# Patient Record
Sex: FEMALE | Race: WHITE | NOT HISPANIC OR LATINO | Employment: OTHER | URBAN - METROPOLITAN AREA
[De-identification: names, ages, dates, MRNs, and addresses within clinical notes are randomized per-mention and may not be internally consistent; named-entity substitution may affect disease eponyms.]

---

## 2015-02-27 LAB — HM COLONOSCOPY: NORMAL

## 2017-01-31 RX ORDER — CARVEDILOL 12.5 MG/1
TABLET ORAL
Qty: 180 TABLET | Refills: 3 | OUTPATIENT
Start: 2017-01-31

## 2017-02-03 RX ORDER — CARVEDILOL 12.5 MG/1
TABLET ORAL
Qty: 180 TABLET | Refills: 3 | Status: SHIPPED | OUTPATIENT
Start: 2017-02-03 | End: 2017-03-06 | Stop reason: DRUGHIGH

## 2017-03-03 RX ORDER — LOSARTAN POTASSIUM 50 MG/1
TABLET ORAL
Qty: 30 TABLET | Refills: 1 | Status: SHIPPED | OUTPATIENT
Start: 2017-03-03 | End: 2017-07-03 | Stop reason: SDUPTHER

## 2017-03-04 PROBLEM — R42 DIZZINESS: Status: ACTIVE | Noted: 2017-03-04

## 2017-03-04 PROBLEM — R11.0 NAUSEA: Status: ACTIVE | Noted: 2017-03-04

## 2017-03-04 PROBLEM — R00.1 SINUS BRADYCARDIA: Status: ACTIVE | Noted: 2017-03-04

## 2017-03-04 PROBLEM — I49.1 PREMATURE ATRIAL CONTRACTION: Status: ACTIVE | Noted: 2017-03-04

## 2017-03-04 PROBLEM — E07.9 DISORDER OF THYROID: Status: ACTIVE | Noted: 2017-03-04

## 2017-03-04 PROBLEM — I10 HYPERTENSION: Status: ACTIVE | Noted: 2017-03-04

## 2017-03-04 PROBLEM — R06.02 SHORTNESS OF BREATH: Status: ACTIVE | Noted: 2017-03-04

## 2017-03-04 PROBLEM — D64.9 ANEMIA: Status: ACTIVE | Noted: 2017-03-04

## 2017-03-04 PROBLEM — M81.0 OSTEOPOROSIS: Status: ACTIVE | Noted: 2017-03-04

## 2017-03-04 PROBLEM — M19.90 ARTHRITIS: Status: ACTIVE | Noted: 2017-03-04

## 2017-03-04 PROBLEM — K92.1 HEMATOCHEZIA: Status: ACTIVE | Noted: 2017-03-04

## 2017-03-04 PROBLEM — R10.32 LEFT LOWER QUADRANT PAIN: Status: ACTIVE | Noted: 2017-03-04

## 2017-03-04 PROBLEM — R53.83 FATIGUE: Status: ACTIVE | Noted: 2017-03-04

## 2017-03-04 PROBLEM — G47.30 SLEEP APNEA: Status: ACTIVE | Noted: 2017-03-04

## 2017-03-04 PROBLEM — G47.00 INSOMNIA: Status: ACTIVE | Noted: 2017-03-04

## 2017-03-04 PROBLEM — I49.3 VENTRICULAR PREMATURE BEATS: Status: ACTIVE | Noted: 2017-03-04

## 2017-03-04 PROBLEM — R94.31 ABNORMAL ELECTROCARDIOGRAM: Status: ACTIVE | Noted: 2017-03-04

## 2017-03-04 PROBLEM — K59.00 CONSTIPATION: Status: ACTIVE | Noted: 2017-03-04

## 2017-03-04 PROBLEM — R07.9 CHEST PAIN: Status: ACTIVE | Noted: 2017-03-04

## 2017-03-04 PROBLEM — K21.9 GASTROESOPHAGEAL REFLUX DISEASE WITHOUT ESOPHAGITIS: Status: ACTIVE | Noted: 2017-03-04

## 2017-03-04 PROBLEM — E78.5 DYSLIPIDEMIA: Status: ACTIVE | Noted: 2017-03-04

## 2017-03-04 PROBLEM — K52.9 NON-SPECIFIC COLITIS: Status: ACTIVE | Noted: 2017-03-04

## 2017-03-06 ENCOUNTER — OFFICE VISIT (OUTPATIENT)
Dept: INTERNAL MEDICINE | Facility: CLINIC | Age: 77
End: 2017-03-06

## 2017-03-06 VITALS
BODY MASS INDEX: 26.2 KG/M2 | HEIGHT: 66 IN | HEART RATE: 72 BPM | DIASTOLIC BLOOD PRESSURE: 92 MMHG | RESPIRATION RATE: 16 BRPM | WEIGHT: 163 LBS | SYSTOLIC BLOOD PRESSURE: 166 MMHG | TEMPERATURE: 97.6 F

## 2017-03-06 DIAGNOSIS — E78.5 DYSLIPIDEMIA: ICD-10-CM

## 2017-03-06 DIAGNOSIS — K21.9 GASTROESOPHAGEAL REFLUX DISEASE WITHOUT ESOPHAGITIS: ICD-10-CM

## 2017-03-06 DIAGNOSIS — M81.0 OSTEOPOROSIS: ICD-10-CM

## 2017-03-06 DIAGNOSIS — I10 ESSENTIAL HYPERTENSION: Primary | ICD-10-CM

## 2017-03-06 PROCEDURE — 99214 OFFICE O/P EST MOD 30 MIN: CPT | Performed by: FAMILY MEDICINE

## 2017-03-06 PROCEDURE — G0439 PPPS, SUBSEQ VISIT: HCPCS | Performed by: FAMILY MEDICINE

## 2017-03-06 RX ORDER — CARVEDILOL 25 MG/1
25 TABLET ORAL 2 TIMES DAILY WITH MEALS
COMMUNITY
End: 2017-09-07 | Stop reason: DRUGHIGH

## 2017-03-06 NOTE — PATIENT INSTRUCTIONS
Medicare Wellness  Personal Prevention Plan of Service     Date of Office Visit:  2017  Encounter Provider:  Jarred Carrizales Jr., MD  Place of Service:  [facility]  Patient Name: Moriah Petty  :  1940    As part of the Medicare Wellness portion of your visit today, we are providing you with this personalized preventive plan of services (PPPS). This plan is based upon recommendations of the [place] Preventive Services Task Force (USPSTF) and the Advisory Committee on Immunization Practices (ACIP).    This lists the preventive care services that should be considered, and provides dates of when you are due. Items listed as completed are up-to-date and do not require any further intervention.    Health Maintenance   Topic Date Due   • TDAP/TD VACCINES (1 - Tdap) 1958   • PNEUMOCOCCAL VACCINES (65+ LOW/MEDIUM RISK) (1 of 2 - PCV13) 2004   • ZOSTER VACCINE  2016   • DXA SCAN  2016   • MEDICARE ANNUAL WELLNESS  2016   • INFLUENZA VACCINE  Completed         Patient Self-Management and Personalized Health Advice  The patient has been provided with information about: prevention of cardiac or vascular disease and preventive services including:   · Counseling for cardiovascular disease risk reduction.    Visit Diagnoses:  No diagnosis found.    No orders of the defined types were placed in this encounter.      Outpatient Encounter Prescriptions as of 3/6/2017   Medication Sig Dispense Refill   • Aspirin (ASPIR-81 PO) Take  by mouth.     • Calcium Carb-Cholecalciferol (CALCIUM + D3) 600-200 MG-UNIT tablet Take  by mouth Daily.     • carvedilol (COREG) 25 MG tablet Take 25 mg by mouth 2 (Two) Times a Day With Meals.     • denosumab (PROLIA) 60 MG/ML solution syringe Inject  under the skin 1 (One) Time.     • losartan (COZAAR) 50 MG tablet TAKE 1 TABLET BY MOUTH EVERY DAY 30 tablet 1   • Multiple Vitamin (MULTI VITAMIN DAILY PO) Take  by mouth  Daily.     • Naproxen Sodium (ALEVE PO) Take  by mouth.     • Omega-3 Fatty Acids (FISH OIL) 1200 MG capsule capsule Take  by mouth.     • omeprazole (priLOSEC) 20 MG capsule Take  by mouth.     • [DISCONTINUED] amoxicillin (AMOXIL) 500 MG capsule Take 2 capsules by mouth 2 (Two) Times a Day. 20 capsule 0   • [DISCONTINUED] carvedilol (COREG) 12.5 MG tablet TAKE 1/2 TABLET BY MOUTH TWICE DAILY FOR 2 WKS, THEN 1 TABLET BY MOUTH TWICE DAILY 180 tablet 3     No facility-administered encounter medications on file as of 3/6/2017.        Reviewed use of high risk medication in the elderly: no  Reviewed for potential of harmful drug interactions in the elderly: no    Follow Up:  No Follow-up on file.     An After Visit Summary and PPPS with all of these plans were given to the patient.

## 2017-03-06 NOTE — PROGRESS NOTES
Subjective   Moriah Petty is a 76 y.o. female.     Chief Complaint   Patient presents with   • Hypertension   • Hyperlipidemia   • GI Problem         History of Present Illness   Patient is here as a new patient also follow-up of chronic disease management hypertension question of dyslipidemia also history of reflux esophagitis.  She is followed also osteoporosis with probably a injections and follows up with Dr. Myles [facility].      The following portions of the patient's history were reviewed and updated as appropriate: allergies, current medications, past social history and problem list.    Review of Systems   Constitutional: Negative.    HENT: Negative.    Eyes: Negative.    Respiratory: Negative.    Cardiovascular: Negative.    Gastrointestinal: Negative.    Endocrine: Negative.    Genitourinary: Negative.    Musculoskeletal: Negative.    Skin: Negative.    Allergic/Immunologic: Negative.    Neurological: Negative.    Hematological: Negative.    Psychiatric/Behavioral: Negative.        Objective   Vitals:    03/06/17 1704   BP: 166/92   Pulse: 72   Resp: 16   Temp: 97.6 °F (36.4 °C)     Physical Exam   Constitutional: She is oriented to person, place, and time. She appears well-developed and well-nourished.   HENT:   Head: Normocephalic and atraumatic.   Right Ear: Tympanic membrane and external ear normal.   Left Ear: Tympanic membrane and external ear normal.   Nose: Nose normal.   Mouth/Throat: Oropharynx is clear and moist.   Eyes: Conjunctivae and EOM are normal. Pupils are equal, round, and reactive to light.   Neck: Normal range of motion. Neck supple. No JVD present. No thyromegaly present.   Cardiovascular: Normal rate, regular rhythm, normal heart sounds and intact distal pulses.    Pulmonary/Chest: Effort normal and breath sounds normal.   Abdominal: Soft. Bowel sounds are normal.   Musculoskeletal: Normal range of motion.   Lymphadenopathy:     She has no cervical adenopathy.   Neurological: She is  alert and oriented to person, place, and time. No cranial nerve deficit. Coordination normal.   Skin: Skin is warm and dry. No rash noted.   Psychiatric: She has a normal mood and affect. Her behavior is normal. Judgment and thought content normal.   Vitals reviewed.      Assessment/Plan   Problem List Items Addressed This Visit        Cardiovascular and Mediastinum    Hypertension - Primary    Relevant Medications    carvedilol (COREG) 25 MG tablet       Digestive    Gastroesophageal reflux disease without esophagitis       Musculoskeletal and Integument    Osteoporosis       Other    Dyslipidemia       plan: No changes medications history reviewed call for refills recheck in 6 months sooner if needed.

## 2017-03-06 NOTE — PROGRESS NOTES
QUICK REFERENCE INFORMATION:  The ABCs of the Annual Wellness Visit    Subsequent Medicare Wellness Visit    HEALTH RISK ASSESSMENT    1940    Recent Hospitalizations:  No recent hospitalization(s)..        Current Medical Providers:  Patient Care Team:  Jarred Carrizales Jr., MD as PCP - General (Family Medicine)        Smoking Status:  History   Smoking Status   • Former Smoker   • Packs/day: 0.50   • Years: 30.00   • Types: Cigarettes   • Quit date: 5/6/1989   Smokeless Tobacco   • Never Used       Alcohol Consumption:  History   Alcohol Use   • 4.2 oz/week   • 7 Shots of liquor per week     Comment: 2 DRINKS PER DAY       Depression Screen:   PHQ-9 Depression Screening 3/6/2017   Little interest or pleasure in doing things 0   Feeling down, depressed, or hopeless 0   Trouble falling or staying asleep, or sleeping too much 0   Feeling tired or having little energy 0   Poor appetite or overeating 0   Feeling bad about yourself - or that you are a failure or have let yourself or your family down 0   Trouble concentrating on things, such as reading the newspaper or watching television 0   Moving or speaking so slowly that other people could have noticed. Or the opposite - being so fidgety or restless that you have been moving around a lot more than usual 0   Thoughts that you would be better off dead, or of hurting yourself in some way 0   PHQ-9 Total Score 0   If you checked off any problems, how difficult have these problems made it for you to do your work, take care of things at home, or get along with other people? Not difficult at all       Health Habits and Functional and Cognitive Screening:  Functional & Cognitive Status 3/6/2017   Do you have difficulty preparing food and eating? No   Do you have difficulty bathing yourself? No   Do you have difficulty getting dressed? No   Do you have difficulty using the toilet? No   Do you have difficulty moving around from place to place? No   In the past year have you  fallen or experienced a near fall? No   Do you need help using the phone?  No   Are you deaf or do you have serious difficulty hearing?  No   Do you need help with transportation? No   Do you need help shopping? No   Do you need help preparing meals?  No   Do you need help with housework?  No   Do you need help with laundry? No   Do you need help taking your medications? No   Do you need help managing money? No   Do you have difficulty concentrating, remembering or making decisions? No              Does the patient have evidence of cognitive impairment? No    Asprin use counseling:yes      Recent Lab Results:  CMP:  Lab Results   Component Value Date    BUN 15 01/28/2015    CREATININE 0.72 01/28/2015     01/28/2015    K 4.2 01/28/2015    CO2 27 01/28/2015    CALCIUM 10.1 01/28/2015    ALBUMIN 4.7 01/28/2015    BILITOT 0.3 01/28/2015    ALKPHOS 72 01/28/2015    AST 17 01/28/2015    ALT 14 01/28/2015     Lipid Panel:  Lab Results   Component Value Date    CHLPL 234 (H) 01/28/2015    TRIG 88 01/28/2015    HDL 78 (H) 01/28/2015     (H) 01/28/2015     LDL:     HbA1c:     Urine Microalbumin:     Visual Acuity:  No exam data present    Age-appropriate Screening Schedule:  Refer to the list below for future screening recommendations based on patient's age, sex and/or medical conditions. Orders for these recommended tests are listed in the plan section. The patient has been provided with a written plan.    Health Maintenance   Topic Date Due   • TDAP/TD VACCINES (1 - Tdap) 09/18/1958   • PNEUMOCOCCAL VACCINES (65+ LOW/MEDIUM RISK) (1 of 2 - PCV13) 09/18/2004   • ZOSTER VACCINE  11/29/2016   • DXA SCAN  12/19/2016   • INFLUENZA VACCINE  Completed        Subjective   History of Present Illness    Moriah Petty is a 76 y.o. female who presents for an Subsequent Wellness Visit.    The following portions of the patient's history were reviewed and updated as appropriate: allergies, current medications, past family  history, past medical history, past social history, past surgical history and problem list.    Outpatient Medications Prior to Visit   Medication Sig Dispense Refill   • Aspirin (ASPIR-81 PO) Take  by mouth.     • Calcium Carb-Cholecalciferol (CALCIUM + D3) 600-200 MG-UNIT tablet Take  by mouth Daily.     • losartan (COZAAR) 50 MG tablet TAKE 1 TABLET BY MOUTH EVERY DAY 30 tablet 1   • Multiple Vitamin (MULTI VITAMIN DAILY PO) Take  by mouth Daily.     • Omega-3 Fatty Acids (FISH OIL) 1200 MG capsule capsule Take  by mouth.     • omeprazole (priLOSEC) 20 MG capsule Take  by mouth.     • amoxicillin (AMOXIL) 500 MG capsule Take 2 capsules by mouth 2 (Two) Times a Day. 20 capsule 0   • carvedilol (COREG) 12.5 MG tablet TAKE 1/2 TABLET BY MOUTH TWICE DAILY FOR 2 WKS, THEN 1 TABLET BY MOUTH TWICE DAILY 180 tablet 3     No facility-administered medications prior to visit.        Patient Active Problem List   Diagnosis   • Abnormal electrocardiogram   • Non-specific colitis   • Acute post-traumatic headache   • Anemia   • Arthritis   • Hematochezia   • Chest pain   • Compression fracture of lumbar vertebra   • Closed wedge compression fracture of second lumbar vertebra   • Constipation   • Dizziness   • Dyslipidemia   • Fatigue   • Ventricular premature beats   • Gastroesophageal reflux disease without esophagitis   • Hypertension   • Hypotension   • Insomnia   • Left lower quadrant pain   • Low back pain   • Osteoporosis   • Premature atrial contraction   • Peripheral nerve disease   • Pneumonia   • Nausea   • Sensorineural hearing loss   • Shortness of breath   • Sinus bradycardia   • Sleep apnea   • Disorder of thyroid   • Trigeminal neuralgia       Identification of Risk Factors:  Risk factors include: cardiovascular risk.    Review of Systems   Constitutional: Negative.    HENT: Negative.    Eyes: Negative.    Respiratory: Negative.    Cardiovascular: Negative.    Gastrointestinal: Negative.    Endocrine: Negative.   "  Genitourinary: Negative.    Musculoskeletal: Negative.    Skin: Negative.    Allergic/Immunologic: Negative.    Neurological: Negative.    Hematological: Negative.    Psychiatric/Behavioral: Negative.        Compared to one year ago, the patient feels her physical health is the same.  Compared to one year ago, the patient feels her mental health is the same.    Objective     Physical Exam   Constitutional: She is oriented to person, place, and time. She appears well-developed and well-nourished.   HENT:   Head: Normocephalic and atraumatic.   Right Ear: Tympanic membrane and external ear normal.   Left Ear: Tympanic membrane and external ear normal.   Nose: Nose normal.   Mouth/Throat: Oropharynx is clear and moist.   Eyes: Conjunctivae and EOM are normal. Pupils are equal, round, and reactive to light.   Neck: Normal range of motion. Neck supple. No JVD present. No thyromegaly present.   Cardiovascular: Normal rate, regular rhythm, normal heart sounds and intact distal pulses.    Pulmonary/Chest: Effort normal and breath sounds normal.   Abdominal: Soft. Bowel sounds are normal.   Musculoskeletal: Normal range of motion.   Lymphadenopathy:     She has no cervical adenopathy.   Neurological: She is alert and oriented to person, place, and time. No cranial nerve deficit. Coordination normal.   Skin: Skin is warm and dry. No rash noted.   Psychiatric: She has a normal mood and affect. Her behavior is normal. Judgment and thought content normal.   Vitals reviewed.      Vitals:    03/06/17 1704   BP: 166/92   BP Location: Left arm   Patient Position: Sitting   Cuff Size: Adult   Pulse: 72   Resp: 16   Temp: 97.6 °F (36.4 °C)   TempSrc: Tympanic   Weight: 163 lb (73.9 kg)   Height: 65.5\" (166.4 cm)   PainSc: 0-No pain       Body mass index is 26.71 kg/(m^2).  Discussed the patient's BMI with her. The BMI is in the acceptable range.    Assessment/Plan   Patient Self-Management and Personalized Health Advice  The patient " has been provided with information about: prevention of cardiac or vascular disease and preventive services including:   · Counseling for cardiovascular disease risk reduction.    Visit Diagnoses:  No diagnosis found.    No orders of the defined types were placed in this encounter.      Outpatient Encounter Prescriptions as of 3/6/2017   Medication Sig Dispense Refill   • Aspirin (ASPIR-81 PO) Take  by mouth.     • Calcium Carb-Cholecalciferol (CALCIUM + D3) 600-200 MG-UNIT tablet Take  by mouth Daily.     • carvedilol (COREG) 25 MG tablet Take 25 mg by mouth 2 (Two) Times a Day With Meals.     • denosumab (PROLIA) 60 MG/ML solution syringe Inject  under the skin 1 (One) Time.     • losartan (COZAAR) 50 MG tablet TAKE 1 TABLET BY MOUTH EVERY DAY 30 tablet 1   • Multiple Vitamin (MULTI VITAMIN DAILY PO) Take  by mouth Daily.     • Naproxen Sodium (ALEVE PO) Take  by mouth.     • Omega-3 Fatty Acids (FISH OIL) 1200 MG capsule capsule Take  by mouth.     • omeprazole (priLOSEC) 20 MG capsule Take  by mouth.     • [DISCONTINUED] amoxicillin (AMOXIL) 500 MG capsule Take 2 capsules by mouth 2 (Two) Times a Day. 20 capsule 0   • [DISCONTINUED] carvedilol (COREG) 12.5 MG tablet TAKE 1/2 TABLET BY MOUTH TWICE DAILY FOR 2 WKS, THEN 1 TABLET BY MOUTH TWICE DAILY 180 tablet 3     No facility-administered encounter medications on file as of 3/6/2017.        Reviewed use of high risk medication in the elderly: no  Reviewed for potential of harmful drug interactions in the elderly: no    Follow Up:  No Follow-up on file.     An After Visit Summary and PPPS with all of these plans were given to the patient.

## 2017-04-03 ENCOUNTER — TRANSCRIBE ORDERS (OUTPATIENT)
Dept: ADMINISTRATIVE | Facility: HOSPITAL | Age: 77
End: 2017-04-03

## 2017-04-03 DIAGNOSIS — Z12.31 VISIT FOR SCREENING MAMMOGRAM: Primary | ICD-10-CM

## 2017-04-04 ENCOUNTER — OFFICE VISIT (OUTPATIENT)
Dept: CARDIOLOGY | Facility: CLINIC | Age: 77
End: 2017-04-04

## 2017-04-04 VITALS
DIASTOLIC BLOOD PRESSURE: 77 MMHG | BODY MASS INDEX: 25.43 KG/M2 | HEART RATE: 61 BPM | WEIGHT: 162 LBS | HEIGHT: 67 IN | SYSTOLIC BLOOD PRESSURE: 168 MMHG

## 2017-04-04 DIAGNOSIS — G47.33 OBSTRUCTIVE SLEEP APNEA SYNDROME: ICD-10-CM

## 2017-04-04 DIAGNOSIS — I49.3 VENTRICULAR PREMATURE BEATS: ICD-10-CM

## 2017-04-04 DIAGNOSIS — K21.9 GASTROESOPHAGEAL REFLUX DISEASE WITHOUT ESOPHAGITIS: ICD-10-CM

## 2017-04-04 DIAGNOSIS — R06.02 SHORTNESS OF BREATH: ICD-10-CM

## 2017-04-04 DIAGNOSIS — R53.82 CHRONIC FATIGUE: ICD-10-CM

## 2017-04-04 DIAGNOSIS — R00.1 SINUS BRADYCARDIA: ICD-10-CM

## 2017-04-04 DIAGNOSIS — I10 ESSENTIAL HYPERTENSION: ICD-10-CM

## 2017-04-04 DIAGNOSIS — I49.1 PREMATURE ATRIAL CONTRACTION: ICD-10-CM

## 2017-04-04 DIAGNOSIS — I10 HTN (HYPERTENSION), BENIGN: Primary | ICD-10-CM

## 2017-04-04 PROCEDURE — 93000 ELECTROCARDIOGRAM COMPLETE: CPT | Performed by: INTERNAL MEDICINE

## 2017-04-04 PROCEDURE — 99214 OFFICE O/P EST MOD 30 MIN: CPT | Performed by: INTERNAL MEDICINE

## 2017-04-04 NOTE — PROGRESS NOTES
[facility]  Cardiology Office Visit Note        Subjective:     Encounter Date:2017      Patient ID: Moriah Petty   Age: 76 y.o.  Sex: female  :  1940  MRN: [id number]             Date of Office Visit: 2017  Encounter Provider: Roney Sierra MD  Place of Service: [facility]     .    Chief Complaint:  History of Present Illness    The following portions of the patient's history were reviewed and updated as appropriate: allergies, current medications, past family history, past medical history, past social history, past surgical history and problem list.    Review of Systems   Constitution: Negative for chills, fever and weight gain.   HENT: Negative for congestion, headaches, hearing loss and sore throat.    Eyes: Negative for blurred vision and double vision.   Cardiovascular: Negative for chest pain, claudication, cyanosis, dyspnea on exertion, irregular heartbeat, leg swelling, near-syncope, orthopnea, palpitations, paroxysmal nocturnal dyspnea and syncope.   Respiratory: Negative for cough, shortness of breath, snoring and wheezing.    Endocrine: Negative for cold intolerance.   Hematologic/Lymphatic: Negative for adenopathy. Does not bruise/bleed easily.   Skin: Negative for rash.   Musculoskeletal: Negative for back pain.   Gastrointestinal: Negative for abdominal pain, change in bowel habit, constipation, diarrhea, nausea and vomiting.   Genitourinary: Negative for dysuria, frequency, hematuria and hesitancy.   Neurological: Negative for disturbances in coordination, excessive daytime sleepiness, dizziness, focal weakness, light-headedness, loss of balance and numbness.   Psychiatric/Behavioral: Negative for depression and memory loss. The patient is not nervous/anxious.    Allergic/Immunologic: Negative for hives.         ECG 12 Lead  Date/Time: 2017 12:53 PM  Performed by: RONEY SIERRA JR  Authorized by: MARIELA CASILLAS  RONEY GOFF   Comparison: compared with previous ECG   Similar to previous ECG  Rhythm: sinus bradycardia  BPM: 56  Conduction: LAFB  Other findings: LVH  Clinical impression: abnormal ECG                       HPI     The patient is a 76-year-old white female with history of normal coronary arteries by cardiac catheterization November 2012, hypertension, sleep apnea who does not wear CPAP, who presents for cardiac follow-up.  I last saw her in the office in January 2016.  Since then she denies chest pain.  No PND, orthopnea or pedal edema.  No palpitations dizziness or syncope.  No recent change in weight.    Cardiac risk factors: No diabetes.  Positive hypertension and hyperlipidemia.  No smoking for many years.  No family history of early CAD as father had MI in his 70s.    Last echocardiogram in September 2014 showed LVEF 60%, mild to moderate LVH, interatrial septal aneurysm, history of patent foramen ovale.  Last treadmill test was performed in 2010 showing no ischemia.  Cardiolite was normal.  Ejection fraction 61%.            Past Medical History:   Diagnosis Date   • Anemia    • Bradycardia    • Colitis    • Colon polyps    • Compression fracture of L1 lumbar vertebra    • Compression fracture of L2    • Depression    • Diverticulosis    • GERD (gastroesophageal reflux disease)    • GI hemorrhage    • Hearing loss    • History of blood transfusion 11/2013    S/P CHOLECYSTECTOMY SX  BHE   • History of trigeminal neuralgia    • Hypertension    • Insomnia    • Memory loss    • OA (osteoarthritis)    • Osteoarthritis    • Osteoporosis    • PAC (premature atrial contraction)    • Peripheral neuralgia    • Pneumonia     RUL   • PVC's (premature ventricular contractions)    • Sleep apnea    • Thyroid disorder        Past Surgical History:   Procedure Laterality Date   • BRAIN SURGERY Left 08/2014    MICROVASCULAR DECOMPRESSION LEFT EAR   • CARDIAC CATHETERIZATION  03/2002   • CHOLECYSTECTOMY N/A 11/20/2013      ESME GOLDMAN   • COLONOSCOPY N/A 02/21/2012    INT/EXT HEMORRHOIDS, DIVERTICULOSIS, 2 HYPERPLASTIC POLYPS, TORT,    • COLONOSCOPY  02/27/2015    eh, tics, torts, ih   REPEAT IN 5 YRS      • JOINT REPLACEMENT  03/15/2004    HIP-PARTIAL DR. SIMRAN KRAMER   • JOINT REPLACEMENT Bilateral 2013    KNEE   • KYPHOPLASTY      COMPRESSION FX OF L2   • TRIGEMINAL NERVE DECOMPRESSION         Social History     Social History   • Marital status:      Spouse name: N/A   • Number of children: N/A   • Years of education: N/A     Occupational History   • Not on file.     Social History Main Topics   • Smoking status: Former Smoker     Packs/day: 0.50     Years: 30.00     Types: Cigarettes     Quit date: 5/6/1989   • Smokeless tobacco: Never Used   • Alcohol use 4.2 oz/week     7 Shots of liquor per week      Comment: 2 DRINKS PER DAY   • Drug use: No   • Sexual activity: Not on file     Other Topics Concern   • Not on file     Social History Narrative       Family History   Problem Relation Age of Onset   • Ovarian cancer Daughter    • Colon cancer Mother    • Arthritis Mother    • Stomach cancer Maternal Grandfather    • Glaucoma Father    • Depression Son    • Stroke Paternal Grandmother            Scheduled Meds:  Current Outpatient Prescriptions on File Prior to Visit   Medication Sig Dispense Refill   • Aspirin (ASPIR-81 PO) Take  by mouth.     • Calcium Carb-Cholecalciferol (CALCIUM + D3) 600-200 MG-UNIT tablet Take  by mouth Daily.     • carvedilol (COREG) 25 MG tablet Take 25 mg by mouth 2 (Two) Times a Day With Meals.     • denosumab (PROLIA) 60 MG/ML solution syringe Inject  under the skin 1 (One) Time.     • losartan (COZAAR) 50 MG tablet TAKE 1 TABLET BY MOUTH EVERY DAY 30 tablet 1   • Multiple Vitamin (MULTI VITAMIN DAILY PO) Take  by mouth Daily.     • Naproxen Sodium (ALEVE PO) Take  by mouth.     • Omega-3 Fatty Acids (FISH OIL) 1200 MG capsule capsule Take  by mouth.     •  "omeprazole (priLOSEC) 20 MG capsule Take  by mouth.       No current facility-administered medications on file prior to visit.        /77  Pulse 61  Ht 67\" (170.2 cm)  Wt 162 lb (73.5 kg)  BMI 25.37 kg/m2    Objective:     Physical Exam   Constitutional: She is oriented to person, place, and time. She appears well-developed and well-nourished. No distress.   HENT:   Head: Normocephalic and atraumatic.   Right Ear: External ear normal.   Left Ear: External ear normal.   Mouth/Throat: Oropharynx is clear and moist. No oropharyngeal exudate.   Eyes: Conjunctivae and EOM are normal. Pupils are equal, round, and reactive to light. No scleral icterus.   Neck: Normal range of motion. Neck supple. No JVD present. No tracheal deviation present. No thyromegaly present.   Cardiovascular: Normal rate, regular rhythm, S1 normal, S2 normal, normal heart sounds and intact distal pulses.  PMI is not displaced.  Exam reveals no gallop, no distant heart sounds, no friction rub and no decreased pulses.    No murmur heard.  Pulmonary/Chest: Effort normal and breath sounds normal. No accessory muscle usage. No respiratory distress. She has no wheezes. She has no rales. She exhibits no tenderness.   Abdominal: Soft. Bowel sounds are normal. She exhibits no distension and no mass. There is no tenderness. There is no rebound and no guarding.   Musculoskeletal: Normal range of motion. She exhibits no edema, tenderness or deformity.   Lymphadenopathy:     She has no cervical adenopathy.   Neurological: She is alert and oriented to person, place, and time. She has normal reflexes. No cranial nerve deficit. Coordination normal.   Skin: Skin is dry. No rash noted. She is not diaphoretic. No erythema. No pallor.   Psychiatric: She has a normal mood and affect.             Lab Review:               Lab Review:         Lab Review     No results found for: CHOL  Lab Results   Component Value Date    HDL 78 (H) 01/28/2015    HDL 72 (H) " 01/28/2014     Lab Results   Component Value Date     (H) 01/28/2015     (H) 01/28/2014     Lab Results   Component Value Date    TRIG 88 01/28/2015    TRIG 56 01/28/2014     No components found for: CHOLHDL  Lab Results   Component Value Date    GLUCOSE 89 01/28/2015    BUN 15 01/28/2015    CREATININE 0.72 01/28/2015    CO2 27 01/28/2015    CALCIUM 10.1 01/28/2015    ALBUMIN 4.7 01/28/2015    AST 17 01/28/2015    ALT 14 01/28/2015     Lab Results   Component Value Date    GLUCOSE 89 01/28/2015    CALCIUM 10.1 01/28/2015     01/28/2015    K 4.2 01/28/2015    CO2 27 01/28/2015    CL 99 01/28/2015    BUN 15 01/28/2015    CREATININE 0.72 01/28/2015     Lab Results   Component Value Date    WBC 6.84 06/09/2016    HGB 14.8 06/09/2016    HCT 44.0 06/09/2016    MCV 93.2 06/09/2016     06/09/2016     Lab Results   Component Value Date    DDIMER <150 01/28/2015     Lab Results   Component Value Date    TSH 1.92 01/28/2015     Lab Results   Component Value Date    CKTOTAL 162 01/28/2015     No results found for: DIGOXIN  Lab Results   Component Value Date    CKTOTAL 162 01/28/2015     No results found for: INR, PROTIME  CrCl cannot be calculated (Patient has no serum creatinine result on file.).    Assessment:          Diagnosis Plan   1. HTN (hypertension), benign  ECG 12 Lead   2. Essential hypertension     3. Premature atrial contraction     4. Sinus bradycardia     5. Ventricular premature beats     6. Shortness of breath     7. Gastroesophageal reflux disease without esophagitis     8. Obstructive sleep apnea syndrome     9. Chronic fatigue            Assessment and Plan:    Moriah was seen today for hypertension.    Diagnoses and all orders for this visit:    HTN (hypertension), benign  -     ECG 12 Lead    Essential hypertension    Premature atrial contraction    Sinus bradycardia    Ventricular premature beats    Shortness of breath    Gastroesophageal reflux disease without  esophagitis    Obstructive sleep apnea syndrome    Chronic fatigue    The patient doing fine from a cardiac standpoint.  Last some I saw her, put her on Coreg for treatment of mild hypertension and palpitation from PVCs and PACs.  Her blood pressure has been normal around 120/80 at home in the recent past.  She hasn't taken her blood pressure though in the past month or so.  Her blood pressure is elevated at 166/77.  I will have her check her blood pressure and heart rate at home daily for 2 weeks and call our office.  I may need to adjust her blood pressure medications if her blood pressure remains elevated at home.    A total of 25 minutes was spent in the care of this patient, including at least 13 minutes face-to-face with the patient.    I not only counseled the patient today on the significant factors noted in the assessment and plan, but I also recommended that the patient reduce salt and saturated animal fat intake in diet, as well as to perform scheduled exercise on a regular basis.      Plan:                  04/04/2017  10:58 PM  MD Anibal Clay MD  4/4/2017, 10:58 PM    EMR Dragon/Transcription disclaimer:   Much of this encounter note is an electronic transcription/translation of spoken language to printed text. The electronic translation of spoken language may permit erroneous, or at times, nonsensical words or phrases to be inadvertently transcribed; Although I have reviewed the note for such errors, some may still exist.

## 2017-04-11 ENCOUNTER — HOSPITAL ENCOUNTER (OUTPATIENT)
Dept: MAMMOGRAPHY | Facility: HOSPITAL | Age: 77
Discharge: HOME | End: 2017-04-11
Attending: OBSTETRICS & GYNECOLOGY | Admitting: OBSTETRICS & GYNECOLOGY

## 2017-04-11 DIAGNOSIS — Z12.31 VISIT FOR SCREENING MAMMOGRAM: ICD-10-CM

## 2017-04-11 PROCEDURE — 77063 BREAST TOMOSYNTHESIS BI: CPT

## 2017-04-11 PROCEDURE — G0202 SCR MAMMO BI INCL CAD: HCPCS

## 2017-04-18 ENCOUNTER — APPOINTMENT (OUTPATIENT)
Dept: MAMMOGRAPHY | Facility: HOSPITAL | Age: 77
End: 2017-04-18
Attending: OBSTETRICS & GYNECOLOGY

## 2017-05-09 ENCOUNTER — OFFICE VISIT (OUTPATIENT)
Dept: OBSTETRICS AND GYNECOLOGY | Facility: CLINIC | Age: 77
End: 2017-05-09

## 2017-05-09 VITALS
HEIGHT: 66 IN | BODY MASS INDEX: 25.91 KG/M2 | DIASTOLIC BLOOD PRESSURE: 82 MMHG | SYSTOLIC BLOOD PRESSURE: 148 MMHG | WEIGHT: 161.2 LBS

## 2017-05-09 DIAGNOSIS — Z01.419 ENCOUNTER FOR GYNECOLOGICAL EXAMINATION WITHOUT ABNORMAL FINDING: Primary | ICD-10-CM

## 2017-05-09 DIAGNOSIS — M81.0 OSTEOPOROSIS: ICD-10-CM

## 2017-05-09 LAB
DEVELOPER EXPIRATION DATE: NORMAL
DEVELOPER LOT NUMBER: NORMAL
EXPIRATION DATE: NORMAL
FECAL OCCULT BLOOD SCREEN, POC: NEGATIVE
Lab: NORMAL
NEGATIVE CONTROL: NEGATIVE
POSITIVE CONTROL: POSITIVE

## 2017-05-09 PROCEDURE — G0101 CA SCREEN;PELVIC/BREAST EXAM: HCPCS | Performed by: OBSTETRICS & GYNECOLOGY

## 2017-05-09 PROCEDURE — G0328 FECAL BLOOD SCRN IMMUNOASSAY: HCPCS | Performed by: OBSTETRICS & GYNECOLOGY

## 2017-05-11 LAB
CONV .: NORMAL
CYTOLOGIST CVX/VAG CYTO: NORMAL
CYTOLOGY CVX/VAG DOC THIN PREP: NORMAL
DX ICD CODE: NORMAL
HIV 1 & 2 AB SER-IMP: NORMAL
OTHER STN SPEC: NORMAL
PATH REPORT.FINAL DX SPEC: NORMAL
STAT OF ADQ CVX/VAG CYTO-IMP: NORMAL

## 2017-05-30 ENCOUNTER — CLINICAL SUPPORT (OUTPATIENT)
Dept: OBSTETRICS AND GYNECOLOGY | Facility: CLINIC | Age: 77
End: 2017-05-30

## 2017-05-30 VITALS
BODY MASS INDEX: 25.78 KG/M2 | SYSTOLIC BLOOD PRESSURE: 150 MMHG | HEIGHT: 66 IN | WEIGHT: 160.4 LBS | DIASTOLIC BLOOD PRESSURE: 94 MMHG

## 2017-05-30 DIAGNOSIS — M81.0 OSTEOPOROSIS: Primary | ICD-10-CM

## 2017-05-30 DIAGNOSIS — Z92.29 HISTORY OF DRUG THERAPY: ICD-10-CM

## 2017-05-30 PROCEDURE — 96372 THER/PROPH/DIAG INJ SC/IM: CPT | Performed by: OBSTETRICS & GYNECOLOGY

## 2017-07-03 RX ORDER — LOSARTAN POTASSIUM 50 MG/1
TABLET ORAL
Qty: 90 TABLET | Refills: 3 | Status: SHIPPED | OUTPATIENT
Start: 2017-07-03 | End: 2019-05-20

## 2017-09-07 ENCOUNTER — OFFICE VISIT (OUTPATIENT)
Dept: INTERNAL MEDICINE | Facility: CLINIC | Age: 77
End: 2017-09-07

## 2017-09-07 VITALS
OXYGEN SATURATION: 96 % | SYSTOLIC BLOOD PRESSURE: 122 MMHG | BODY MASS INDEX: 25.99 KG/M2 | DIASTOLIC BLOOD PRESSURE: 86 MMHG | WEIGHT: 161 LBS | TEMPERATURE: 96.4 F | HEART RATE: 51 BPM

## 2017-09-07 DIAGNOSIS — R53.83 OTHER FATIGUE: ICD-10-CM

## 2017-09-07 DIAGNOSIS — G47.62 SLEEP RELATED LEG CRAMPS: Primary | ICD-10-CM

## 2017-09-07 DIAGNOSIS — I10 ESSENTIAL HYPERTENSION: ICD-10-CM

## 2017-09-07 DIAGNOSIS — E78.5 DYSLIPIDEMIA: ICD-10-CM

## 2017-09-07 PROCEDURE — 99214 OFFICE O/P EST MOD 30 MIN: CPT | Performed by: FAMILY MEDICINE

## 2017-09-07 RX ORDER — DESONIDE 0.5 MG/G
CREAM TOPICAL
Refills: 1 | COMMUNITY
Start: 2017-08-02 | End: 2019-05-20

## 2017-09-07 RX ORDER — CARVEDILOL 12.5 MG/1
TABLET ORAL
Refills: 3 | COMMUNITY
Start: 2017-08-17 | End: 2019-05-20

## 2017-09-07 NOTE — PROGRESS NOTES
Subjective   Moriah Petty is a 76 y.o. female.     Chief Complaint   Patient presents with   • Leg cramps   • Hypertension   • Insomnia         History of Present Illness   Patient is history of hypertension hyperlipidemia with history of OB/GYN follow-up.    Other problems addressed include nocturnal leg cramps and difficulty sleeping.  Prior labs and workup are reviewed.  She has history of trigeminal neuralgia requiring surgery.  We discussed some aspects of fatigue in relation to sleep.      The following portions of the patient's history were reviewed and updated as appropriate: allergies, current medications, past social history and problem list.    Review of Systems   Constitutional: Negative.    HENT: Negative.    Eyes: Negative.    Respiratory: Negative.    Cardiovascular: Negative.    Gastrointestinal: Negative.    Endocrine: Negative.    Genitourinary: Negative.    Musculoskeletal: Negative.    Skin: Negative.    Allergic/Immunologic: Negative.    Neurological: Negative.    Hematological: Negative.    Psychiatric/Behavioral: Positive for sleep disturbance.       Objective   Vitals:    09/07/17 1029   BP: 122/86   Pulse: 51   Temp: 96.4 °F (35.8 °C)   SpO2: 96%     Physical Exam   Constitutional: She is oriented to person, place, and time. She appears well-developed and well-nourished.   HENT:   Head: Normocephalic and atraumatic.   Right Ear: Tympanic membrane and external ear normal.   Left Ear: Tympanic membrane and external ear normal.   Nose: Nose normal.   Mouth/Throat: Oropharynx is clear and moist.   Eyes: Conjunctivae and EOM are normal. Pupils are equal, round, and reactive to light.   Neck: Normal range of motion. Neck supple. No JVD present. No thyromegaly present.   Cardiovascular: Normal rate, regular rhythm, normal heart sounds and intact distal pulses.    Pulmonary/Chest: Effort normal and breath sounds normal.   Abdominal: Soft. Bowel sounds are normal.   Musculoskeletal: Normal range of  motion.   Lymphadenopathy:     She has no cervical adenopathy.   Neurological: She is alert and oriented to person, place, and time. No cranial nerve deficit. Coordination normal.   Skin: Skin is warm and dry. No rash noted.   Psychiatric: She has a normal mood and affect. Her behavior is normal. Judgment and thought content normal.   Vitals reviewed.      Assessment/Plan   Problem List Items Addressed This Visit        Cardiovascular and Mediastinum    Hypertension    Relevant Medications    carvedilol (COREG) 12.5 MG tablet    Other Relevant Orders    TSH    T4, Free    T3, Free    Vitamin B12    Lipid Panel With / Chol / HDL Ratio    CBC & Differential    Comprehensive Metabolic Panel    Urinalysis With / Microscopic If Indicated       Other    Dyslipidemia    Relevant Orders    TSH    T4, Free    T3, Free    Vitamin B12    Lipid Panel With / Chol / HDL Ratio    CBC & Differential    Comprehensive Metabolic Panel    Urinalysis With / Microscopic If Indicated    Fatigue    Relevant Orders    TSH    T4, Free    T3, Free    Vitamin B12    Lipid Panel With / Chol / HDL Ratio    CBC & Differential    Comprehensive Metabolic Panel    Urinalysis With / Microscopic If Indicated      Other Visit Diagnoses     Sleep related leg cramps    -  Primary    Relevant Orders    TSH    T4, Free    T3, Free    Vitamin B12    Lipid Panel With / Chol / HDL Ratio    CBC & Differential    Comprehensive Metabolic Panel    Urinalysis With / Microscopic If Indicated      Plan: Screening labs for fatigue and leg cramps.  Recheck in 6 months.  Meds continued.  May try OTC magnesium oxide 250 mg at bedtime.

## 2017-09-08 LAB
ALBUMIN SERPL-MCNC: 4.9 G/DL (ref 3.5–5.2)
ALBUMIN/GLOB SERPL: 1.8 G/DL
ALP SERPL-CCNC: 45 U/L (ref 39–117)
ALT SERPL-CCNC: 13 U/L (ref 1–33)
APPEARANCE UR: CLEAR
AST SERPL-CCNC: 18 U/L (ref 1–32)
BASOPHILS # BLD AUTO: 0.06 10*3/MM3 (ref 0–0.2)
BASOPHILS NFR BLD AUTO: 0.8 % (ref 0–1.5)
BILIRUB SERPL-MCNC: 0.7 MG/DL (ref 0.1–1.2)
BILIRUB UR QL STRIP: NEGATIVE
BUN SERPL-MCNC: 13 MG/DL (ref 8–23)
BUN/CREAT SERPL: 15.3 (ref 7–25)
CALCIUM SERPL-MCNC: 10.6 MG/DL (ref 8.6–10.5)
CHLORIDE SERPL-SCNC: 90 MMOL/L (ref 98–107)
CHOLEST SERPL-MCNC: 260 MG/DL (ref 0–200)
CHOLEST/HDLC SERPL: 2.77 {RATIO}
CO2 SERPL-SCNC: 27.6 MMOL/L (ref 22–29)
COLOR UR: YELLOW
CREAT SERPL-MCNC: 0.85 MG/DL (ref 0.57–1)
EOSINOPHIL # BLD AUTO: 0.18 10*3/MM3 (ref 0–0.7)
EOSINOPHIL NFR BLD AUTO: 2.4 % (ref 0.3–6.2)
ERYTHROCYTE [DISTWIDTH] IN BLOOD BY AUTOMATED COUNT: 13.7 % (ref 11.7–13)
GLOBULIN SER CALC-MCNC: 2.8 GM/DL
GLUCOSE SERPL-MCNC: 100 MG/DL (ref 65–99)
GLUCOSE UR QL: NEGATIVE
HCT VFR BLD AUTO: 41.4 % (ref 35.6–45.5)
HDLC SERPL-MCNC: 94 MG/DL (ref 40–60)
HGB BLD-MCNC: 14 G/DL (ref 11.9–15.5)
HGB UR QL STRIP: NEGATIVE
IMM GRANULOCYTES # BLD: 0.02 10*3/MM3 (ref 0–0.03)
IMM GRANULOCYTES NFR BLD: 0.3 % (ref 0–0.5)
KETONES UR QL STRIP: NEGATIVE
LDLC SERPL CALC-MCNC: 150 MG/DL (ref 0–100)
LEUKOCYTE ESTERASE UR QL STRIP: NEGATIVE
LYMPHOCYTES # BLD AUTO: 2.08 10*3/MM3 (ref 0.9–4.8)
LYMPHOCYTES NFR BLD AUTO: 27.4 % (ref 19.6–45.3)
MCH RBC QN AUTO: 31.5 PG (ref 26.9–32)
MCHC RBC AUTO-ENTMCNC: 33.8 G/DL (ref 32.4–36.3)
MCV RBC AUTO: 93 FL (ref 80.5–98.2)
MONOCYTES # BLD AUTO: 0.58 10*3/MM3 (ref 0.2–1.2)
MONOCYTES NFR BLD AUTO: 7.6 % (ref 5–12)
NEUTROPHILS # BLD AUTO: 4.67 10*3/MM3 (ref 1.9–8.1)
NEUTROPHILS NFR BLD AUTO: 61.5 % (ref 42.7–76)
NITRITE UR QL STRIP: NEGATIVE
PH UR STRIP: 7.5 [PH] (ref 5–8)
PLATELET # BLD AUTO: 275 10*3/MM3 (ref 140–500)
POTASSIUM SERPL-SCNC: 4.9 MMOL/L (ref 3.5–5.2)
PROT SERPL-MCNC: 7.7 G/DL (ref 6–8.5)
PROT UR QL STRIP: NEGATIVE
RBC # BLD AUTO: 4.45 10*6/MM3 (ref 3.9–5.2)
SODIUM SERPL-SCNC: 130 MMOL/L (ref 136–145)
SP GR UR: 1.02 (ref 1–1.03)
T3FREE SERPL-MCNC: 2.7 PG/ML (ref 2–4.4)
T4 FREE SERPL-MCNC: 1.11 NG/DL (ref 0.93–1.7)
TRIGL SERPL-MCNC: 81 MG/DL (ref 0–150)
TSH SERPL DL<=0.005 MIU/L-ACNC: 10.38 MIU/ML (ref 0.27–4.2)
UROBILINOGEN UR STRIP-MCNC: NORMAL MG/DL
VIT B12 SERPL-MCNC: 838 PG/ML (ref 211–946)
VLDLC SERPL CALC-MCNC: 16.2 MG/DL (ref 5–40)
WBC # BLD AUTO: 7.59 10*3/MM3 (ref 4.5–10.7)

## 2017-09-14 DIAGNOSIS — R79.89 ELEVATED TSH: Primary | ICD-10-CM

## 2017-09-14 DIAGNOSIS — R73.01 ELEVATED FASTING GLUCOSE: ICD-10-CM

## 2017-09-14 DIAGNOSIS — E87.1 HYPONATREMIA: ICD-10-CM

## 2017-09-27 ENCOUNTER — RESULTS ENCOUNTER (OUTPATIENT)
Dept: INTERNAL MEDICINE | Facility: CLINIC | Age: 77
End: 2017-09-27

## 2017-09-27 ENCOUNTER — OFFICE VISIT (OUTPATIENT)
Dept: INTERNAL MEDICINE | Facility: CLINIC | Age: 77
End: 2017-09-27

## 2017-09-27 VITALS
WEIGHT: 164 LBS | HEART RATE: 57 BPM | DIASTOLIC BLOOD PRESSURE: 76 MMHG | SYSTOLIC BLOOD PRESSURE: 128 MMHG | TEMPERATURE: 97.2 F | OXYGEN SATURATION: 96 % | BODY MASS INDEX: 26.47 KG/M2

## 2017-09-27 DIAGNOSIS — E03.9 ACQUIRED HYPOTHYROIDISM: Primary | ICD-10-CM

## 2017-09-27 DIAGNOSIS — Z23 NEED FOR IMMUNIZATION AGAINST INFLUENZA: ICD-10-CM

## 2017-09-27 DIAGNOSIS — E83.52 SERUM CALCIUM ELEVATED: ICD-10-CM

## 2017-09-27 DIAGNOSIS — R79.89 ELEVATED TSH: ICD-10-CM

## 2017-09-27 DIAGNOSIS — E87.1 HYPONATREMIA: ICD-10-CM

## 2017-09-27 DIAGNOSIS — I10 ESSENTIAL HYPERTENSION: ICD-10-CM

## 2017-09-27 DIAGNOSIS — E78.5 DYSLIPIDEMIA: ICD-10-CM

## 2017-09-27 DIAGNOSIS — R73.01 ELEVATED FASTING GLUCOSE: ICD-10-CM

## 2017-09-27 DIAGNOSIS — K21.9 GASTROESOPHAGEAL REFLUX DISEASE WITHOUT ESOPHAGITIS: ICD-10-CM

## 2017-09-27 PROCEDURE — 99214 OFFICE O/P EST MOD 30 MIN: CPT | Performed by: FAMILY MEDICINE

## 2017-09-27 PROCEDURE — G0008 ADMIN INFLUENZA VIRUS VAC: HCPCS | Performed by: FAMILY MEDICINE

## 2017-09-27 RX ORDER — LEVOTHYROXINE SODIUM 25 UG/1
25 CAPSULE ORAL DAILY
Qty: 30 CAPSULE | Refills: 0 | Status: SHIPPED | OUTPATIENT
Start: 2017-09-27 | End: 2017-10-20 | Stop reason: SDUPTHER

## 2017-09-27 NOTE — PROGRESS NOTES
Subjective   Moriah Petty is a 77 y.o. female.     Chief Complaint   Patient presents with   • Hypothyroidism   • Depression   • Hypertension   • Hyperlipidemia         History of Present Illness   Patient returns for recheck with lab abnormalities.  She has had a rise in TSH to greater than 10 history of being treated with thyroid supplement Dr. Wright several years ago.  She is also had some increase in leg cramps insomnia mild depression with labs showing slight rise in calcium hyponatremia sodium down to 130.  There is been arising cholesterol.  She's had increasing fatigue.    She also sees Dr. Myles GYN Dr. Collins gastroneurology also Dr. Trujillo cardiology.    We discussed trying to get off omeprazole as she is worried about some the studies about dementia.  We'll have her alternate omeprazole with Pepcid.      The following portions of the patient's history were reviewed and updated as appropriate: allergies, current medications, past social history and problem list.    Review of Systems   Constitutional: Negative.    HENT: Negative.    Eyes: Negative.    Respiratory: Negative.    Cardiovascular: Negative.    Gastrointestinal: Negative.    Endocrine: Negative.    Genitourinary: Negative.    Musculoskeletal: Negative.    Skin: Negative.    Allergic/Immunologic: Negative.    Neurological: Negative.    Hematological: Negative.    Psychiatric/Behavioral: Negative.        Objective   Vitals:    09/27/17 0943   BP: 128/76   Pulse: 57   Temp: 97.2 °F (36.2 °C)   SpO2: 96%     Physical Exam   Constitutional: She is oriented to person, place, and time. She appears well-developed and well-nourished.   HENT:   Head: Normocephalic and atraumatic.   Right Ear: Tympanic membrane and external ear normal.   Left Ear: Tympanic membrane and external ear normal.   Nose: Nose normal.   Mouth/Throat: Oropharynx is clear and moist.   Eyes: Conjunctivae and EOM are normal. Pupils are equal, round, and reactive to light.   Neck:  Normal range of motion. Neck supple. No JVD present. No thyromegaly present.   Cardiovascular: Normal rate, regular rhythm, normal heart sounds and intact distal pulses.    Pulmonary/Chest: Effort normal and breath sounds normal.   Abdominal: Soft. Bowel sounds are normal.   Musculoskeletal: Normal range of motion.   Lymphadenopathy:     She has no cervical adenopathy.   Neurological: She is alert and oriented to person, place, and time. No cranial nerve deficit. Coordination normal.   Skin: Skin is warm and dry. No rash noted.   Psychiatric: She has a normal mood and affect. Her behavior is normal. Judgment and thought content normal.   Vitals reviewed.      Assessment/Plan   Problem List Items Addressed This Visit        Cardiovascular and Mediastinum    Hypertension    Relevant Orders    TSH    T4, Free    T3, Free    Basic Metabolic Panel    Osmolality, Serum    Calcium, Ionized       Other    Dyslipidemia    Relevant Orders    TSH    T4, Free    T3, Free    Basic Metabolic Panel    Osmolality, Serum    Calcium, Ionized      Other Visit Diagnoses     Acquired hypothyroidism    -  Primary    Relevant Medications    levothyroxine sodium (TIROSINT) 25 MCG capsule    Other Relevant Orders    TSH    T4, Free    T3, Free    Basic Metabolic Panel    Osmolality, Serum    Calcium, Ionized    Hyponatremia        Relevant Orders    TSH    T4, Free    T3, Free    Basic Metabolic Panel    Osmolality, Serum    Calcium, Ionized    Serum calcium elevated        Relevant Orders    TSH    T4, Free    T3, Free    Basic Metabolic Panel    Osmolality, Serum    Calcium, Ionized    Need for immunization against influenza        Relevant Orders    Flu Vaccine High Dose PF 65YR+      Plan: Repeat thyroid panel plus BMP serum osmolality ionized calcium.  Anticipate starting levothyroxine 25 µg daily.  Recheck in about 4 months with preceding labs.  Give her high-dose flu vaccine today.  Alternate omeprazole and Pepcid.

## 2017-09-28 LAB
BUN SERPL-MCNC: 13 MG/DL (ref 8–23)
BUN/CREAT SERPL: 16.3 (ref 7–25)
CA-I SERPL ISE-MCNC: 5.4 MG/DL (ref 4.5–5.6)
CALCIUM SERPL-MCNC: 10 MG/DL (ref 8.6–10.5)
CHLORIDE SERPL-SCNC: 99 MMOL/L (ref 98–107)
CO2 SERPL-SCNC: 25.4 MMOL/L (ref 22–29)
CREAT SERPL-MCNC: 0.8 MG/DL (ref 0.57–1)
GLUCOSE SERPL-MCNC: 99 MG/DL (ref 65–99)
OSMOLALITY SERPL: 286 MOSM/KG (ref 280–301)
POTASSIUM SERPL-SCNC: 4.8 MMOL/L (ref 3.5–5.2)
SODIUM SERPL-SCNC: 138 MMOL/L (ref 136–145)
T3FREE SERPL-MCNC: 2.2 PG/ML (ref 2–4.4)
T4 FREE SERPL-MCNC: 1 NG/DL (ref 0.93–1.7)
TSH SERPL DL<=0.005 MIU/L-ACNC: 9.42 MIU/ML (ref 0.27–4.2)

## 2017-10-18 ENCOUNTER — TELEPHONE (OUTPATIENT)
Dept: GASTROENTEROLOGY | Facility: CLINIC | Age: 77
End: 2017-10-18

## 2017-10-18 DIAGNOSIS — K62.5 RECTAL BLEEDING: Primary | ICD-10-CM

## 2017-10-18 NOTE — TELEPHONE ENCOUNTER
Same symptoms as she complained of in March of this year, was seen in June for this and had normal blood count.  Would advise she check her hemoglobin and hematocrit with CBC, can use Anusol HC suppositories as we'll prescribe last time to be taken at bedtime.  Would schedule her follow-up in the office when she returns from vacation to discuss options for further evaluation.  Agree with advice for progressive symptoms to go to the emergency room.

## 2017-10-18 NOTE — TELEPHONE ENCOUNTER
"Called pt and pt reports today when she had a bm she passed a large amount of bright red blood.  She states it was in the water and when she wiped.  She states it was not in the stool.  Pt states she did not strain with this bm and it was a normal bm for her.  She states after the bm she dripped blood for approx 2 hrs.  Pt reports she has done this in the past but not with this amount of blood.  Pt reports she feels great.  She is going out of town Saturday for a \"long planned vacation\".  She is asking what should she do.  Advised would send message to Dr Collins and in the meantime if bleeding recurrs or she becomes symptomatic- light headed or dizzy to go to ER.  Pt verb understanding.   "

## 2017-10-18 NOTE — TELEPHONE ENCOUNTER
----- Message from Sumaya Campos sent at 10/18/2017 11:51 AM EDT -----  Regarding: ANAL FISTULA   Contact: [phone]  PT CALLED STATED MIGHT A FISH AND BLEEDING ASKING FOR MEDICATION

## 2017-10-19 ENCOUNTER — RESULTS ENCOUNTER (OUTPATIENT)
Dept: GASTROENTEROLOGY | Facility: CLINIC | Age: 77
End: 2017-10-19

## 2017-10-19 DIAGNOSIS — K62.5 RECTAL BLEEDING: ICD-10-CM

## 2017-10-19 RX ORDER — HYDROCORTISONE ACETATE 25 MG/1
25 SUPPOSITORY RECTAL NIGHTLY PRN
Qty: 10 SUPPOSITORY | Refills: 3 | Status: SHIPPED | OUTPATIENT
Start: 2017-10-19 | End: 2019-01-28

## 2017-10-19 NOTE — TELEPHONE ENCOUNTER
Call to pt. Advise per Dr Collins to check H&H with CBC.  Can use Anusol HC suppositories as prescribed last time to be taken at bedtime.  F/u visit when returns from vacation to discuss options for further eval.  If symptoms progress, go to ER.    Pt verb understanding.  Leaving Saturday - unable to come today for lab appt.  Will walk in tomorrow - order placed for CBC - message to DR Collins.    F/u appt scheduled with DR Collins for 11/6/17 @ 1400.      Rita completed for Hydrocortisone 25 mg suppository - per rectum QHS prn rectal discomfort, #10, R3.

## 2017-10-20 LAB
ERYTHROCYTE [DISTWIDTH] IN BLOOD BY AUTOMATED COUNT: 14.3 % (ref 11.7–13)
HCT VFR BLD AUTO: 36.5 % (ref 35.6–45.5)
HGB BLD-MCNC: 12.2 G/DL (ref 11.9–15.5)
MCH RBC QN AUTO: 31.4 PG (ref 26.9–32)
MCHC RBC AUTO-ENTMCNC: 33.4 G/DL (ref 32.4–36.3)
MCV RBC AUTO: 94.1 FL (ref 80.5–98.2)
PLATELET # BLD AUTO: 253 10*3/MM3 (ref 140–500)
RBC # BLD AUTO: 3.88 10*6/MM3 (ref 3.9–5.2)
WBC # BLD AUTO: 5.7 10*3/MM3 (ref 4.5–10.7)

## 2017-10-20 RX ORDER — LEVOTHYROXINE SODIUM 25 UG/1
25 CAPSULE ORAL DAILY
Qty: 90 CAPSULE | Refills: 2 | Status: SHIPPED | OUTPATIENT
Start: 2017-10-20 | End: 2018-01-30 | Stop reason: DRUGHIGH

## 2017-10-24 ENCOUNTER — TELEPHONE (OUTPATIENT)
Dept: GASTROENTEROLOGY | Facility: CLINIC | Age: 77
End: 2017-10-24

## 2017-10-24 NOTE — TELEPHONE ENCOUNTER
----- Message from Morgan SHAW MD sent at 10/23/2017 12:36 PM EDT -----  Regarding: Lab results  Okay to call results, H&H relatively stable.  Would follow-up in office as recommended.  ----- Message -----     From: Interface, Reflab Results In     Sent: 10/20/2017   8:09 PM       To: Morgan SHAW MD

## 2017-10-24 NOTE — TELEPHONE ENCOUNTER
Call to pt.  Advise per Dr Collins that H&H relatively stable.  F/u in office as recommended.  Pt verb understanding.

## 2017-11-06 ENCOUNTER — OFFICE VISIT (OUTPATIENT)
Dept: GASTROENTEROLOGY | Facility: CLINIC | Age: 77
End: 2017-11-06

## 2017-11-06 VITALS
SYSTOLIC BLOOD PRESSURE: 132 MMHG | BODY MASS INDEX: 26.12 KG/M2 | WEIGHT: 166.4 LBS | HEIGHT: 67 IN | TEMPERATURE: 97.8 F | DIASTOLIC BLOOD PRESSURE: 74 MMHG

## 2017-11-06 DIAGNOSIS — K59.00 CONSTIPATION, UNSPECIFIED CONSTIPATION TYPE: ICD-10-CM

## 2017-11-06 DIAGNOSIS — K62.5 RECTAL BLEEDING: ICD-10-CM

## 2017-11-06 DIAGNOSIS — Z80.0 FAMILY HISTORY OF COLON CANCER: ICD-10-CM

## 2017-11-06 DIAGNOSIS — D12.6 ADENOMATOUS POLYP OF COLON, UNSPECIFIED PART OF COLON: ICD-10-CM

## 2017-11-06 DIAGNOSIS — K21.9 GASTROESOPHAGEAL REFLUX DISEASE, ESOPHAGITIS PRESENCE NOT SPECIFIED: Primary | ICD-10-CM

## 2017-11-06 PROCEDURE — 99214 OFFICE O/P EST MOD 30 MIN: CPT | Performed by: INTERNAL MEDICINE

## 2017-11-06 NOTE — PROGRESS NOTES
Chief Complaint   Patient presents with   • Rectal Bleeding        Moriah Petty is a  77 y.o. female here for a follow up visit for GERD, Rectal bleeding    HPI this 77-year-old white female patient of Dr. Jarred Carrizales presents since last evaluated in June 2016.  At that time she had experienced an episode of rectal bleeding which was treated as possible hemorrhoidal in nature.  She did respond to therapy but has noted intermittent episodes of rectal bleeding in the interim.  On most recent bleeding event was described as a large volume of blood with no rectal pain.  She treated this with hydrocortisone suppositories and had eventual resolution.  Review of her blood work reflects that she did drop her hemoglobin from a level of 14 to 12.2.  She does carry a family history of colon cancer involving her mother.  Her last colonoscopy was performed February 2015.  Given his recurrent bouts of bleeding and lack of pain as well as her family history, I encouraged her to consider colonoscopic examination at this time.    Past Medical History:   Diagnosis Date   • Anemia    • Bradycardia    • Colitis    • Colon polyps    • Compression fracture of L1 lumbar vertebra    • Compression fracture of L2    • Depression    • Diverticulosis    • GERD (gastroesophageal reflux disease)    • GI hemorrhage    • Hearing loss    • History of blood transfusion 11/2013    S/P CHOLECYSTECTOMY SX  BHE   • History of trigeminal neuralgia    • Hypertension    • Insomnia    • Memory loss    • OA (osteoarthritis)    • Osteoarthritis    • Osteoporosis    • PAC (premature atrial contraction)    • Peripheral neuralgia    • Pneumonia     RUL   • PVC's (premature ventricular contractions)    • Sleep apnea    • Thyroid disorder        Current Outpatient Prescriptions   Medication Sig Dispense Refill   • Alum & Mag Hydroxide-Simeth (ANTACID FAST RELIEF PO) Take  by mouth.     • Aspirin (ASPIR-81 PO) Take  by mouth.     • Calcium Carb-Cholecalciferol  (CALCIUM + D3) 600-200 MG-UNIT tablet Take  by mouth Daily.     • carvedilol (COREG) 12.5 MG tablet 1 TABLET BY MOUTH TWICE DAILY  3   • desonide (DESOWEN) 0.05 % cream APPLY SPARINGLY TO THE CENTRAL FACE AND NOSE 2 TIMES A DAY  1   • levothyroxine sodium (TIROSINT) 25 MCG capsule Take 1 capsule by mouth Daily. 90 capsule 2   • losartan (COZAAR) 50 MG tablet TAKE 1 TABLET BY MOUTH EVERY DAY 90 tablet 3   • magnesium oxide 250 MG tablet Take 250 mg by mouth Every Night.     • Multiple Vitamin (MULTI VITAMIN DAILY PO) Take  by mouth Daily.     • Naproxen Sodium (ALEVE PO) Take  by mouth.     • Omega-3 Fatty Acids (FISH OIL) 1200 MG capsule capsule Take  by mouth.     • omeprazole (priLOSEC) 20 MG capsule Take  by mouth.     • hydrocortisone (ANUSOL-HC) 25 MG suppository Insert 1 suppository into the rectum At Night As Needed for Hemorrhoids (rectal discomfort). 10 suppository 3     No current facility-administered medications for this visit.        PRN Meds:.    Allergies   Allergen Reactions   • Morphine And Related Itching and Swelling       Social History     Social History   • Marital status:      Spouse name: N/A   • Number of children: N/A   • Years of education: N/A     Occupational History   • Not on file.     Social History Main Topics   • Smoking status: Former Smoker     Packs/day: 0.50     Years: 30.00     Types: Cigarettes     Quit date: 5/6/1989   • Smokeless tobacco: Never Used   • Alcohol use 4.2 oz/week     7 Shots of liquor per week      Comment: 2 DRINKS PER DAY   • Drug use: No   • Sexual activity: Not Currently     Other Topics Concern   • Not on file     Social History Narrative       Family History   Problem Relation Age of Onset   • Ovarian cancer Daughter    • Colon cancer Mother    • Arthritis Mother    • Stomach cancer Maternal Grandfather    • Glaucoma Father    • Depression Son    • Stroke Paternal Grandmother        Review of Systems   Gastrointestinal:        Rectal bleeding        Vitals:    11/06/17 1406   BP: 132/74   Temp: 97.8 °F (36.6 °C)       Physical Exam   Constitutional: She is oriented to person, place, and time. She appears well-developed and well-nourished.   HENT:   Head: Normocephalic.   Mouth/Throat: Oropharynx is clear and moist.   Eyes: Conjunctivae and EOM are normal.   Neck: Normal range of motion.   Cardiovascular: Normal rate and regular rhythm.    Pulmonary/Chest: Breath sounds normal.   Abdominal: Soft. Bowel sounds are normal.   Musculoskeletal: Normal range of motion.   Neurological: She is alert and oriented to person, place, and time.   Skin: Skin is warm and dry.   Psychiatric: She has a normal mood and affect. Her behavior is normal.       ASSESSMENT  #1 rectal bleeding: May be associated with hemorrhoids, cannot rule out possibility of polypoid source or even neoplasm.  #2 GERD      PLAN  Schedule colonoscopy      ICD-10-CM ICD-9-CM   1. Gastroesophageal reflux disease, esophagitis presence not specified K21.9 530.81   2. Constipation, unspecified constipation type K59.00 564.00

## 2017-11-28 DIAGNOSIS — M81.0 AGE-RELATED OSTEOPOROSIS WITHOUT CURRENT PATHOLOGICAL FRACTURE: Primary | ICD-10-CM

## 2017-11-29 ENCOUNTER — LAB (OUTPATIENT)
Dept: OBSTETRICS AND GYNECOLOGY | Facility: CLINIC | Age: 77
End: 2017-11-29

## 2017-11-29 DIAGNOSIS — M81.0 OSTEOPOROSIS, UNSPECIFIED OSTEOPOROSIS TYPE, UNSPECIFIED PATHOLOGICAL FRACTURE PRESENCE: Primary | ICD-10-CM

## 2017-11-29 DIAGNOSIS — M81.0 AGE-RELATED OSTEOPOROSIS WITHOUT CURRENT PATHOLOGICAL FRACTURE: ICD-10-CM

## 2017-11-30 ENCOUNTER — DOCUMENTATION (OUTPATIENT)
Dept: OBSTETRICS AND GYNECOLOGY | Facility: CLINIC | Age: 77
End: 2017-11-30

## 2017-11-30 ENCOUNTER — TELEPHONE (OUTPATIENT)
Dept: INTERNAL MEDICINE | Facility: CLINIC | Age: 77
End: 2017-11-30

## 2017-11-30 DIAGNOSIS — E87.5 HYPERKALEMIA: Primary | ICD-10-CM

## 2017-11-30 LAB
25(OH)D3+25(OH)D2 SERPL-MCNC: 50.5 NG/ML (ref 30–100)
ALBUMIN SERPL-MCNC: 4.3 G/DL (ref 3.5–5.2)
ALBUMIN/GLOB SERPL: 1.5 G/DL
ALP SERPL-CCNC: 47 U/L (ref 39–117)
ALT SERPL-CCNC: 15 U/L (ref 1–33)
AST SERPL-CCNC: 19 U/L (ref 1–32)
BILIRUB SERPL-MCNC: 0.5 MG/DL (ref 0.1–1.2)
BUN SERPL-MCNC: 15 MG/DL (ref 8–23)
BUN SERPL-MCNC: 17 MG/DL (ref 8–23)
BUN/CREAT SERPL: 18.5 (ref 7–25)
BUN/CREAT SERPL: 19.1 (ref 7–25)
CALCIUM SERPL-MCNC: 10 MG/DL (ref 8.6–10.5)
CALCIUM SERPL-MCNC: 10.6 MG/DL (ref 8.6–10.5)
CHLORIDE SERPL-SCNC: 100 MMOL/L (ref 98–107)
CHLORIDE SERPL-SCNC: 98 MMOL/L (ref 98–107)
CO2 SERPL-SCNC: 26.4 MMOL/L (ref 22–29)
CO2 SERPL-SCNC: 29.4 MMOL/L (ref 22–29)
CREAT SERPL-MCNC: 0.81 MG/DL (ref 0.57–1)
CREAT SERPL-MCNC: 0.89 MG/DL (ref 0.57–1)
GFR SERPLBLD CREATININE-BSD FMLA CKD-EPI: 61 ML/MIN/1.73
GFR SERPLBLD CREATININE-BSD FMLA CKD-EPI: 68 ML/MIN/1.73
GFR SERPLBLD CREATININE-BSD FMLA CKD-EPI: 74 ML/MIN/1.73
GFR SERPLBLD CREATININE-BSD FMLA CKD-EPI: 83 ML/MIN/1.73
GLOBULIN SER CALC-MCNC: 2.8 GM/DL
GLUCOSE SERPL-MCNC: 106 MG/DL (ref 65–99)
GLUCOSE SERPL-MCNC: 99 MG/DL (ref 65–99)
POTASSIUM SERPL-SCNC: 5.1 MMOL/L (ref 3.5–5.2)
POTASSIUM SERPL-SCNC: 6.1 MMOL/L (ref 3.5–5.2)
PROT SERPL-MCNC: 7.1 G/DL (ref 6–8.5)
SODIUM SERPL-SCNC: 136 MMOL/L (ref 136–145)
SODIUM SERPL-SCNC: 140 MMOL/L (ref 136–145)

## 2017-12-05 ENCOUNTER — TELEPHONE (OUTPATIENT)
Dept: OBSTETRICS AND GYNECOLOGY | Facility: CLINIC | Age: 77
End: 2017-12-05

## 2017-12-11 ENCOUNTER — DOCUMENTATION (OUTPATIENT)
Dept: OBSTETRICS AND GYNECOLOGY | Facility: CLINIC | Age: 77
End: 2017-12-11

## 2017-12-11 ENCOUNTER — TELEPHONE (OUTPATIENT)
Dept: INTERNAL MEDICINE | Facility: CLINIC | Age: 77
End: 2017-12-11

## 2017-12-11 DIAGNOSIS — E87.5 HYPERKALEMIA: Primary | ICD-10-CM

## 2017-12-11 DIAGNOSIS — R73.09 ELEVATED GLUCOSE: ICD-10-CM

## 2017-12-11 NOTE — TELEPHONE ENCOUNTER
I talked to Dr. Myles of the patient's GYN about her Prolia and potassium of 6.1 and then repeat of 5.1.  She has been on losartan for years but it looks like it's possible that the blood potassium elevation be related to losartan..  I would like for her to come in in about 2 days after being off losartan for nonfasting BMP and hemoglobin A1c.  I have called her and told her that's what needs to be done.

## 2017-12-11 NOTE — PROGRESS NOTES
I have reviewed the patient's labs and discuss them with Dr. Carrizales.  He feels that this is likely due to losartan.  He has stopped the losartan and plans to repeat the potassium level.  This is already begun to improve.  If it continues to normalize, we could consider Prolia at that point.

## 2017-12-20 ENCOUNTER — LAB (OUTPATIENT)
Dept: INTERNAL MEDICINE | Facility: CLINIC | Age: 77
End: 2017-12-20

## 2017-12-20 DIAGNOSIS — E87.5 HYPERKALEMIA: ICD-10-CM

## 2017-12-20 DIAGNOSIS — R73.09 ELEVATED GLUCOSE: ICD-10-CM

## 2017-12-20 LAB
BUN SERPL-MCNC: 14 MG/DL (ref 8–23)
BUN/CREAT SERPL: 16.7 (ref 7–25)
CALCIUM SERPL-MCNC: 10 MG/DL (ref 8.6–10.5)
CHLORIDE SERPL-SCNC: 97 MMOL/L (ref 98–107)
CO2 SERPL-SCNC: 28.7 MMOL/L (ref 22–29)
CREAT SERPL-MCNC: 0.84 MG/DL (ref 0.57–1)
GLUCOSE SERPL-MCNC: 102 MG/DL (ref 65–99)
HBA1C MFR BLD: 5.55 % (ref 4.8–5.6)
POTASSIUM SERPL-SCNC: 4.9 MMOL/L (ref 3.5–5.2)
SODIUM SERPL-SCNC: 137 MMOL/L (ref 136–145)

## 2018-01-23 DIAGNOSIS — M81.0 OSTEOPOROSIS, UNSPECIFIED OSTEOPOROSIS TYPE, UNSPECIFIED PATHOLOGICAL FRACTURE PRESENCE: ICD-10-CM

## 2018-01-23 DIAGNOSIS — E03.9 HYPOTHYROIDISM, UNSPECIFIED TYPE: ICD-10-CM

## 2018-01-23 DIAGNOSIS — I10 ESSENTIAL HYPERTENSION: Primary | ICD-10-CM

## 2018-01-26 LAB
BUN SERPL-MCNC: 12 MG/DL (ref 8–23)
BUN/CREAT SERPL: 15 (ref 7–25)
CALCIUM SERPL-MCNC: 10.2 MG/DL (ref 8.6–10.5)
CHLORIDE SERPL-SCNC: 96 MMOL/L (ref 98–107)
CO2 SERPL-SCNC: 26.3 MMOL/L (ref 22–29)
CREAT SERPL-MCNC: 0.8 MG/DL (ref 0.57–1)
GFR SERPLBLD CREATININE-BSD FMLA CKD-EPI: 69 ML/MIN/1.73
GFR SERPLBLD CREATININE-BSD FMLA CKD-EPI: 84 ML/MIN/1.73
GLUCOSE SERPL-MCNC: 107 MG/DL (ref 65–99)
POTASSIUM SERPL-SCNC: 4.8 MMOL/L (ref 3.5–5.2)
SODIUM SERPL-SCNC: 135 MMOL/L (ref 136–145)
T4 FREE SERPL-MCNC: 1.24 NG/DL (ref 0.93–1.7)
TSH SERPL DL<=0.005 MIU/L-ACNC: 3.97 MIU/ML (ref 0.27–4.2)

## 2018-01-28 ENCOUNTER — RESULTS ENCOUNTER (OUTPATIENT)
Dept: INTERNAL MEDICINE | Facility: CLINIC | Age: 78
End: 2018-01-28

## 2018-01-28 DIAGNOSIS — E03.9 HYPOTHYROIDISM, UNSPECIFIED TYPE: ICD-10-CM

## 2018-01-28 DIAGNOSIS — M81.0 OSTEOPOROSIS, UNSPECIFIED OSTEOPOROSIS TYPE, UNSPECIFIED PATHOLOGICAL FRACTURE PRESENCE: ICD-10-CM

## 2018-01-30 ENCOUNTER — OFFICE VISIT (OUTPATIENT)
Dept: INTERNAL MEDICINE | Facility: CLINIC | Age: 78
End: 2018-01-30

## 2018-01-30 VITALS
OXYGEN SATURATION: 98 % | BODY MASS INDEX: 25.22 KG/M2 | WEIGHT: 161 LBS | TEMPERATURE: 97.4 F | SYSTOLIC BLOOD PRESSURE: 122 MMHG | DIASTOLIC BLOOD PRESSURE: 84 MMHG | HEART RATE: 77 BPM

## 2018-01-30 DIAGNOSIS — K21.9 GASTROESOPHAGEAL REFLUX DISEASE WITHOUT ESOPHAGITIS: ICD-10-CM

## 2018-01-30 DIAGNOSIS — G47.09 OTHER INSOMNIA: ICD-10-CM

## 2018-01-30 DIAGNOSIS — I10 ESSENTIAL HYPERTENSION: Primary | ICD-10-CM

## 2018-01-30 DIAGNOSIS — E03.9 HYPOTHYROIDISM, UNSPECIFIED TYPE: ICD-10-CM

## 2018-01-30 PROCEDURE — 99214 OFFICE O/P EST MOD 30 MIN: CPT | Performed by: FAMILY MEDICINE

## 2018-01-30 RX ORDER — LEVOTHYROXINE SODIUM 0.03 MG/1
TABLET ORAL
COMMUNITY
Start: 2018-01-23 | End: 2018-08-21

## 2018-01-30 RX ORDER — FAMOTIDINE 20 MG/1
20 TABLET, FILM COATED ORAL 2 TIMES DAILY
COMMUNITY
End: 2018-04-17 | Stop reason: SDUPTHER

## 2018-01-30 NOTE — PROGRESS NOTES
Subjective   Moriah Petty is a 77 y.o. female.     Chief Complaint   Patient presents with   • Hypothyroidism   • Hypertension   • GI Problem         History of Present Illness   Marina is doing well overall.  Potassium and sodium.  Pretty stable.  Glucose is also not too high.  She has had a recurrence of reflux heartburn not relieve the famotidine 10 mg over-the-counter and we decided to go back to omeprazole 20 mg every other day.  Otherwise treatment of hypertension hyperlipidemia hypothyroidism are reviewed.  She has had some mild insomnia but that seems to be related to screen time with her tablet at bedtime.      The following portions of the patient's history were reviewed and updated as appropriate: allergies, current medications, past social history and problem list.    Review of Systems   Constitutional: Negative.    HENT: Negative.    Eyes: Negative.    Respiratory: Negative.    Cardiovascular: Negative.    Gastrointestinal: Negative.    Endocrine: Negative.    Genitourinary: Negative.    Musculoskeletal: Negative.    Skin: Negative.    Allergic/Immunologic: Negative.    Neurological: Negative.    Hematological: Negative.    Psychiatric/Behavioral: Negative.        Objective   Vitals:    01/30/18 0952   BP: 122/84   Pulse: 77   Temp: 97.4 °F (36.3 °C)   SpO2: 98%     Physical Exam   Constitutional: She is oriented to person, place, and time. She appears well-developed and well-nourished.   HENT:   Head: Normocephalic and atraumatic.   Right Ear: Tympanic membrane and external ear normal.   Left Ear: Tympanic membrane and external ear normal.   Nose: Nose normal.   Mouth/Throat: Oropharynx is clear and moist.   Eyes: Conjunctivae and EOM are normal. Pupils are equal, round, and reactive to light.   Neck: Normal range of motion. Neck supple. No JVD present. No thyromegaly present.   Cardiovascular: Normal rate, regular rhythm, normal heart sounds and intact distal pulses.    Pulmonary/Chest: Effort normal  and breath sounds normal.   Abdominal: Soft. Bowel sounds are normal.   Musculoskeletal: Normal range of motion.   Lymphadenopathy:     She has no cervical adenopathy.   Neurological: She is alert and oriented to person, place, and time. No cranial nerve deficit. Coordination normal.   Skin: Skin is warm and dry. No rash noted.   Psychiatric: She has a normal mood and affect. Her behavior is normal. Judgment and thought content normal.   Vitals reviewed.      Assessment/Plan   Problem List Items Addressed This Visit        Cardiovascular and Mediastinum    Hypertension - Primary       Digestive    Gastroesophageal reflux disease without esophagitis    Relevant Medications    hyoscyamine (LEVSIN) 0.125 MG SL tablet    famotidine (PEPCID) 20 MG tablet       Endocrine    Hypothyroidism    Relevant Medications    levothyroxine (SYNTHROID, LEVOTHROID) 25 MCG tablet       Other    Insomnia      Meds reviewed and omeprazole 20 mg OTC every other day return in 6 months for a care wellness and limit screen time at bedtime.

## 2018-02-06 PROBLEM — M81.0 OSTEOPOROSIS, POST-MENOPAUSAL: Status: ACTIVE | Noted: 2018-02-06

## 2018-02-07 ENCOUNTER — HOSPITAL ENCOUNTER (OUTPATIENT)
Dept: INFUSION THERAPY | Facility: HOSPITAL | Age: 78
Discharge: HOME | End: 2018-02-07
Attending: OBSTETRICS & GYNECOLOGY | Admitting: OBSTETRICS & GYNECOLOGY

## 2018-02-07 VITALS
HEIGHT: 67 IN | WEIGHT: 161 LBS | HEART RATE: 54 BPM | DIASTOLIC BLOOD PRESSURE: 64 MMHG | OXYGEN SATURATION: 98 % | TEMPERATURE: 96.7 F | BODY MASS INDEX: 25.27 KG/M2 | SYSTOLIC BLOOD PRESSURE: 148 MMHG | RESPIRATION RATE: 12 BRPM

## 2018-02-07 DIAGNOSIS — M81.0 OSTEOPOROSIS, POST-MENOPAUSAL: ICD-10-CM

## 2018-02-07 PROCEDURE — 96372 THER/PROPH/DIAG INJ SC/IM: CPT

## 2018-02-07 PROCEDURE — 25010000002 DENOSUMAB 60 MG/ML SOLUTION: Performed by: OBSTETRICS & GYNECOLOGY

## 2018-02-07 RX ORDER — FAMOTIDINE 20 MG/1
20 TABLET, FILM COATED ORAL EVERY OTHER DAY
COMMUNITY
End: 2018-08-21

## 2018-02-07 RX ADMIN — DENOSUMAB 60 MG: 60 INJECTION SUBCUTANEOUS at 11:51

## 2018-02-07 NOTE — PATIENT INSTRUCTIONS
Denosumab injection  What is this medicine?  DENOSUMAB (den oh laura mab) slows bone breakdown. Prolia is used to treat osteoporosis in women after menopause and in men. Xgeva is used to prevent bone fractures and other bone problems caused by cancer bone metastases. Xgeva is also used to treat giant cell tumor of the bone.  This medicine may be used for other purposes; ask your health care provider or pharmacist if you have questions.  COMMON BRAND NAME(S): Prolia, XGEVA  What should I tell my health care provider before I take this medicine?  They need to know if you have any of these conditions:  -dental disease  -eczema  -infection or history of infections  -kidney disease or on dialysis  -low blood calcium or vitamin D  -malabsorption syndrome  -scheduled to have surgery or tooth extraction  -taking medicine that contains denosumab  -thyroid or parathyroid disease  -an unusual reaction to denosumab, other medicines, foods, dyes, or preservatives  -pregnant or trying to get pregnant  -breast-feeding  How should I use this medicine?  This medicine is for injection under the skin. It is given by a health care professional in a hospital or clinic setting.  If you are getting Prolia, a special MedGuide will be given to you by the pharmacist with each prescription and refill. Be sure to read this information carefully each time.  For Prolia, talk to your pediatrician regarding the use of this medicine in children. Special care may be needed. For Xgeva, talk to your pediatrician regarding the use of this medicine in children. While this drug may be prescribed for children as young as 13 years for selected conditions, precautions do apply.  Overdosage: If you think you have taken too much of this medicine contact a poison control center or emergency room at once.  NOTE: This medicine is only for you. Do not share this medicine with others.  What if I miss a dose?  It is important not to miss your dose. Call your doctor  or health care professional if you are unable to keep an appointment.  What may interact with this medicine?  Do not take this medicine with any of the following medications:  -other medicines containing denosumab  This medicine may also interact with the following medications:  -medicines that suppress the immune system  -medicines that treat cancer  -steroid medicines like prednisone or cortisone  This list may not describe all possible interactions. Give your health care provider a list of all the medicines, herbs, non-prescription drugs, or dietary supplements you use. Also tell them if you smoke, drink alcohol, or use illegal drugs. Some items may interact with your medicine.  What should I watch for while using this medicine?  Visit your doctor or health care professional for regular checks on your progress. Your doctor or health care professional may order blood tests and other tests to see how you are doing.  Call your doctor or health care professional if you get a cold or other infection while receiving this medicine. Do not treat yourself. This medicine may decrease your body's ability to fight infection.  You should make sure you get enough calcium and vitamin D while you are taking this medicine, unless your doctor tells you not to. Discuss the foods you eat and the vitamins you take with your health care professional.  See your dentist regularly. Brush and floss your teeth as directed. Before you have any dental work done, tell your dentist you are receiving this medicine.  Do not become pregnant while taking this medicine or for 5 months after stopping it. Women should inform their doctor if they wish to become pregnant or think they might be pregnant. There is a potential for serious side effects to an unborn child. Talk to your health care professional or pharmacist for more information.  What side effects may I notice from receiving this medicine?  Side effects that you should report to your doctor  or health care professional as soon as possible:  -allergic reactions like skin rash, itching or hives, swelling of the face, lips, or tongue  -breathing problems  -chest pain  -fast, irregular heartbeat  -feeling faint or lightheaded, falls  -fever, chills, or any other sign of infection  -muscle spasms, tightening, or twitches  -numbness or tingling  -skin blisters or bumps, or is dry, peels, or red  -slow healing or unexplained pain in the mouth or jaw  -unusual bleeding or bruising  Side effects that usually do not require medical attention (report to your doctor or health care professional if they continue or are bothersome):  -muscle pain  -stomach upset, gas  This list may not describe all possible side effects. Call your doctor for medical advice about side effects. You may report side effects to FDA at 5-983-FDA-0618.  Where should I keep my medicine?  This medicine is only given in a clinic, doctor's office, or other health care setting and will not be stored at home.  NOTE: This sheet is a summary. It may not cover all possible information. If you have questions about this medicine, talk to your doctor, pharmacist, or health care provider.  © 2018 [employer] (2017-01-19 10:06:55)

## 2018-02-07 NOTE — PROGRESS NOTES
Calcium WNL from lab results drawn on 1/25/18. Patient tolerated injection without complaints and reports receiving med previously at MD office. Patient ambulatory, D/C'd from [facility] at 1158.

## 2018-03-16 ENCOUNTER — TRANSCRIBE ORDERS (OUTPATIENT)
Dept: ADMINISTRATIVE | Facility: HOSPITAL | Age: 78
End: 2018-03-16

## 2018-03-16 DIAGNOSIS — Z12.39 SCREENING BREAST EXAMINATION: Primary | ICD-10-CM

## 2018-04-13 ENCOUNTER — HOSPITAL ENCOUNTER (OUTPATIENT)
Dept: MAMMOGRAPHY | Facility: HOSPITAL | Age: 78
Discharge: HOME | End: 2018-04-13
Attending: OBSTETRICS & GYNECOLOGY | Admitting: OBSTETRICS & GYNECOLOGY

## 2018-04-13 DIAGNOSIS — Z12.39 SCREENING BREAST EXAMINATION: ICD-10-CM

## 2018-04-13 PROCEDURE — 77067 SCR MAMMO BI INCL CAD: CPT

## 2018-04-13 PROCEDURE — 77063 BREAST TOMOSYNTHESIS BI: CPT

## 2018-04-17 ENCOUNTER — OFFICE VISIT (OUTPATIENT)
Dept: GASTROENTEROLOGY | Facility: CLINIC | Age: 78
End: 2018-04-17

## 2018-04-17 VITALS
BODY MASS INDEX: 26.37 KG/M2 | HEIGHT: 67 IN | SYSTOLIC BLOOD PRESSURE: 110 MMHG | TEMPERATURE: 98.5 F | WEIGHT: 168 LBS | DIASTOLIC BLOOD PRESSURE: 70 MMHG

## 2018-04-17 DIAGNOSIS — K59.00 CONSTIPATION, UNSPECIFIED CONSTIPATION TYPE: ICD-10-CM

## 2018-04-17 DIAGNOSIS — K62.5 RECTAL BLEEDING: Primary | ICD-10-CM

## 2018-04-17 DIAGNOSIS — K21.9 GASTROESOPHAGEAL REFLUX DISEASE WITHOUT ESOPHAGITIS: ICD-10-CM

## 2018-04-17 DIAGNOSIS — D12.6 ADENOMATOUS POLYP OF COLON, UNSPECIFIED PART OF COLON: ICD-10-CM

## 2018-04-17 LAB
BASOPHILS # BLD AUTO: 0.04 10*3/MM3 (ref 0–0.2)
BASOPHILS NFR BLD AUTO: 0.7 % (ref 0–1.5)
EOSINOPHIL # BLD AUTO: 0.28 10*3/MM3 (ref 0–0.7)
EOSINOPHIL NFR BLD AUTO: 4.9 % (ref 0.3–6.2)
ERYTHROCYTE [DISTWIDTH] IN BLOOD BY AUTOMATED COUNT: 14.3 % (ref 11.7–13)
HCT VFR BLD AUTO: 38.6 % (ref 35.6–45.5)
HGB BLD-MCNC: 12.6 G/DL (ref 11.9–15.5)
IMM GRANULOCYTES # BLD: 0.02 10*3/MM3 (ref 0–0.03)
IMM GRANULOCYTES NFR BLD: 0.3 % (ref 0–0.5)
LYMPHOCYTES # BLD AUTO: 1.33 10*3/MM3 (ref 0.9–4.8)
LYMPHOCYTES NFR BLD AUTO: 23.3 % (ref 19.6–45.3)
MCH RBC QN AUTO: 29.8 PG (ref 26.9–32)
MCHC RBC AUTO-ENTMCNC: 32.6 G/DL (ref 32.4–36.3)
MCV RBC AUTO: 91.3 FL (ref 80.5–98.2)
MONOCYTES # BLD AUTO: 0.45 10*3/MM3 (ref 0.2–1.2)
MONOCYTES NFR BLD AUTO: 7.9 % (ref 5–12)
NEUTROPHILS # BLD AUTO: 3.6 10*3/MM3 (ref 1.9–8.1)
NEUTROPHILS NFR BLD AUTO: 62.9 % (ref 42.7–76)
PLATELET # BLD AUTO: 254 10*3/MM3 (ref 140–500)
RBC # BLD AUTO: 4.23 10*6/MM3 (ref 3.9–5.2)
WBC # BLD AUTO: 5.72 10*3/MM3 (ref 4.5–10.7)

## 2018-04-17 PROCEDURE — 99214 OFFICE O/P EST MOD 30 MIN: CPT | Performed by: NURSE PRACTITIONER

## 2018-04-17 NOTE — PROGRESS NOTES
Chief Complaint   Patient presents with   • Rectal Bleeding     almost a week       Moriah Petty is a  77 y.o. female here for a follow up visit for rectal bleeding.     HPI  76 yo f presents today for follow up visit for recurrent rectal bleeding. She is a patient of Dr. Collins. She was last seen in office on 11/2017. At that time she had been having bright red blood per rectum with hx hemorrhoids. She has FH mother with colon cancer. Patient's last colonoscopy was 2015 and showed diverticulosis, NBIH, NBEH and tortuous colon. She has hx hyperplastic colon polyps. She used HC suppositories and those helped at that time. She was scheduled for colonoscopy last December but cancelled because the rectal bleeding stopped and it was so close to the holidays.     She has been doing well until about a few weeks ago she started seeing BRBPR again (seeing in the toilet and on the toilet paper every time she had Bm) x 1 week. She started HC suppositories and the bleeding has since stopped. She also had change in bowels from normal (brown and soft) to pencil thin. This alarmed her and it is what made her come in today. She has hx GERD and does well on pepcid. She denies any dysphagia, reflux, abd pain, constipation, diarrhea or melena. She admits her appetite is good and her weight has been stable.    Past Medical History:   Diagnosis Date   • Adenomatous polyp of colon 11/6/2017   • Anemia    • Bradycardia    • Colitis    • Colon polyps    • Compression fracture of L1 lumbar vertebra    • Compression fracture of L2    • Depression    • Diverticulosis    • GERD (gastroesophageal reflux disease)    • GI hemorrhage    • Hearing loss    • History of blood transfusion 11/2013    S/P CHOLECYSTECTOMY SX  BHE   • History of trigeminal neuralgia    • Hypertension    • Insomnia    • Memory loss    • OA (osteoarthritis)    • Osteoarthritis    • Osteoporosis    • PAC (premature atrial contraction)    • Peripheral neuralgia    •  Pneumonia     RUL   • PVC's (premature ventricular contractions)    • Sleep apnea    • Thyroid disorder        Past Surgical History:   Procedure Laterality Date   • BRAIN SURGERY Left 08/2014    MICROVASCULAR DECOMPRESSION LEFT EAR   • CARDIAC CATHETERIZATION  03/2002   • CHOLECYSTECTOMY N/A 11/20/2013    DR. ESME GOLDMAN   • COLONOSCOPY N/A 02/21/2012    INT/EXT HEMORRHOIDS, DIVERTICULOSIS, 2 HYPERPLASTIC POLYPS, TORT,    • COLONOSCOPY  02/27/2015    eh, tics, torts, ih   REPEAT IN 5 YRS      • JOINT REPLACEMENT  03/15/2004    HIP-PARTIAL DR. SIMRAN KRAMER   • JOINT REPLACEMENT Bilateral 2013    KNEE   • KYPHOPLASTY      COMPRESSION FX OF L2   • TRIGEMINAL NERVE DECOMPRESSION         Scheduled Meds:    Continuous Infusions:  No current facility-administered medications for this visit.     PRN Meds:.    Allergies   Allergen Reactions   • Morphine And Related Itching and Swelling       Social History     Social History   • Marital status:      Spouse name: N/A   • Number of children: N/A   • Years of education: N/A     Occupational History   • Not on file.     Social History Main Topics   • Smoking status: Former Smoker     Packs/day: 0.50     Years: 30.00     Types: Cigarettes     Start date: 2/1/1957     Quit date: 5/6/1989   • Smokeless tobacco: Never Used   • Alcohol use 4.2 oz/week     2 Shots of liquor, 2 Standard drinks or equivalent per week      Comment: 2 DRINKS PER DAY   • Drug use: No   • Sexual activity: Not Currently     Partners: Male     Birth control/ protection: Post-menopausal     Other Topics Concern   • Not on file     Social History Narrative   • No narrative on file       Family History   Problem Relation Age of Onset   • Ovarian cancer Daughter    • Colon cancer Mother    • Arthritis Mother    • Stomach cancer Mother    • Stomach cancer Maternal Grandfather    • Glaucoma Father    • Depression Son    • Stroke Paternal Grandmother        Review of Systems    Constitutional: Negative for appetite change, chills, diaphoresis, fatigue, fever and unexpected weight change.   HENT: Negative for nosebleeds, postnasal drip, sore throat, trouble swallowing and voice change.    Respiratory: Negative for cough, choking, chest tightness, shortness of breath and wheezing.    Cardiovascular: Negative for chest pain.   Gastrointestinal: Negative for abdominal distention, abdominal pain, anal bleeding, blood in stool, constipation, diarrhea, nausea, rectal pain and vomiting.   Endocrine: Negative for polydipsia, polyphagia and polyuria.   Musculoskeletal: Negative for gait problem.   Skin: Negative for rash and wound.   Allergic/Immunologic: Negative for food allergies.   Neurological: Negative for dizziness, speech difficulty and light-headedness.   Psychiatric/Behavioral: Negative for confusion, self-injury, sleep disturbance and suicidal ideas.       Vitals:    04/17/18 1133   BP: 110/70   Temp: 98.5 °F (36.9 °C)       Physical Exam   Constitutional: She is oriented to person, place, and time. She appears well-developed and well-nourished. She does not appear ill. No distress.   HENT:   Head: Normocephalic.   Eyes: Pupils are equal, round, and reactive to light.   Cardiovascular: Normal rate, regular rhythm and normal heart sounds.    Pulmonary/Chest: Effort normal and breath sounds normal.   Abdominal: Soft. Bowel sounds are normal. She exhibits no distension and no mass. There is no hepatosplenomegaly. There is no tenderness. There is no rebound and no guarding. No hernia.   Musculoskeletal: Normal range of motion.   Neurological: She is alert and oriented to person, place, and time.   Skin: Skin is warm and dry.   Psychiatric: She has a normal mood and affect. Her speech is normal and behavior is normal. Judgment normal.       No images are attached to the encounter.    Assessment & Plan     1. Rectal bleeding  - CBC & Differential  - Case Request; Standing  - Case  Request    2. Adenomatous polyp of colon, unspecified part of colon  - Case Request; Standing  - Case Request    3. Gastroesophageal reflux disease without esophagitis  - Case Request; Standing  - Case Request    4. Constipation, unspecified constipation type  - Case Request; Standing  - Case Request    Given her recurrence of rectal bleeding and FH of colon cancer I think she needs colonoscopy for further evaluation. I discussed with her the potential risks involved with the procedure and she agrees to proceed. I will check Hgb today given she dropped 2 units last year after the last bleeding episode. Follow up with Dr. Collins after the scope. GERD seems well controlled at this time.

## 2018-04-23 DIAGNOSIS — N63.20 BREAST MASS, LEFT: Primary | ICD-10-CM

## 2018-04-23 DIAGNOSIS — D49.3 NEOPLASM OF BREAST: ICD-10-CM

## 2018-04-24 ENCOUNTER — ANESTHESIA (OUTPATIENT)
Dept: GASTROENTEROLOGY | Facility: HOSPITAL | Age: 78
End: 2018-04-24

## 2018-04-24 ENCOUNTER — HOSPITAL ENCOUNTER (OUTPATIENT)
Facility: HOSPITAL | Age: 78
Setting detail: HOSPITAL OUTPATIENT SURGERY
Discharge: HOME | End: 2018-04-24
Attending: INTERNAL MEDICINE | Admitting: INTERNAL MEDICINE

## 2018-04-24 ENCOUNTER — ANESTHESIA EVENT (OUTPATIENT)
Dept: GASTROENTEROLOGY | Facility: HOSPITAL | Age: 78
End: 2018-04-24

## 2018-04-24 VITALS
SYSTOLIC BLOOD PRESSURE: 143 MMHG | WEIGHT: 166.44 LBS | RESPIRATION RATE: 18 BRPM | BODY MASS INDEX: 26.06 KG/M2 | TEMPERATURE: 97.7 F | DIASTOLIC BLOOD PRESSURE: 77 MMHG | HEART RATE: 51 BPM | OXYGEN SATURATION: 98 %

## 2018-04-24 PROCEDURE — S0260 H&P FOR SURGERY: HCPCS | Performed by: INTERNAL MEDICINE

## 2018-04-24 PROCEDURE — 25010000002 PROPOFOL 10 MG/ML EMULSION: Performed by: ANESTHESIOLOGY

## 2018-04-24 PROCEDURE — 45378 DIAGNOSTIC COLONOSCOPY: CPT | Performed by: INTERNAL MEDICINE

## 2018-04-24 RX ORDER — PROPOFOL 10 MG/ML
VIAL (ML) INTRAVENOUS CONTINUOUS PRN
Status: DISCONTINUED | OUTPATIENT
Start: 2018-04-24 | End: 2018-04-24 | Stop reason: SURG

## 2018-04-24 RX ORDER — PROPOFOL 10 MG/ML
VIAL (ML) INTRAVENOUS AS NEEDED
Status: DISCONTINUED | OUTPATIENT
Start: 2018-04-24 | End: 2018-04-24 | Stop reason: SURG

## 2018-04-24 RX ORDER — LIDOCAINE HYDROCHLORIDE 20 MG/ML
INJECTION, SOLUTION INFILTRATION; PERINEURAL AS NEEDED
Status: DISCONTINUED | OUTPATIENT
Start: 2018-04-24 | End: 2018-04-24 | Stop reason: SURG

## 2018-04-24 RX ORDER — SODIUM CHLORIDE, SODIUM LACTATE, POTASSIUM CHLORIDE, CALCIUM CHLORIDE 600; 310; 30; 20 MG/100ML; MG/100ML; MG/100ML; MG/100ML
1000 INJECTION, SOLUTION INTRAVENOUS CONTINUOUS
Status: DISCONTINUED | OUTPATIENT
Start: 2018-04-24 | End: 2018-04-24 | Stop reason: HOSPADM

## 2018-04-24 RX ADMIN — SODIUM CHLORIDE, POTASSIUM CHLORIDE, SODIUM LACTATE AND CALCIUM CHLORIDE: 600; 310; 30; 20 INJECTION, SOLUTION INTRAVENOUS at 13:07

## 2018-04-24 RX ADMIN — SODIUM CHLORIDE, POTASSIUM CHLORIDE, SODIUM LACTATE AND CALCIUM CHLORIDE 1000 ML: 600; 310; 30; 20 INJECTION, SOLUTION INTRAVENOUS at 12:39

## 2018-04-24 RX ADMIN — PROPOFOL 160 MCG/KG/MIN: 10 INJECTION, EMULSION INTRAVENOUS at 13:07

## 2018-04-24 RX ADMIN — LIDOCAINE HYDROCHLORIDE 60 MG: 20 INJECTION, SOLUTION INFILTRATION; PERINEURAL at 13:07

## 2018-04-24 RX ADMIN — PROPOFOL 100 MG: 10 INJECTION, EMULSION INTRAVENOUS at 13:07

## 2018-04-24 NOTE — DISCHARGE INSTRUCTIONS
WHAT ARE DIVERTICULOSIS AND DIVERTICULITIS?  Many people have small pouches in their colons that bulge outward through weak spots, like an inner tube that pokes through weak places in a tire.  Each pouch is called a diverticulum.  The condition of having diverticula is DIVERTICULOSIS.  The condition becomes more common as people age.  About half of all people over the age of 60 have diverticulosis    When pouches become infected or inflamed, the condition is called DIVERTICULITIS.  This happens in 10% to 25% of people with diverticulosis.  Diverticulosis and diverticulitis are also called DIVERTICULAR DISEASE.     WHAT ARE THE SYMPTOMS?  Diverticulosis - Most people do not have any discomfort or symptoms.  However, symptoms may include mild cramps, bloating, and constipation.  Other diseases such as irritable bowel syndrome (IBS) and stomach ulcers cause similar problems, so these symptoms do not always mean a person has diverticulosis.  You should visit your doctor if you have these troubling symptoms.    Diverticulitis - The most common symptom is abdominal pain.  The most common sign is tenderness around the left side of the lower abdomen.  If infection is the cause, fever, nausea, vomiting, chills, cramping, and constipation may occur as well.  The severity depends on the extent of the infection and complications.    WHAT ARE THE COMPLICATIONS?  Diverticulitis can lead to bleeding, infections,perforations or tears, or blockages.  These complications always require treatment to prevent them from proggressing and causing serous illness.    Bleeding from a diverticula is a rare complication.  When this occurs, blood may appear in the toilet or in your stool.  Bleeding can be severe, but it may stop by itself and not require treatment.  Doctors believe bleeding diverticula are caused by a small blood vessel in a diverticulum that weakens and finally bursts.  If you have bleeding from the rectum, you should see your  doctor.  If the bleeding does not stop you may need surgery.    Abscess, Perforation, and Peritonitis - The infection causing diverticulitis often clears up after a few days of treatment with antibiotics.  If the condition gets worse, an abscess may form in the colon.  An abscess is an infected area with pus that may cause swelling and destroy tissue.  Sometimes the infected diverticula may develop small holes, called perforations.  These perforations allow pus to leak out of the colon into the abdominal area.  If the abscess is small and remains in the colon, it may clear up after treatment with antibiotics.  If not, the doctor may need to drain it.  A large abscess can become a serious problem if the infection leaks out and contaminates areas outside the colon.  Infection that spreads into the abdominal cavity is called peritonitis.  Peritonitis requires immediate surgery toclean the abdominal cavity and remove the damaged part of the colon.  Without surgery, peritonitis can be fatal.    FISTULA  A fistula is an abnormal connection of tissue between two organs or between an organ and the skin.  When damaged tissues come into contact with each other during infection, they sometimes stick together.  If they heal that way, a fistula forms.  When diverticulitis-related infection spreads through out the colon, the colon's tissue may stick to nearby tissues.  The organs usually involved are the bladder, small intestine, and skin.  The problem can be corrected with surgery to remove the fistula and affected part of the colon.    INTESTINAL OBSTRUCTION  The scarring caused by infection may cause partial or total blockage of the large intestine.  When this happens, the colon is unable to move bowel contents normally.  When the obstruction totally blocks the intestine, emergency surgery is necessary.  Partial blockage is not an emergency, so the surgery to correct it can be planned.    WHAT CAUSES DIVERTICULAR  DISEASE  Although not proven, the dominant theory is that a low-fiber diet is the main cause of diverticular disease.  The disease was first noticed in the [place] in the early 1900s.  At about the same time, processed foods were introduced into the American diet.  Many processed foods contain refined, low-fiber flour.  Unlike whole-wheat flour, refined flour has no wheat bran.    Diverticular disease is common in developed or industrialized countries-particularly the [place], [place], and [place]-where low-fiber diets are common.  The disease is rare in countries of [place] and [place], where people eat high-fiber vegetable diets.    Fiber is the part of fruits, vegetables, and whole grains that the body cannot digest.  Some fiber dissolves easily in water (soluble fiber).  It takes on a soft, jelly-like texture in the intestines.  Some fiber passes almost unchanged through the intestines (insoluble fiber).  Both kinds of fiber help make stools soft and easy to pass.  Fiber also prevents constipation.    Constipation makes the muscles strain to move stool that is too hard.  It is the main cause of increased pressure in the colon.  This excess pressure might cause the weak spots in the colon to bulge out and become diverticula.  Diverticulitis occurs when diverticula become infected or inflamed.  Doctors are not certain what causes the infection.  It may begin when stool or bacteria are caught in the diverticula.  An attack of diverticulitis can develop suddenly and without warning.    HOW DOES THE DOCTOR DIAGNOSE DIVERTICULAR DISEASE  The doctor asks about medical history, does a physical exam, and may perform one or more diagnostic tests.  Because most people do not have symptoms, diverticulosis is often found through tests ordered for another ailment.    When taking a medical history, the doctor may ask about bowel habits, symptoms, pain, diet, and medications.  The physical exam usually involves a  digital rectal exam.  To preform this test. The doctor inserts a gloved, lubricated finger into the rectum to detect tenderness, blockage, or blood.  The doctor may check stool for signs of bleeding and test blood for signs of infection.  The doctor may also order x-rays or other tests.    WHAT IS THE TREATMENT FOR DIVERTICULAR DISEASE  Increasing the amount of fiber in the diet may reduce symptoms of diverticulosis and prevent complications such as diverticulitis.  Fiber keeps stool soft and lowers pressure inside the colon so that bowel contents can move through easily.  The American Dietetic Association. Recommends 20 to 35 grams of fiber each day.  The doctor may also recommend taking a fiber product such as Citrucel or Metamucil once a dya.  These products are mixed water and provide about 2 to 3.5 grams of fiber per  Tablespoon, mixed with 8 ounces of water.    Avoidance of nuts, popcorn, and sunflower, pumpkin, hina, and sesame seeds has been recommended by physicians out of fear that food particles could enter, block, or irritate the diverticula.  However, no scientific data support this treatment measure.  Eating a high-fiber diet is the only requirement highly emphasized across the medical literature.  Eliminating specific foods is not necessary.  The seeds in tomatoes, zucchini, cucumbers, strawberries, and raspberries, as well as poppy seeds, are generally considered harmless.  People differ in amounts and types of foods the can eat.  Decisions about diet should be made based on what works best for each person.  Keeping a food diary may help identify what foods may cause symptoms.    If cramps, bloating, and constipation are problems, the doctor may prescribe  Short course of pain medication.  However, many medications affect emptying of the colon, an undesirable side effect for people with diverticulosis.    DIVERTICULITIS  Treatment focuses on clearing up the infection and inflammation, resting the  colon, and preventing or minimizing complications.  An attack of diverticulitis without complications may respond to antibiotics within a few days if treated early.  To help the colon rest, the doctor may recommend bed rest and a liquid diet, along with a pain reliever.    An acute attack with severe pain or sever infection may require a hospital stay.  Most acute cases of diverticulitis are treated with antibiotics and a liquid diet.  The antibiotics are given by injection into a vein.  In some cases, however, surgery may be necessary.    WHEN IS SURGERY NECESSARY  If attacks are severe or frequent, the doctor may advise surgery.  The surgeon removes the affected part of the colon and joins the remaining sections.  This typed of surgery, called colon resection, aims to keep attacks from coming back and to prevent complications.  The doctor may also recommend surgery for complications of a fistula or intestinal obstruction.    If antibiotics do not correct an attack, emergency surgery may be required.  Other reasons for emergency surgery include a large abscess, perforation, peritonitis, or continued bleeding.    Emergency surgery usually involves 2 operations.  The first will clear the infected abdominal cavity and remove part of the colon.  Because infection and sometimes obstruction, it is not safe to rejoin the colon during the first operation.  Instead, the surgeon creates a temporary hole, or stoma, in the abdomen.  The end of the colon is connected to the hole, a procedure called a colostomy, to allow normal eating and bowel movements.  The stool goes into a bag attached to the opening in the abdomen.  In the second operation, the surgeon rejoins the ends of the colon.

## 2018-04-24 NOTE — H&P
[facility]  Pre Procedure History & Physical    Chief Complaint:   Rectal bleeding, history of polyps    Subjective     HPI:   This 77-year-old female presents to the endoscopy suite for colonoscopic evaluation.  She has a history of recurrent rectal bleeding, family history of colon cancer, and a personal history of polyps.  Last colonoscopy of record February 2015.    Past Medical History:   Past Medical History:   Diagnosis Date   • Adenomatous polyp of colon 11/6/2017   • Anemia    • Bradycardia    • Colitis    • Colon polyps    • Compression fracture of L1 lumbar vertebra    • Compression fracture of L2    • Depression    • Diverticulosis    • GERD (gastroesophageal reflux disease)    • GI hemorrhage    • Hearing loss    • History of blood transfusion 11/2013    S/P CHOLECYSTECTOMY SX  BHE   • History of trigeminal neuralgia    • Hypertension    • Insomnia    • Memory loss    • OA (osteoarthritis)    • Osteoarthritis    • Osteoporosis    • PAC (premature atrial contraction)    • Peripheral neuralgia    • Pneumonia     RUL   • PVC's (premature ventricular contractions)    • Sleep apnea    • Thyroid disorder        Past Surgical History:  Past Surgical History:   Procedure Laterality Date   • BRAIN SURGERY Left 08/2014    MICROVASCULAR DECOMPRESSION LEFT EAR   • CARDIAC CATHETERIZATION  03/2002   • CHOLECYSTECTOMY N/A 11/20/2013    DR. ESME GOLDMAN   • COLONOSCOPY N/A 02/21/2012    INT/EXT HEMORRHOIDS, DIVERTICULOSIS, 2 HYPERPLASTIC POLYPS, TORT,    • COLONOSCOPY  02/27/2015    eh, tics, torts, ih   REPEAT IN 5 YRS      • JOINT REPLACEMENT  03/15/2004    HIP-PARTIAL DR. SIMRAN KRAMER   • JOINT REPLACEMENT Bilateral 2013    KNEE   • KYPHOPLASTY      COMPRESSION FX OF L2   • TRIGEMINAL NERVE DECOMPRESSION         Family History:  Family History   Problem Relation Age of Onset   • Ovarian cancer Daughter    • Colon cancer Mother    • Arthritis Mother    • Stomach  cancer Mother    • Stomach cancer Maternal Grandfather    • Glaucoma Father    • Depression Son    • Stroke Paternal Grandmother        Social History:   reports that she quit smoking about 28 years ago. Her smoking use included Cigarettes. She started smoking about 61 years ago. She has a 15.00 pack-year smoking history. She has never used smokeless tobacco. She reports that she drinks about 4.2 oz of alcohol per week . She reports that she does not use drugs.    Medications:   No prescriptions prior to admission.       Allergies:  Morphine and related    ROS:    Pertinent items are noted in HPI, all other systems reviewed and negative     Objective     There were no vitals taken for this visit.    Physical Exam   Constitutional: Pt is oriented to person, place, and time and well-developed, well-nourished, and in no distress.   Mouth/Throat: Oropharynx is clear and moist.   Neck: Normal range of motion.   Cardiovascular: Normal rate, regular rhythm and normal heart sounds.    Pulmonary/Chest: Effort normal and breath sounds normal.   Abdominal: Soft. Nontender  Skin: Skin is warm and dry.   Psychiatric: Mood, memory, affect and judgment normal.     Assessment/Plan     Diagnosis:  Rectal bleeding  Family history of colon cancer  Personal history of colon polyps    Anticipated Surgical Procedure:  Colonoscopy    The risks, benefits, and alternatives of this procedure have been discussed with the patient or the responsible party- the patient understands and agrees to proceed.

## 2018-04-24 NOTE — ANESTHESIA POSTPROCEDURE EVALUATION
Patient: Moriah Petty    Procedure Summary     Date:  04/24/18 Room / Location:   [facility]    Anesthesia Start:  1305 Anesthesia Stop:  1328    Procedure:  COLONOSCOPY to cecum and t.i. (N/A ) Diagnosis:       Diverticulosis      Tortuous colon      Hemorrhoids      (Rectal bleeding [K62.5])      (Gastroesophageal reflux disease without esophagitis [K21.9])      (Constipation, unspecified constipation type [K59.00])      (Adenomatous polyp of colon, unspecified part of colon [D12.6])    Surgeon:  Morgan SHAW MD Provider:  Santos Tripp MD    Anesthesia Type:  MAC ASA Status:  3          Anesthesia Type: MAC  Last vitals  BP   143/77 (04/24/18 1359)   Temp   36.5 °C (97.7 °F) (04/24/18 1349)   Pulse   51 (04/24/18 1359)   Resp   18 (04/24/18 1359)     SpO2   98 % (04/24/18 1359)     Post Anesthesia Care and Evaluation    Patient location during evaluation: PACU  Patient participation: complete - patient participated  Level of consciousness: awake  Pain score: 0  Pain management: adequate  Airway patency: patent  Anesthetic complications: No anesthetic complications    Cardiovascular status: acceptable  Respiratory status: acceptable  Hydration status: acceptable    Comments: Blood pressure 143/77, pulse 51, temperature 36.5 °C (97.7 °F), resp. rate 18, weight 75.5 kg (166 lb 7 oz), SpO2 98 %.    No anesthesia care post op

## 2018-04-24 NOTE — ANESTHESIA PREPROCEDURE EVALUATION
Anesthesia Evaluation     NPO Solid Status: > 8 hours  NPO Liquid Status: > 2 hours           Airway   Mallampati: II  TM distance: >3 FB  Neck ROM: full  no difficulty expected  Dental - normal exam     Pulmonary - normal exam    breath sounds clear to auscultation  (+) sleep apnea,   (-) decreased breath sounds, wheezes  Cardiovascular - normal exam    Rhythm: regular  Rate: normal    (+) hypertension,       Neuro/Psych  (+) headaches,     GI/Hepatic/Renal/Endo    (+)  GI bleeding, hypothyroidism,     Musculoskeletal     (+) back pain,   Abdominal  - normal exam   Substance History      OB/GYN          Other   (+) arthritis   history of cancer                  Anesthesia Plan    ASA 3     MAC     intravenous induction   Anesthetic plan and risks discussed with patient.

## 2018-04-27 ENCOUNTER — TELEPHONE (OUTPATIENT)
Dept: GASTROENTEROLOGY | Facility: CLINIC | Age: 78
End: 2018-04-27

## 2018-04-27 NOTE — TELEPHONE ENCOUNTER
"----- Message from Moriah Petty sent at 4/27/2018 11:44 AM EDT -----  Regarding: Prescription Question  Contact: [phone]  At my colonoscopy on the 24th, I mentioned that I had a prescription from you for cramping for hyoscyamine 0.125 mg that was to be taken 4x a day with meals. You said there was another medicine that was to be taken \"as needed.\" I checked the prescription bottle and it does indeed say 4x a day with meals. Would it be possible for you to give me a prescription for an \"as needed\" medicine? I believe you said there was something that you put under your tongue to relieve cramping.  Thank you.  Moriah Petty  "

## 2018-04-30 NOTE — TELEPHONE ENCOUNTER
"Review of patient's medical list indicates she had been prescribed Levsin sublingual 0.125 to be taken every 4 hours as needed for spasms.  I could not see any prescription that was written for a 4 times a day schedule with meals.  Levsin Sublingual was the only medication listed to be administered under the tongue.  He she wants to take scheduled hyoscyamine, can try Levbid which is slow release and lasts for 12 hours.  Otherwise, would continue use of Levsin sublingual \"as needed\".  "

## 2018-04-30 NOTE — TELEPHONE ENCOUNTER
Pt called and advised per Dr Collins that he reviewed her medication list and she had been prescribed levisin sublingual 0.125 to be taken every 4 hrs as needed for spasms.  He did not see nay prescription that was written for 4 times a day with meals.  If she is wants to take schedule hyoscyamine , she can try levbid which is slow release and lasts for 12 hours.  Other wise she can continue to use levsin as needed.      Pt verb understanding and states she is glad she can use it as needed instead of 4x per day.

## 2018-05-02 ENCOUNTER — HOSPITAL ENCOUNTER (OUTPATIENT)
Dept: ULTRASOUND IMAGING | Facility: HOSPITAL | Age: 78
Discharge: HOME | End: 2018-05-02
Attending: OBSTETRICS & GYNECOLOGY | Admitting: OBSTETRICS & GYNECOLOGY

## 2018-05-02 DIAGNOSIS — N63.20 BREAST MASS, LEFT: Primary | ICD-10-CM

## 2018-05-02 DIAGNOSIS — R92.8 BREAST LESION ON MAMMOGRAPHY: ICD-10-CM

## 2018-05-02 PROCEDURE — 76642 ULTRASOUND BREAST LIMITED: CPT

## 2018-05-07 ENCOUNTER — DOCUMENTATION (OUTPATIENT)
Dept: OBSTETRICS AND GYNECOLOGY | Facility: CLINIC | Age: 78
End: 2018-05-07

## 2018-05-07 NOTE — PROGRESS NOTES
Phone call.  Results were reviewed with the patient and options were discussed.  She has an ultrasound-guided biopsy scheduled for Thursday.  Further planning after the results of this biopsy are known.

## 2018-05-10 ENCOUNTER — HOSPITAL ENCOUNTER (OUTPATIENT)
Dept: ULTRASOUND IMAGING | Facility: HOSPITAL | Age: 78
Discharge: HOME | End: 2018-05-10
Attending: OBSTETRICS & GYNECOLOGY | Admitting: OBSTETRICS & GYNECOLOGY

## 2018-05-10 VITALS
OXYGEN SATURATION: 95 % | RESPIRATION RATE: 18 BRPM | HEIGHT: 67 IN | DIASTOLIC BLOOD PRESSURE: 79 MMHG | SYSTOLIC BLOOD PRESSURE: 160 MMHG | HEART RATE: 64 BPM | WEIGHT: 164 LBS | TEMPERATURE: 97.7 F | BODY MASS INDEX: 25.74 KG/M2

## 2018-05-10 DIAGNOSIS — R92.8 BREAST LESION ON MAMMOGRAPHY: ICD-10-CM

## 2018-05-10 PROCEDURE — A4648 IMPLANTABLE TISSUE MARKER: HCPCS

## 2018-05-10 PROCEDURE — 88305 TISSUE EXAM BY PATHOLOGIST: CPT | Performed by: OBSTETRICS & GYNECOLOGY

## 2018-05-10 RX ORDER — LIDOCAINE HYDROCHLORIDE AND EPINEPHRINE 10; 10 MG/ML; UG/ML
10 INJECTION, SOLUTION INFILTRATION; PERINEURAL ONCE
Status: COMPLETED | OUTPATIENT
Start: 2018-05-10 | End: 2018-05-10

## 2018-05-10 RX ORDER — LIDOCAINE HYDROCHLORIDE 10 MG/ML
10 INJECTION, SOLUTION INFILTRATION; PERINEURAL ONCE
Status: COMPLETED | OUTPATIENT
Start: 2018-05-10 | End: 2018-05-10

## 2018-05-10 RX ADMIN — LIDOCAINE HYDROCHLORIDE AND EPINEPHRINE 10 ML: 10; 10 INJECTION, SOLUTION INFILTRATION; PERINEURAL at 10:56

## 2018-05-10 RX ADMIN — LIDOCAINE HYDROCHLORIDE 10 ML: 10 INJECTION, SOLUTION INFILTRATION; PERINEURAL at 10:56

## 2018-05-10 NOTE — NURSING NOTE
Biopsy site to left outer breast clear with Skin Affix dry and intact. No firmness or swelling noted at or around biopsy site. Denies pain. Ice pack with protective covering applied to biopsy site. Discharge instructions discussed with understanding voiced by patient. Copies provided to patient. No distress noted. To home via private vehicle accompanied by her . Offered wheelchair but patient declined.

## 2018-05-11 LAB
CYTO UR: NORMAL
LAB AP CASE REPORT: NORMAL
LAB AP CLINICAL INFORMATION: NORMAL
Lab: NORMAL
PATH REPORT.FINAL DX SPEC: NORMAL
PATH REPORT.GROSS SPEC: NORMAL

## 2018-05-14 ENCOUNTER — TELEPHONE (OUTPATIENT)
Dept: OBSTETRICS AND GYNECOLOGY | Facility: CLINIC | Age: 78
End: 2018-05-14

## 2018-05-22 ENCOUNTER — OFFICE VISIT (OUTPATIENT)
Dept: OBSTETRICS AND GYNECOLOGY | Facility: CLINIC | Age: 78
End: 2018-05-22

## 2018-05-22 ENCOUNTER — TRANSCRIBE ORDERS (OUTPATIENT)
Dept: ADMINISTRATIVE | Facility: HOSPITAL | Age: 78
End: 2018-05-22

## 2018-05-22 VITALS — DIASTOLIC BLOOD PRESSURE: 70 MMHG | WEIGHT: 165.8 LBS | SYSTOLIC BLOOD PRESSURE: 130 MMHG | BODY MASS INDEX: 25.96 KG/M2

## 2018-05-22 DIAGNOSIS — M81.0 AGE RELATED OSTEOPOROSIS, UNSPECIFIED PATHOLOGICAL FRACTURE PRESENCE: Primary | ICD-10-CM

## 2018-05-22 DIAGNOSIS — Z01.419 ENCOUNTER FOR GYNECOLOGICAL EXAMINATION WITHOUT ABNORMAL FINDING: Primary | ICD-10-CM

## 2018-05-22 DIAGNOSIS — M81.0 OSTEOPOROSIS, UNSPECIFIED OSTEOPOROSIS TYPE, UNSPECIFIED PATHOLOGICAL FRACTURE PRESENCE: ICD-10-CM

## 2018-05-22 PROCEDURE — G0101 CA SCREEN;PELVIC/BREAST EXAM: HCPCS | Performed by: OBSTETRICS & GYNECOLOGY

## 2018-05-22 PROCEDURE — 99212 OFFICE O/P EST SF 10 MIN: CPT | Performed by: OBSTETRICS & GYNECOLOGY

## 2018-05-22 RX ORDER — NAPROXEN 250 MG/1
250 TABLET ORAL
COMMUNITY
End: 2018-11-16 | Stop reason: ALTCHOICE

## 2018-05-22 NOTE — PROGRESS NOTES
HPI   Moriah Petty  is a 77 y.o. female who presents for a routine gynecologic exam.  She is without complaints today.  Bowels and bladder are functioning normally.  No hot flashes or night sweats.  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  Also, the patient has been in treatment for osteoporosis.  She takes Prolia every 6 months.  Also, calcium, vitamin D, magnesium and regular weightbearing exercise.  She is asymptomatic and feeling well.    Chief Complaint   Patient presents with   • Gynecologic Exam       Past Medical History:   Diagnosis Date   • Adenomatous polyp of colon 11/6/2017   • Anemia    • Bradycardia    • Colitis    • Colon polyps    • Compression fracture of L1 lumbar vertebra    • Compression fracture of L2    • Depression    • Diverticulosis    • GERD (gastroesophageal reflux disease)    • GI hemorrhage    • Hearing loss    • History of blood transfusion 11/2013    S/P CHOLECYSTECTOMY SX  BHE   • History of trigeminal neuralgia    • Hypertension    • Insomnia    • Memory loss    • OA (osteoarthritis)    • Osteoarthritis    • Osteoporosis    • PAC (premature atrial contraction)    • Peripheral neuralgia    • Pneumonia     RUL   • PVC's (premature ventricular contractions)    • Sleep apnea    • Thyroid disorder        Past Surgical History:   Procedure Laterality Date   • BRAIN SURGERY Left 08/2014    MICROVASCULAR DECOMPRESSION LEFT EAR   • CARDIAC CATHETERIZATION  03/2002   • CHOLECYSTECTOMY N/A 11/20/2013    DR. ESME GOLDMAN   • COLONOSCOPY N/A 02/21/2012    INT/EXT HEMORRHOIDS, DIVERTICULOSIS, 2 HYPERPLASTIC POLYPS, TORT,    • COLONOSCOPY  02/27/2015    eh, tics, torts, ih   REPEAT IN 5 YRS      • COLONOSCOPY N/A 4/24/2018    Procedure: COLONOSCOPY to cecum and t.i.;  Surgeon: Morgan SHAW MD;  Location: [facility];  Service: Gastroenterology   • JOINT REPLACEMENT  03/15/2004    HIP-PARTIAL DR. SIMRAN KRAMER   • JOINT REPLACEMENT  Bilateral 2013    KNEE   • KYPHOPLASTY      COMPRESSION FX OF L2   • TRIGEMINAL NERVE DECOMPRESSION         Social History     Social History   • Marital status:      Spouse name: N/A   • Number of children: N/A   • Years of education: N/A     Occupational History   • Not on file.     Social History Main Topics   • Smoking status: Former Smoker     Packs/day: 0.50     Years: 30.00     Types: Cigarettes     Start date: 2/1/1957     Quit date: 5/6/1989   • Smokeless tobacco: Never Used   • Alcohol use 4.2 oz/week     2 Shots of liquor, 2 Standard drinks or equivalent per week      Comment: 2 DRINKS PER DAY   • Drug use: No   • Sexual activity: Not Currently     Partners: Male     Birth control/ protection: Post-menopausal     Other Topics Concern   • Not on file     Social History Narrative   • No narrative on file       The following portions of the patient's history were reviewed and updated as appropriate: allergies, current medications, past family history, past medical history, past social history, past surgical history and problem list.    Review of Systems is negative for unexplained weight change.  It is negative for hot flashes and night sweats.  It is negative for urinary incontinence.  It is negative for pelvic pressure and low back pain.  All other systems are reviewed and are negative          Physical Exam   Constitutional: She is oriented to person, place, and time. She appears well-developed and well-nourished.   HENT:   Head: Normocephalic and atraumatic.   Cardiovascular: Normal rate and regular rhythm.    Pulmonary/Chest: Effort normal and breath sounds normal. She has no wheezes. She has no rales.   The breasts are equal in size.  There are no palpable lumps.  Nipple discharge and axillary adenopathy are absent.   Abdominal: Soft. She exhibits no distension. There is no tenderness.   Genitourinary: Vagina normal and uterus normal. There is no lesion on the right labia. There is no lesion on  the left labia. Cervix exhibits no motion tenderness. Right adnexum displays no mass and no tenderness. Left adnexum displays no mass and no tenderness. No vaginal discharge found.   Neurological: She is alert and oriented to person, place, and time.   Skin: Skin is warm and dry.   Nursing note and vitals reviewed.      Assessment    Moriah was seen today for gynecologic exam.    Diagnoses and all orders for this visit:    Encounter for gynecological examination without abnormal finding    Osteoporosis, unspecified osteoporosis type, unspecified pathological fracture presence    Other orders  -      DEXA SCAN        Plan  1. Normal gynecologic exam  2. Recent mammogram with an ultrasound-guided biopsy.  This revealed a fibroadenoma.  We discussed this.  The patient declines general surgery consultation for excision of the mass.  Instead, she will follow up in 6 months for a repeat mammogram.  3. Normal colonoscopy this year.  Counseled regarding the importance of regular screening colonoscopies  4. Osteoporosis.  Counseled.  Currently, the patient takes calcium, magnesium, vitamin D and does regular weightbearing exercise.  She takes Prolia twice yearly.  I recommend a DEXA soon.  If osteoporosis has resolved, Prolia could be discontinued.     5. No Follow-up on file.    History   Smoking Status   • Former Smoker   • Packs/day: 0.50   • Years: 30.00   • Types: Cigarettes   • Start date: 2/1/1957   • Quit date: 5/6/1989   6.     7.

## 2018-08-01 ENCOUNTER — HOSPITAL ENCOUNTER (OUTPATIENT)
Dept: BONE DENSITY | Facility: HOSPITAL | Age: 78
Discharge: HOME | End: 2018-08-01
Attending: OBSTETRICS & GYNECOLOGY

## 2018-08-13 RX ORDER — LEVOTHYROXINE SODIUM 0.03 MG/1
TABLET ORAL
Qty: 90 TABLET | Refills: 2 | Status: SHIPPED | OUTPATIENT
Start: 2018-08-13 | End: 2019-05-20

## 2018-08-14 DIAGNOSIS — M81.0 AGE-RELATED OSTEOPOROSIS WITHOUT CURRENT PATHOLOGICAL FRACTURE: Primary | ICD-10-CM

## 2018-08-16 ENCOUNTER — HOSPITAL ENCOUNTER (OUTPATIENT)
Dept: BONE DENSITY | Facility: HOSPITAL | Age: 78
Discharge: HOME | End: 2018-08-16
Attending: OBSTETRICS & GYNECOLOGY | Admitting: OBSTETRICS & GYNECOLOGY

## 2018-08-16 DIAGNOSIS — M81.0 AGE RELATED OSTEOPOROSIS, UNSPECIFIED PATHOLOGICAL FRACTURE PRESENCE: ICD-10-CM

## 2018-08-16 PROCEDURE — 77080 DXA BONE DENSITY AXIAL: CPT

## 2018-08-19 ENCOUNTER — RESULTS ENCOUNTER (OUTPATIENT)
Dept: OBSTETRICS AND GYNECOLOGY | Facility: CLINIC | Age: 78
End: 2018-08-19

## 2018-08-19 DIAGNOSIS — M81.0 AGE-RELATED OSTEOPOROSIS WITHOUT CURRENT PATHOLOGICAL FRACTURE: ICD-10-CM

## 2018-08-20 ENCOUNTER — HOSPITAL ENCOUNTER (EMERGENCY)
Facility: HOSPITAL | Age: 78
Discharge: HOME | End: 2018-08-21
Attending: EMERGENCY MEDICINE | Admitting: EMERGENCY MEDICINE

## 2018-08-20 DIAGNOSIS — K52.9 COLITIS: Primary | ICD-10-CM

## 2018-08-20 PROCEDURE — 99284 EMERGENCY DEPT VISIT MOD MDM: CPT

## 2018-08-20 RX ORDER — SODIUM CHLORIDE 0.9 % (FLUSH) 0.9 %
10 SYRINGE (ML) INJECTION AS NEEDED
Status: DISCONTINUED | OUTPATIENT
Start: 2018-08-20 | End: 2018-08-21 | Stop reason: HOSPADM

## 2018-08-21 ENCOUNTER — EPISODE CHANGES (OUTPATIENT)
Dept: CASE MANAGEMENT | Facility: OTHER | Age: 78
End: 2018-08-21

## 2018-08-21 ENCOUNTER — APPOINTMENT (OUTPATIENT)
Dept: CT IMAGING | Facility: HOSPITAL | Age: 78
End: 2018-08-21

## 2018-08-21 VITALS
HEART RATE: 86 BPM | HEIGHT: 65 IN | SYSTOLIC BLOOD PRESSURE: 133 MMHG | OXYGEN SATURATION: 93 % | RESPIRATION RATE: 18 BRPM | TEMPERATURE: 99.9 F | WEIGHT: 165 LBS | BODY MASS INDEX: 27.49 KG/M2 | DIASTOLIC BLOOD PRESSURE: 65 MMHG

## 2018-08-21 LAB
ALBUMIN SERPL-MCNC: 4.9 G/DL (ref 3.5–5.2)
ALBUMIN/GLOB SERPL: 1.7 G/DL
ALP SERPL-CCNC: 50 U/L (ref 39–117)
ALT SERPL W P-5'-P-CCNC: 21 U/L (ref 1–33)
ANION GAP SERPL CALCULATED.3IONS-SCNC: 14.9 MMOL/L
AST SERPL-CCNC: 19 U/L (ref 1–32)
BACTERIA UR QL AUTO: ABNORMAL /HPF
BASOPHILS # BLD AUTO: 0.02 10*3/MM3 (ref 0–0.2)
BASOPHILS NFR BLD AUTO: 0.2 % (ref 0–1.5)
BILIRUB SERPL-MCNC: 0.5 MG/DL (ref 0.1–1.2)
BILIRUB UR QL STRIP: NEGATIVE
BUN BLD-MCNC: 20 MG/DL (ref 8–23)
BUN/CREAT SERPL: 17.5 (ref 7–25)
CALCIUM SPEC-SCNC: 9.3 MG/DL (ref 8.6–10.5)
CHLORIDE SERPL-SCNC: 94 MMOL/L (ref 98–107)
CLARITY UR: CLEAR
CO2 SERPL-SCNC: 27.1 MMOL/L (ref 22–29)
COLOR UR: YELLOW
CREAT BLD-MCNC: 1.14 MG/DL (ref 0.57–1)
DEPRECATED RDW RBC AUTO: 46.3 FL (ref 37–54)
EOSINOPHIL # BLD AUTO: 0.12 10*3/MM3 (ref 0–0.7)
EOSINOPHIL NFR BLD AUTO: 1.1 % (ref 0.3–6.2)
ERYTHROCYTE [DISTWIDTH] IN BLOOD BY AUTOMATED COUNT: 13.7 % (ref 11.7–13)
GFR SERPL CREATININE-BSD FRML MDRD: 46 ML/MIN/1.73
GLOBULIN UR ELPH-MCNC: 2.9 GM/DL
GLUCOSE BLD-MCNC: 85 MG/DL (ref 65–99)
GLUCOSE UR STRIP-MCNC: NEGATIVE MG/DL
HCT VFR BLD AUTO: 43.7 % (ref 35.6–45.5)
HGB BLD-MCNC: 14.3 G/DL (ref 11.9–15.5)
HGB UR QL STRIP.AUTO: NEGATIVE
HOLD SPECIMEN: NORMAL
HOLD SPECIMEN: NORMAL
HYALINE CASTS UR QL AUTO: ABNORMAL /LPF
IMM GRANULOCYTES # BLD: 0.02 10*3/MM3 (ref 0–0.03)
IMM GRANULOCYTES NFR BLD: 0.2 % (ref 0–0.5)
KETONES UR QL STRIP: NEGATIVE
LEUKOCYTE ESTERASE UR QL STRIP.AUTO: ABNORMAL
LIPASE SERPL-CCNC: 37 U/L (ref 13–60)
LYMPHOCYTES # BLD AUTO: 0.62 10*3/MM3 (ref 0.9–4.8)
LYMPHOCYTES NFR BLD AUTO: 5.8 % (ref 19.6–45.3)
MCH RBC QN AUTO: 30.1 PG (ref 26.9–32)
MCHC RBC AUTO-ENTMCNC: 32.7 G/DL (ref 32.4–36.3)
MCV RBC AUTO: 92 FL (ref 80.5–98.2)
MONOCYTES # BLD AUTO: 0.03 10*3/MM3 (ref 0.2–1.2)
MONOCYTES NFR BLD AUTO: 0.3 % (ref 5–12)
NEUTROPHILS # BLD AUTO: 9.83 10*3/MM3 (ref 1.9–8.1)
NEUTROPHILS NFR BLD AUTO: 92.4 % (ref 42.7–76)
NITRITE UR QL STRIP: NEGATIVE
PH UR STRIP.AUTO: 7.5 [PH] (ref 5–8)
PLATELET # BLD AUTO: 240 10*3/MM3 (ref 140–500)
PMV BLD AUTO: 11.8 FL (ref 6–12)
POTASSIUM BLD-SCNC: 4.1 MMOL/L (ref 3.5–5.2)
PROT SERPL-MCNC: 7.8 G/DL (ref 6–8.5)
PROT UR QL STRIP: NEGATIVE
RBC # BLD AUTO: 4.75 10*6/MM3 (ref 3.9–5.2)
RBC # UR: ABNORMAL /HPF
REF LAB TEST METHOD: ABNORMAL
SODIUM BLD-SCNC: 136 MMOL/L (ref 136–145)
SP GR UR STRIP: 1.02 (ref 1–1.03)
SQUAMOUS #/AREA URNS HPF: ABNORMAL /HPF
UROBILINOGEN UR QL STRIP: ABNORMAL
WBC NRBC COR # BLD: 10.64 10*3/MM3 (ref 4.5–10.7)
WBC UR QL AUTO: ABNORMAL /HPF
WHOLE BLOOD HOLD SPECIMEN: NORMAL
WHOLE BLOOD HOLD SPECIMEN: NORMAL

## 2018-08-21 PROCEDURE — 83690 ASSAY OF LIPASE: CPT | Performed by: EMERGENCY MEDICINE

## 2018-08-21 PROCEDURE — 25010000002 IOPAMIDOL 61 % SOLUTION: Performed by: EMERGENCY MEDICINE

## 2018-08-21 PROCEDURE — 85025 COMPLETE CBC W/AUTO DIFF WBC: CPT | Performed by: EMERGENCY MEDICINE

## 2018-08-21 PROCEDURE — 74177 CT ABD & PELVIS W/CONTRAST: CPT

## 2018-08-21 PROCEDURE — 25010000002 ONDANSETRON PER 1 MG: Performed by: EMERGENCY MEDICINE

## 2018-08-21 PROCEDURE — 80053 COMPREHEN METABOLIC PANEL: CPT | Performed by: EMERGENCY MEDICINE

## 2018-08-21 PROCEDURE — 81001 URINALYSIS AUTO W/SCOPE: CPT | Performed by: EMERGENCY MEDICINE

## 2018-08-21 PROCEDURE — 96361 HYDRATE IV INFUSION ADD-ON: CPT

## 2018-08-21 PROCEDURE — 96374 THER/PROPH/DIAG INJ IV PUSH: CPT

## 2018-08-21 RX ORDER — METRONIDAZOLE 250 MG/1
250 TABLET ORAL 3 TIMES DAILY
Qty: 21 TABLET | Refills: 0 | Status: SHIPPED | OUTPATIENT
Start: 2018-08-21 | End: 2018-10-22

## 2018-08-21 RX ORDER — ONDANSETRON 2 MG/ML
4 INJECTION INTRAMUSCULAR; INTRAVENOUS ONCE
Status: COMPLETED | OUTPATIENT
Start: 2018-08-21 | End: 2018-08-21

## 2018-08-21 RX ORDER — SODIUM CHLORIDE 9 MG/ML
125 INJECTION, SOLUTION INTRAVENOUS CONTINUOUS
Status: DISCONTINUED | OUTPATIENT
Start: 2018-08-21 | End: 2018-08-21 | Stop reason: HOSPADM

## 2018-08-21 RX ADMIN — ONDANSETRON 4 MG: 2 INJECTION INTRAMUSCULAR; INTRAVENOUS at 01:10

## 2018-08-21 RX ADMIN — SODIUM CHLORIDE 125 ML/HR: 9 INJECTION, SOLUTION INTRAVENOUS at 00:44

## 2018-08-21 RX ADMIN — IOPAMIDOL 85 ML: 612 INJECTION, SOLUTION INTRAVENOUS at 01:47

## 2018-08-21 NOTE — ED TRIAGE NOTES
EM[facility] reports pt called for generalized abdominal pain that started 3 hours ago.  Pt denies N/v/d. Pt reports the pain is more of a cramp.

## 2018-08-21 NOTE — ED PROVIDER NOTES
EMERGENCY DEPARTMENT ENCOUNTER    CHIEF COMPLAINT  Chief Complaint: Abd pain  History given by: Patient  History limited by: None  Room Number: 19/19  PMD: Jrared Carrizales Jr., MD      HPI:  Pt is a 77 y.o. female who presents complaining of gradual, constant, lower abd pain since 1930 tonight. Pt denies dysuria, diarrhea, hematuria, hematochezia, fever, nausea, and vomiting. Pt states her last BM was this AM. Pt denies any similar pain in the past.    Duration: Since 1930 tonight  Onset: Gradual  Timing: Constant  Location: Lower abd  Intensity/Severity: Moderate  Progression: Unchanged  Associated Symptoms: None  Previous Episodes: None  Treatment before arrival: None    PAST MEDICAL HISTORY  Active Ambulatory Problems     Diagnosis Date Noted   • Abnormal electrocardiogram 03/04/2017   • Non-specific colitis 03/04/2017   • Acute post-traumatic headache 10/09/2013   • Anemia 03/04/2017   • Arthritis 03/04/2017   • Hematochezia 03/04/2017   • Chest pain 03/04/2017   • Compression fracture of lumbar vertebra (CMS/[facility]) 10/09/2013   • Closed wedge compression fracture of second lumbar vertebra (CMS/[facility]) 04/15/2013   • Constipation 03/04/2017   • Dizziness 03/04/2017   • Dyslipidemia 03/04/2017   • Fatigue 03/04/2017   • Ventricular premature beats 03/04/2017   • Gastroesophageal reflux disease without esophagitis 03/04/2017   • Hypertension 03/04/2017   • Hypotension 09/10/2014   • Insomnia 03/04/2017   • Left lower quadrant pain 03/04/2017   • Low back pain 08/04/2014   • Osteoporosis 03/04/2017   • Premature atrial contraction 03/04/2017   • 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   • Pneumonia 09/03/2014   • Nausea 03/04/2017   • Sensorineural hearing loss 10/08/2014   • Shortness of breath 03/04/2017   • Sinus bradycardia 03/04/2017   • Sleep apnea 03/04/2017   • Disorder of thyroid 03/04/2017   • Trigeminal neuralgia 04/15/2013   • Rectal bleeding 11/06/2017   • Family history of colon cancer 11/06/2017   • Adenomatous  polyp of colon 11/06/2017   • Hypothyroidism 01/23/2018   • Osteoporosis, 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     Resolved Ambulatory Problems     Diagnosis Date Noted   • No Resolved Ambulatory Problems     Past Medical History:   Diagnosis Date   • Adenomatous polyp of colon 11/6/2017   • Anemia    • Bradycardia    • Colitis    • Colon polyps    • Compression fracture of L1 lumbar vertebra (CMS/HCC)    • Compression fracture of L2 (CMS/HCC)    • Depression    • Diverticulosis    • GERD (gastroesophageal reflux disease)    • GI hemorrhage    • Hearing loss    • History of blood transfusion 11/2013   • History of trigeminal neuralgia    • Hypertension    • Insomnia    • Memory loss    • OA (osteoarthritis)    • Osteoarthritis    • Osteoporosis    • PAC (premature atrial contraction)    • Peripheral neuralgia    • Pneumonia    • PVC's (premature ventricular contractions)    • Sleep apnea    • Thyroid disorder        PAST SURGICAL HISTORY  Past Surgical History:   Procedure Laterality Date   • BRAIN SURGERY Left 08/2014    MICROVASCULAR DECOMPRESSION LEFT EAR   • CARDIAC CATHETERIZATION  03/2002   • CHOLECYSTECTOMY N/A 11/20/2013    DR. ESME GOLDMAN   • COLONOSCOPY N/A 02/21/2012    INT/EXT HEMORRHOIDS, DIVERTICULOSIS, 2 HYPERPLASTIC POLYPS, TORT,    • COLONOSCOPY  02/27/2015    eh, tics, torts, ih   REPEAT IN 5 YRS      • COLONOSCOPY N/A 4/24/2018    Procedure: COLONOSCOPY to cecum and t.i.;  Surgeon: Morgan SHAW MD;  Location: [facility] ENDOSCOPY;  Service: Gastroenterology   • JOINT REPLACEMENT  03/15/2004    HIP-PARTIAL DR. SIMRAN KRAMER   • JOINT REPLACEMENT Bilateral 2013    KNEE   • KYPHOPLASTY      COMPRESSION FX OF L2   • TRIGEMINAL NERVE DECOMPRESSION         FAMILY HISTORY  Family History   Problem Relation Age of Onset   • Ovarian cancer Daughter    • Colon cancer Mother    • Arthritis Mother    • Stomach cancer Mother    • Stomach cancer Maternal Grandfather    • Glaucoma Father     • Depression Son    • Stroke Paternal Grandmother        SOCIAL HISTORY  Social History     Social History   • Marital status:      Spouse name: N/A   • Number of children: N/A   • Years of education: N/A     Occupational History   • Not on file.     Social History Main Topics   • Smoking status: Former Smoker     Packs/day: 0.50     Years: 30.00     Types: Cigarettes     Start date: 2/1/1957     Quit date: 5/6/1989   • Smokeless tobacco: Never Used   • Alcohol use 4.2 oz/week     2 Shots of liquor, 2 Standard drinks or equivalent per week      Comment: 2 DRINKS PER DAY   • Drug use: No   • Sexual activity: Not Currently     Partners: Male     Birth control/ protection: Post-menopausal     Other Topics Concern   • Not on file     Social History Narrative   • No narrative on file       ALLERGIES  Morphine and related    REVIEW OF SYSTEMS  Review of Systems   Constitutional: Negative for fever.   HENT: Negative for sore throat.    Eyes: Negative.    Respiratory: Negative for cough and shortness of breath.    Cardiovascular: Negative for chest pain.   Gastrointestinal: Positive for abdominal pain (gradual, constant, lower abd pain). Negative for blood in stool, diarrhea, nausea and vomiting.   Genitourinary: Negative for dysuria and hematuria.   Musculoskeletal: Negative for neck pain.   Skin: Negative for rash.   Allergic/Immunologic: Negative.    Neurological: Negative for weakness, numbness and headaches.   Hematological: Negative.    Psychiatric/Behavioral: Negative.    All other systems reviewed and are negative.      PHYSICAL EXAM  ED Triage Vitals   Temp Heart Rate Resp BP SpO2   08/20/18 2342 08/20/18 2337 08/20/18 2337 08/20/18 2337 08/20/18 2337   100.4 °F (38 °C) 82 20 164/86 97 %      Temp src Heart Rate Source Patient Position BP Location FiO2 (%)   08/20/18 2342 -- -- -- --   Tympanic           Physical Exam   Constitutional: She is oriented to person, place, and time. No distress.   HENT:    Head: Normocephalic and atraumatic.   Eyes: Pupils are equal, round, and reactive to light. EOM are normal.   Neck: Normal range of motion. Neck supple.   Cardiovascular: Normal rate, regular rhythm and normal heart sounds.    Pulmonary/Chest: Effort normal and breath sounds normal. No respiratory distress.   Abdominal: Soft. There is tenderness (mild) in the suprapubic area. There is no rebound and no guarding.   Musculoskeletal: Normal range of motion. She exhibits no edema.   Neurological: She is alert and oriented to person, place, and time. She has normal sensation and normal strength.   Skin: Skin is warm and dry. No rash noted.   Psychiatric: Mood and affect normal.   Nursing note and vitals reviewed.      LAB RESULTS  Lab Results (last 24 hours)     Procedure Component Value Units Date/Time    CBC & Differential [[id number]] Collected:  08/21/18 0033    Specimen:  Blood Updated:  08/21/18 0054    Narrative:       The following orders were created for panel order CBC & Differential.  Procedure                               Abnormality         Status                     ---------                               -----------         ------                     CBC Auto Differential[[id number]]        Abnormal            Final result                 Please view results for these tests on the individual orders.    Comprehensive Metabolic Panel [[id number]]  (Abnormal) Collected:  08/21/18 0033    Specimen:  Blood Updated:  08/21/18 0109     Glucose 85 mg/dL      BUN 20 mg/dL      Creatinine 1.14 (H) mg/dL      Sodium 136 mmol/L      Potassium 4.1 mmol/L      Chloride 94 (L) mmol/L      CO2 27.1 mmol/L      Calcium 9.3 mg/dL      Total Protein 7.8 g/dL      Albumin 4.90 g/dL      ALT (SGPT) 21 U/L      AST (SGOT) 19 U/L      Alkaline Phosphatase 50 U/L      Total Bilirubin 0.5 mg/dL      eGFR Non African Amer 46 (L) mL/min/1.73      Globulin 2.9 gm/dL      A/G Ratio 1.7 g/dL      BUN/Creatinine Ratio 17.5     Anion Gap  14.9 mmol/L     Narrative:       The MDRD GFR formula is only valid for adults with stable renal function between ages 18 and 70.    Lipase [[id number]]  (Normal) Collected:  08/21/18 0033    Specimen:  Blood Updated:  08/21/18 0109     Lipase 37 U/L     Urinalysis With Microscopic If Indicated (No Culture) - Urine, Clean Catch [[id number]]  (Abnormal) Collected:  08/21/18 0033    Specimen:  Urine from Urine, Clean Catch Updated:  08/21/18 0048     Color, UA Yellow     Appearance, UA Clear     pH, UA 7.5     Specific Gravity, UA 1.019     Glucose, UA Negative     Ketones, UA Negative     Bilirubin, UA Negative     Blood, UA Negative     Protein, UA Negative     Leuk Esterase, UA Small (1+) (A)     Nitrite, UA Negative     Urobilinogen, UA 1.0 E.U./dL    CBC Auto Differential [[id number]]  (Abnormal) Collected:  08/21/18 0033    Specimen:  Blood Updated:  08/21/18 0054     WBC 10.64 10*3/mm3      RBC 4.75 10*6/mm3      Hemoglobin 14.3 g/dL      Hematocrit 43.7 %      MCV 92.0 fL      MCH 30.1 pg      MCHC 32.7 g/dL      RDW 13.7 (H) %      RDW-SD 46.3 fl      MPV 11.8 fL      Platelets 240 10*3/mm3      Neutrophil % 92.4 (H) %      Lymphocyte % 5.8 (L) %      Monocyte % 0.3 (L) %      Eosinophil % 1.1 %      Basophil % 0.2 %      Immature Grans % 0.2 %      Neutrophils, Absolute 9.83 (H) 10*3/mm3      Lymphocytes, Absolute 0.62 (L) 10*3/mm3      Monocytes, Absolute 0.03 (L) 10*3/mm3      Eosinophils, Absolute 0.12 10*3/mm3      Basophils, Absolute 0.02 10*3/mm3      Immature Grans, Absolute 0.02 10*3/mm3     Urinalysis, Microscopic Only - Urine, Clean Catch [[id number]]  (Abnormal) Collected:  08/21/18 0033    Specimen:  Urine from Urine, Clean Catch Updated:  08/21/18 0048     RBC, UA 0-2 /HPF      WBC, UA 3-5 (A) /HPF      Bacteria, UA None Seen /HPF      Squamous Epithelial Cells, UA 0-2 /HPF      Hyaline Casts, UA 0-2 /LPF      Methodology Automated Microscopy          I ordered the above labs and reviewed the  results    RADIOLOGY  CT Abdomen Pelvis With Contrast   Final Result   IMPRESSION :    1. Bilateral renal lesions consistent with cysts. The large complex left   renal cyst shows little change.   2. Mild mid sigmoid colitis, suggest endoscopic correlation.   3. Colonic diverticulosis           This report was finalized on 8/21/2018 1:56 AM by Doe Marshall M.D.               I ordered the above noted radiological studies. Interpreted by radiologist. Reviewed by me in PACS.       PROCEDURES  Procedures      PROGRESS AND CONSULTS   2341 Ordered protocol labs, UA, and lipase for further evaluation.    0034 Ordered zofran for nausea.    0110 Ordered CT Abd/Pel for further evaluation.    0200 Rechecked with pt, who is resting comfortably, and discussed colitis seen in CT. Plan to discharge with antibiotics. Pt understands and agrees with the plan, all questions answered.    MEDICAL DECISION MAKING  Results were reviewed/discussed with the patient and they were also made aware of online access. Pt also made aware that some labs, such as cultures, will not be resulted during ER visit and follow up with PMD is necessary.     MDM  Number of Diagnoses or Management Options  Colitis:      Amount and/or Complexity of Data Reviewed  Clinical lab tests: ordered and reviewed (Creatinine= 1.14, WBC= 10.64, UA is negative for UTI.)  Tests in the radiology section of CPT®: reviewed and ordered (CT Abd/Pel shows mild mid sigmoid colitis)  Decide to obtain previous medical records or to obtain history from someone other than the patient: yes    Patient Progress  Patient progress: stable         DIAGNOSIS  Final diagnoses:   Colitis       DISPOSITION  DISCHARGE    Patient discharged in stable condition.    Reviewed implications of results, diagnosis, meds, responsibility to follow up, warning signs and symptoms of possible worsening, potential complications and reasons to return to ER, including new or worsening sxs.    Patient/Family  voiced understanding of above instructions.    Discussed plan for discharge, as there is no emergent indication for admission. Patient referred to primary care provider for BP management due to today's BP. Pt/family is agreeable and understands need for follow up and repeat testing.  Pt is aware that discharge does not mean that nothing is wrong but it indicates no emergency is present that requires admission and they must continue care with follow-up as given below or physician of their choice.     FOLLOW-UP  Jarred Carrizales Jr., MD  [address] [id number] [phone]    Schedule an appointment as soon as possible for a visit       [facility] Emergency Department  [address] [facility] [address] [phone]    If symptoms worsen, As needed         Medication List      New Prescriptions    metroNIDAZOLE 250 MG tablet  Commonly known as:  FLAGYL  Take 1 tablet by mouth 3 (Three) Times a Day.          Latest Documented Vital Signs:  As of 2:35 AM  BP- 141/71 HR- 86 Temp- 100.4 °F (38 °C) (Tympanic) O2 sat- 93%    --  Documentation assistance provided by noah Escobar for Dr. Mccoy.  Information recorded by the scribe was done at my direction and has been verified and validated by me.     Kathy Escobar  08/21/18 0206       Santos Mccoy MD  08/21/18 0287

## 2018-08-23 ENCOUNTER — OFFICE VISIT (OUTPATIENT)
Dept: INTERNAL MEDICINE | Facility: CLINIC | Age: 78
End: 2018-08-23

## 2018-08-23 ENCOUNTER — TELEPHONE (OUTPATIENT)
Dept: OBSTETRICS AND GYNECOLOGY | Facility: CLINIC | Age: 78
End: 2018-08-23

## 2018-08-23 VITALS
DIASTOLIC BLOOD PRESSURE: 82 MMHG | BODY MASS INDEX: 28.29 KG/M2 | HEART RATE: 56 BPM | TEMPERATURE: 97.2 F | SYSTOLIC BLOOD PRESSURE: 136 MMHG | OXYGEN SATURATION: 98 % | WEIGHT: 170 LBS

## 2018-08-23 DIAGNOSIS — Q21.12 PFO (PATENT FORAMEN OVALE): ICD-10-CM

## 2018-08-23 DIAGNOSIS — R79.89 ELEVATED SERUM CREATININE: ICD-10-CM

## 2018-08-23 DIAGNOSIS — I49.3 VENTRICULAR PREMATURE BEATS: ICD-10-CM

## 2018-08-23 DIAGNOSIS — G47.9 SLEEP DISTURBANCE: ICD-10-CM

## 2018-08-23 DIAGNOSIS — K52.9 NON-SPECIFIC COLITIS: ICD-10-CM

## 2018-08-23 DIAGNOSIS — I10 ESSENTIAL HYPERTENSION: Primary | ICD-10-CM

## 2018-08-23 LAB
BUN SERPL-MCNC: 13 MG/DL (ref 8–23)
BUN/CREAT SERPL: 16.9 (ref 7–25)
CALCIUM SERPL-MCNC: 9.7 MG/DL (ref 8.6–10.5)
CHLORIDE SERPL-SCNC: 100 MMOL/L (ref 98–107)
CO2 SERPL-SCNC: 25.3 MMOL/L (ref 22–29)
CREAT SERPL-MCNC: 0.77 MG/DL (ref 0.57–1)
GLUCOSE SERPL-MCNC: 103 MG/DL (ref 65–99)
POTASSIUM SERPL-SCNC: 4.9 MMOL/L (ref 3.5–5.2)
SODIUM SERPL-SCNC: 137 MMOL/L (ref 136–145)

## 2018-08-23 PROCEDURE — G0439 PPPS, SUBSEQ VISIT: HCPCS | Performed by: FAMILY MEDICINE

## 2018-08-23 PROCEDURE — 99214 OFFICE O/P EST MOD 30 MIN: CPT | Performed by: FAMILY MEDICINE

## 2018-08-23 NOTE — PATIENT INSTRUCTIONS
Medicare Wellness  Personal Prevention Plan of Service     Date of Office Visit:  2018  Encounter Provider:  Jarred Carrizales Jr., MD  Place of Service:  [facility] INTERNAL MEDICINE  Patient Name: Moriah Petty  :  1940    As part of the Medicare Wellness portion of your visit today, we are providing you with this personalized preventive plan of services (PPPS). This plan is based upon recommendations of the [place] Preventive Services Task Force (USPSTF) and the Advisory Committee on Immunization Practices (ACIP).    This lists the preventive care services that should be considered, and provides dates of when you are due. Items listed as completed are up-to-date and do not require any further intervention.    Health Maintenance   Topic Date Due   • TDAP/TD VACCINES (1 - Tdap) 1958   • ZOSTER VACCINE (1 of 2) 1989   • PNEUMOCOCCAL VACCINES (65+ LOW/MEDIUM RISK) (1 of 2 - PCV13) 2004   • MEDICARE ANNUAL WELLNESS  2018   • INFLUENZA VACCINE  2018   • DXA SCAN  2020   • COLONOSCOPY  2023       No orders of the defined types were placed in this encounter.      No Follow-up on file.

## 2018-08-23 NOTE — PROGRESS NOTES
QUICK REFERENCE INFORMATION:  The ABCs of the Annual Wellness Visit    Subsequent Medicare Wellness Visit    HEALTH RISK ASSESSMENT    1940    Recent Hospitalizations:  Recently treated at the following:  [facility].        Current Medical Providers:  Patient Care Team:  Jarred Carrizales Jr., MD as PCP - General (Family Medicine)  Jarred Carrizales Jr., MD as PCP - Claims Attributed  Shmuel Triana, RN as Care Coordinator (Population Health)        Smoking Status:  History   Smoking Status   • Former Smoker   • Packs/day: 0.50   • Years: 30.00   • Types: Cigarettes   • Start date: 2/1/1957   • Quit date: 5/6/1989   Smokeless Tobacco   • Never Used       Alcohol Consumption:  History   Alcohol Use   • 4.2 oz/week   • 2 Shots of liquor, 2 Standard drinks or equivalent per week     Comment: 2 DRINKS PER DAY       Depression Screen:   PHQ-2/PHQ-9 Depression Screening 8/23/2018   Little interest or pleasure in doing things 0   Feeling down, depressed, or hopeless 1   Trouble falling or staying asleep, or sleeping too much -   Feeling tired or having little energy -   Poor appetite or overeating -   Feeling bad about yourself - or that you are a failure or have let yourself or your family down -   Trouble concentrating on things, such as reading the newspaper or watching television -   Moving or speaking so slowly that other people could have noticed. Or the opposite - being so fidgety or restless that you have been moving around a lot more than usual -   Thoughts that you would be better off dead, or of hurting yourself in some way -   Total Score 1   If you checked off any problems, how difficult have these problems made it for you to do your work, take care of things at home, or get along with other people? -       Health Habits and Functional and Cognitive Screening:  Functional & Cognitive Status 8/23/2018   Do you have difficulty preparing food and eating? No   Do you have difficulty bathing yourself,  getting dressed or grooming yourself? No   Do you have difficulty using the toilet? No   Do you have difficulty moving around from place to place? No   Do you have trouble with steps or getting out of a bed or a chair? No   In the past year have you fallen or experienced a near fall? No   Current Diet Well Balanced Diet   Dental Exam Up to date   Eye Exam Up to date   Exercise (times per week) 0 times per week   Current Exercise Activities Include None   Do you need help using the phone?  No   Are you deaf or do you have serious difficulty hearing?  No   Do you need help with transportation? No   Do you need help shopping? No   Do you need help preparing meals?  No   Do you need help with housework?  No   Do you need help with laundry? No   Do you need help taking your medications? No   Do you need help managing money? No   Do you ever drive or ride in a car without wearing a seat belt? No   Have you felt unusual stress, anger or loneliness in the last month? No   Who do you live with? Spouse   If you need help, do you have trouble finding someone available to you? No   Have you been bothered in the last four weeks by sexual problems? No   Do you have difficulty concentrating, remembering or making decisions? No           Does the patient have evidence of cognitive impairment? No    Aspirin use counseling: Taking ASA appropriately as indicated      Recent Lab Results:  CMP:  Lab Results   Component Value Date     (H) 01/25/2018    BUN 20 08/21/2018    CREATININE 1.14 (H) 08/21/2018    EGFRIFNONA 46 (L) 08/21/2018    EGFRIFAFRI 84 01/25/2018    BCR 17.5 08/21/2018     08/21/2018    K 4.1 08/21/2018    CO2 27.1 08/21/2018    CALCIUM 9.3 08/21/2018    PROTENTOTREF 7.1 11/29/2017    ALBUMIN 4.90 08/21/2018    LABGLOBREF 2.8 11/29/2017    LABIL2 1.5 11/29/2017    BILITOT 0.5 08/21/2018    ALKPHOS 50 08/21/2018    AST 19 08/21/2018    ALT 21 08/21/2018     Lipid Panel:  Lab Results   Component Value Date     TRIG 81 09/07/2017    HDL 94 (H) 09/07/2017    VLDL 16.2 09/07/2017     HbA1c:  Lab Results   Component Value Date    HGBA1C 5.55 12/20/2017       Visual Acuity:  No exam data present    Age-appropriate Screening Schedule:  Refer to the list below for future screening recommendations based on patient's age, sex and/or medical conditions. Orders for these recommended tests are listed in the plan section. The patient has been provided with a written plan.    Health Maintenance   Topic Date Due   • TDAP/TD VACCINES (1 - Tdap) 09/18/1958   • ZOSTER VACCINE (1 of 2) 09/18/1989   • PNEUMOCOCCAL VACCINES (65+ LOW/MEDIUM RISK) (1 of 2 - PCV13) 09/18/2004   • INFLUENZA VACCINE  08/01/2018   • DXA SCAN  08/16/2020   • COLONOSCOPY  04/24/2023        Subjective   History of Present Illness    Moriah Petty is a 77 y.o. female who presents for an Subsequent Wellness Visit.    The following portions of the patient's history were reviewed and updated as appropriate: allergies, current medications, past family history, past medical history, past social history, past surgical history and problem list.    Outpatient Medications Prior to Visit   Medication Sig Dispense Refill   • Aspirin (ASPIR-81 PO) Take  by mouth.     • Calcium Carb-Cholecalciferol (CALCIUM + D3) 600-200 MG-UNIT tablet Take  by mouth Daily.     • calcium citrate-vitamin d (CALCITRATE) 315-250 MG-UNIT tablet tablet Take 1 tablet by mouth.     • carvedilol (COREG) 12.5 MG tablet 1 TABLET BY MOUTH TWICE DAILY  3   • denosumab (PROLIA) 60 MG/ML solution syringe Inject  under the skin 1 (One) Time. Every 6 months     • desonide (DESOWEN) 0.05 % cream APPLY SPARINGLY TO THE CENTRAL FACE AND NOSE 2 TIMES A DAY  1   • hydrocortisone (ANUSOL-HC) 25 MG suppository Insert 1 suppository into the rectum At Night As Needed for Hemorrhoids (rectal discomfort). 10 suppository 3   • hyoscyamine (LEVSIN) 0.125 MG SL tablet Take 0.125 mg by mouth Every 4 (Four) Hours As Needed for  Cramping.     • levothyroxine (SYNTHROID, LEVOTHROID) 25 MCG tablet TAKE 1 TABLET BY MOUTH DAILY 90 tablet 2   • losartan (COZAAR) 50 MG tablet TAKE 1 TABLET BY MOUTH EVERY DAY 90 tablet 3   • magnesium oxide 250 MG tablet Take 250 mg by mouth Every Night.     • metroNIDAZOLE (FLAGYL) 250 MG tablet Take 1 tablet by mouth 3 (Three) Times a Day. 21 tablet 0   • Multiple Vitamin (MULTI VITAMIN DAILY PO) Take  by mouth Daily.     • naproxen (NAPROSYN) 250 MG tablet Take 250 mg by mouth.     • Naproxen Sodium (ALEVE PO) Take  by mouth.     • Omega-3 Fatty Acids (FISH OIL) 1200 MG capsule capsule Take  by mouth.     • omeprazole (priLOSEC) 20 MG capsule Take  by mouth Every Other Day.       No facility-administered medications prior to visit.        Patient Active Problem List   Diagnosis   • Abnormal electrocardiogram   • Non-specific colitis   • Acute post-traumatic headache   • Anemia   • Arthritis   • Hematochezia   • Chest pain   • Compression fracture of lumbar vertebra (CMS/HCC)   • Closed wedge compression fracture of second lumbar vertebra (CMS/HCC)   • Constipation   • Dizziness   • Dyslipidemia   • Fatigue   • Ventricular premature beats   • Gastroesophageal reflux disease without esophagitis   • Hypertension   • Hypotension   • Insomnia   • Left lower quadrant pain   • Low back pain   • Osteoporosis   • Premature atrial contraction   • Peripheral nerve disease   • Pneumonia   • Nausea   • Sensorineural hearing loss   • Shortness of breath   • Sinus bradycardia   • Sleep apnea   • Disorder of thyroid   • Trigeminal neuralgia   • Rectal bleeding   • Family history of colon cancer   • Adenomatous polyp of colon   • Hypothyroidism   • Osteoporosis, post-menopausal       Advance Care Planning:  has an advance directive - a copy has been provided and is in file    Identification of Risk Factors:  Risk factors include: cardiovascular risk and depression.    Review of Systems    Compared to one year ago, the patient  feels her physical health is the same.  Compared to one year ago, the patient feels her mental health is worse.    Objective     Physical Exam    Vitals:    08/23/18 1009   BP: 136/82   BP Location: Left arm   Patient Position: Sitting   Cuff Size: Adult   Pulse: 56   Temp: 97.2 °F (36.2 °C)   TempSrc: Tympanic   SpO2: 98%   Weight: 77.1 kg (170 lb)   PainSc: 0-No pain       Patient's Body mass index is 28.29 kg/m². BMI is above normal parameters. Recommendations include: educational material.      Assessment/Plan   Patient Self-Management and Personalized Health Advice  The patient has been provided with information about: prevention of cardiac or vascular disease and mental health concerns and preventive services including:   · Counseling for cardiovascular disease risk reduction.    Visit Diagnoses:  No diagnosis found.    No orders of the defined types were placed in this encounter.      Outpatient Encounter Prescriptions as of 8/23/2018   Medication Sig Dispense Refill   • Aspirin (ASPIR-81 PO) Take  by mouth.     • Calcium Carb-Cholecalciferol (CALCIUM + D3) 600-200 MG-UNIT tablet Take  by mouth Daily.     • calcium citrate-vitamin d (CALCITRATE) 315-250 MG-UNIT tablet tablet Take 1 tablet by mouth.     • carvedilol (COREG) 12.5 MG tablet 1 TABLET BY MOUTH TWICE DAILY  3   • denosumab (PROLIA) 60 MG/ML solution syringe Inject  under the skin 1 (One) Time. Every 6 months     • desonide (DESOWEN) 0.05 % cream APPLY SPARINGLY TO THE CENTRAL FACE AND NOSE 2 TIMES A DAY  1   • hydrocortisone (ANUSOL-HC) 25 MG suppository Insert 1 suppository into the rectum At Night As Needed for Hemorrhoids (rectal discomfort). 10 suppository 3   • hyoscyamine (LEVSIN) 0.125 MG SL tablet Take 0.125 mg by mouth Every 4 (Four) Hours As Needed for Cramping.     • levothyroxine (SYNTHROID, LEVOTHROID) 25 MCG tablet TAKE 1 TABLET BY MOUTH DAILY 90 tablet 2   • losartan (COZAAR) 50 MG tablet TAKE 1 TABLET BY MOUTH EVERY DAY 90 tablet 3    • magnesium oxide 250 MG tablet Take 250 mg by mouth Every Night.     • metroNIDAZOLE (FLAGYL) 250 MG tablet Take 1 tablet by mouth 3 (Three) Times a Day. 21 tablet 0   • Multiple Vitamin (MULTI VITAMIN DAILY PO) Take  by mouth Daily.     • naproxen (NAPROSYN) 250 MG tablet Take 250 mg by mouth.     • Naproxen Sodium (ALEVE PO) Take  by mouth.     • Omega-3 Fatty Acids (FISH OIL) 1200 MG capsule capsule Take  by mouth.     • omeprazole (priLOSEC) 20 MG capsule Take  by mouth Every Other Day.       No facility-administered encounter medications on file as of 8/23/2018.        Reviewed use of high risk medication in the elderly: not applicable  Reviewed for potential of harmful drug interactions in the elderly: not applicable    Follow Up:  No Follow-up on file.     An After Visit Summary and PPPS with all of these plans were given to the patient.

## 2018-08-23 NOTE — PROGRESS NOTES
Subjective   Moriah Petty is a 77 y.o. female.     Chief Complaint   Patient presents with   • GI Problem   • Hypertension   Sleep disturbance/Medicare wellness.  Delightful lady was recently emergency room with evidence of nonspecific colitis moving on Flagyl.  History of Present Illness   Delightful lady was recently emergency room with evidence of nonspecific colitis moving on Flagyl.  She is here for Medicare wellness exam and also review of hypertension treatment.  She does have some sleep disturbance and suicidal features tends to do solitary things were as her  is very social.  She doesn't have evidence of any morbid depression doesn't wish taking antidepressant.    We discussed upcoming need for Prevnar 13 and Pneumovax 23.    She has had follow-up with Dr. Trujillo her cardiologist has a history of patent foramen ovale traumatic.    Otherwise she is seen Dr. Collins in the recent past is up-to-date on colonoscopy despite having colitis recently.      The following portions of the patient's history were reviewed and updated as appropriate: allergies, current medications, past social history and problem list.    Review of Systems   Constitutional: Negative.    HENT: Negative.    Eyes: Negative.    Respiratory: Negative.    Cardiovascular: Negative.    Gastrointestinal: Positive for abdominal pain.   Endocrine: Negative.    Genitourinary: Negative.    Musculoskeletal: Negative.    Skin: Negative.    Allergic/Immunologic: Negative.    Neurological: Negative.    Hematological: Negative.    Psychiatric/Behavioral: Positive for sleep disturbance.       Objective   Vitals:    08/23/18 1009   BP: 136/82   Pulse: 56   Temp: 97.2 °F (36.2 °C)   SpO2: 98%     Physical Exam   Constitutional: She is oriented to person, place, and time. She appears well-developed.   HENT:   Head: Normocephalic.   Right Ear: External ear normal.   Left Ear: External ear normal.   Mouth/Throat: Oropharynx is clear and moist.   Eyes:  Pupils are equal, round, and reactive to light.   Neck: Normal range of motion. Neck supple.   Cardiovascular: Normal rate, regular rhythm and normal heart sounds.    Pulmonary/Chest: Effort normal and breath sounds normal.   Abdominal: Soft. Bowel sounds are normal.   Musculoskeletal: Normal range of motion.   Neurological: She is alert and oriented to person, place, and time.   Skin: Skin is warm and dry.   Psychiatric: She has a normal mood and affect.   Vitals reviewed.      Assessment/Plan   Problem List Items Addressed This Visit        Cardiovascular and Mediastinum    Ventricular premature beats    Hypertension - Primary       Digestive    Non-specific colitis      Other Visit Diagnoses     Sleep disturbance        PFO (patent foramen ovale)        Elevated serum creatinine        Relevant Orders    Basic Metabolic Panel      Plan: Recheck BMP for elevated creatinine in emergency room otherwise recheck in 6 months.  Consider Prevnar 13 on next visit.  Medications remain the same Medicare wellness performed.  Discussion about sleep hygiene.

## 2018-08-24 ENCOUNTER — INFUSION (OUTPATIENT)
Dept: ONCOLOGY | Facility: HOSPITAL | Age: 78
End: 2018-08-24
Attending: OBSTETRICS & GYNECOLOGY

## 2018-08-24 ENCOUNTER — DOCUMENTATION (OUTPATIENT)
Dept: OBSTETRICS AND GYNECOLOGY | Facility: CLINIC | Age: 78
End: 2018-08-24

## 2018-08-24 ENCOUNTER — PATIENT OUTREACH (OUTPATIENT)
Dept: CASE MANAGEMENT | Facility: OTHER | Age: 78
End: 2018-08-24

## 2018-08-24 VITALS — WEIGHT: 167.6 LBS | TEMPERATURE: 98.5 F | BODY MASS INDEX: 27.89 KG/M2

## 2018-08-24 DIAGNOSIS — M81.0 OSTEOPOROSIS, POST-MENOPAUSAL: Primary | ICD-10-CM

## 2018-08-24 PROCEDURE — 25010000002 DENOSUMAB 60 MG/ML SOLUTION: Performed by: OBSTETRICS & GYNECOLOGY

## 2018-08-24 PROCEDURE — 96372 THER/PROPH/DIAG INJ SC/IM: CPT | Performed by: NURSE PRACTITIONER

## 2018-08-24 RX ADMIN — DENOSUMAB 60 MG: 60 INJECTION SUBCUTANEOUS at 14:16

## 2018-08-24 NOTE — PROGRESS NOTES
Pt arrived ambulatory for prolia injection. Labs reviewed and calcium level WNL. Pt denies any complaints. Prolia given in left arm with bandaid applied over site. Instructed to notify PCP with any questions or concerns. Provided pt with scheduling number for next appt which will be due in feb 2019. Pt vu and dc ambulatory.

## 2018-08-24 NOTE — PROGRESS NOTES
Phone call.  DEXA results were reviewed and discussed with the patient.  She continues to have osteoporosis at the spine.  I recommend that she continue Prolia and she agrees.  She has no appointment for this afternoon.

## 2018-08-24 NOTE — OUTREACH NOTE
Care Management Plan 8/24/2018   Lifestyle Goals Medication management;Routine follow-up with doctor(s)   Self Management Medication Adherence   Annual Wellness Visit:  Patient Has Completed   Specific Disease Process Teaching (No Data)   Specific Disease Process Teaching Colitis   Does patient have depression diagnosis? No   Advanced Directives: Patient Has   Ed Visits past 12 months: 1   Medication Adherence Medications understood   Goal Progress (No Data)   Goal Progress Active My Chart   Health Literacy Good     The main concerns and/or symptoms the patient would like to address are: No needs or concerns    Education/instruction provided by Care Coordinator: Medications as advised for Colitis.  Diet recommendations.    Follow Up Outreach Due: As needed.    Shmuel Triana RN

## 2018-09-24 ENCOUNTER — CLINICAL SUPPORT (OUTPATIENT)
Dept: INTERNAL MEDICINE | Facility: CLINIC | Age: 78
End: 2018-09-24

## 2018-09-24 DIAGNOSIS — Z23 NEED FOR PNEUMOCOCCAL VACCINE: ICD-10-CM

## 2018-09-24 DIAGNOSIS — Z23 FLU VACCINE NEED: ICD-10-CM

## 2018-09-24 PROCEDURE — 90662 IIV NO PRSV INCREASED AG IM: CPT | Performed by: FAMILY MEDICINE

## 2018-09-24 PROCEDURE — G0008 ADMIN INFLUENZA VIRUS VAC: HCPCS | Performed by: FAMILY MEDICINE

## 2018-09-24 PROCEDURE — G0009 ADMIN PNEUMOCOCCAL VACCINE: HCPCS | Performed by: FAMILY MEDICINE

## 2018-09-24 PROCEDURE — 90670 PCV13 VACCINE IM: CPT | Performed by: FAMILY MEDICINE

## 2018-10-09 ENCOUNTER — EPISODE CHANGES (OUTPATIENT)
Dept: CASE MANAGEMENT | Facility: OTHER | Age: 78
End: 2018-10-09

## 2018-10-22 ENCOUNTER — OFFICE VISIT (OUTPATIENT)
Dept: INTERNAL MEDICINE | Facility: CLINIC | Age: 78
End: 2018-10-22

## 2018-10-22 VITALS
OXYGEN SATURATION: 97 % | RESPIRATION RATE: 16 BRPM | SYSTOLIC BLOOD PRESSURE: 140 MMHG | HEIGHT: 65 IN | HEART RATE: 55 BPM | WEIGHT: 169.4 LBS | DIASTOLIC BLOOD PRESSURE: 80 MMHG | BODY MASS INDEX: 28.22 KG/M2 | TEMPERATURE: 97.5 F

## 2018-10-22 DIAGNOSIS — K52.9 COLITIS: Primary | ICD-10-CM

## 2018-10-22 PROCEDURE — 99212 OFFICE O/P EST SF 10 MIN: CPT | Performed by: NURSE PRACTITIONER

## 2018-10-22 RX ORDER — METRONIDAZOLE 250 MG/1
250 TABLET ORAL 3 TIMES DAILY
Qty: 30 TABLET | Refills: 0 | Status: SHIPPED | OUTPATIENT
Start: 2018-10-22 | End: 2018-11-09

## 2018-10-22 NOTE — PROGRESS NOTES
Subjective   Moriah Petty is a 78 y.o. female.   Chief Complaint   Patient presents with   • GI Problem     Possible Colitis, pt has history with attacks.        Patient presents for initial evaluation of lower abdominal pain that began yesterday.  She describes the pain as throughout the lower abdomen, and cramping in quality.  She states that she has not had any changes in her bowel movements, endorsing daily soft formed stools.  She denies nausea or vomiting.  She does have a history of colitis with her last flare being in August 2018.  At that time she presented to the ER and was prescribed Flagyl, which alleviated her symptoms at the completion of therapy.  She denies shortness of breath, chest pain, palpitations.  She denies any fever or myalgias at this time.  She does state that the abdominal pain is very similar to that of her last flare.  She denies melena and of any other new symptoms at this time.         The following portions of the patient's history were reviewed and updated as appropriate: allergies, current medications, past family history, past medical history, past social history, past surgical history and problem list.    Review of Systems   Constitutional: Negative for activity change, chills, fatigue, fever, unexpected weight gain and unexpected weight loss.   HENT: Negative for congestion, hearing loss, postnasal drip, sinus pressure, sneezing, sore throat and tinnitus.    Eyes: Negative for photophobia, pain and visual disturbance.   Respiratory: Negative for cough, chest tightness, shortness of breath and wheezing.    Cardiovascular: Negative for chest pain, palpitations and leg swelling.   Gastrointestinal: Positive for abdominal pain (lower abdomen). Negative for abdominal distention, blood in stool, constipation, diarrhea, nausea, rectal pain, vomiting, GERD and indigestion.   Endocrine: Negative for polydipsia, polyphagia and polyuria.   Genitourinary: Negative for dysuria, frequency,  "hematuria and urgency.   Musculoskeletal: Negative.    Skin: Negative.    Neurological: Negative for dizziness, weakness, numbness and headache.   Psychiatric/Behavioral: Negative.        Objective    /80 (BP Location: Right arm, Patient Position: Sitting, Cuff Size: Small Adult)   Pulse 55   Temp 97.5 °F (36.4 °C) (Tympanic)   Resp 16   Ht 165.1 cm (65\")   Wt 76.8 kg (169 lb 6.4 oz)   SpO2 97%   BMI 28.19 kg/m²     Physical Exam   Constitutional: She is oriented to person, place, and time. She appears well-developed and well-nourished. No distress.   HENT:   Head: Normocephalic and atraumatic.   Mouth/Throat: No oropharyngeal exudate.   Eyes: Pupils are equal, round, and reactive to light. Conjunctivae and EOM are normal.   Neck: Normal range of motion. Neck supple. No JVD present. No tracheal deviation present. No thyromegaly present.   Cardiovascular: Normal rate, regular rhythm, normal heart sounds and intact distal pulses.  Exam reveals no gallop and no friction rub.    No murmur heard.  Pulmonary/Chest: Effort normal and breath sounds normal. No respiratory distress. She has no wheezes. She has no rales. She exhibits no tenderness.   Abdominal: Soft. Normal appearance and bowel sounds are normal. She exhibits no distension, no ascites and no mass. There is no hepatosplenomegaly. There is no tenderness. There is no rigidity, no rebound, no guarding, no CVA tenderness, no tenderness at McBurney's point and negative Rebolledo's sign. No hernia.   Bowel sounds active ×4 quadrants.  No pain to light or deep palpation ×4 quadrants.   Musculoskeletal: Normal range of motion.   Lymphadenopathy:     She has no cervical adenopathy.   Neurological: She is alert and oriented to person, place, and time.   Skin: Capillary refill takes less than 2 seconds. She is not diaphoretic.   Psychiatric: She has a normal mood and affect. Her behavior is normal.   Nursing note and vitals reviewed.        Assessment/Plan "   Moriah was seen today for gi problem.    Diagnoses and all orders for this visit:    Colitis  -     metroNIDAZOLE (FLAGYL) 250 MG tablet; Take 1 tablet by mouth 3 (Three) Times a Day.      -Colitis: Prescribed Flagyl 250 mg 3 times daily ×10 days.  Encouraged patient to keep her diet [place] and increase her water intake for the duration of this illness.  Encouraged her to proceed to the ER for development of acute symptoms including acute abdominal pain, chest pain, palpitations, shortness breath.  Follow-up if symptoms persist or worsen despite completion of antibiotic therapy.

## 2018-10-30 DIAGNOSIS — Z09 FOLLOW-UP EXAM, 3-6 MONTHS SINCE PREVIOUS EXAM: ICD-10-CM

## 2018-10-30 DIAGNOSIS — N63.20 BREAST MASS, LEFT: Primary | ICD-10-CM

## 2018-11-05 ENCOUNTER — EPISODE CHANGES (OUTPATIENT)
Dept: CASE MANAGEMENT | Facility: OTHER | Age: 78
End: 2018-11-05

## 2018-11-05 ENCOUNTER — OFFICE VISIT (OUTPATIENT)
Dept: INTERNAL MEDICINE | Facility: CLINIC | Age: 78
End: 2018-11-05

## 2018-11-05 VITALS
OXYGEN SATURATION: 94 % | SYSTOLIC BLOOD PRESSURE: 158 MMHG | TEMPERATURE: 98.5 F | DIASTOLIC BLOOD PRESSURE: 88 MMHG | WEIGHT: 168.2 LBS | HEART RATE: 57 BPM | BODY MASS INDEX: 28.02 KG/M2 | HEIGHT: 65 IN | RESPIRATION RATE: 16 BRPM

## 2018-11-05 DIAGNOSIS — R10.32 LEFT LOWER QUADRANT PAIN: Primary | ICD-10-CM

## 2018-11-05 LAB
ALBUMIN SERPL-MCNC: 4.4 G/DL (ref 3.5–5.2)
ALBUMIN/GLOB SERPL: 1.6 G/DL
ALP SERPL-CCNC: 47 U/L (ref 39–117)
ALT SERPL-CCNC: 23 U/L (ref 1–33)
AST SERPL-CCNC: 15 U/L (ref 1–32)
BASOPHILS # BLD AUTO: 0.03 10*3/MM3 (ref 0–0.2)
BASOPHILS NFR BLD AUTO: 0.4 % (ref 0–1.5)
BILIRUB SERPL-MCNC: 0.5 MG/DL (ref 0.1–1.2)
BUN SERPL-MCNC: 11 MG/DL (ref 8–23)
BUN/CREAT SERPL: 15.3 (ref 7–25)
CALCIUM SERPL-MCNC: 9.5 MG/DL (ref 8.6–10.5)
CHLORIDE SERPL-SCNC: 97 MMOL/L (ref 98–107)
CO2 SERPL-SCNC: 25.8 MMOL/L (ref 22–29)
CREAT SERPL-MCNC: 0.72 MG/DL (ref 0.57–1)
EOSINOPHIL # BLD AUTO: 0.2 10*3/MM3 (ref 0–0.7)
EOSINOPHIL NFR BLD AUTO: 2.6 % (ref 0.3–6.2)
ERYTHROCYTE [DISTWIDTH] IN BLOOD BY AUTOMATED COUNT: 14 % (ref 11.7–13)
GLOBULIN SER CALC-MCNC: 2.8 GM/DL
GLUCOSE SERPL-MCNC: 98 MG/DL (ref 65–99)
HCT VFR BLD AUTO: 40.5 % (ref 35.6–45.5)
HGB BLD-MCNC: 13 G/DL (ref 11.9–15.5)
IMM GRANULOCYTES # BLD: 0.02 10*3/MM3 (ref 0–0.03)
IMM GRANULOCYTES NFR BLD: 0.3 % (ref 0–0.5)
LYMPHOCYTES # BLD AUTO: 1.58 10*3/MM3 (ref 0.9–4.8)
LYMPHOCYTES NFR BLD AUTO: 20.2 % (ref 19.6–45.3)
MCH RBC QN AUTO: 29.7 PG (ref 26.9–32)
MCHC RBC AUTO-ENTMCNC: 32.1 G/DL (ref 32.4–36.3)
MCV RBC AUTO: 92.5 FL (ref 80.5–98.2)
MONOCYTES # BLD AUTO: 0.76 10*3/MM3 (ref 0.2–1.2)
MONOCYTES NFR BLD AUTO: 9.7 % (ref 5–12)
NEUTROPHILS # BLD AUTO: 5.24 10*3/MM3 (ref 1.9–8.1)
NEUTROPHILS NFR BLD AUTO: 66.8 % (ref 42.7–76)
PLATELET # BLD AUTO: 253 10*3/MM3 (ref 140–500)
POTASSIUM SERPL-SCNC: 4.5 MMOL/L (ref 3.5–5.2)
PROT SERPL-MCNC: 7.2 G/DL (ref 6–8.5)
RBC # BLD AUTO: 4.38 10*6/MM3 (ref 3.9–5.2)
SODIUM SERPL-SCNC: 135 MMOL/L (ref 136–145)
WBC # BLD AUTO: 7.83 10*3/MM3 (ref 4.5–10.7)

## 2018-11-05 PROCEDURE — 99213 OFFICE O/P EST LOW 20 MIN: CPT | Performed by: NURSE PRACTITIONER

## 2018-11-05 RX ORDER — CIPROFLOXACIN 750 MG/1
750 TABLET, FILM COATED ORAL EVERY 12 HOURS SCHEDULED
Qty: 14 TABLET | Refills: 0 | Status: SHIPPED | OUTPATIENT
Start: 2018-11-05 | End: 2018-11-12

## 2018-11-05 NOTE — PROGRESS NOTES
Subjective   Moriah Petty is a 78 y.o. female.   Chief Complaint   Patient presents with   • Abdominal Pain     lower left quadrant began yesterday, consitent with intermitent severity       Patient presents for evaluation of left lower quadrant abdominal pain.  She was seen by me on 10/22/18 with bilateral lower abdominal pain.  She does have a history of diverticulitis and colitis.  At her last visit, she was treated with Flagyl ×10 days, at the completion of which she states that her symptoms subsided.  She began to have left lower quadrant pain yesterday.  She states that the pain is localized at this time to the left lower quadrant and does not radiate, and states that it is sharp and intermittent in nature.  It waxes and wanes.  She states that this abdominal pain feels very similar to past episodes of diverticulitis.  She denies any systemic symptoms including fever, chills, myalgias, shortness of breath, chest pain.  She states that her bowel and bladder habits have remained unchanged, denying constipation, diarrhea, blood in the urine or stool.  She does have a GI doctor, and lasted a colonoscopy April 2018 which showed diverticulosis.  She denies development of any other new symptoms at this time.      Abdominal Pain   This is a recurrent problem. The onset quality is gradual. The problem occurs intermittently. The problem has been waxing and waning. The pain is located in the LLQ. The pain is at a severity of 8/10. The quality of the pain is cramping and sharp. The abdominal pain radiates to the LLQ. Associated symptoms include belching. Pertinent negatives include no anorexia, arthralgias, constipation, diarrhea, dysuria, fever, flatus, frequency, headaches, hematochezia, hematuria, melena, myalgias, nausea, vomiting or weight loss. Nothing aggravates the pain. The pain is relieved by nothing.        The following portions of the patient's history were reviewed and updated as appropriate: allergies,  "current medications, past family history, past medical history, past social history, past surgical history and problem list.    Review of Systems   Constitutional: Negative for activity change, chills, fatigue, fever, unexpected weight gain and unexpected weight loss.   HENT: Negative for congestion, hearing loss, postnasal drip, sinus pressure, sneezing, sore throat and tinnitus.    Eyes: Negative for photophobia, pain and visual disturbance.   Respiratory: Negative for cough, chest tightness, shortness of breath and wheezing.    Cardiovascular: Negative for chest pain, palpitations and leg swelling.   Gastrointestinal: Positive for abdominal pain (  Left lower quadrant ×1 day). Negative for abdominal distention, anal bleeding, anorexia, blood in stool, constipation, diarrhea, flatus, hematochezia, melena, nausea, rectal pain, vomiting, GERD and indigestion.   Endocrine: Negative for polydipsia, polyphagia and polyuria.   Genitourinary: Negative for dysuria, frequency, hematuria and urgency.   Musculoskeletal: Negative for arthralgias and myalgias.   Neurological: Negative for dizziness, weakness, numbness and headache.   All other systems reviewed and are negative.      Objective    /88 (BP Location: Left arm, Patient Position: Sitting, Cuff Size: Small Adult)   Pulse 57   Temp 98.5 °F (36.9 °C) (Oral)   Resp 16   Ht 165.1 cm (65\")   Wt 76.3 kg (168 lb 3.2 oz)   SpO2 94%   BMI 27.99 kg/m²     Physical Exam   Constitutional: She is oriented to person, place, and time. She appears well-developed and well-nourished. No distress.   HENT:   Head: Normocephalic and atraumatic.   Right Ear: External ear normal.   Left Ear: External ear normal.   Nose: Nose normal.   Mouth/Throat: Oropharynx is clear and moist.   Eyes: Pupils are equal, round, and reactive to light. Conjunctivae and EOM are normal. Right eye exhibits no discharge. Left eye exhibits no discharge.   Neck: Normal range of motion. Neck supple. No " JVD present. No tracheal deviation present. No thyromegaly present.   Cardiovascular: Normal rate, regular rhythm, normal heart sounds and intact distal pulses.  Exam reveals no gallop and no friction rub.    No murmur heard.  Pulmonary/Chest: Effort normal and breath sounds normal. No respiratory distress. She has no wheezes. She has no rales. She exhibits no tenderness.   Lungs CTA bilaterally   Abdominal: Soft. Normal appearance and bowel sounds are normal. She exhibits no distension, no ascites and no mass. There is no hepatosplenomegaly, splenomegaly or hepatomegaly. There is tenderness in the left lower quadrant. There is no rigidity, no rebound, no guarding, no CVA tenderness, no tenderness at McBurney's point and negative Rebolledo's sign. No hernia.       Pressure and slight tenderness to palpation of left lower quadrant.   Genitourinary: Rectal exam shows guaiac negative stool.   Musculoskeletal: Normal range of motion.   Lymphadenopathy:     She has no cervical adenopathy.   Neurological: She is alert and oriented to person, place, and time.   Skin: Skin is warm and dry. Capillary refill takes less than 2 seconds. She is not diaphoretic.   Psychiatric: She has a normal mood and affect. Her behavior is normal.   Nursing note and vitals reviewed.        Assessment/Plan   Moriah was seen today for abdominal pain.    Diagnoses and all orders for this visit:    Left lower quadrant pain  -     CT Abdomen Pelvis With Contrast  -     CBC & Differential  -     Comprehensive metabolic panel    Other orders  -     ciprofloxacin (CIPRO) 750 MG tablet; Take 1 tablet by mouth Every 12 (Twelve) Hours for 7 days.    -Will treat with Cipro 750 mg twice a day ×7 days, as she has just completed a course of Flagyl.  We'll also obtain a CT scan of the abdomen and pelvis to rule out other etiologies of this recurrent abdominal pain.  Will also obtain CBC and CMP.  Educated patient on the risks of ciprofloxacin including tendon  rupture, and that she should abstain from any heavy lifting for the duration of her treatment and for 1 week after.    -Follow-up if symptoms persist or worsen.  Educated patient on symptoms for which she should proceed to the ER including development of fever correlated with this abdominal pain, shortness of breath, chest pain.

## 2018-11-07 ENCOUNTER — TELEPHONE (OUTPATIENT)
Dept: INTERNAL MEDICINE | Facility: CLINIC | Age: 78
End: 2018-11-07

## 2018-11-07 NOTE — TELEPHONE ENCOUNTER
Pt called and said she saw you Monday and you started her on Cipro and had a fainting episode and its apparently a side effect and would like to know what you recommend. Please advise.

## 2018-11-09 ENCOUNTER — HOSPITAL ENCOUNTER (OUTPATIENT)
Dept: CT IMAGING | Facility: HOSPITAL | Age: 78
Discharge: HOME | End: 2018-11-09
Admitting: NURSE PRACTITIONER

## 2018-11-09 DIAGNOSIS — K57.92 DIVERTICULITIS: Primary | ICD-10-CM

## 2018-11-09 PROCEDURE — 82565 ASSAY OF CREATININE: CPT

## 2018-11-09 PROCEDURE — 25010000002 IOPAMIDOL 61 % SOLUTION: Performed by: NURSE PRACTITIONER

## 2018-11-09 PROCEDURE — 74177 CT ABD & PELVIS W/CONTRAST: CPT

## 2018-11-09 RX ORDER — METRONIDAZOLE 250 MG/1
250 TABLET ORAL 3 TIMES DAILY
Qty: 30 TABLET | Refills: 0 | Status: SHIPPED | OUTPATIENT
Start: 2018-11-09 | End: 2018-11-21 | Stop reason: HOSPADM

## 2018-11-09 RX ADMIN — IOPAMIDOL 85 ML: 612 INJECTION, SOLUTION INTRAVENOUS at 10:19

## 2018-11-09 NOTE — PROGRESS NOTES
Please let patient know that her CT came back showing likely mild acute diverticulitis of the sigmoid colon. Since she responded well to Flagyl last time, and could not tolerate the Cipro, we will do another course of Flagyl, which I have sent the prescription for. I would also like her to follow up with her GI doctor soon on this recurring diverticulitis.

## 2018-11-12 ENCOUNTER — TELEPHONE (OUTPATIENT)
Dept: GASTROENTEROLOGY | Facility: CLINIC | Age: 78
End: 2018-11-12

## 2018-11-12 LAB — CREAT BLDA-MCNC: 0.8 MG/DL (ref 0.6–1.3)

## 2018-11-12 NOTE — TELEPHONE ENCOUNTER
Call to pt.  States went to PCP for worsening abd pain and unable to get appt with Dr Collins.  CT of 11/9 showed mild diverticulitis.  Placed on FLagyl.  States feeling better.   Has f/u appt with Dr Collins 1/7.    Requesting DR Collins review CT - concerned because has had a couple episode of colitis.  Wants to make sure tx and f/u appropriately.

## 2018-11-12 NOTE — TELEPHONE ENCOUNTER
Call to pt.  Advise per DR Collins that review of the scan suggests an isolated area that would be consistent with mild diverticulitis.  As long at pt responds to current tx, would not change tx or f/u.  May discuss further eval upon f/u.  Pt verb understanding.

## 2018-11-12 NOTE — TELEPHONE ENCOUNTER
----- Message from Anny Gardner sent at 11/12/2018  1:52 PM EST -----  Regarding: MILD DIVERTICULITIS  Contact: [phone]  PT HAD CT SCAN ON 11/09 @ [facility]. IT SHOWED MILD DIVERTICULITIS. PT ASK IF DR RAO COULD TAKE A LOOK AT IT. THEY DID START HER ON FLAGYL. MAYELA O/V IN JAN.

## 2018-11-12 NOTE — TELEPHONE ENCOUNTER
Review of the scan suggests an isolated area that would be consistent with mild diverticulitis.  As long as patient response to current therapy would not change treatment or follow-up.  May discuss further evaluation upon follow-up.

## 2018-11-13 ENCOUNTER — HOSPITAL ENCOUNTER (OUTPATIENT)
Dept: MAMMOGRAPHY | Facility: HOSPITAL | Age: 78
Discharge: HOME | End: 2018-11-13
Attending: OBSTETRICS & GYNECOLOGY | Admitting: OBSTETRICS & GYNECOLOGY

## 2018-11-13 ENCOUNTER — HOSPITAL ENCOUNTER (OUTPATIENT)
Dept: ULTRASOUND IMAGING | Facility: HOSPITAL | Age: 78
Discharge: HOME | End: 2018-11-13

## 2018-11-13 DIAGNOSIS — N63.20 BREAST MASS, LEFT: ICD-10-CM

## 2018-11-13 DIAGNOSIS — Z09 FOLLOW-UP EXAM, 3-6 MONTHS SINCE PREVIOUS EXAM: ICD-10-CM

## 2018-11-13 PROCEDURE — 77065 DX MAMMO INCL CAD UNI: CPT

## 2018-11-13 PROCEDURE — 76642 ULTRASOUND BREAST LIMITED: CPT

## 2018-11-16 ENCOUNTER — APPOINTMENT (OUTPATIENT)
Dept: CT IMAGING | Facility: HOSPITAL | Age: 78
End: 2018-11-16

## 2018-11-16 ENCOUNTER — HOSPITAL ENCOUNTER (INPATIENT)
Facility: HOSPITAL | Age: 78
LOS: 4 days | Discharge: HOME | End: 2018-11-21
Attending: EMERGENCY MEDICINE | Admitting: HOSPITALIST

## 2018-11-16 DIAGNOSIS — K57.92 ACUTE DIVERTICULITIS: Primary | ICD-10-CM

## 2018-11-16 LAB
ALBUMIN SERPL-MCNC: 4.1 G/DL (ref 3.5–5.2)
ALBUMIN/GLOB SERPL: 1.2 G/DL
ALP SERPL-CCNC: 38 U/L (ref 39–117)
ALT SERPL W P-5'-P-CCNC: 25 U/L (ref 1–33)
ANION GAP SERPL CALCULATED.3IONS-SCNC: 10.8 MMOL/L
AST SERPL-CCNC: 25 U/L (ref 1–32)
BACTERIA UR QL AUTO: ABNORMAL /HPF
BASOPHILS # BLD AUTO: 0.01 10*3/MM3 (ref 0–0.2)
BASOPHILS NFR BLD AUTO: 0.1 % (ref 0–1.5)
BILIRUB SERPL-MCNC: 0.7 MG/DL (ref 0.1–1.2)
BILIRUB UR QL STRIP: NEGATIVE
BUN BLD-MCNC: 13 MG/DL (ref 8–23)
BUN/CREAT SERPL: 16.3 (ref 7–25)
CALCIUM SPEC-SCNC: 9.5 MG/DL (ref 8.6–10.5)
CHLORIDE SERPL-SCNC: 98 MMOL/L (ref 98–107)
CLARITY UR: CLEAR
CO2 SERPL-SCNC: 26.2 MMOL/L (ref 22–29)
COLOR UR: YELLOW
CREAT BLD-MCNC: 0.8 MG/DL (ref 0.57–1)
DEPRECATED RDW RBC AUTO: 47.1 FL (ref 37–54)
EOSINOPHIL # BLD AUTO: 0.03 10*3/MM3 (ref 0–0.7)
EOSINOPHIL NFR BLD AUTO: 0.2 % (ref 0.3–6.2)
ERYTHROCYTE [DISTWIDTH] IN BLOOD BY AUTOMATED COUNT: 14.3 % (ref 11.7–13)
GFR SERPL CREATININE-BSD FRML MDRD: 69 ML/MIN/1.73
GLOBULIN UR ELPH-MCNC: 3.3 GM/DL
GLUCOSE BLD-MCNC: 111 MG/DL (ref 65–99)
GLUCOSE UR STRIP-MCNC: NEGATIVE MG/DL
HCT VFR BLD AUTO: 37.6 % (ref 35.6–45.5)
HGB BLD-MCNC: 12.6 G/DL (ref 11.9–15.5)
HGB UR QL STRIP.AUTO: NEGATIVE
HOLD SPECIMEN: NORMAL
HOLD SPECIMEN: NORMAL
HYALINE CASTS UR QL AUTO: ABNORMAL /LPF
IMM GRANULOCYTES # BLD: 0.04 10*3/MM3 (ref 0–0.03)
IMM GRANULOCYTES NFR BLD: 0.2 % (ref 0–0.5)
KETONES UR QL STRIP: ABNORMAL
LEUKOCYTE ESTERASE UR QL STRIP.AUTO: ABNORMAL
LIPASE SERPL-CCNC: 21 U/L (ref 13–60)
LYMPHOCYTES # BLD AUTO: 1.12 10*3/MM3 (ref 0.9–4.8)
LYMPHOCYTES NFR BLD AUTO: 6.4 % (ref 19.6–45.3)
MCH RBC QN AUTO: 30.1 PG (ref 26.9–32)
MCHC RBC AUTO-ENTMCNC: 33.5 G/DL (ref 32.4–36.3)
MCV RBC AUTO: 90 FL (ref 80.5–98.2)
MONOCYTES # BLD AUTO: 0.73 10*3/MM3 (ref 0.2–1.2)
MONOCYTES NFR BLD AUTO: 4.2 % (ref 5–12)
NEUTROPHILS # BLD AUTO: 15.69 10*3/MM3 (ref 1.9–8.1)
NEUTROPHILS NFR BLD AUTO: 89.1 % (ref 42.7–76)
NITRITE UR QL STRIP: NEGATIVE
PH UR STRIP.AUTO: 6.5 [PH] (ref 5–8)
PLATELET # BLD AUTO: 255 10*3/MM3 (ref 140–500)
PMV BLD AUTO: 11.2 FL (ref 6–12)
POTASSIUM BLD-SCNC: 4 MMOL/L (ref 3.5–5.2)
PROT SERPL-MCNC: 7.4 G/DL (ref 6–8.5)
PROT UR QL STRIP: ABNORMAL
RBC # BLD AUTO: 4.18 10*6/MM3 (ref 3.9–5.2)
RBC # UR: ABNORMAL /HPF
REF LAB TEST METHOD: ABNORMAL
SODIUM BLD-SCNC: 135 MMOL/L (ref 136–145)
SP GR UR STRIP: 1.02 (ref 1–1.03)
SQUAMOUS #/AREA URNS HPF: ABNORMAL /HPF
TRANS CELLS #/AREA URNS HPF: ABNORMAL /HPF
UROBILINOGEN UR QL STRIP: ABNORMAL
WBC NRBC COR # BLD: 17.58 10*3/MM3 (ref 4.5–10.7)
WBC UR QL AUTO: ABNORMAL /HPF
WHOLE BLOOD HOLD SPECIMEN: NORMAL
WHOLE BLOOD HOLD SPECIMEN: NORMAL

## 2018-11-16 PROCEDURE — 80053 COMPREHEN METABOLIC PANEL: CPT | Performed by: PHYSICIAN ASSISTANT

## 2018-11-16 PROCEDURE — 25010000002 HYDROMORPHONE PER 4 MG: Performed by: EMERGENCY MEDICINE

## 2018-11-16 PROCEDURE — 83690 ASSAY OF LIPASE: CPT | Performed by: PHYSICIAN ASSISTANT

## 2018-11-16 PROCEDURE — 99285 EMERGENCY DEPT VISIT HI MDM: CPT

## 2018-11-16 PROCEDURE — 85025 COMPLETE CBC W/AUTO DIFF WBC: CPT | Performed by: PHYSICIAN ASSISTANT

## 2018-11-16 PROCEDURE — 25010000002 IOPAMIDOL 61 % SOLUTION: Performed by: EMERGENCY MEDICINE

## 2018-11-16 PROCEDURE — 74177 CT ABD & PELVIS W/CONTRAST: CPT

## 2018-11-16 PROCEDURE — 25010000002 CEFTRIAXONE PER 250 MG: Performed by: EMERGENCY MEDICINE

## 2018-11-16 PROCEDURE — 81001 URINALYSIS AUTO W/SCOPE: CPT | Performed by: PHYSICIAN ASSISTANT

## 2018-11-16 PROCEDURE — 25010000002 ONDANSETRON PER 1 MG: Performed by: PHYSICIAN ASSISTANT

## 2018-11-16 RX ORDER — HYDROMORPHONE HYDROCHLORIDE 1 MG/ML
0.5 INJECTION, SOLUTION INTRAMUSCULAR; INTRAVENOUS; SUBCUTANEOUS ONCE
Status: COMPLETED | OUTPATIENT
Start: 2018-11-16 | End: 2018-11-16

## 2018-11-16 RX ORDER — CEFTRIAXONE SODIUM 1 G/50ML
1 INJECTION, SOLUTION INTRAVENOUS ONCE
Status: COMPLETED | OUTPATIENT
Start: 2018-11-16 | End: 2018-11-17

## 2018-11-16 RX ORDER — ONDANSETRON 2 MG/ML
4 INJECTION INTRAMUSCULAR; INTRAVENOUS ONCE
Status: COMPLETED | OUTPATIENT
Start: 2018-11-16 | End: 2018-11-16

## 2018-11-16 RX ADMIN — ONDANSETRON 4 MG: 2 INJECTION INTRAMUSCULAR; INTRAVENOUS at 21:54

## 2018-11-16 RX ADMIN — HYDROMORPHONE HYDROCHLORIDE 0.5 MG: 1 INJECTION, SOLUTION INTRAMUSCULAR; INTRAVENOUS; SUBCUTANEOUS at 21:54

## 2018-11-16 RX ADMIN — IOPAMIDOL 85 ML: 612 INJECTION, SOLUTION INTRAVENOUS at 22:26

## 2018-11-16 RX ADMIN — CEFTRIAXONE SODIUM 1 G: 1 INJECTION, SOLUTION INTRAVENOUS at 23:43

## 2018-11-16 RX ADMIN — SODIUM CHLORIDE, POTASSIUM CHLORIDE, SODIUM LACTATE AND CALCIUM CHLORIDE 500 ML: 600; 310; 30; 20 INJECTION, SOLUTION INTRAVENOUS at 21:53

## 2018-11-17 PROBLEM — K57.92 ACUTE DIVERTICULITIS: Status: ACTIVE | Noted: 2018-11-17

## 2018-11-17 LAB
ANION GAP SERPL CALCULATED.3IONS-SCNC: 11.3 MMOL/L
BASOPHILS # BLD AUTO: 0.02 10*3/MM3 (ref 0–0.2)
BASOPHILS NFR BLD AUTO: 0.2 % (ref 0–1.5)
BUN BLD-MCNC: 10 MG/DL (ref 8–23)
BUN/CREAT SERPL: 13.3 (ref 7–25)
CALCIUM SPEC-SCNC: 9 MG/DL (ref 8.6–10.5)
CHLORIDE SERPL-SCNC: 100 MMOL/L (ref 98–107)
CO2 SERPL-SCNC: 25.7 MMOL/L (ref 22–29)
CREAT BLD-MCNC: 0.75 MG/DL (ref 0.57–1)
DEPRECATED RDW RBC AUTO: 47.6 FL (ref 37–54)
EOSINOPHIL # BLD AUTO: 0.05 10*3/MM3 (ref 0–0.7)
EOSINOPHIL NFR BLD AUTO: 0.4 % (ref 0.3–6.2)
ERYTHROCYTE [DISTWIDTH] IN BLOOD BY AUTOMATED COUNT: 14.4 % (ref 11.7–13)
GFR SERPL CREATININE-BSD FRML MDRD: 75 ML/MIN/1.73
GLUCOSE BLD-MCNC: 125 MG/DL (ref 65–99)
HCT VFR BLD AUTO: 34.2 % (ref 35.6–45.5)
HGB BLD-MCNC: 11.2 G/DL (ref 11.9–15.5)
IMM GRANULOCYTES # BLD: 0.02 10*3/MM3 (ref 0–0.03)
IMM GRANULOCYTES NFR BLD: 0.2 % (ref 0–0.5)
LYMPHOCYTES # BLD AUTO: 1.29 10*3/MM3 (ref 0.9–4.8)
LYMPHOCYTES NFR BLD AUTO: 10.1 % (ref 19.6–45.3)
MCH RBC QN AUTO: 29.6 PG (ref 26.9–32)
MCHC RBC AUTO-ENTMCNC: 32.7 G/DL (ref 32.4–36.3)
MCV RBC AUTO: 90.5 FL (ref 80.5–98.2)
MONOCYTES # BLD AUTO: 0.65 10*3/MM3 (ref 0.2–1.2)
MONOCYTES NFR BLD AUTO: 5.1 % (ref 5–12)
NEUTROPHILS # BLD AUTO: 10.76 10*3/MM3 (ref 1.9–8.1)
NEUTROPHILS NFR BLD AUTO: 84.2 % (ref 42.7–76)
PLATELET # BLD AUTO: 220 10*3/MM3 (ref 140–500)
PMV BLD AUTO: 11.7 FL (ref 6–12)
POTASSIUM BLD-SCNC: 4.4 MMOL/L (ref 3.5–5.2)
RBC # BLD AUTO: 3.78 10*6/MM3 (ref 3.9–5.2)
SODIUM BLD-SCNC: 137 MMOL/L (ref 136–145)
WBC NRBC COR # BLD: 12.77 10*3/MM3 (ref 4.5–10.7)

## 2018-11-17 PROCEDURE — 25010000002 HYDROMORPHONE PER 4 MG: Performed by: EMERGENCY MEDICINE

## 2018-11-17 PROCEDURE — 36415 COLL VENOUS BLD VENIPUNCTURE: CPT | Performed by: INTERNAL MEDICINE

## 2018-11-17 PROCEDURE — 85025 COMPLETE CBC W/AUTO DIFF WBC: CPT | Performed by: INTERNAL MEDICINE

## 2018-11-17 PROCEDURE — 80048 BASIC METABOLIC PNL TOTAL CA: CPT | Performed by: INTERNAL MEDICINE

## 2018-11-17 PROCEDURE — 25010000002 HYDROMORPHONE PER 4 MG: Performed by: HOSPITALIST

## 2018-11-17 PROCEDURE — 25010000002 PIPERACILLIN SOD-TAZOBACTAM PER 1 G: Performed by: INTERNAL MEDICINE

## 2018-11-17 PROCEDURE — 25810000003 SODIUM CHLORIDE 0.9 % WITH KCL 20 MEQ 20-0.9 MEQ/L-% SOLUTION: Performed by: INTERNAL MEDICINE

## 2018-11-17 RX ORDER — CARVEDILOL 12.5 MG/1
12.5 TABLET ORAL 2 TIMES DAILY WITH MEALS
Status: DISCONTINUED | OUTPATIENT
Start: 2018-11-17 | End: 2018-11-21 | Stop reason: HOSPADM

## 2018-11-17 RX ORDER — HYDROMORPHONE HYDROCHLORIDE 1 MG/ML
0.5 INJECTION, SOLUTION INTRAMUSCULAR; INTRAVENOUS; SUBCUTANEOUS
Status: DISCONTINUED | OUTPATIENT
Start: 2018-11-17 | End: 2018-11-20

## 2018-11-17 RX ORDER — HYDROMORPHONE HYDROCHLORIDE 1 MG/ML
0.5 INJECTION, SOLUTION INTRAMUSCULAR; INTRAVENOUS; SUBCUTANEOUS ONCE
Status: COMPLETED | OUTPATIENT
Start: 2018-11-17 | End: 2018-11-17

## 2018-11-17 RX ORDER — SODIUM CHLORIDE AND POTASSIUM CHLORIDE 150; 900 MG/100ML; MG/100ML
100 INJECTION, SOLUTION INTRAVENOUS CONTINUOUS
Status: DISCONTINUED | OUTPATIENT
Start: 2018-11-17 | End: 2018-11-20

## 2018-11-17 RX ORDER — LEVOTHYROXINE SODIUM 0.03 MG/1
25 TABLET ORAL
Status: DISCONTINUED | OUTPATIENT
Start: 2018-11-17 | End: 2018-11-21 | Stop reason: HOSPADM

## 2018-11-17 RX ORDER — ACETAMINOPHEN 325 MG/1
650 TABLET ORAL EVERY 4 HOURS PRN
Status: DISCONTINUED | OUTPATIENT
Start: 2018-11-17 | End: 2018-11-21 | Stop reason: HOSPADM

## 2018-11-17 RX ORDER — LOSARTAN POTASSIUM 50 MG/1
50 TABLET ORAL DAILY
Status: DISCONTINUED | OUTPATIENT
Start: 2018-11-17 | End: 2018-11-21 | Stop reason: HOSPADM

## 2018-11-17 RX ORDER — CALCIUM CARBONATE 200(500)MG
2 TABLET,CHEWABLE ORAL 2 TIMES DAILY PRN
Status: DISCONTINUED | OUTPATIENT
Start: 2018-11-17 | End: 2018-11-21 | Stop reason: HOSPADM

## 2018-11-17 RX ORDER — ONDANSETRON 4 MG/1
4 TABLET, FILM COATED ORAL EVERY 6 HOURS PRN
Status: DISCONTINUED | OUTPATIENT
Start: 2018-11-17 | End: 2018-11-21 | Stop reason: HOSPADM

## 2018-11-17 RX ORDER — ONDANSETRON 4 MG/1
4 TABLET, ORALLY DISINTEGRATING ORAL EVERY 6 HOURS PRN
Status: DISCONTINUED | OUTPATIENT
Start: 2018-11-17 | End: 2018-11-21 | Stop reason: HOSPADM

## 2018-11-17 RX ORDER — ASPIRIN 81 MG/1
81 TABLET ORAL NIGHTLY
Status: DISCONTINUED | OUTPATIENT
Start: 2018-11-17 | End: 2018-11-21 | Stop reason: HOSPADM

## 2018-11-17 RX ORDER — CALCIUM CARBONATE 500(1250)
1000 TABLET ORAL DAILY
Status: DISCONTINUED | OUTPATIENT
Start: 2018-11-17 | End: 2018-11-21 | Stop reason: HOSPADM

## 2018-11-17 RX ORDER — SODIUM CHLORIDE 0.9 % (FLUSH) 0.9 %
3 SYRINGE (ML) INJECTION EVERY 12 HOURS SCHEDULED
Status: DISCONTINUED | OUTPATIENT
Start: 2018-11-17 | End: 2018-11-21 | Stop reason: HOSPADM

## 2018-11-17 RX ORDER — DIPHENOXYLATE HYDROCHLORIDE AND ATROPINE SULFATE 2.5; .025 MG/1; MG/1
1 TABLET ORAL DAILY
Status: DISCONTINUED | OUTPATIENT
Start: 2018-11-17 | End: 2018-11-21 | Stop reason: HOSPADM

## 2018-11-17 RX ORDER — ONDANSETRON 2 MG/ML
4 INJECTION INTRAMUSCULAR; INTRAVENOUS EVERY 6 HOURS PRN
Status: DISCONTINUED | OUTPATIENT
Start: 2018-11-17 | End: 2018-11-21 | Stop reason: HOSPADM

## 2018-11-17 RX ORDER — SODIUM CHLORIDE 0.9 % (FLUSH) 0.9 %
3-10 SYRINGE (ML) INJECTION AS NEEDED
Status: DISCONTINUED | OUTPATIENT
Start: 2018-11-17 | End: 2018-11-21 | Stop reason: HOSPADM

## 2018-11-17 RX ORDER — PANTOPRAZOLE SODIUM 40 MG/1
40 TABLET, DELAYED RELEASE ORAL EVERY MORNING
Status: DISCONTINUED | OUTPATIENT
Start: 2018-11-17 | End: 2018-11-21 | Stop reason: HOSPADM

## 2018-11-17 RX ADMIN — CALCIUM 1000 MG: 500 TABLET ORAL at 14:07

## 2018-11-17 RX ADMIN — LOSARTAN POTASSIUM 50 MG: 50 TABLET, FILM COATED ORAL at 14:07

## 2018-11-17 RX ADMIN — HYDROMORPHONE HYDROCHLORIDE 0.5 MG: 1 INJECTION, SOLUTION INTRAMUSCULAR; INTRAVENOUS; SUBCUTANEOUS at 17:41

## 2018-11-17 RX ADMIN — HYDROMORPHONE HYDROCHLORIDE 0.5 MG: 1 INJECTION, SOLUTION INTRAMUSCULAR; INTRAVENOUS; SUBCUTANEOUS at 11:49

## 2018-11-17 RX ADMIN — METRONIDAZOLE 500 MG: 500 INJECTION, SOLUTION INTRAVENOUS at 00:32

## 2018-11-17 RX ADMIN — PANTOPRAZOLE SODIUM 40 MG: 40 TABLET, DELAYED RELEASE ORAL at 09:54

## 2018-11-17 RX ADMIN — Medication 1 TABLET: at 14:07

## 2018-11-17 RX ADMIN — ASPIRIN 81 MG: 81 TABLET, DELAYED RELEASE ORAL at 20:22

## 2018-11-17 RX ADMIN — POTASSIUM CHLORIDE AND SODIUM CHLORIDE 100 ML/HR: 900; 150 INJECTION, SOLUTION INTRAVENOUS at 09:54

## 2018-11-17 RX ADMIN — CARVEDILOL 12.5 MG: 12.5 TABLET, FILM COATED ORAL at 14:07

## 2018-11-17 RX ADMIN — POTASSIUM CHLORIDE AND SODIUM CHLORIDE 100 ML/HR: 900; 150 INJECTION, SOLUTION INTRAVENOUS at 20:22

## 2018-11-17 RX ADMIN — TAZOBACTAM SODIUM AND PIPERACILLIN SODIUM 3.38 G: 375; 3 INJECTION, SOLUTION INTRAVENOUS at 06:08

## 2018-11-17 RX ADMIN — HYDROMORPHONE HYDROCHLORIDE 0.5 MG: 1 INJECTION, SOLUTION INTRAMUSCULAR; INTRAVENOUS; SUBCUTANEOUS at 01:29

## 2018-11-17 RX ADMIN — TAZOBACTAM SODIUM AND PIPERACILLIN SODIUM 3.38 G: 375; 3 INJECTION, SOLUTION INTRAVENOUS at 14:06

## 2018-11-17 RX ADMIN — LEVOTHYROXINE SODIUM 25 MCG: 25 TABLET ORAL at 14:07

## 2018-11-17 RX ADMIN — HYDROMORPHONE HYDROCHLORIDE 0.5 MG: 1 INJECTION, SOLUTION INTRAMUSCULAR; INTRAVENOUS; SUBCUTANEOUS at 22:01

## 2018-11-17 RX ADMIN — TAZOBACTAM SODIUM AND PIPERACILLIN SODIUM 3.38 G: 375; 3 INJECTION, SOLUTION INTRAVENOUS at 22:01

## 2018-11-17 RX ADMIN — HYDROMORPHONE HYDROCHLORIDE 0.5 MG: 1 INJECTION, SOLUTION INTRAMUSCULAR; INTRAVENOUS; SUBCUTANEOUS at 07:49

## 2018-11-17 RX ADMIN — HYDROMORPHONE HYDROCHLORIDE 0.5 MG: 1 INJECTION, SOLUTION INTRAMUSCULAR; INTRAVENOUS; SUBCUTANEOUS at 14:14

## 2018-11-17 NOTE — ED NOTES
Pt reports that she started having lower abd pain that started this morning. Pt denies having N/V/D. Pt report that it feels like she is constipated but has had several BMs today and with mostly normal consistency. Pt denies seeing blood in stool. Pt reports that this is different that diverticulitis. Pt reports having CT scan her on 11/9 for her diverticulitis.      Sarah Galdamez RN  11/16/18 2053    Pt reports pain when she is standing and pain when she is sitting.     Sarah Galdamez RN  11/16/18 2111

## 2018-11-17 NOTE — PLAN OF CARE
Problem: Patient Care Overview  Goal: Plan of Care Review  Outcome: Ongoing (interventions implemented as appropriate)   11/17/18 4536   Plan of Care Review   Progress no change   OTHER   Outcome Summary from er at 0205, call to md (pain med not ordered), waiting for response, LBM 11/16, received iv abt in er     Goal: Discharge Needs Assessment  Outcome: Ongoing (interventions implemented as appropriate)    Goal: Interprofessional Rounds/Family Conf  Outcome: Ongoing (interventions implemented as appropriate)      Problem: Pain, Acute (Adult)  Goal: Identify Related Risk Factors and Signs and Symptoms  Outcome: Ongoing (interventions implemented as appropriate)    Goal: Acceptable Pain Control/Comfort Level  Outcome: Ongoing (interventions implemented as appropriate)

## 2018-11-17 NOTE — ED PROVIDER NOTES
"EMERGENCY DEPARTMENT ENCOUNTER    Room Number:  19/19  Date seen:  11/16/2018  Time seen: 9:00 PM  PCP: Jarred Carrizales Jr., MD    HPI:  Chief complaint:Abdominal Pain  Context:Moriah Petty is a 78 y.o. female who presents to the ED with c/o severe lower abd pain that began this morning. Pt states that she had a CT and was told she had mild diverticulitis, but that this pain is not similar to the pain that she experienced with that. Pt denies N/V/D, fever, and any urinary problems. Pt states she has had a cholecystectomy in the past. Pt has no other complaints at this time. She was placed on cipro, but states it made her feel unwell so was changed to flagyl.    Onset: gradual  Location:lower abd  Radiation: none  Duration: since this morning  Timing: constant  Character: \"like I am constipated\"  Aggravating Factors: none  Alleviating Factors: none  Severity: severe    MEDICAL RECORD REVIEW  On 11/5/18 pt had an office visit with PCP for LLQ abd pain, had a CT of abd and pelvis on 11/9/18 that showed mild acute uncomplicated Diverticulitis.     ALLERGIES  Morphine and related    PAST MEDICAL HISTORY  Active Ambulatory Problems     Diagnosis Date Noted   • Abnormal electrocardiogram 03/04/2017   • Non-specific colitis 03/04/2017   • Acute post-traumatic headache 10/09/2013   • Anemia 03/04/2017   • Arthritis 03/04/2017   • Hematochezia 03/04/2017   • Chest pain 03/04/2017   • Compression fracture of lumbar vertebra (CMS/HCC) 10/09/2013   • Closed wedge compression fracture of second lumbar vertebra (CMS/HCC) 04/15/2013   • Constipation 03/04/2017   • Dizziness 03/04/2017   • Dyslipidemia 03/04/2017   • Fatigue 03/04/2017   • Ventricular premature beats 03/04/2017   • Gastroesophageal reflux disease without esophagitis 03/04/2017   • Hypertension 03/04/2017   • Hypotension 09/10/2014   • Insomnia 03/04/2017   • Left lower quadrant pain 03/04/2017   • Low back pain 08/04/2014   • Osteoporosis 03/04/2017   • Premature " atrial contraction 03/04/2017   • 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   • Pneumonia 09/03/2014   • Nausea 03/04/2017   • Sensorineural hearing loss 10/08/2014   • Shortness of breath 03/04/2017   • Sinus bradycardia 03/04/2017   • Sleep apnea 03/04/2017   • Disorder of thyroid 03/04/2017   • Trigeminal neuralgia 04/15/2013   • Rectal bleeding 11/06/2017   • Family history of colon cancer 11/06/2017   • Adenomatous polyp of colon 11/06/2017   • Hypothyroidism 01/23/2018   • Osteoporosis, 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     Resolved Ambulatory Problems     Diagnosis Date Noted   • No Resolved Ambulatory Problems     Past Medical History:   Diagnosis Date   • Adenomatous polyp of colon 11/6/2017   • Anemia    • Bradycardia    • Colitis    • Colon polyps    • Compression fracture of L1 lumbar vertebra (CMS/HCC)    • Compression fracture of L2 (CMS/HCC)    • Depression    • Diverticulosis    • GERD (gastroesophageal reflux disease)    • GI hemorrhage    • Hearing loss    • History of blood transfusion 11/2013   • History of trigeminal neuralgia    • Hypertension    • Insomnia    • Memory loss    • OA (osteoarthritis)    • Osteoarthritis    • Osteoporosis    • PAC (premature atrial contraction)    • Peripheral neuralgia    • Pneumonia    • PVC's (premature ventricular contractions)    • Sleep apnea    • Thyroid disorder        PAST SURGICAL HISTORY  Past Surgical History:   Procedure Laterality Date   • BRAIN SURGERY Left 08/2014    MICROVASCULAR DECOMPRESSION LEFT EAR   • CARDIAC CATHETERIZATION  03/2002   • CHOLECYSTECTOMY N/A 11/20/2013    DR. ESME GOLDMAN   • COLONOSCOPY N/A 02/21/2012    INT/EXT HEMORRHOIDS, DIVERTICULOSIS, 2 HYPERPLASTIC POLYPS, TORT,    • COLONOSCOPY  02/27/2015    eh, tics, torts, ih   REPEAT IN 5 YRS      • JOINT REPLACEMENT  03/15/2004    HIP-PARTIAL DR. SIMRAN KRAMER   • JOINT REPLACEMENT Bilateral 2013    KNEE   • KYPHOPLASTY      COMPRESSION FX OF L2   • TRIGEMINAL NERVE  DECOMPRESSION         FAMILY HISTORY  Family History   Problem Relation Age of Onset   • Ovarian cancer Daughter    • Colon cancer Mother    • Arthritis Mother    • Stomach cancer Mother    • Stomach cancer Maternal Grandfather    • Glaucoma Father    • Depression Son    • Stroke Paternal Grandmother        SOCIAL HISTORY  Social History     Socioeconomic History   • Marital status:      Spouse name: Not on file   • Number of children: Not on file   • Years of education: Not on file   • Highest education level: Not on file   Social Needs   • Financial resource strain: Not on file   • Food insecurity - worry: Not on file   • Food insecurity - inability: Not on file   • Transportation needs - medical: Not on file   • Transportation needs - non-medical: Not on file   Occupational History   • Not on file   Tobacco Use   • Smoking status: Former Smoker     Packs/day: 0.50     Years: 30.00     Pack years: 15.00     Types: Cigarettes     Start date: 1957     Last attempt to quit: 1989     Years since quittin.5   • Smokeless tobacco: Never Used   Substance and Sexual Activity   • Alcohol use: Yes     Alcohol/week: 4.2 oz     Types: 2 Shots of liquor, 2 Standard drinks or equivalent per week     Comment: 2 DRINKS PER DAY   • Drug use: No   • Sexual activity: Not Currently     Partners: Male     Birth control/protection: Post-menopausal   Other Topics Concern   • Not on file   Social History Narrative   • Not on file       REVIEW OF SYSTEMS  Review of Systems   Constitutional: Negative for chills and fever.   HENT: Negative.    Eyes: Negative.    Respiratory: Negative for shortness of breath.    Cardiovascular: Negative for chest pain.   Gastrointestinal: Positive for abdominal pain (lower abd).   Genitourinary: Negative.    Musculoskeletal: Negative.    Skin: Negative.    Neurological: Negative.    Psychiatric/Behavioral: Negative.        PHYSICAL EXAM  ED Triage Vitals [18]   Temp Heart Rate  Resp BP SpO2   98.1 °F (36.7 °C) 93 16 -- 96 %      Temp src Heart Rate Source Patient Position BP Location FiO2 (%)   Tympanic Monitor -- -- --     Physical Exam   Constitutional: She is oriented to person, place, and time and well-developed, well-nourished, and in no distress.   HENT:   Head: Normocephalic and atraumatic.   Right Ear: External ear normal.   Left Ear: External ear normal.   Nose: Nose normal.   Eyes: Conjunctivae are normal.   Neck: Normal range of motion.   Cardiovascular: Normal rate and regular rhythm.   Pulmonary/Chest: Effort normal and breath sounds normal.   Abdominal:   Normal bowel sounds  Soft  Diffuse lower abd tenderness  Nondistended  No rebound, guarding, or rigidity  No appreciable organomegaly  No CVA tenderness     Musculoskeletal: Normal range of motion.   Neurological: She is alert and oriented to person, place, and time.   Skin: Skin is warm and dry.   Psychiatric: Affect normal.   Nursing note and vitals reviewed.      LAB RESULTS  Recent Results (from the past 24 hour(s))   Comprehensive Metabolic Panel    Collection Time: 11/16/18  9:20 PM   Result Value Ref Range    Glucose 111 (H) 65 - 99 mg/dL    BUN 13 8 - 23 mg/dL    Creatinine 0.80 0.57 - 1.00 mg/dL    Sodium 135 (L) 136 - 145 mmol/L    Potassium 4.0 3.5 - 5.2 mmol/L    Chloride 98 98 - 107 mmol/L    CO2 26.2 22.0 - 29.0 mmol/L    Calcium 9.5 8.6 - 10.5 mg/dL    Total Protein 7.4 6.0 - 8.5 g/dL    Albumin 4.10 3.50 - 5.20 g/dL    ALT (SGPT) 25 1 - 33 U/L    AST (SGOT) 25 1 - 32 U/L    Alkaline Phosphatase 38 (L) 39 - 117 U/L    Total Bilirubin 0.7 0.1 - 1.2 mg/dL    eGFR Non African Amer 69 >60 mL/min/1.73    Globulin 3.3 gm/dL    A/G Ratio 1.2 g/dL    BUN/Creatinine Ratio 16.3 7.0 - 25.0    Anion Gap 10.8 mmol/L   Lipase    Collection Time: 11/16/18  9:20 PM   Result Value Ref Range    Lipase 21 13 - 60 U/L   CBC Auto Differential    Collection Time: 11/16/18  9:20 PM   Result Value Ref Range    WBC 17.58 (H) 4.50 -  10.70 10*3/mm3    RBC 4.18 3.90 - 5.20 10*6/mm3    Hemoglobin 12.6 11.9 - 15.5 g/dL    Hematocrit 37.6 35.6 - 45.5 %    MCV 90.0 80.5 - 98.2 fL    MCH 30.1 26.9 - 32.0 pg    MCHC 33.5 32.4 - 36.3 g/dL    RDW 14.3 (H) 11.7 - 13.0 %    RDW-SD 47.1 37.0 - 54.0 fl    MPV 11.2 6.0 - 12.0 fL    Platelets 255 140 - 500 10*3/mm3    Neutrophil % 89.1 (H) 42.7 - 76.0 %    Lymphocyte % 6.4 (L) 19.6 - 45.3 %    Monocyte % 4.2 (L) 5.0 - 12.0 %    Eosinophil % 0.2 (L) 0.3 - 6.2 %    Basophil % 0.1 0.0 - 1.5 %    Immature Grans % 0.2 0.0 - 0.5 %    Neutrophils, Absolute 15.69 (H) 1.90 - 8.10 10*3/mm3    Lymphocytes, Absolute 1.12 0.90 - 4.80 10*3/mm3    Monocytes, Absolute 0.73 0.20 - 1.20 10*3/mm3    Eosinophils, Absolute 0.03 0.00 - 0.70 10*3/mm3    Basophils, Absolute 0.01 0.00 - 0.20 10*3/mm3    Immature Grans, Absolute 0.04 (H) 0.00 - 0.03 10*3/mm3   Light Blue Top    Collection Time: 11/16/18  9:20 PM   Result Value Ref Range    Extra Tube hold for add-on    Green Top (Gel)    Collection Time: 11/16/18  9:20 PM   Result Value Ref Range    Extra Tube Hold for add-ons.    Lavender Top    Collection Time: 11/16/18  9:20 PM   Result Value Ref Range    Extra Tube hold for add-on    Gold Top - SST    Collection Time: 11/16/18  9:20 PM   Result Value Ref Range    Extra Tube Hold for add-ons.    Urinalysis With Microscopic If Indicated (No Culture) - Urine, Clean Catch    Collection Time: 11/16/18  9:30 PM   Result Value Ref Range    Color, UA Yellow Yellow, Straw    Appearance, UA Clear Clear    pH, UA 6.5 5.0 - 8.0    Specific Gravity, UA 1.020 1.005 - 1.030    Glucose, UA Negative Negative    Ketones, UA 15 mg/dL (1+) (A) Negative    Bilirubin, UA Negative Negative    Blood, UA Negative Negative    Protein, UA Trace (A) Negative    Leuk Esterase, UA Small (1+) (A) Negative    Nitrite, UA Negative Negative    Urobilinogen, UA 0.2 E.U./dL 0.2 - 1.0 E.U./dL   Urinalysis, Microscopic Only - Urine, Clean Catch    Collection Time:  11/16/18  9:30 PM   Result Value Ref Range    RBC, UA 0-2 None Seen, 0-2 /HPF    WBC, UA 3-5 (A) None Seen, 0-2 /HPF    Bacteria, UA None Seen None Seen /HPF    Squamous Epithelial Cells, UA 0-2 None Seen, 0-2 /HPF    Transitional Epithelial Cells, UA 0-2 0 - 2 /HPF    Hyaline Casts, UA 0-2 None Seen /LPF    Methodology Manual Light Microscopy        I ordered the above labs and reviewed the results    RADIOLOGY  CT Abdomen Pelvis With Contrast   Final Result    IMPRESSION:       1. Persistently thick-walled appearance to the patient's mid sigmoid   colon may reflect diverticulitis. I don't see any extraluminal gas or   fluid collections to suggest perforation and abscess formation. There is   a trace amount of fluid seen within the pelvis, but this certainly could   be reactive given the appearance of the sigmoid colon. There is no   evidence of mechanical bowel obstruction.   2. Intra and extrahepatic biliary dilatation may be postcholecystectomy   in nature, but given degree of dilatation, correlation with liver   function tests is suggested.       Radiation dose reduction techniques were utilized, including automated   exposure control and exposure modulation based on body size.       This report was finalized on 11/16/2018 11:12 PM by Dr. Viola Barrera M.D.              I ordered the above noted radiological studies and reviewed the images on the PACS system.     MEDICATIONS GIVEN IN ER  Medications   cefTRIAXone (ROCEPHIN) IVPB 1 g (not administered)   metroNIDAZOLE (FLAGYL) IVPB 500 mg (not administered)   lactated ringers bolus 500 mL (0 mL Intravenous Stopped 11/16/18 2253)   ondansetron (ZOFRAN) injection 4 mg (4 mg Intravenous Given 11/16/18 2154)   HYDROmorphone (DILAUDID) injection 0.5 mg (0.5 mg Intravenous Given 11/16/18 2154)   iopamidol (ISOVUE-300) 61 % injection 100 mL (85 mL Intravenous Given by Other 11/16/18 2226)       PROCEDURES  Procedures      PROGRESS AND CONSULTS    Progress  Notes:       2108  Discussed plan to order pt pain medication for her pain and imaging and labs for further evaluation of pt condition. Pt understands and agrees with the plan, all questions answered.    1100 Rechecked patient. She is resting, pain is improved. Discussed labs, though we are still awaiting CT scan result. Abdomen remains soft and  tender diffusely throughout the lower.    1125 Reviewed pt's history and workup with Dr. Wright.  After a bedside evaluation; Dr Wright agrees with the plan of care    1130 Rechecked patient. Pain is returning, will order additional analgesia. IV Abx ordered and Dr. Wright will discuss with A for admission. The patient is agreeable.    Documentation assistance provided by noah Morrow for Kandis Mcgovern PA-C.  Information recorded by the scribe was done at my direction and has been verified and validated by me.         Gordon Morrow  11/16/18 2331       Kandis Mcgovern PA  11/16/18 9583

## 2018-11-17 NOTE — H&P
Name: Moriah Petty ADMIT: 2018   : 1940  PCP: Jarred Carrizales Jr., MD    MRN: [id number] LOS: 0 days   AGE/SEX: 78 y.o. female  ROOM: [facility]     Chief Complaint   Patient presents with   • Abdominal Pain       History of present illness  Ms. Petty is a 78 y.o. female has a history of diverticulosis, hypertension and hypothyroidism and presents to [facility] complaining of abdominal pain.  Patient was treated as an outpatient for diverticulitis initially with Cipro but she did not tolerate the Cipro so she was given Flagyl.  However she continues to have pain and white count was 17,000 yesterday and CT shows sigmoid colon diverticulitis but no abscess formation or complications.  Patient was started on Zosyn and she feels somewhat better but still has abdominal cramps.  She is not passing any blood in his stool prevented any nausea or vomiting.      Past Medical History:   Diagnosis Date   • Adenomatous polyp of colon 2017   • Anemia    • Bradycardia    • Colitis    • Colon polyps    • Compression fracture of L1 lumbar vertebra (CMS/HCC)    • Compression fracture of L2 (CMS/HCC)    • Depression    • Diverticulosis    • GERD (gastroesophageal reflux disease)    • GI hemorrhage    • Hearing loss    • History of blood transfusion 2013    S/P CHOLECYSTECTOMY SX  BHE   • History of trigeminal neuralgia    • Hypertension    • Insomnia    • Memory loss    • OA (osteoarthritis)    • Osteoarthritis    • Osteoporosis    • PAC (premature atrial contraction)    • Peripheral neuralgia    • Pneumonia     RUL   • PVC's (premature ventricular contractions)    • Sleep apnea    • Thyroid disorder      Past Surgical History:   Procedure Laterality Date   • BRAIN SURGERY Left 2014    MICROVASCULAR DECOMPRESSION LEFT EAR   • CARDIAC CATHETERIZATION  2002   • CHOLECYSTECTOMY N/A 2013    DR. ESME GOLDMAN   • COLONOSCOPY N/A 2012    INT/EXT HEMORRHOIDS, DIVERTICULOSIS, 2 HYPERPLASTIC  POLYPS, TORT,    • COLONOSCOPY  2015    eh, tics, torts, ih   REPEAT IN 5 YRS      • JOINT REPLACEMENT  03/15/2004    HIP-PARTIAL DR. SIMRAN KRAMER   • JOINT REPLACEMENT Bilateral     KNEE   • KYPHOPLASTY      COMPRESSION FX OF L2   • TRIGEMINAL NERVE DECOMPRESSION       Family History   Problem Relation Age of Onset   • Ovarian cancer Daughter    • Cancer Daughter    • Colon cancer Mother    • Arthritis Mother    • Stomach cancer Mother    • Cancer Mother    • Stomach cancer Maternal Grandfather    • Cancer Maternal Grandfather    • Glaucoma Father    • Arthritis Father    • Heart disease Father    • Hypertension Father    • Alcohol abuse Son    • Stroke Paternal Grandmother      Social History     Tobacco Use   • Smoking status: Former Smoker     Packs/day: 0.50     Years: 30.00     Pack years: 15.00     Types: Cigarettes     Start date: 1957     Last attempt to quit: 1989     Years since quittin.5   • Smokeless tobacco: Never Used   Substance Use Topics   • Alcohol use: Yes     Alcohol/week: 4.2 oz     Types: 2 Shots of liquor, 2 Standard drinks or equivalent per week     Comment: 2 DRINKS PER DAY   • Drug use: No     Medications Prior to Admission   Medication Sig Dispense Refill Last Dose   • Aspirin (ASPIR-81 PO) Take 81 mg by mouth Every Night.   11/15/2018 at 2230   • Calcium Carb-Cholecalciferol (CALCIUM + D3) 600-200 MG-UNIT tablet Take 1 tablet by mouth Every Night.   11/15/2018 at 2230   • carvedilol (COREG) 12.5 MG tablet q 12 hours  3 2018 at 0800   • desonide (DESOWEN) 0.05 % cream APPLY SPARINGLY TO THE CENTRAL FACE AND NOSE 2 TIMES A DAY  prn  1 Past Month at Unknown time   • hyoscyamine (LEVSIN) 0.125 MG SL tablet Take 0.125 mg by mouth Every 4 (Four) Hours As Needed for Cramping.   2018 at 1200   • levothyroxine (SYNTHROID, LEVOTHROID) 25 MCG tablet TAKE 1 TABLET BY MOUTH DAILY 90 tablet 2 2018 at 0800   • losartan (COZAAR) 50 MG  tablet TAKE 1 TABLET BY MOUTH EVERY DAY (Patient taking differently: TAKE 1 TABLET BY MOUTH EVERY DAY 50 mg) 90 tablet 3 11/16/2018 at 0800   • magnesium oxide 250 MG tablet Take 250 mg by mouth Daily.   11/15/2018 at 2230   • metroNIDAZOLE (FLAGYL) 250 MG tablet Take 1 tablet by mouth 3 (Three) Times a Day for 10 days. 30 tablet 0 11/16/2018 at 0800   • Multiple Vitamin (MULTI VITAMIN DAILY PO) Take 1 tablet by mouth Daily.   11/16/2018 at 0800   • Naproxen Sodium (ALEVE PO) Take 1 tablet by mouth Daily.   11/16/2018 at 0800   • Omega-3 Fatty Acids (FISH OIL) 1200 MG capsule capsule Take 1,200 mg by mouth 2 (Two) Times a Day With Meals.   11/16/2018 at 0800   • omeprazole (priLOSEC) 20 MG capsule Take 20 mg by mouth Daily.   11/16/2018 at 0800   • denosumab (PROLIA) 60 MG/ML solution syringe Inject  under the skin into the appropriate area as directed. Every 6 months - feb and august 8/24/2018   • hydrocortisone (ANUSOL-HC) 25 MG suppository Insert 1 suppository into the rectum At Night As Needed for Hemorrhoids (rectal discomfort). 10 suppository 3 More than a month at Unknown time     Allergies:    Allergies   Allergen Reactions   • Morphine And Related Itching and Swelling       Review of Systems   Constitutional: Negative for activity change, appetite change, chills, fatigue and fever.   HENT: Negative for congestion, ear discharge, mouth sores, nosebleeds, sneezing, sore throat and trouble swallowing.    Eyes: Negative for discharge, itching and visual disturbance.   Respiratory: Negative for apnea, cough, chest tightness, shortness of breath, wheezing and stridor.    Cardiovascular: Negative for chest pain, palpitations and leg swelling.   Gastrointestinal: Positive for abdominal pain. Negative for abdominal distention, blood in stool, constipation, diarrhea, nausea and vomiting.   Endocrine: Negative for cold intolerance, heat intolerance and polydipsia.   Genitourinary: Negative for difficulty urinating  and frequency.   Musculoskeletal: Negative for arthralgias, back pain, gait problem, joint swelling and neck stiffness.   Skin: Negative for color change, pallor and rash.   Neurological: Negative for dizziness, seizures, syncope, facial asymmetry, weakness, numbness and headaches.   Hematological: Negative for adenopathy. Does not bruise/bleed easily.   Psychiatric/Behavioral: Negative for agitation, behavioral problems and hallucinations.          Objective    Vital Signs  Temp:  [97.7 °F (36.5 °C)-99.2 °F (37.3 °C)] 97.7 °F (36.5 °C)  Heart Rate:  [61-93] 61  Resp:  [15-18] 18  BP: (111-153)/(62-75) 111/69  SpO2:  [86 %-98 %] 96 %  on  Flow (L/min):  [2] 2;   Device (Oxygen Therapy): nasal cannula  Body mass index is 27.15 kg/m².    Physical Exam   Constitutional: She is oriented to person, place, and time. She appears well-developed and well-nourished. No distress.   HENT:   Head: Normocephalic and atraumatic.   Nose: No epistaxis.   Mouth/Throat: Oropharynx is clear and moist.   Eyes: Conjunctivae and EOM are normal. Pupils are equal, round, and reactive to light.   Neck: Normal range of motion. Neck supple. No JVD present. No tracheal deviation and no edema present. No thyroid mass and no thyromegaly present.   Cardiovascular: Normal rate, regular rhythm and normal heart sounds.   No murmur heard.  Pulmonary/Chest: Breath sounds normal. She has no decreased breath sounds. She has no wheezes.   Abdominal: Soft. Normal appearance and bowel sounds are normal. She exhibits no ascites. There is no hepatosplenomegaly.   Musculoskeletal: Normal range of motion. She exhibits no edema.   Neurological: She is alert and oriented to person, place, and time.   Skin: Skin is warm, dry and intact. No rash noted. No cyanosis or erythema. No pallor. Nails show no clubbing.   Psychiatric: She has a normal mood and affect. Her behavior is normal.   Vitals reviewed.      Results Review:   I reviewed the patient's new clinical  results.    Lab Results (last 24 hours)     Procedure Component Value Units Date/Time    CBC & Differential [[id number]] Collected:  11/16/18 2120    Specimen:  Blood Updated:  11/16/18 2140    Narrative:       The following orders were created for panel order CBC & Differential.  Procedure                               Abnormality         Status                     ---------                               -----------         ------                     CBC Auto Differential[[id number]]        Abnormal            Final result                 Please view results for these tests on the individual orders.    Comprehensive Metabolic Panel [[id number]]  (Abnormal) Collected:  11/16/18 2120    Specimen:  Blood Updated:  11/16/18 2203     Glucose 111 mg/dL      BUN 13 mg/dL      Creatinine 0.80 mg/dL      Sodium 135 mmol/L      Potassium 4.0 mmol/L      Chloride 98 mmol/L      CO2 26.2 mmol/L      Calcium 9.5 mg/dL      Total Protein 7.4 g/dL      Albumin 4.10 g/dL      ALT (SGPT) 25 U/L      AST (SGOT) 25 U/L      Alkaline Phosphatase 38 U/L      Total Bilirubin 0.7 mg/dL      eGFR Non African Amer 69 mL/min/1.73      Globulin 3.3 gm/dL      A/G Ratio 1.2 g/dL      BUN/Creatinine Ratio 16.3     Anion Gap 10.8 mmol/L     Narrative:       The MDRD GFR formula is only valid for adults with stable renal function between ages 18 and 70.    Lipase [[id number]]  (Normal) Collected:  11/16/18 2120    Specimen:  Blood Updated:  11/16/18 2203     Lipase 21 U/L     CBC Auto Differential [[id number]]  (Abnormal) Collected:  11/16/18 2120    Specimen:  Blood Updated:  11/16/18 2140     WBC 17.58 10*3/mm3      RBC 4.18 10*6/mm3      Hemoglobin 12.6 g/dL      Hematocrit 37.6 %      MCV 90.0 fL      MCH 30.1 pg      MCHC 33.5 g/dL      RDW 14.3 %      RDW-SD 47.1 fl      MPV 11.2 fL      Platelets 255 10*3/mm3      Neutrophil % 89.1 %      Lymphocyte % 6.4 %      Monocyte % 4.2 %      Eosinophil % 0.2 %      Basophil % 0.1 %      Immature Grans  % 0.2 %      Neutrophils, Absolute 15.69 10*3/mm3      Lymphocytes, Absolute 1.12 10*3/mm3      Monocytes, Absolute 0.73 10*3/mm3      Eosinophils, Absolute 0.03 10*3/mm3      Basophils, Absolute 0.01 10*3/mm3      Immature Grans, Absolute 0.04 10*3/mm3     Urinalysis With Microscopic If Indicated (No Culture) - Urine, Clean Catch [[id number]]  (Abnormal) Collected:  11/16/18 2130    Specimen:  Urine, Clean Catch Updated:  11/16/18 2203     Color, UA Yellow     Appearance, UA Clear     pH, UA 6.5     Specific Gravity, UA 1.020     Glucose, UA Negative     Ketones, UA 15 mg/dL (1+)     Bilirubin, UA Negative     Blood, UA Negative     Protein, UA Trace     Leuk Esterase, UA Small (1+)     Nitrite, UA Negative     Urobilinogen, UA 0.2 E.U./dL    Urinalysis, Microscopic Only - Urine, Clean Catch [[id number]]  (Abnormal) Collected:  11/16/18 2130    Specimen:  Urine, Clean Catch Updated:  11/16/18 2203     RBC, UA 0-2 /HPF      WBC, UA 3-5 /HPF      Bacteria, UA None Seen /HPF      Squamous Epithelial Cells, UA 0-2 /HPF      Transitional Epithelial Cells, UA 0-2 /HPF      Hyaline Casts, UA 0-2 /LPF      Methodology Manual Light Microscopy    Basic Metabolic Panel [[id number]]  (Abnormal) Collected:  11/17/18 0657    Specimen:  Blood Updated:  11/17/18 0848     Glucose 125 mg/dL      BUN 10 mg/dL      Creatinine 0.75 mg/dL      Sodium 137 mmol/L      Potassium 4.4 mmol/L      Chloride 100 mmol/L      CO2 25.7 mmol/L      Calcium 9.0 mg/dL      eGFR Non African Amer 75 mL/min/1.73      BUN/Creatinine Ratio 13.3     Anion Gap 11.3 mmol/L     Narrative:       The MDRD GFR formula is only valid for adults with stable renal function between ages 18 and 70.    CBC & Differential [[id number]] Collected:  11/17/18 0657    Specimen:  Blood Updated:  11/17/18 0822    Narrative:       The following orders were created for panel order CBC & Differential.  Procedure                               Abnormality         Status                      ---------                               -----------         ------                     CBC Auto Differential[[id number]]        Abnormal            Final result                 Please view results for these tests on the individual orders.    CBC Auto Differential [[id number]]  (Abnormal) Collected:  11/17/18 0657    Specimen:  Blood Updated:  11/17/18 0822     WBC 12.77 10*3/mm3      RBC 3.78 10*6/mm3      Hemoglobin 11.2 g/dL      Hematocrit 34.2 %      MCV 90.5 fL      MCH 29.6 pg      MCHC 32.7 g/dL      RDW 14.4 %      RDW-SD 47.6 fl      MPV 11.7 fL      Platelets 220 10*3/mm3      Neutrophil % 84.2 %      Lymphocyte % 10.1 %      Monocyte % 5.1 %      Eosinophil % 0.4 %      Basophil % 0.2 %      Immature Grans % 0.2 %      Neutrophils, Absolute 10.76 10*3/mm3      Lymphocytes, Absolute 1.29 10*3/mm3      Monocytes, Absolute 0.65 10*3/mm3      Eosinophils, Absolute 0.05 10*3/mm3      Basophils, Absolute 0.02 10*3/mm3      Immature Grans, Absolute 0.02 10*3/mm3           CT Abdomen Pelvis With Contrast   Final Result    IMPRESSION:       1. Persistently thick-walled appearance to the patient's mid sigmoid   colon may reflect diverticulitis. I don't see any extraluminal gas or   fluid collections to suggest perforation and abscess formation. There is   a trace amount of fluid seen within the pelvis, but this certainly could   be reactive given the appearance of the sigmoid colon. There is no   evidence of mechanical bowel obstruction.   2. Intra and extrahepatic biliary dilatation may be postcholecystectomy   in nature, but given degree of dilatation, correlation with liver   function tests is suggested.       Radiation dose reduction techniques were utilized, including automated   exposure control and exposure modulation based on body size.       This report was finalized on 11/16/2018 11:12 PM by Dr. Viola Barrera M.D.            Assessment/Plan      Active Hospital Problems    Diagnosis Date Noted   •  **Acute diverticulitis [K57.92] 11/17/2018   • Hypothyroidism [E03.9] 01/23/2018   • Hypertension [I10] 03/04/2017      Resolved Hospital Problems   No resolved problems to display.         Ms. Petty is a 78 y.o. female who diverticulitis of sigmoid colon.  Continue Zosyn and pain medications.        I discussed the patients findings and my recommendations with patient.      Bibiana Dominique MD  [facility]  11/17/18

## 2018-11-17 NOTE — ED PROVIDER NOTES
Pt presents to the ED complaining of lower abd pain that worsened acutely this morning. Pt states she had recent CT scan with evidence of diverticulitis, and was started on Cipro. Pt was unable to take Cipro and was switched to Flagyl, without improvement.     On exam, pt has moderate lower abd tenderness, but is in NAD.     I agree with midlevel plan to admit pt to [facility] for further workup and evaluation of diverticulitis and persistence of symptoms. I agreed to discuss case with Dr. Plaza for admission. Sary Mcgovern PA-C agreed to update pt on plan for admission and further care.     2325: Call placed to [facility]    0021: Discussed pt's with Dr. Plaza ([facility] who agreed to admit pt to Med/Surg for further abx treatment of diverticulitis.     The CHANDRAKANT and I have discussed this patient's history, physical exam, and treatment plan.  I have reviewed the documentation and personally had a face to face interaction with the patient. I affirm the documentation and agree with the treatment and plan.  The attached note describes my personal findings.    Documentation assistance provided by noah Chiang for Dr. Wright.  Information recorded by the noah was done at my direction and has been verified and validated by me.       Leora Chiang  11/17/18 0028       Zak Wright MD  11/17/18 2245

## 2018-11-17 NOTE — PLAN OF CARE
Problem: Patient Care Overview  Goal: Plan of Care Review  Outcome: Ongoing (interventions implemented as appropriate)   11/17/18 1504   Plan of Care Review   Progress no change   OTHER   Outcome Summary Patient c/o constant abdominal pain, medicating with IV Dilaudid. Continue IVF and IV antibiotics. Full liquid diet. VSS.    Coping/Psychosocial   Plan of Care Reviewed With patient     Goal: Individualization and Mutuality  Outcome: Ongoing (interventions implemented as appropriate)    Goal: Discharge Needs Assessment  Outcome: Ongoing (interventions implemented as appropriate)    Goal: Interprofessional Rounds/Family Conf  Outcome: Ongoing (interventions implemented as appropriate)      Problem: Pain, Acute (Adult)  Goal: Identify Related Risk Factors and Signs and Symptoms  Outcome: Outcome(s) achieved Date Met: 11/17/18    Goal: Acceptable Pain Control/Comfort Level  Outcome: Ongoing (interventions implemented as appropriate)

## 2018-11-18 LAB
ANION GAP SERPL CALCULATED.3IONS-SCNC: 11.1 MMOL/L
BASOPHILS # BLD AUTO: 0.01 10*3/MM3 (ref 0–0.2)
BASOPHILS NFR BLD AUTO: 0.1 % (ref 0–1.5)
BUN BLD-MCNC: 6 MG/DL (ref 8–23)
BUN/CREAT SERPL: 8.3 (ref 7–25)
CALCIUM SPEC-SCNC: 8.6 MG/DL (ref 8.6–10.5)
CHLORIDE SERPL-SCNC: 103 MMOL/L (ref 98–107)
CO2 SERPL-SCNC: 22.9 MMOL/L (ref 22–29)
CREAT BLD-MCNC: 0.72 MG/DL (ref 0.57–1)
DEPRECATED RDW RBC AUTO: 49.8 FL (ref 37–54)
EOSINOPHIL # BLD AUTO: 0.1 10*3/MM3 (ref 0–0.7)
EOSINOPHIL NFR BLD AUTO: 1 % (ref 0.3–6.2)
ERYTHROCYTE [DISTWIDTH] IN BLOOD BY AUTOMATED COUNT: 14.4 % (ref 11.7–13)
GFR SERPL CREATININE-BSD FRML MDRD: 78 ML/MIN/1.73
GLUCOSE BLD-MCNC: 107 MG/DL (ref 65–99)
HCT VFR BLD AUTO: 33.4 % (ref 35.6–45.5)
HGB BLD-MCNC: 10.6 G/DL (ref 11.9–15.5)
IMM GRANULOCYTES # BLD: 0.02 10*3/MM3 (ref 0–0.03)
IMM GRANULOCYTES NFR BLD: 0.2 % (ref 0–0.5)
LYMPHOCYTES # BLD AUTO: 1.02 10*3/MM3 (ref 0.9–4.8)
LYMPHOCYTES NFR BLD AUTO: 10.4 % (ref 19.6–45.3)
MCH RBC QN AUTO: 29.9 PG (ref 26.9–32)
MCHC RBC AUTO-ENTMCNC: 31.7 G/DL (ref 32.4–36.3)
MCV RBC AUTO: 94.4 FL (ref 80.5–98.2)
MONOCYTES # BLD AUTO: 0.73 10*3/MM3 (ref 0.2–1.2)
MONOCYTES NFR BLD AUTO: 7.5 % (ref 5–12)
NEUTROPHILS # BLD AUTO: 7.9 10*3/MM3 (ref 1.9–8.1)
NEUTROPHILS NFR BLD AUTO: 80.8 % (ref 42.7–76)
PLATELET # BLD AUTO: 194 10*3/MM3 (ref 140–500)
PMV BLD AUTO: 11.4 FL (ref 6–12)
POTASSIUM BLD-SCNC: 4.4 MMOL/L (ref 3.5–5.2)
RBC # BLD AUTO: 3.54 10*6/MM3 (ref 3.9–5.2)
SODIUM BLD-SCNC: 137 MMOL/L (ref 136–145)
WBC NRBC COR # BLD: 9.78 10*3/MM3 (ref 4.5–10.7)

## 2018-11-18 PROCEDURE — 25010000002 PIPERACILLIN SOD-TAZOBACTAM PER 1 G: Performed by: INTERNAL MEDICINE

## 2018-11-18 PROCEDURE — 36415 COLL VENOUS BLD VENIPUNCTURE: CPT | Performed by: INTERNAL MEDICINE

## 2018-11-18 PROCEDURE — 25010000002 HYDROMORPHONE PER 4 MG: Performed by: HOSPITALIST

## 2018-11-18 PROCEDURE — 85025 COMPLETE CBC W/AUTO DIFF WBC: CPT | Performed by: INTERNAL MEDICINE

## 2018-11-18 PROCEDURE — 25810000003 SODIUM CHLORIDE 0.9 % WITH KCL 20 MEQ 20-0.9 MEQ/L-% SOLUTION: Performed by: INTERNAL MEDICINE

## 2018-11-18 PROCEDURE — 80048 BASIC METABOLIC PNL TOTAL CA: CPT | Performed by: INTERNAL MEDICINE

## 2018-11-18 RX ORDER — ECHINACEA PURPUREA EXTRACT 125 MG
1 TABLET ORAL AS NEEDED
Status: DISCONTINUED | OUTPATIENT
Start: 2018-11-18 | End: 2018-11-21 | Stop reason: HOSPADM

## 2018-11-18 RX ADMIN — TAZOBACTAM SODIUM AND PIPERACILLIN SODIUM 3.38 G: 375; 3 INJECTION, SOLUTION INTRAVENOUS at 14:19

## 2018-11-18 RX ADMIN — HYDROMORPHONE HYDROCHLORIDE 0.5 MG: 1 INJECTION, SOLUTION INTRAMUSCULAR; INTRAVENOUS; SUBCUTANEOUS at 14:19

## 2018-11-18 RX ADMIN — HYDROMORPHONE HYDROCHLORIDE 0.5 MG: 1 INJECTION, SOLUTION INTRAMUSCULAR; INTRAVENOUS; SUBCUTANEOUS at 04:32

## 2018-11-18 RX ADMIN — ACETAMINOPHEN 650 MG: 325 TABLET, FILM COATED ORAL at 11:29

## 2018-11-18 RX ADMIN — POTASSIUM CHLORIDE AND SODIUM CHLORIDE 100 ML/HR: 900; 150 INJECTION, SOLUTION INTRAVENOUS at 06:09

## 2018-11-18 RX ADMIN — ASPIRIN 81 MG: 81 TABLET, DELAYED RELEASE ORAL at 20:44

## 2018-11-18 RX ADMIN — LEVOTHYROXINE SODIUM 25 MCG: 25 TABLET ORAL at 06:09

## 2018-11-18 RX ADMIN — HYDROMORPHONE HYDROCHLORIDE 0.5 MG: 1 INJECTION, SOLUTION INTRAMUSCULAR; INTRAVENOUS; SUBCUTANEOUS at 07:50

## 2018-11-18 RX ADMIN — HYDROMORPHONE HYDROCHLORIDE 0.5 MG: 1 INJECTION, SOLUTION INTRAMUSCULAR; INTRAVENOUS; SUBCUTANEOUS at 12:23

## 2018-11-18 RX ADMIN — HYDROMORPHONE HYDROCHLORIDE 0.5 MG: 1 INJECTION, SOLUTION INTRAMUSCULAR; INTRAVENOUS; SUBCUTANEOUS at 17:57

## 2018-11-18 RX ADMIN — TAZOBACTAM SODIUM AND PIPERACILLIN SODIUM 3.38 G: 375; 3 INJECTION, SOLUTION INTRAVENOUS at 06:09

## 2018-11-18 RX ADMIN — POTASSIUM CHLORIDE AND SODIUM CHLORIDE 100 ML/HR: 900; 150 INJECTION, SOLUTION INTRAVENOUS at 16:17

## 2018-11-18 RX ADMIN — HYDROMORPHONE HYDROCHLORIDE 0.5 MG: 1 INJECTION, SOLUTION INTRAMUSCULAR; INTRAVENOUS; SUBCUTANEOUS at 20:45

## 2018-11-18 RX ADMIN — TAZOBACTAM SODIUM AND PIPERACILLIN SODIUM 3.38 G: 375; 3 INJECTION, SOLUTION INTRAVENOUS at 22:05

## 2018-11-18 RX ADMIN — HYDROMORPHONE HYDROCHLORIDE 0.5 MG: 1 INJECTION, SOLUTION INTRAMUSCULAR; INTRAVENOUS; SUBCUTANEOUS at 09:53

## 2018-11-18 RX ADMIN — PANTOPRAZOLE SODIUM 40 MG: 40 TABLET, DELAYED RELEASE ORAL at 06:10

## 2018-11-18 NOTE — PROGRESS NOTES
Name: Moriah Petty ADMIT: 2018   : 1940  PCP: Jarred Carrizales Jr., MD    MRN: [id number] LOS: 1 days   AGE/SEX: 78 y.o. female    ROOM: [facility]/   Subjective   Had pain this morning  No fever    Brief hospital course since admission:  Acute diverticulitis    I have reviewed past medical history, family history, social history and allergies.  No changes from admission note.      Review of Systems   Constitutional: Positive for fever.   Respiratory: Negative for shortness of breath.    Gastrointestinal: Positive for abdominal pain. Negative for abdominal distention, diarrhea, nausea and vomiting.          Objective   Vital Signs  Temp:  [97.4 °F (36.3 °C)-100.6 °F (38.1 °C)] 97.4 °F (36.3 °C)  Heart Rate:  [58-71] 71  Resp:  [18] 18  BP: (104-143)/(58-76) 143/76  SpO2:  [96 %-97 %] 97 %  on  Flow (L/min):  [2] 2;   Device (Oxygen Therapy): nasal cannula  Body mass index is 27.15 kg/m².    Intake/Output Summary (Last 24 hours) at 2018 1502  Last data filed at 2018 1419  Gross per 24 hour   Intake 2906 ml   Output 2825 ml   Net 81 ml       Physical Exam   Constitutional: She is oriented to person, place, and time. She appears well-developed and well-nourished. No distress.   HENT:   Head: Normocephalic and atraumatic.   Eyes: Pupils are equal, round, and reactive to light. No scleral icterus.   Cardiovascular: Normal rate and regular rhythm.   Pulmonary/Chest: Effort normal. No respiratory distress. She has no decreased breath sounds. She has no wheezes.   Abdominal: Soft. Bowel sounds are normal. There is no hepatosplenomegaly.   Neurological: She is alert and oriented to person, place, and time.   Skin: No cyanosis. Nails show no clubbing.   Psychiatric: She has a normal mood and affect. Her behavior is normal.       Results Review:    Results from last 7 days   Lab Units  18   0516  18   0657  18   2120   WBC 10*3/mm3  9.78  12.77*  17.58*   HEMOGLOBIN g/dL  10.6*  11.2*   12.6   HEMATOCRIT %  33.4*  34.2*  37.6   PLATELETS 10*3/mm3  194  220  255     Results from last 7 days   Lab Units  11/18/18   0516  11/17/18   0657  11/16/18   2120   SODIUM mmol/L  137  137  135*   POTASSIUM mmol/L  4.4  4.4  4.0   CHLORIDE mmol/L  103  100  98   CO2 mmol/L  22.9  25.7  26.2   BUN mg/dL  6*  10  13   CREATININE mg/dL  0.72  0.75  0.80   GLUCOSE mg/dL  107*  125*  111*   CALCIUM mg/dL  8.6  9.0  9.5         Hemoglobin A1C:  Lab Results   Component Value Date    HGBA1C 5.55 12/20/2017     Glucose Range:No results found for: POCGLU      aspirin 81 mg Oral Nightly   calcium carbonate (oyster shell) 1,000 mg Oral Daily   carvedilol 12.5 mg Oral BID With Meals   levothyroxine 25 mcg Oral Q AM   losartan 50 mg Oral Daily   multivitamin 1 tablet Oral Daily   pantoprazole 40 mg Oral QAM   piperacillin-tazobactam 3.375 g Intravenous Q8H   sodium chloride 3 mL Intravenous Q12H       sodium chloride 0.9 % with KCl 20 mEq 100 mL/hr Last Rate: 100 mL/hr (11/18/18 0609)   Diet Full Liquid  Assessment/Plan       Assessment/Plan      Active Hospital Problems    Diagnosis Date Noted   • **Acute diverticulitis [K57.92] 11/17/2018   • Hypothyroidism [E03.9] 01/23/2018   • Hypertension [I10] 03/04/2017      Resolved Hospital Problems   No resolved problems to display.     Continue antibiotics and full liquid diet        Bibiana Dominique MD  [facility]  11/18/18

## 2018-11-18 NOTE — PLAN OF CARE
Problem: Patient Care Overview  Goal: Plan of Care Review  Outcome: Ongoing (interventions implemented as appropriate)   11/18/18 0522   Plan of Care Review   Progress no change   OTHER   Outcome Summary med for pain, walked, ivf, iv abt, vdg     Goal: Discharge Needs Assessment  Outcome: Ongoing (interventions implemented as appropriate)    Goal: Interprofessional Rounds/Family Conf  Outcome: Ongoing (interventions implemented as appropriate)      Problem: Pain, Acute (Adult)  Goal: Acceptable Pain Control/Comfort Level  Outcome: Ongoing (interventions implemented as appropriate)

## 2018-11-19 LAB
ANION GAP SERPL CALCULATED.3IONS-SCNC: 12.2 MMOL/L
BASOPHILS # BLD AUTO: 0.02 10*3/MM3 (ref 0–0.2)
BASOPHILS NFR BLD AUTO: 0.1 % (ref 0–1.5)
BUN BLD-MCNC: 4 MG/DL (ref 8–23)
BUN/CREAT SERPL: 6.9 (ref 7–25)
CALCIUM SPEC-SCNC: 8.6 MG/DL (ref 8.6–10.5)
CHLORIDE SERPL-SCNC: 100 MMOL/L (ref 98–107)
CO2 SERPL-SCNC: 22.8 MMOL/L (ref 22–29)
CREAT BLD-MCNC: 0.58 MG/DL (ref 0.57–1)
DEPRECATED RDW RBC AUTO: 50.4 FL (ref 37–54)
EOSINOPHIL # BLD AUTO: 0.09 10*3/MM3 (ref 0–0.7)
EOSINOPHIL NFR BLD AUTO: 0.7 % (ref 0.3–6.2)
ERYTHROCYTE [DISTWIDTH] IN BLOOD BY AUTOMATED COUNT: 14.5 % (ref 11.7–13)
GFR SERPL CREATININE-BSD FRML MDRD: 100 ML/MIN/1.73
GLUCOSE BLD-MCNC: 112 MG/DL (ref 65–99)
HCT VFR BLD AUTO: 36.7 % (ref 35.6–45.5)
HGB BLD-MCNC: 11.7 G/DL (ref 11.9–15.5)
IMM GRANULOCYTES # BLD: 0.03 10*3/MM3 (ref 0–0.03)
IMM GRANULOCYTES NFR BLD: 0.2 % (ref 0–0.5)
LYMPHOCYTES # BLD AUTO: 0.78 10*3/MM3 (ref 0.9–4.8)
LYMPHOCYTES NFR BLD AUTO: 5.7 % (ref 19.6–45.3)
MCH RBC QN AUTO: 30.4 PG (ref 26.9–32)
MCHC RBC AUTO-ENTMCNC: 31.9 G/DL (ref 32.4–36.3)
MCV RBC AUTO: 95.3 FL (ref 80.5–98.2)
MONOCYTES # BLD AUTO: 0.77 10*3/MM3 (ref 0.2–1.2)
MONOCYTES NFR BLD AUTO: 5.6 % (ref 5–12)
NEUTROPHILS # BLD AUTO: 11.95 10*3/MM3 (ref 1.9–8.1)
NEUTROPHILS NFR BLD AUTO: 87.7 % (ref 42.7–76)
PLATELET # BLD AUTO: 203 10*3/MM3 (ref 140–500)
PMV BLD AUTO: 11.2 FL (ref 6–12)
POTASSIUM BLD-SCNC: 4.9 MMOL/L (ref 3.5–5.2)
RBC # BLD AUTO: 3.85 10*6/MM3 (ref 3.9–5.2)
SODIUM BLD-SCNC: 135 MMOL/L (ref 136–145)
WBC NRBC COR # BLD: 13.64 10*3/MM3 (ref 4.5–10.7)

## 2018-11-19 PROCEDURE — 85025 COMPLETE CBC W/AUTO DIFF WBC: CPT | Performed by: INTERNAL MEDICINE

## 2018-11-19 PROCEDURE — 25010000002 PIPERACILLIN SOD-TAZOBACTAM PER 1 G: Performed by: INTERNAL MEDICINE

## 2018-11-19 PROCEDURE — 25010000002 HYDROMORPHONE PER 4 MG: Performed by: HOSPITALIST

## 2018-11-19 PROCEDURE — 80048 BASIC METABOLIC PNL TOTAL CA: CPT | Performed by: INTERNAL MEDICINE

## 2018-11-19 PROCEDURE — 25810000003 SODIUM CHLORIDE 0.9 % WITH KCL 20 MEQ 20-0.9 MEQ/L-% SOLUTION: Performed by: INTERNAL MEDICINE

## 2018-11-19 RX ADMIN — HYDROMORPHONE HYDROCHLORIDE 0.5 MG: 1 INJECTION, SOLUTION INTRAMUSCULAR; INTRAVENOUS; SUBCUTANEOUS at 15:23

## 2018-11-19 RX ADMIN — TAZOBACTAM SODIUM AND PIPERACILLIN SODIUM 3.38 G: 375; 3 INJECTION, SOLUTION INTRAVENOUS at 22:52

## 2018-11-19 RX ADMIN — TAZOBACTAM SODIUM AND PIPERACILLIN SODIUM 3.38 G: 375; 3 INJECTION, SOLUTION INTRAVENOUS at 06:00

## 2018-11-19 RX ADMIN — POTASSIUM CHLORIDE AND SODIUM CHLORIDE 100 ML/HR: 900; 150 INJECTION, SOLUTION INTRAVENOUS at 12:13

## 2018-11-19 RX ADMIN — METRONIDAZOLE 500 MG: 500 INJECTION, SOLUTION INTRAVENOUS at 21:42

## 2018-11-19 RX ADMIN — POTASSIUM CHLORIDE AND SODIUM CHLORIDE 100 ML/HR: 900; 150 INJECTION, SOLUTION INTRAVENOUS at 02:21

## 2018-11-19 RX ADMIN — ASPIRIN 81 MG: 81 TABLET, DELAYED RELEASE ORAL at 21:42

## 2018-11-19 RX ADMIN — HYDROMORPHONE HYDROCHLORIDE 0.5 MG: 1 INJECTION, SOLUTION INTRAMUSCULAR; INTRAVENOUS; SUBCUTANEOUS at 22:52

## 2018-11-19 RX ADMIN — LOSARTAN POTASSIUM 50 MG: 50 TABLET, FILM COATED ORAL at 08:23

## 2018-11-19 RX ADMIN — TAZOBACTAM SODIUM AND PIPERACILLIN SODIUM 3.38 G: 375; 3 INJECTION, SOLUTION INTRAVENOUS at 16:05

## 2018-11-19 RX ADMIN — HYDROMORPHONE HYDROCHLORIDE 0.5 MG: 1 INJECTION, SOLUTION INTRAMUSCULAR; INTRAVENOUS; SUBCUTANEOUS at 06:51

## 2018-11-19 RX ADMIN — CARVEDILOL 12.5 MG: 12.5 TABLET, FILM COATED ORAL at 08:23

## 2018-11-19 RX ADMIN — HYDROMORPHONE HYDROCHLORIDE 0.5 MG: 1 INJECTION, SOLUTION INTRAMUSCULAR; INTRAVENOUS; SUBCUTANEOUS at 03:45

## 2018-11-19 RX ADMIN — PANTOPRAZOLE SODIUM 40 MG: 40 TABLET, DELAYED RELEASE ORAL at 06:00

## 2018-11-19 RX ADMIN — HYDROMORPHONE HYDROCHLORIDE 0.5 MG: 1 INJECTION, SOLUTION INTRAMUSCULAR; INTRAVENOUS; SUBCUTANEOUS at 11:13

## 2018-11-19 RX ADMIN — HYDROMORPHONE HYDROCHLORIDE 0.5 MG: 1 INJECTION, SOLUTION INTRAMUSCULAR; INTRAVENOUS; SUBCUTANEOUS at 18:14

## 2018-11-19 RX ADMIN — ACETAMINOPHEN 650 MG: 325 TABLET, FILM COATED ORAL at 15:23

## 2018-11-19 RX ADMIN — METRONIDAZOLE 500 MG: 500 INJECTION, SOLUTION INTRAVENOUS at 14:37

## 2018-11-19 RX ADMIN — CARVEDILOL 12.5 MG: 12.5 TABLET, FILM COATED ORAL at 18:12

## 2018-11-19 RX ADMIN — CALCIUM 1000 MG: 500 TABLET ORAL at 08:23

## 2018-11-19 RX ADMIN — LEVOTHYROXINE SODIUM 25 MCG: 25 TABLET ORAL at 06:00

## 2018-11-19 RX ADMIN — Medication 1 TABLET: at 08:23

## 2018-11-19 NOTE — PLAN OF CARE
Problem: Patient Care Overview  Goal: Plan of Care Review  Outcome: Ongoing (interventions implemented as appropriate)   11/19/18 4716   Plan of Care Review   Progress no change   OTHER   Outcome Summary temp max 100.7, tylenol given, medicated with dilaudid for pain with relief, encouraged mobility today, no nausea, c/o lower abdominal pain, tolerating full liquid, ivf infusing, iv abx administered as ordered   Coping/Psychosocial   Plan of Care Reviewed With patient     Goal: Individualization and Mutuality  Outcome: Ongoing (interventions implemented as appropriate)    Goal: Discharge Needs Assessment  Outcome: Ongoing (interventions implemented as appropriate)    Goal: Interprofessional Rounds/Family Conf  Outcome: Ongoing (interventions implemented as appropriate)      Problem: Pain, Acute (Adult)  Goal: Acceptable Pain Control/Comfort Level  Outcome: Ongoing (interventions implemented as appropriate)      Problem: Infection, Risk/Actual (Adult)  Goal: Infection Prevention/Resolution  Outcome: Ongoing (interventions implemented as appropriate)

## 2018-11-19 NOTE — PROGRESS NOTES
Name: Moriah Petty ADMIT: 2018   : 1940  PCP: Jarred Carrizales Jr., MD    MRN: [id number] LOS: 2 days   AGE/SEX: 78 y.o. female    ROOM: [facility] [place]/   Subjective   Had pain this morning  No fever    Brief hospital course since admission:  Acute diverticulitis    I have reviewed past medical history, family history, social history and allergies.  No changes from admission note.      Review of Systems   Constitutional: Positive for fever.   Respiratory: Negative for shortness of breath.    Gastrointestinal: Positive for abdominal pain. Negative for abdominal distention, diarrhea, nausea and vomiting.          Objective   Vital Signs  Temp:  [97.6 °F (36.4 °C)-100.7 °F (38.2 °C)] 100.7 °F (38.2 °C)  Heart Rate:  [69-80] 69  Resp:  [18] 18  BP: (134-149)/(63-76) 147/74  SpO2:  [94 %-98 %] 94 %  on  Flow (L/min):  [2] 2;   Device (Oxygen Therapy): room air  Body mass index is 27.15 kg/m².    Intake/Output Summary (Last 24 hours) at 2018 1628  Last data filed at 2018 1605  Gross per 24 hour   Intake 4533.33 ml   Output 3790 ml   Net 743.33 ml       Physical Exam   Constitutional: She is oriented to person, place, and time. She appears well-developed and well-nourished. No distress.   HENT:   Head: Normocephalic and atraumatic.   Eyes: Pupils are equal, round, and reactive to light. No scleral icterus.   Cardiovascular: Normal rate and regular rhythm.   Pulmonary/Chest: Effort normal. No respiratory distress. She has no decreased breath sounds. She has no wheezes.   Abdominal: Soft. Bowel sounds are normal. There is no hepatosplenomegaly.   Neurological: She is alert and oriented to person, place, and time.   Skin: No cyanosis. Nails show no clubbing.   Psychiatric: She has a normal mood and affect. Her behavior is normal.       Results Review:    Results from last 7 days   Lab Units  18   0443  18   0516  18   0657   WBC 10*3/mm3  13.64*  9.78  12.77*   HEMOGLOBIN g/dL  11.7*  10.6*   11.2*   HEMATOCRIT %  36.7  33.4*  34.2*   PLATELETS 10*3/mm3  203  194  220     Results from last 7 days   Lab Units  11/19/18   0443  11/18/18   0516  11/17/18   0657   SODIUM mmol/L  135*  137  137   POTASSIUM mmol/L  4.9  4.4  4.4   CHLORIDE mmol/L  100  103  100   CO2 mmol/L  22.8  22.9  25.7   BUN mg/dL  4*  6*  10   CREATININE mg/dL  0.58  0.72  0.75   GLUCOSE mg/dL  112*  107*  125*   CALCIUM mg/dL  8.6  8.6  9.0         Hemoglobin A1C:  Lab Results   Component Value Date    HGBA1C 5.55 12/20/2017     Glucose Range:No results found for: POCGLU      aspirin 81 mg Oral Nightly   calcium carbonate (oyster shell) 1,000 mg Oral Daily   carvedilol 12.5 mg Oral BID With Meals   levothyroxine 25 mcg Oral Q AM   losartan 50 mg Oral Daily   metroNIDAZOLE 500 mg Intravenous Q8H   multivitamin 1 tablet Oral Daily   pantoprazole 40 mg Oral QAM   piperacillin-tazobactam 3.375 g Intravenous Q8H   sodium chloride 3 mL Intravenous Q12H       sodium chloride 0.9 % with KCl 20 mEq 100 mL/hr Last Rate: 100 mL/hr (11/19/18 1213)   Diet Full Liquid  Assessment/Plan       Assessment/Plan      Active Hospital Problems    Diagnosis Date Noted   • **Acute diverticulitis [K57.92] 11/17/2018   • Hypothyroidism [E03.9] 01/23/2018   • Hypertension [I10] 03/04/2017      Resolved Hospital Problems   No resolved problems to display.     Continue antibiotics and full liquid diet  Initial white count came down nicely but today it is up again.  Discussed with the pharmacist and I did add Flagyl in addition to Zosyn  She has a low-grade fever  Continue antibiotics and monitor her white count and fever if it doesn't come down I would consult infectious disease are GI on Tuesday        Bibiana Dominique MD  [facility]  11/19/18

## 2018-11-19 NOTE — PLAN OF CARE
Problem: Patient Care Overview  Goal: Plan of Care Review  Outcome: Ongoing (interventions implemented as appropriate)   11/19/18 0416   Plan of Care Review   Progress no change   OTHER   Outcome Summary still c/o intermittent abd cramping, medicated, no nausea tolerating Full liquid diet, no BM, c/o frequency of urination, Temp max 100   Coping/Psychosocial   Plan of Care Reviewed With patient     Goal: Individualization and Mutuality  Outcome: Ongoing (interventions implemented as appropriate)    Goal: Discharge Needs Assessment  Outcome: Ongoing (interventions implemented as appropriate)      Problem: Pain, Acute (Adult)  Goal: Acceptable Pain Control/Comfort Level  Outcome: Ongoing (interventions implemented as appropriate)      Problem: Infection, Risk/Actual (Adult)  Goal: Identify Related Risk Factors and Signs and Symptoms  Outcome: Outcome(s) achieved Date Met: 11/19/18    Goal: Infection Prevention/Resolution  Outcome: Ongoing (interventions implemented as appropriate)

## 2018-11-19 NOTE — PROGRESS NOTES
Discharge Planning Assessment  [facility]     Patient Name: Moriah Petty  MRN: [id number]  Today's Date: 11/19/2018    Admit Date: 11/16/2018    Discharge Needs Assessment    No documentation.       Discharge Plan     Row Name 11/19/18 6478       Plan    Plan  Plans home with spouse no discharge needs    Plan Comments  Spoke with patient at bedside, face sheet verified. Patient stated she is independent with ADL's and plans to return home with her spouse Sammy Petty [phone] at discharge. Patient stated she has used home health in the past but can not remember which agency she used. Patient denies any discharge needs. Deloris Ponce RN        Destination      No service coordination in this encounter.      Durable Medical Equipment      No service coordination in this encounter.      Dialysis/Infusion      No service coordination in this encounter.      Home Medical Care      No service coordination in this encounter.      Community Resources      No service coordination in this encounter.          Demographic Summary    No documentation.       Functional Status    No documentation.       Psychosocial    No documentation.       Abuse/Neglect    No documentation.       Legal    No documentation.       Substance Abuse    No documentation.       Patient Forms    No documentation.           Deloris Ponce RN

## 2018-11-20 LAB
ANION GAP SERPL CALCULATED.3IONS-SCNC: 10.1 MMOL/L
BASOPHILS # BLD AUTO: 0.02 10*3/MM3 (ref 0–0.2)
BASOPHILS NFR BLD AUTO: 0.2 % (ref 0–1.5)
BUN BLD-MCNC: 3 MG/DL (ref 8–23)
BUN/CREAT SERPL: 6.3 (ref 7–25)
CALCIUM SPEC-SCNC: 8.2 MG/DL (ref 8.6–10.5)
CHLORIDE SERPL-SCNC: 101 MMOL/L (ref 98–107)
CO2 SERPL-SCNC: 23.9 MMOL/L (ref 22–29)
CREAT BLD-MCNC: 0.48 MG/DL (ref 0.57–1)
DEPRECATED RDW RBC AUTO: 48.7 FL (ref 37–54)
EOSINOPHIL # BLD AUTO: 0.27 10*3/MM3 (ref 0–0.7)
EOSINOPHIL NFR BLD AUTO: 2.9 % (ref 0.3–6.2)
ERYTHROCYTE [DISTWIDTH] IN BLOOD BY AUTOMATED COUNT: 14.1 % (ref 11.7–13)
GFR SERPL CREATININE-BSD FRML MDRD: 125 ML/MIN/1.73
GLUCOSE BLD-MCNC: 107 MG/DL (ref 65–99)
HCT VFR BLD AUTO: 34 % (ref 35.6–45.5)
HGB BLD-MCNC: 10.5 G/DL (ref 11.9–15.5)
IMM GRANULOCYTES # BLD: 0.02 10*3/MM3 (ref 0–0.03)
IMM GRANULOCYTES NFR BLD: 0.2 % (ref 0–0.5)
LYMPHOCYTES # BLD AUTO: 0.94 10*3/MM3 (ref 0.9–4.8)
LYMPHOCYTES NFR BLD AUTO: 10 % (ref 19.6–45.3)
MCH RBC QN AUTO: 29.1 PG (ref 26.9–32)
MCHC RBC AUTO-ENTMCNC: 30.9 G/DL (ref 32.4–36.3)
MCV RBC AUTO: 94.2 FL (ref 80.5–98.2)
MONOCYTES # BLD AUTO: 0.71 10*3/MM3 (ref 0.2–1.2)
MONOCYTES NFR BLD AUTO: 7.6 % (ref 5–12)
NEUTROPHILS # BLD AUTO: 7.41 10*3/MM3 (ref 1.9–8.1)
NEUTROPHILS NFR BLD AUTO: 79.1 % (ref 42.7–76)
PLATELET # BLD AUTO: 245 10*3/MM3 (ref 140–500)
PMV BLD AUTO: 11 FL (ref 6–12)
POTASSIUM BLD-SCNC: 4 MMOL/L (ref 3.5–5.2)
RBC # BLD AUTO: 3.61 10*6/MM3 (ref 3.9–5.2)
SODIUM BLD-SCNC: 135 MMOL/L (ref 136–145)
WBC NRBC COR # BLD: 9.37 10*3/MM3 (ref 4.5–10.7)

## 2018-11-20 PROCEDURE — 36415 COLL VENOUS BLD VENIPUNCTURE: CPT | Performed by: INTERNAL MEDICINE

## 2018-11-20 PROCEDURE — 25010000002 HYDROMORPHONE PER 4 MG: Performed by: HOSPITALIST

## 2018-11-20 PROCEDURE — 80048 BASIC METABOLIC PNL TOTAL CA: CPT | Performed by: INTERNAL MEDICINE

## 2018-11-20 PROCEDURE — 85025 COMPLETE CBC W/AUTO DIFF WBC: CPT | Performed by: INTERNAL MEDICINE

## 2018-11-20 PROCEDURE — 25010000002 PIPERACILLIN SOD-TAZOBACTAM PER 1 G: Performed by: INTERNAL MEDICINE

## 2018-11-20 RX ORDER — AMOXICILLIN AND CLAVULANATE POTASSIUM 875; 125 MG/1; MG/1
1 TABLET, FILM COATED ORAL EVERY 12 HOURS SCHEDULED
Status: DISCONTINUED | OUTPATIENT
Start: 2018-11-20 | End: 2018-11-21 | Stop reason: HOSPADM

## 2018-11-20 RX ORDER — METRONIDAZOLE 500 MG/1
500 TABLET ORAL EVERY 8 HOURS SCHEDULED
Status: DISCONTINUED | OUTPATIENT
Start: 2018-11-20 | End: 2018-11-21 | Stop reason: HOSPADM

## 2018-11-20 RX ORDER — HYDROCODONE BITARTRATE AND ACETAMINOPHEN 7.5; 325 MG/1; MG/1
1 TABLET ORAL EVERY 4 HOURS PRN
Status: DISCONTINUED | OUTPATIENT
Start: 2018-11-20 | End: 2018-11-21 | Stop reason: HOSPADM

## 2018-11-20 RX ADMIN — CARVEDILOL 12.5 MG: 12.5 TABLET, FILM COATED ORAL at 18:04

## 2018-11-20 RX ADMIN — PANTOPRAZOLE SODIUM 40 MG: 40 TABLET, DELAYED RELEASE ORAL at 06:14

## 2018-11-20 RX ADMIN — HYDROMORPHONE HYDROCHLORIDE 0.5 MG: 1 INJECTION, SOLUTION INTRAMUSCULAR; INTRAVENOUS; SUBCUTANEOUS at 08:18

## 2018-11-20 RX ADMIN — LEVOTHYROXINE SODIUM 25 MCG: 25 TABLET ORAL at 05:06

## 2018-11-20 RX ADMIN — CARVEDILOL 12.5 MG: 12.5 TABLET, FILM COATED ORAL at 08:17

## 2018-11-20 RX ADMIN — HYDROCODONE BITARTRATE AND ACETAMINOPHEN 1 TABLET: 7.5; 325 TABLET ORAL at 16:08

## 2018-11-20 RX ADMIN — METRONIDAZOLE 500 MG: 500 INJECTION, SOLUTION INTRAVENOUS at 05:06

## 2018-11-20 RX ADMIN — CALCIUM 1000 MG: 500 TABLET ORAL at 08:17

## 2018-11-20 RX ADMIN — HYDROMORPHONE HYDROCHLORIDE 0.5 MG: 1 INJECTION, SOLUTION INTRAMUSCULAR; INTRAVENOUS; SUBCUTANEOUS at 05:20

## 2018-11-20 RX ADMIN — Medication 1 TABLET: at 08:18

## 2018-11-20 RX ADMIN — ASPIRIN 81 MG: 81 TABLET, DELAYED RELEASE ORAL at 20:37

## 2018-11-20 RX ADMIN — HYDROMORPHONE HYDROCHLORIDE 0.5 MG: 1 INJECTION, SOLUTION INTRAMUSCULAR; INTRAVENOUS; SUBCUTANEOUS at 02:38

## 2018-11-20 RX ADMIN — LOSARTAN POTASSIUM 50 MG: 50 TABLET, FILM COATED ORAL at 08:17

## 2018-11-20 RX ADMIN — AMOXICILLIN AND CLAVULANATE POTASSIUM 1 TABLET: 875; 125 TABLET, FILM COATED ORAL at 20:37

## 2018-11-20 RX ADMIN — HYDROCODONE BITARTRATE AND ACETAMINOPHEN 1 TABLET: 7.5; 325 TABLET ORAL at 20:37

## 2018-11-20 RX ADMIN — TAZOBACTAM SODIUM AND PIPERACILLIN SODIUM 3.38 G: 375; 3 INJECTION, SOLUTION INTRAVENOUS at 06:14

## 2018-11-20 RX ADMIN — AMOXICILLIN AND CLAVULANATE POTASSIUM 1 TABLET: 875; 125 TABLET, FILM COATED ORAL at 14:40

## 2018-11-20 RX ADMIN — HYDROCODONE BITARTRATE AND ACETAMINOPHEN 1 TABLET: 7.5; 325 TABLET ORAL at 11:55

## 2018-11-20 RX ADMIN — METRONIDAZOLE 500 MG: 500 TABLET, FILM COATED ORAL at 22:03

## 2018-11-20 RX ADMIN — METRONIDAZOLE 500 MG: 500 TABLET, FILM COATED ORAL at 16:06

## 2018-11-20 NOTE — PROGRESS NOTES
"    DAILY PROGRESS NOTE  [facility]    Patient Identification:  Name: Moriah Petty  Age: 78 y.o.  Sex: female  :  1940  MRN: [id number]         Primary Care Physician: Jarred Carrizales Jr., MD    Subjective:  Interval History: still abd discomfort 3-4/10 and is taking prn Dilaudid - no emesis/cp/LOC - seems apprehensive regarding dispo    Objective:ambulating halls w/ RN - no fm     Scheduled Meds:  amoxicillin-clavulanate 1 tablet Oral Q12H   aspirin 81 mg Oral Nightly   calcium carbonate (oyster shell) 1,000 mg Oral Daily   carvedilol 12.5 mg Oral BID With Meals   levothyroxine 25 mcg Oral Q AM   losartan 50 mg Oral Daily   metroNIDAZOLE 500 mg Oral Q8H   multivitamin 1 tablet Oral Daily   pantoprazole 40 mg Oral QAM   sodium chloride 3 mL Intravenous Q12H     Continuous Infusions:     Vital signs in last 24 hours:  Temp:  [97.1 °F (36.2 °C)-100.7 °F (38.2 °C)] 99 °F (37.2 °C)  Heart Rate:  [68-73] 73  Resp:  [16-18] 16  BP: (125-166)/(56-83) 166/56    Intake/Output:    Intake/Output Summary (Last 24 hours) at 2018 1044  Last data filed at 2018 1030  Gross per 24 hour   Intake 4187.95 ml   Output 2750 ml   Net 1437.95 ml       Exam:  /56 (BP Location: Right arm, Patient Position: Lying)   Pulse 73   Temp 99 °F (37.2 °C) (Oral)   Resp 16   Ht 167.6 cm (66\")   SpO2 92%   BMI 27.15 kg/m²     General Appearance:    Alert, cooperative, no distress, AAOx3, not toxic                          Throat:   Lips, tongue, gums normal; oral mucosa pink and moist                           Neck:   No JVD                         Lungs:    Clear to auscultation bilaterally, respirations unlabored                          Heart:    Regular rate and rhythm, S1 and S2 normal                  Abdomen:     Soft, mild lower quad ttp w/out any r/g, bowel sounds active, no masses                 Extremities:   Extremities normal, atraumatic, no cyanosis or edema                  Neurologic:   " CNII-XII intact, moving all w/out focal deficits noted - normal gait      Data Review:  Labs in chart were reviewed.    Assessment:  Active Hospital Problems    Diagnosis Date Noted   • **Acute diverticulitis [K57.92] 11/17/2018   • Hypothyroidism [E03.9] 01/23/2018   • Hypertension [I10] 03/04/2017      Resolved Hospital Problems   No resolved problems to display.       Plan:  Convert to PO - challenge and observe     FLD    Outpatient GI Dr Collins     Encourage ambulation     Dispo - hopeful for tomorrow     Terence Diop MD  11/20/2018  10:44 AM

## 2018-11-20 NOTE — PLAN OF CARE
Problem: Patient Care Overview  Goal: Plan of Care Review  Outcome: Ongoing (interventions implemented as appropriate)   11/20/18 0325   Plan of Care Review   Progress no change   OTHER   Outcome Summary VSS - temp max 99. C/o generalize abd pain, relieved with IV pain meds, abd round. Last BM tonight. Tolerating full liquid diet, IVF and IV abx continued. Labs in the AM.    Coping/Psychosocial   Plan of Care Reviewed With patient     Goal: Individualization and Mutuality  Outcome: Ongoing (interventions implemented as appropriate)    Goal: Discharge Needs Assessment  Outcome: Ongoing (interventions implemented as appropriate)    Goal: Interprofessional Rounds/Family Conf  Outcome: Ongoing (interventions implemented as appropriate)      Problem: Pain, Acute (Adult)  Goal: Acceptable Pain Control/Comfort Level  Outcome: Ongoing (interventions implemented as appropriate)      Problem: Infection, Risk/Actual (Adult)  Goal: Infection Prevention/Resolution  Outcome: Ongoing (interventions implemented as appropriate)

## 2018-11-20 NOTE — PLAN OF CARE
Problem: Patient Care Overview  Goal: Plan of Care Review  Outcome: Ongoing (interventions implemented as appropriate)   11/20/18 1701   Plan of Care Review   Progress improving   OTHER   Outcome Summary tolerating full liquid diet, no nausea, bm today, c/o abdominal pain - changed to po pain medication with relief, changed to po abx today, up ambulating and sat in chair today   Coping/Psychosocial   Plan of Care Reviewed With patient     Goal: Individualization and Mutuality  Outcome: Ongoing (interventions implemented as appropriate)    Goal: Discharge Needs Assessment  Outcome: Ongoing (interventions implemented as appropriate)    Goal: Interprofessional Rounds/Family Conf  Outcome: Ongoing (interventions implemented as appropriate)      Problem: Pain, Acute (Adult)  Goal: Acceptable Pain Control/Comfort Level  Outcome: Ongoing (interventions implemented as appropriate)      Problem: Infection, Risk/Actual (Adult)  Goal: Infection Prevention/Resolution  Outcome: Ongoing (interventions implemented as appropriate)

## 2018-11-21 VITALS
SYSTOLIC BLOOD PRESSURE: 137 MMHG | RESPIRATION RATE: 16 BRPM | BODY MASS INDEX: 27.15 KG/M2 | TEMPERATURE: 97.4 F | HEIGHT: 66 IN | DIASTOLIC BLOOD PRESSURE: 80 MMHG | OXYGEN SATURATION: 93 % | HEART RATE: 61 BPM

## 2018-11-21 RX ORDER — ONDANSETRON 4 MG/1
4 TABLET, ORALLY DISINTEGRATING ORAL EVERY 6 HOURS PRN
Start: 2018-11-21 | End: 2019-01-28

## 2018-11-21 RX ORDER — ACETAMINOPHEN 325 MG/1
650 TABLET ORAL EVERY 4 HOURS PRN
Start: 2018-11-21 | End: 2019-01-28

## 2018-11-21 RX ORDER — AMOXICILLIN AND CLAVULANATE POTASSIUM 875; 125 MG/1; MG/1
1 TABLET, FILM COATED ORAL EVERY 12 HOURS SCHEDULED
Qty: 17 TABLET | Refills: 0
Start: 2018-11-21 | End: 2018-11-30

## 2018-11-21 RX ORDER — HYDROCODONE BITARTRATE AND ACETAMINOPHEN 7.5; 325 MG/1; MG/1
1 TABLET ORAL EVERY 4 HOURS PRN
Start: 2018-11-21 | End: 2018-11-30

## 2018-11-21 RX ORDER — METRONIDAZOLE 500 MG/1
500 TABLET ORAL EVERY 8 HOURS SCHEDULED
Qty: 26 TABLET | Refills: 0
Start: 2018-11-21 | End: 2018-11-30

## 2018-11-21 RX ADMIN — Medication 1 TABLET: at 08:32

## 2018-11-21 RX ADMIN — HYDROCODONE BITARTRATE AND ACETAMINOPHEN 1 TABLET: 7.5; 325 TABLET ORAL at 02:25

## 2018-11-21 RX ADMIN — HYDROCODONE BITARTRATE AND ACETAMINOPHEN 1 TABLET: 7.5; 325 TABLET ORAL at 06:24

## 2018-11-21 RX ADMIN — AMOXICILLIN AND CLAVULANATE POTASSIUM 1 TABLET: 875; 125 TABLET, FILM COATED ORAL at 08:32

## 2018-11-21 RX ADMIN — METRONIDAZOLE 500 MG: 500 TABLET, FILM COATED ORAL at 13:54

## 2018-11-21 RX ADMIN — HYDROCODONE BITARTRATE AND ACETAMINOPHEN 1 TABLET: 7.5; 325 TABLET ORAL at 10:18

## 2018-11-21 RX ADMIN — SODIUM CHLORIDE, PRESERVATIVE FREE 3 ML: 5 INJECTION INTRAVENOUS at 08:31

## 2018-11-21 RX ADMIN — LOSARTAN POTASSIUM 50 MG: 50 TABLET, FILM COATED ORAL at 08:32

## 2018-11-21 RX ADMIN — LEVOTHYROXINE SODIUM 25 MCG: 25 TABLET ORAL at 06:25

## 2018-11-21 RX ADMIN — CALCIUM 1000 MG: 500 TABLET ORAL at 08:32

## 2018-11-21 RX ADMIN — METRONIDAZOLE 500 MG: 500 TABLET, FILM COATED ORAL at 06:25

## 2018-11-21 RX ADMIN — HYDROCODONE BITARTRATE AND ACETAMINOPHEN 1 TABLET: 7.5; 325 TABLET ORAL at 13:57

## 2018-11-21 RX ADMIN — PANTOPRAZOLE SODIUM 40 MG: 40 TABLET, DELAYED RELEASE ORAL at 06:25

## 2018-11-21 RX ADMIN — CARVEDILOL 12.5 MG: 12.5 TABLET, FILM COATED ORAL at 08:32

## 2018-11-21 NOTE — PROGRESS NOTES
Discharge Planning Assessment  [facility]     Patient Name: Moriah Petty  MRN: [id number]  Today's Date: 11/21/2018    Admit Date: 11/16/2018    Discharge Needs Assessment    No documentation.       Discharge Plan     Row Name 11/21/18 1446       Plan    Plan  Home w/o needs    Plan Comments  I called PCP's office / Dr Carrizales and spoke with Vivian, she said the dr is booked, she made hospital follow up appointment with Malgorzata NICHOLSON for Nov 27 @ 1PM.   Josie Whitaker RN        Expected Discharge Date and Time     Expected Discharge Date Expected Discharge Time    Nov 21, 2018      Josie Whitaker RN

## 2018-11-21 NOTE — PLAN OF CARE
Problem: Patient Care Overview  Goal: Plan of Care Review  Outcome: Ongoing (interventions implemented as appropriate)   11/21/18 9359   Plan of Care Review   Progress improving   OTHER   Outcome Summary VSS, cynthia full liquid diet, abd pain tolerated w/ q4 PO med, ambulated before bed, IVF infusing, resting well, possible discharge today   Coping/Psychosocial   Plan of Care Reviewed With patient     Goal: Individualization and Mutuality  Outcome: Ongoing (interventions implemented as appropriate)    Goal: Discharge Needs Assessment  Outcome: Ongoing (interventions implemented as appropriate)    Goal: Interprofessional Rounds/Family Conf  Outcome: Ongoing (interventions implemented as appropriate)      Problem: Pain, Acute (Adult)  Goal: Acceptable Pain Control/Comfort Level  Outcome: Ongoing (interventions implemented as appropriate)      Problem: Infection, Risk/Actual (Adult)  Goal: Infection Prevention/Resolution  Outcome: Ongoing (interventions implemented as appropriate)

## 2018-11-21 NOTE — DISCHARGE SUMMARY
[facility]    Date of Discharge:  11/21/2018    PCP: Jarred Carrizales Jr., MD    Discharge Diagnosis:   Active Hospital Problems    Diagnosis Date Noted   • **Acute diverticulitis [K57.92] 11/17/2018   • Hypothyroidism [E03.9] 01/23/2018   • Hypertension [I10] 03/04/2017      Resolved Hospital Problems   No resolved problems to display.     Procedures Performed       Consults     Date and Time Order Name Status Description    11/16/2018 4745 [facility] (on-call MD unless specified) Completed         Hospital Course  Please see history and physical for details. Patient is a 78 y.o. female admitted for complaints of abdominal pain.  Patient was started in an outpatient setting on low-dose Flagyl but was unable to tolerate Cipro for diverticulitis.  She was admitted due to concern for antibiotic failure.  CT of the abdomen and pelvis from 11/16/2018 demonstrates persistently thickened wall appearance of the sigmoid colon but there are no aspects of microperforation or abscess formation.  Patient is followed by GI Dr. Collins in an outpatient setting but they were not consulted on this admission.  Patient still has some abdominal pain but is improved and there are absolutely no aspects of emesis though she does have some nausea at times.  Her white blood cell count is back to normal she's remained afebrile and her vital signs are stable.  I feel this patient is medically stable to transition as I have challenged her over the last 24 hours with an oral challenge.  She has tolerated this as I converted her antibiotics to Augmentin and Flagyl.  I will continue these antibiotics for another 10 days and I have strongly counseled this patient to follow-up with her PCP to have a CT repeated of the abdomen and pelvis status post completion of antibiotics on the same CT scanner to see if there is complete resolution.  If there is not that I think PCP should consider referral to  outpatient surgery to consider partial colectomy if this area of bowel is nonhealing.  Again no GI consultation was ascertained on this admission as I do not want her to undergo colonoscopy in any time in the foreseeable future until repeat CT scans are obtained.  Patient seems apprehensive in regards to disposition though I feel she is medically stable at this juncture.  All questions answered to the patient prior to disposition and she seems amenable to the above plan.        Condition on Discharge: Improved.     Temp:  [97.4 °F (36.3 °C)-98.8 °F (37.1 °C)] 97.4 °F (36.3 °C)  Heart Rate:  [61-68] 61  Resp:  [16] 16  BP: (120-143)/(66-80) 137/80  Body mass index is 27.15 kg/m².    Physical Exam   Constitutional: She is oriented to person, place, and time. She appears well-developed and well-nourished.   HENT:   Head: Normocephalic and atraumatic.   Neck: Neck supple. No JVD present.   Cardiovascular: Normal rate and regular rhythm.   Pulmonary/Chest: Effort normal and breath sounds normal. No respiratory distress.   Abdominal: Soft. Bowel sounds are normal. She exhibits no distension. There is no rebound and no guarding.   Mild ttp lower quads   Musculoskeletal: She exhibits no edema.   Neurological: She is alert and oriented to person, place, and time.        Discharge Medications      New Medications      Instructions Start Date   acetaminophen 325 MG tablet  Commonly known as:  TYLENOL   650 mg, Oral, Every 4 Hours PRN      amoxicillin-clavulanate 875-125 MG per tablet  Commonly known as:  AUGMENTIN  Notes to patient:  Next dose: 11/21 PM   1 tablet, Oral, Every 12 Hours Scheduled      HYDROcodone-acetaminophen 7.5-325 MG per tablet  Commonly known as:  NORCO  Notes to patient:  Next dose: 6PM   1 tablet, Oral, Every 4 Hours PRN      ondansetron ODT 4 MG disintegrating tablet  Commonly known as:  ZOFRAN-ODT   4 mg, Oral, Every 6 Hours PRN         Changes to Medications      Instructions Start Date   losartan 50  MG tablet  Commonly known as:  COZAAR  What changed:    · how much to take  · how to take this  · when to take this  Notes to patient:  Next dose: 11/22 AM   TAKE 1 TABLET BY MOUTH EVERY DAY      metroNIDAZOLE 500 MG tablet  Commonly known as:  FLAGYL  What changed:    · medication strength  · how much to take  · when to take this  Notes to patient:  Next dose: 10PM   500 mg, Oral, Every 8 Hours Scheduled         Continue These Medications      Instructions Start Date   ASPIR-81 PO   81 mg, Oral, Nightly      Calcium + D3 600-200 MG-UNIT tablet   1 tablet, Oral, Nightly      carvedilol 12.5 MG tablet  Commonly known as:  COREG  Notes to patient:  Next dose: 11/21 PM   q 12 hours      desonide 0.05 % cream  Commonly known as:  DESOWEN   APPLY SPARINGLY TO THE CENTRAL FACE AND NOSE 2 TIMES A DAY  prn      fish oil 1200 MG capsule capsule   1,200 mg, Oral, 2 Times Daily With Meals      hydrocortisone 25 MG suppository  Commonly known as:  ANUSOL-HC   25 mg, Rectal, Nightly PRN      hyoscyamine 0.125 MG SL tablet  Commonly known as:  LEVSIN   0.125 mg, Oral, Every 4 Hours PRN      levothyroxine 25 MCG tablet  Commonly known as:  SYNTHROID, LEVOTHROID  Notes to patient:  Next dose: 11/22 AM   TAKE 1 TABLET BY MOUTH DAILY      MULTI VITAMIN DAILY PO  Notes to patient:  Next dose: 11/22 AM   1 tablet, Oral, Daily      omeprazole 20 MG capsule  Commonly known as:  priLOSEC   20 mg, Oral, Daily         Stop These Medications    ALEVE PO     magnesium oxide 250 MG tablet     PROLIA 60 MG/ML solution syringe  Generic drug:  denosumab             Additional Instructions for the Follow-ups that You Need to Schedule     Discharge Follow-up with PCP   As directed       Currently Documented PCP:    Jarred Carrizales Jr., MD    PCP Phone Number:    [phone]     Follow Up Details:  pcp post antibiotic completion and suggest repeat CT A/P to PCP           Follow-up Information     Jarred Carrizales Jr., MD .    Specialty:  Family  Medicine  Why:  pcp post antibiotic completion and suggest repeat CT A/P to PCP  Contact information:  [address]  [place] [address]  [phone]                 Test Results Pending at Discharge     Terence Diop MD  11/21/18  3:15 PM    Discharge time spent greater than 30 minutes.

## 2018-11-22 ENCOUNTER — READMISSION MANAGEMENT (OUTPATIENT)
Dept: CALL CENTER | Facility: HOSPITAL | Age: 78
End: 2018-11-22

## 2018-11-22 NOTE — OUTREACH NOTE
Prep Survey      Responses   Facility patient discharged from?  [place]   Is patient eligible?  Yes   Discharge diagnosis  Acute diverticulitis    Does the patient have one of the following disease processes/diagnoses(primary or secondary)?  Other   Does the patient have Home health ordered?  No   Is there a DME ordered?  No   Prep survey completed?  Yes          Mimi Willard RN

## 2018-11-23 NOTE — PROGRESS NOTES
Discharge Planning Assessment  [facility]     Patient Name: Moriah Petty  MRN: [id number]  Today's Date: 11/23/2018    Admit Date: 11/16/2018    Discharge Needs Assessment    No documentation.       Discharge Plan     Row Name 11/23/18 0815       Plan    Final Discharge Disposition Code  01 - home or self-care       Expected Discharge Date and Time     Expected Discharge Date Expected Discharge Time    Nov 21, 2018      Josie Whitaker RN

## 2018-11-24 ENCOUNTER — READMISSION MANAGEMENT (OUTPATIENT)
Dept: CALL CENTER | Facility: HOSPITAL | Age: 78
End: 2018-11-24

## 2018-11-24 NOTE — OUTREACH NOTE
Medical Week 1 Survey      Responses   Facility patient discharged from?  [place]   Does the patient have one of the following disease processes/diagnoses(primary or secondary)?  Other   Is there a successful TCM telephone encounter documented?  No   Week 1 attempt successful?  Yes   Call start time  1520   Call end time  1533   Discharge diagnosis  Acute diverticulitis    Is patient permission given to speak with other caregiver?  No   Meds reviewed with patient/caregiver?  Yes   Is the patient having any side effects they believe may be caused by any medication additions or changes?  No   Does the patient have all medications ordered at discharge?  Yes   Is the patient taking all medications as directed (includes completed medication regime)?  Yes   Does the patient have a primary care provider?   Yes   Does the patient have an appointment with their PCP within 7 days of discharge?  Yes   Comments regarding PCP  Jarred Carrizales Jr., MD. Appt for Malgorzata Fair APRGEORGIE Tuesday Nov 27, 2018 1:00 PM    Has the patient kept scheduled appointments due by today?  N/A   Comments  Appt for GI Dr Collins, 1/7/18.    Has home health visited the patient within 72 hours of discharge?  N/A   Psychosocial issues?  No   Did the patient receive a copy of their discharge instructions?  Yes   Nursing interventions  Reviewed instructions with patient   What is the patient's perception of their health status since discharge?  Improving   Is the patient/caregiver able to teach back signs and symptoms related to disease process for when to call PCP?  Yes   Is the patient/caregiver able to teach back signs and symptoms related to disease process for when to call 911?  Yes   Is the patient/caregiver able to teach back the hierarchy of who to call/visit for symptoms/problems? PCP, Specialist, Home health nurse, Urgent Care, ED, 911  Yes   Week 1 call completed?  Yes          Isi Barnett RN

## 2018-11-27 ENCOUNTER — OFFICE VISIT (OUTPATIENT)
Dept: INTERNAL MEDICINE | Facility: CLINIC | Age: 78
End: 2018-11-27

## 2018-11-27 VITALS
BODY MASS INDEX: 27 KG/M2 | TEMPERATURE: 99 F | WEIGHT: 168 LBS | HEART RATE: 67 BPM | DIASTOLIC BLOOD PRESSURE: 85 MMHG | SYSTOLIC BLOOD PRESSURE: 162 MMHG | RESPIRATION RATE: 16 BRPM | OXYGEN SATURATION: 99 % | HEIGHT: 66 IN

## 2018-11-27 DIAGNOSIS — Z09 HOSPITAL DISCHARGE FOLLOW-UP: Primary | ICD-10-CM

## 2018-11-27 DIAGNOSIS — K57.92 DIVERTICULITIS: ICD-10-CM

## 2018-11-27 LAB
BASOPHILS # BLD AUTO: 0.04 10*3/MM3 (ref 0–0.2)
BASOPHILS NFR BLD AUTO: 0.4 % (ref 0–1.5)
BUN SERPL-MCNC: 7 MG/DL (ref 8–23)
BUN/CREAT SERPL: 10.8 (ref 7–25)
CALCIUM SERPL-MCNC: 9.3 MG/DL (ref 8.6–10.5)
CHLORIDE SERPL-SCNC: 97 MMOL/L (ref 98–107)
CO2 SERPL-SCNC: 24.2 MMOL/L (ref 22–29)
CREAT SERPL-MCNC: 0.65 MG/DL (ref 0.57–1)
EOSINOPHIL # BLD AUTO: 0.1 10*3/MM3 (ref 0–0.7)
EOSINOPHIL NFR BLD AUTO: 0.9 % (ref 0.3–6.2)
ERYTHROCYTE [DISTWIDTH] IN BLOOD BY AUTOMATED COUNT: 14.4 % (ref 11.7–13)
GLUCOSE SERPL-MCNC: 114 MG/DL (ref 65–99)
HCT VFR BLD AUTO: 37.5 % (ref 35.6–45.5)
HGB BLD-MCNC: 12.7 G/DL (ref 11.9–15.5)
IMM GRANULOCYTES # BLD: 0.05 10*3/MM3 (ref 0–0.03)
IMM GRANULOCYTES NFR BLD: 0.5 % (ref 0–0.5)
LYMPHOCYTES # BLD AUTO: 1.63 10*3/MM3 (ref 0.9–4.8)
LYMPHOCYTES NFR BLD AUTO: 15.2 % (ref 19.6–45.3)
MCH RBC QN AUTO: 29.7 PG (ref 26.9–32)
MCHC RBC AUTO-ENTMCNC: 33.9 G/DL (ref 32.4–36.3)
MCV RBC AUTO: 87.8 FL (ref 80.5–98.2)
MONOCYTES # BLD AUTO: 0.86 10*3/MM3 (ref 0.2–1.2)
MONOCYTES NFR BLD AUTO: 8 % (ref 5–12)
NEUTROPHILS # BLD AUTO: 8.08 10*3/MM3 (ref 1.9–8.1)
NEUTROPHILS NFR BLD AUTO: 75.5 % (ref 42.7–76)
PLATELET # BLD AUTO: 428 10*3/MM3 (ref 140–500)
POTASSIUM SERPL-SCNC: 4.6 MMOL/L (ref 3.5–5.2)
RBC # BLD AUTO: 4.27 10*6/MM3 (ref 3.9–5.2)
SODIUM SERPL-SCNC: 136 MMOL/L (ref 136–145)
WBC # BLD AUTO: 10.71 10*3/MM3 (ref 4.5–10.7)

## 2018-11-27 PROCEDURE — 99213 OFFICE O/P EST LOW 20 MIN: CPT | Performed by: NURSE PRACTITIONER

## 2018-11-27 NOTE — PROGRESS NOTES
"Subjective   Moriah Petty is a 78 y.o. female.   Chief Complaint   Patient presents with   • Follow-up     hospital       She presents for hospital follow-up.  She presented to the ER and 11/16/18 with worsening abdominal pain related to going diverticulitis had finished course of Flagyl, but was unable to tolerate Cipro.  She was hospitalized through 11/21/18, during which time she was treated with IV antibiotics.  She was discharged with a normalized white blood cell count on 11/21/18 on a 10 day course of Augmentin and Flagyl, which she is set to finish on 12/1/18.  She states that she has been taking the medications as prescribed.  She reports consistent 2 out of 10 lower abdominal pain, described as \"aching\" that remains.  She states that her bowel movements are loose, but denies presence of blood or mucus.  She denies any nausea or vomiting throughout this entire event.  She states that she has been maintaining a full liquid diet at home since discharge, and is tolerating this well.  She does endorse fatigue.  She denies chest pain, tightness, shortness of breath.  She denies development of any other new symptoms at this time.         The following portions of the patient's history were reviewed and updated as appropriate: allergies, current medications, past family history, past medical history, past social history, past surgical history and problem list.    Review of Systems   Constitutional: Positive for fatigue. Negative for activity change, chills, fever, unexpected weight gain and unexpected weight loss.   HENT: Negative for congestion, hearing loss, postnasal drip, sinus pressure, sneezing, sore throat and tinnitus.    Eyes: Negative for photophobia, pain and visual disturbance.   Respiratory: Negative for cough, chest tightness, shortness of breath and wheezing.    Cardiovascular: Negative for chest pain, palpitations and leg swelling.   Gastrointestinal: Positive for abdominal pain and diarrhea. " "Negative for abdominal distention, anal bleeding, blood in stool, constipation, nausea, rectal pain, vomiting, GERD and indigestion.   Endocrine: Negative for polydipsia, polyphagia and polyuria.   Genitourinary: Negative for dysuria, frequency, hematuria and urgency.   Neurological: Negative for dizziness, weakness, numbness and headache.   All other systems reviewed and are negative.      Objective    /85 (BP Location: Left arm, Patient Position: Sitting, Cuff Size: Small Adult)   Pulse 67   Temp 99 °F (37.2 °C) (Oral)   Resp 16   Ht 167.6 cm (66\")   Wt 76.2 kg (168 lb)   SpO2 99%   BMI 27.12 kg/m²     Physical Exam   Constitutional: She is oriented to person, place, and time. She appears well-developed and well-nourished. No distress.   HENT:   Head: Normocephalic and atraumatic.   Right Ear: External ear normal.   Left Ear: External ear normal.   Nose: Nose normal.   Mouth/Throat: Oropharynx is clear and moist.   Eyes: Conjunctivae and EOM are normal. Pupils are equal, round, and reactive to light. Right eye exhibits no discharge. Left eye exhibits no discharge.   Neck: Normal range of motion. Neck supple.   Cardiovascular: Normal rate, regular rhythm, normal heart sounds and intact distal pulses. Exam reveals no gallop and no friction rub.   No murmur heard.  Pulmonary/Chest: Effort normal and breath sounds normal. No stridor. No respiratory distress. She has no wheezes. She has no rales. She exhibits no tenderness.   Lungs are CTA bilaterally   Abdominal: Soft. Normal appearance and bowel sounds are normal. She exhibits no distension and no mass. There is no hepatosplenomegaly. There is tenderness in the right lower quadrant and left lower quadrant. There is no rigidity, no rebound, no guarding, no CVA tenderness, no tenderness at McBurney's point and negative Rebolledo's sign. No hernia.   Bowel sounds are active ×4 quadrants.   Musculoskeletal: Normal range of motion.   Neurological: She is alert " and oriented to person, place, and time.   Skin: Skin is warm and dry. Capillary refill takes less than 2 seconds. She is not diaphoretic.   Psychiatric: She has a normal mood and affect. Her behavior is normal. Judgment and thought content normal.   Nursing note and vitals reviewed.        Assessment/Plan   Moriah was seen today for follow-up.    Diagnoses and all orders for this visit:    Hospital discharge follow-up    Diverticulitis  -     CT Abdomen Pelvis With Contrast; Future  -     Basic metabolic panel  -     CBC & Differential    - We will redraw CBC and CMP today to ensure that white blood cell count is remaining normalized.  Patient educated on importance of continuing and finishing her Augmentin and Flagyl, which she verbalizes understanding of.  She does have an appointment with her GI doctor, Dr. Collins, in January.     - Per hospital discharge documentation, we will repeat her CT scan once she finishes her antibiotics.  If the CT scan is not completely resolved, we will refer to general surgery for nonhealing diverticulitis, to discuss what next step should be.    - Follow-up if symptoms persist or worsen.

## 2018-11-28 NOTE — PROGRESS NOTES
Please let patient know that her labs came back stable. Her white blood cell count was very slightly elevated. We will follow-up with the CT once she finishes the antibiotics, and we will contact her with these results. Let us know if she needs anything.

## 2018-12-02 ENCOUNTER — READMISSION MANAGEMENT (OUTPATIENT)
Dept: CALL CENTER | Facility: HOSPITAL | Age: 78
End: 2018-12-02

## 2018-12-02 NOTE — OUTREACH NOTE
Medical Week 2 Survey      Responses   Facility patient discharged from?  [facility]   Does the patient have one of the following disease processes/diagnoses(primary or secondary)?  Other   Week 2 attempt successful?  No   Unsuccessful attempts  Attempt 1          Marina Regan RN

## 2018-12-03 ENCOUNTER — HOSPITAL ENCOUNTER (OUTPATIENT)
Dept: CT IMAGING | Facility: HOSPITAL | Age: 78
Discharge: HOME | End: 2018-12-03
Admitting: NURSE PRACTITIONER

## 2018-12-03 ENCOUNTER — READMISSION MANAGEMENT (OUTPATIENT)
Dept: CALL CENTER | Facility: HOSPITAL | Age: 78
End: 2018-12-03

## 2018-12-03 DIAGNOSIS — K57.92 DIVERTICULITIS: ICD-10-CM

## 2018-12-03 LAB — CREAT BLDA-MCNC: 0.6 MG/DL (ref 0.6–1.3)

## 2018-12-03 PROCEDURE — 25010000002 IOPAMIDOL 61 % SOLUTION: Performed by: NURSE PRACTITIONER

## 2018-12-03 PROCEDURE — 82565 ASSAY OF CREATININE: CPT

## 2018-12-03 PROCEDURE — 74177 CT ABD & PELVIS W/CONTRAST: CPT

## 2018-12-03 RX ADMIN — IOPAMIDOL 85 ML: 612 INJECTION, SOLUTION INTRAVENOUS at 16:00

## 2018-12-03 NOTE — OUTREACH NOTE
Medical Week 2 Survey      Responses   Facility patient discharged from?  [facility]   Does the patient have one of the following disease processes/diagnoses(primary or secondary)?  Other   Week 2 attempt successful?  Yes   Call start time  1653   Discharge diagnosis  Acute diverticulitis    Call end time  1700   Is patient permission given to speak with other caregiver?  Yes   List who call center can speak with  [place], spouse   Person spoke with today (if not patient) and relationship  Sammy, spouse and patient    Meds reviewed with patient/caregiver?  Yes   Is the patient having any side effects they believe may be caused by any medication additions or changes?  No   Does the patient have all medications ordered at discharge?  Yes   Is the patient taking all medications as directed (includes completed medication regime)?  Yes   Medication comments  Patient states taking only tylenol for pain.    Does the patient have a primary care provider?   Yes   Does the patient have an appointment with their PCP within 7 days of discharge?  Yes   Comments regarding PCP  Jarred Carrizales Jr., MD. Appt for Malgorzata NICHOLSON Tuesday Nov 27, 2018 1:00 PM    Has the patient kept scheduled appointments due by today?  Yes   Comments  Appt for GI Dr Collins, 1/7/18. Patient had CT scan today.    Has home health visited the patient within 72 hours of discharge?  N/A   Psychosocial issues?  No   Did the patient receive a copy of their discharge instructions?  Yes   Nursing interventions  Reviewed instructions with patient   What is the patient's perception of their health status since discharge?  Improving   Is the patient/caregiver able to teach back signs and symptoms related to disease process for when to call PCP?  Yes   Is the patient/caregiver able to teach back signs and symptoms related to disease process for when to call 911?  Yes   Is the patient/caregiver able to teach back the hierarchy of who to call/visit for  symptoms/problems? PCP, Specialist, Home health nurse, Urgent Care, ED, 911  Yes   Additional teach back comments  Patient states abdominal pain not as severe, but still has pain. States that she is able to eat and drink OK. Denies nausea.    Week 2 Call Completed?  Yes          Isi Barnett RN

## 2018-12-04 ENCOUNTER — APPOINTMENT (OUTPATIENT)
Dept: GENERAL RADIOLOGY | Facility: HOSPITAL | Age: 78
End: 2018-12-04
Attending: INTERNAL MEDICINE

## 2018-12-04 ENCOUNTER — TELEPHONE (OUTPATIENT)
Dept: INTERNAL MEDICINE | Facility: CLINIC | Age: 78
End: 2018-12-04

## 2018-12-04 ENCOUNTER — HOSPITAL ENCOUNTER (INPATIENT)
Facility: HOSPITAL | Age: 78
LOS: 8 days | Discharge: NURSING, REHAB OR LONG-TERM CARE | End: 2018-12-12
Attending: INTERNAL MEDICINE | Admitting: INTERNAL MEDICINE

## 2018-12-04 DIAGNOSIS — K57.20 DIVERTICULITIS OF LARGE INTESTINE WITH ABSCESS WITHOUT BLEEDING: Primary | ICD-10-CM

## 2018-12-04 DIAGNOSIS — R26.2 DIFFICULTY WALKING: ICD-10-CM

## 2018-12-04 PROBLEM — Z80.0 FAMILY HISTORY OF COLON CANCER: Status: ACTIVE | Noted: 2017-11-06

## 2018-12-04 LAB
ALBUMIN SERPL-MCNC: 3.8 G/DL (ref 3.5–5.2)
ALBUMIN/GLOB SERPL: 1.2 G/DL
ALP SERPL-CCNC: 37 U/L (ref 39–117)
ALT SERPL W P-5'-P-CCNC: 10 U/L (ref 1–33)
ANION GAP SERPL CALCULATED.3IONS-SCNC: 12.8 MMOL/L
AST SERPL-CCNC: 20 U/L (ref 1–32)
BASOPHILS # BLD AUTO: 0.11 10*3/MM3 (ref 0–0.2)
BASOPHILS NFR BLD AUTO: 1.2 % (ref 0–1.5)
BILIRUB SERPL-MCNC: 0.3 MG/DL (ref 0.1–1.2)
BUN BLD-MCNC: 6 MG/DL (ref 8–23)
BUN/CREAT SERPL: 9.1 (ref 7–25)
CALCIUM SPEC-SCNC: 9.2 MG/DL (ref 8.6–10.5)
CHLORIDE SERPL-SCNC: 99 MMOL/L (ref 98–107)
CO2 SERPL-SCNC: 21.2 MMOL/L (ref 22–29)
CREAT BLD-MCNC: 0.66 MG/DL (ref 0.57–1)
DEPRECATED RDW RBC AUTO: 47.6 FL (ref 37–54)
EOSINOPHIL # BLD AUTO: 0.12 10*3/MM3 (ref 0–0.7)
EOSINOPHIL NFR BLD AUTO: 1.3 % (ref 0.3–6.2)
ERYTHROCYTE [DISTWIDTH] IN BLOOD BY AUTOMATED COUNT: 14.7 % (ref 11.7–13)
GFR SERPL CREATININE-BSD FRML MDRD: 86 ML/MIN/1.73
GLOBULIN UR ELPH-MCNC: 3.2 GM/DL
GLUCOSE BLD-MCNC: 92 MG/DL (ref 65–99)
HCT VFR BLD AUTO: 35.2 % (ref 35.6–45.5)
HGB BLD-MCNC: 11.6 G/DL (ref 11.9–15.5)
IMM GRANULOCYTES # BLD: 0.02 10*3/MM3 (ref 0–0.03)
IMM GRANULOCYTES NFR BLD: 0.2 % (ref 0–0.5)
LYMPHOCYTES # BLD AUTO: 2.3 10*3/MM3 (ref 0.9–4.8)
LYMPHOCYTES NFR BLD AUTO: 24.7 % (ref 19.6–45.3)
MCH RBC QN AUTO: 29.1 PG (ref 26.9–32)
MCHC RBC AUTO-ENTMCNC: 33 G/DL (ref 32.4–36.3)
MCV RBC AUTO: 88.2 FL (ref 80.5–98.2)
MONOCYTES # BLD AUTO: 0.76 10*3/MM3 (ref 0.2–1.2)
MONOCYTES NFR BLD AUTO: 8.2 % (ref 5–12)
NEUTROPHILS # BLD AUTO: 6.01 10*3/MM3 (ref 1.9–8.1)
NEUTROPHILS NFR BLD AUTO: 64.6 % (ref 42.7–76)
PLATELET # BLD AUTO: 404 10*3/MM3 (ref 140–500)
PMV BLD AUTO: 11.5 FL (ref 6–12)
POTASSIUM BLD-SCNC: 4 MMOL/L (ref 3.5–5.2)
PROT SERPL-MCNC: 7 G/DL (ref 6–8.5)
RBC # BLD AUTO: 3.99 10*6/MM3 (ref 3.9–5.2)
SODIUM BLD-SCNC: 133 MMOL/L (ref 136–145)
TROPONIN T SERPL-MCNC: <0.01 NG/ML (ref 0–0.03)
WBC NRBC COR # BLD: 9.3 10*3/MM3 (ref 4.5–10.7)

## 2018-12-04 PROCEDURE — 25010000002 HYDROMORPHONE 1 MG/ML SOLUTION: Performed by: INTERNAL MEDICINE

## 2018-12-04 PROCEDURE — 93010 ELECTROCARDIOGRAM REPORT: CPT | Performed by: INTERNAL MEDICINE

## 2018-12-04 PROCEDURE — 87040 BLOOD CULTURE FOR BACTERIA: CPT | Performed by: INTERNAL MEDICINE

## 2018-12-04 PROCEDURE — 80053 COMPREHEN METABOLIC PANEL: CPT | Performed by: INTERNAL MEDICINE

## 2018-12-04 PROCEDURE — 93005 ELECTROCARDIOGRAM TRACING: CPT | Performed by: INTERNAL MEDICINE

## 2018-12-04 PROCEDURE — 85025 COMPLETE CBC W/AUTO DIFF WBC: CPT | Performed by: INTERNAL MEDICINE

## 2018-12-04 PROCEDURE — 25010000002 PIPERACILLIN SOD-TAZOBACTAM PER 1 G: Performed by: INTERNAL MEDICINE

## 2018-12-04 PROCEDURE — 84484 ASSAY OF TROPONIN QUANT: CPT | Performed by: INTERNAL MEDICINE

## 2018-12-04 PROCEDURE — 71045 X-RAY EXAM CHEST 1 VIEW: CPT

## 2018-12-04 RX ORDER — OXYCODONE AND ACETAMINOPHEN 7.5; 325 MG/1; MG/1
1 TABLET ORAL EVERY 4 HOURS PRN
Status: DISCONTINUED | OUTPATIENT
Start: 2018-12-04 | End: 2018-12-06

## 2018-12-04 RX ORDER — CARVEDILOL 12.5 MG/1
12.5 TABLET ORAL 2 TIMES DAILY WITH MEALS
Status: DISCONTINUED | OUTPATIENT
Start: 2018-12-04 | End: 2018-12-12 | Stop reason: HOSPADM

## 2018-12-04 RX ORDER — PANTOPRAZOLE SODIUM 40 MG/1
40 TABLET, DELAYED RELEASE ORAL EVERY MORNING
Status: DISCONTINUED | OUTPATIENT
Start: 2018-12-05 | End: 2018-12-04

## 2018-12-04 RX ORDER — ACETAMINOPHEN 325 MG/1
650 TABLET ORAL EVERY 4 HOURS PRN
Status: DISCONTINUED | OUTPATIENT
Start: 2018-12-04 | End: 2018-12-12 | Stop reason: HOSPADM

## 2018-12-04 RX ORDER — LOSARTAN POTASSIUM 50 MG/1
50 TABLET ORAL DAILY
Status: DISCONTINUED | OUTPATIENT
Start: 2018-12-05 | End: 2018-12-12 | Stop reason: HOSPADM

## 2018-12-04 RX ORDER — SODIUM CHLORIDE 0.9 % (FLUSH) 0.9 %
3-10 SYRINGE (ML) INJECTION AS NEEDED
Status: DISCONTINUED | OUTPATIENT
Start: 2018-12-04 | End: 2018-12-12 | Stop reason: HOSPADM

## 2018-12-04 RX ORDER — FAMOTIDINE 20 MG/1
20 TABLET, FILM COATED ORAL DAILY
Status: DISCONTINUED | OUTPATIENT
Start: 2018-12-04 | End: 2018-12-12 | Stop reason: HOSPADM

## 2018-12-04 RX ORDER — LEVOTHYROXINE SODIUM 0.03 MG/1
25 TABLET ORAL DAILY
Status: DISCONTINUED | OUTPATIENT
Start: 2018-12-05 | End: 2018-12-12 | Stop reason: HOSPADM

## 2018-12-04 RX ORDER — SODIUM CHLORIDE 0.9 % (FLUSH) 0.9 %
3 SYRINGE (ML) INJECTION EVERY 12 HOURS SCHEDULED
Status: DISCONTINUED | OUTPATIENT
Start: 2018-12-04 | End: 2018-12-12 | Stop reason: HOSPADM

## 2018-12-04 RX ORDER — ONDANSETRON 4 MG/1
4 TABLET, ORALLY DISINTEGRATING ORAL EVERY 6 HOURS PRN
Status: DISCONTINUED | OUTPATIENT
Start: 2018-12-04 | End: 2018-12-12 | Stop reason: HOSPADM

## 2018-12-04 RX ORDER — SODIUM CHLORIDE 9 MG/ML
100 INJECTION, SOLUTION INTRAVENOUS CONTINUOUS
Status: DISCONTINUED | OUTPATIENT
Start: 2018-12-04 | End: 2018-12-08

## 2018-12-04 RX ORDER — ACETAMINOPHEN 325 MG/1
650 TABLET ORAL EVERY 4 HOURS PRN
Status: DISCONTINUED | OUTPATIENT
Start: 2018-12-04 | End: 2018-12-05

## 2018-12-04 RX ORDER — ONDANSETRON 4 MG/1
4 TABLET, FILM COATED ORAL EVERY 6 HOURS PRN
Status: DISCONTINUED | OUTPATIENT
Start: 2018-12-04 | End: 2018-12-12 | Stop reason: HOSPADM

## 2018-12-04 RX ORDER — ONDANSETRON 2 MG/ML
4 INJECTION INTRAMUSCULAR; INTRAVENOUS EVERY 6 HOURS PRN
Status: DISCONTINUED | OUTPATIENT
Start: 2018-12-04 | End: 2018-12-12 | Stop reason: HOSPADM

## 2018-12-04 RX ADMIN — TAZOBACTAM SODIUM AND PIPERACILLIN SODIUM 3.38 G: 375; 3 INJECTION, SOLUTION INTRAVENOUS at 20:08

## 2018-12-04 RX ADMIN — HYDROMORPHONE HYDROCHLORIDE 0.5 MG: 1 INJECTION, SOLUTION INTRAMUSCULAR; INTRAVENOUS; SUBCUTANEOUS at 22:02

## 2018-12-04 RX ADMIN — OXYCODONE HYDROCHLORIDE AND ACETAMINOPHEN 1 TABLET: 7.5; 325 TABLET ORAL at 20:08

## 2018-12-04 RX ADMIN — SODIUM CHLORIDE, PRESERVATIVE FREE 3 ML: 5 INJECTION INTRAVENOUS at 20:08

## 2018-12-04 RX ADMIN — SODIUM CHLORIDE 100 ML/HR: 9 INJECTION, SOLUTION INTRAVENOUS at 18:46

## 2018-12-04 RX ADMIN — CARVEDILOL 12.5 MG: 12.5 TABLET, FILM COATED ORAL at 20:11

## 2018-12-04 NOTE — H&P
Patient Care Team:  Jarred Carrizales Jr., MD as PCP - General (Family Medicine)  Jarred Carrizales Jr., MD as PCP - Claims Attributed    Chief complaint: abd pain    Subjective     History of Present Illness    Pleasant 77 yo who was admitted here under our service from 11/16/18-11/21/18 with diverticulitis. She was treated with IV abx and eventually discharged on Augmentin and flagyl. She finished the course of abx. Her symptoms had improved until actually just today when she began to have some LLQ abdominal pain. Sharp, constant only today. Associated with increased flatus. Not having any fevers, chills, nausea, vomiting. She scheduled outpatient CT today which showed new development of abdominal abscesses.       Review of Systems   Constitutional: Negative.    HENT: Negative.    Eyes: Negative.    Respiratory: Negative.    Cardiovascular: Negative for chest pain, palpitations and leg swelling.   Gastrointestinal: Positive for abdominal pain. Negative for vomiting.   Endocrine: Negative.    Genitourinary: Negative.    Musculoskeletal: Negative.    Skin: Negative.    Allergic/Immunologic: Negative.    Neurological: Negative.    Hematological: Negative.    Psychiatric/Behavioral: Negative.         Past Medical History:   Diagnosis Date   • Adenomatous polyp of colon 11/6/2017   • Anemia    • Bradycardia    • Colitis    • Colon polyps    • Compression fracture of L1 lumbar vertebra (CMS/HCC)    • Compression fracture of L2 (CMS/HCC)    • Depression    • Diverticulosis    • GERD (gastroesophageal reflux disease)    • GI hemorrhage    • Hearing loss    • History of blood transfusion 11/2013    S/P CHOLECYSTECTOMY SX  BHE   • History of trigeminal neuralgia    • Hypertension    • Insomnia    • Memory loss    • OA (osteoarthritis)    • Osteoarthritis    • Osteoporosis    • PAC (premature atrial contraction)    • Peripheral neuralgia    • Pneumonia     RUL   • PVC's (premature ventricular contractions)    • Sleep apnea    •  Thyroid disorder      Past Surgical History:   Procedure Laterality Date   • BRAIN SURGERY Left 2014    MICROVASCULAR DECOMPRESSION LEFT EAR   • CARDIAC CATHETERIZATION  2002   • CHOLECYSTECTOMY N/A 2013    DR. ESME GOLDMAN   • COLONOSCOPY N/A 2012    INT/EXT HEMORRHOIDS, DIVERTICULOSIS, 2 HYPERPLASTIC POLYPS, TORT,    • COLONOSCOPY  2015    eh, tics, torts, ih   REPEAT IN 5 YRS      • JOINT REPLACEMENT  03/15/2004    HIP-PARTIAL DR. SIMRAN KRAMER   • JOINT REPLACEMENT Bilateral     KNEE   • KYPHOPLASTY      COMPRESSION FX OF L2   • TRIGEMINAL NERVE DECOMPRESSION       Family History   Problem Relation Age of Onset   • Ovarian cancer Daughter    • Cancer Daughter    • Colon cancer Mother    • Arthritis Mother    • Stomach cancer Mother    • Cancer Mother    • Stomach cancer Maternal Grandfather    • Cancer Maternal Grandfather    • Glaucoma Father    • Arthritis Father    • Heart disease Father    • Hypertension Father    • Alcohol abuse Son    • Stroke Paternal Grandmother      Social History     Tobacco Use   • Smoking status: Former Smoker     Packs/day: 0.50     Years: 30.00     Pack years: 15.00     Types: Cigarettes     Start date: 1957     Last attempt to quit: 1989     Years since quittin.6   • Smokeless tobacco: Never Used   Substance Use Topics   • Alcohol use: Yes     Alcohol/week: 4.2 oz     Types: 2 Shots of liquor, 2 Standard drinks or equivalent per week     Comment: 2 DRINKS PER DAY   • Drug use: No     Medications Prior to Admission   Medication Sig Dispense Refill Last Dose   • acetaminophen (TYLENOL) 325 MG tablet Take 2 tablets by mouth Every 4 (Four) Hours As Needed for Mild Pain , Headache or Fever.   2018 at Unknown time   • Aspirin (ASPIR-81 PO) Take 81 mg by mouth Every Night.   12/3/2018 at Unknown time   • Calcium Carb-Cholecalciferol (CALCIUM + D3) 600-200 MG-UNIT tablet Take 1 tablet by mouth Every Night.    12/3/2018 at Unknown time   • carvedilol (COREG) 12.5 MG tablet q 12 hours  3 12/4/2018 at Unknown time   • desonide (DESOWEN) 0.05 % cream APPLY SPARINGLY TO THE CENTRAL FACE AND NOSE 2 TIMES A DAY  prn  1 Past Month at Unknown time   • hydrocortisone (ANUSOL-HC) 25 MG suppository Insert 1 suppository into the rectum At Night As Needed for Hemorrhoids (rectal discomfort). 10 suppository 3 Past Month at Unknown time   • levothyroxine (SYNTHROID, LEVOTHROID) 25 MCG tablet TAKE 1 TABLET BY MOUTH DAILY 90 tablet 2 12/4/2018 at Unknown time   • losartan (COZAAR) 50 MG tablet TAKE 1 TABLET BY MOUTH EVERY DAY (Patient taking differently: TAKE 1 TABLET BY MOUTH EVERY DAY 50 mg) 90 tablet 3 12/4/2018 at Unknown time   • Multiple Vitamin (MULTI VITAMIN DAILY PO) Take 1 tablet by mouth Daily.   12/4/2018 at Unknown time   • Omega-3 Fatty Acids (FISH OIL) 1200 MG capsule capsule Take 1,200 mg by mouth 2 (Two) Times a Day With Meals.   12/4/2018 at Unknown time   • omeprazole (priLOSEC) 20 MG capsule Take 20 mg by mouth Daily.   12/4/2018 at Unknown time   • hyoscyamine (LEVSIN) 0.125 MG SL tablet Take 0.125 mg by mouth Every 4 (Four) Hours As Needed for Cramping.   More than a month at Unknown time   • ondansetron ODT (ZOFRAN-ODT) 4 MG disintegrating tablet Take 1 tablet by mouth Every 6 (Six) Hours As Needed for Nausea or Vomiting.   More than a month at Unknown time     Allergies:  Morphine and related    Objective      Vital Signs  Temp:  [98.9 °F (37.2 °C)] 98.9 °F (37.2 °C)  Heart Rate:  [65] 65  Resp:  [16] 16  BP: (148)/(82) 148/82    Physical Exam   Constitutional: She is oriented to person, place, and time. She appears well-developed and well-nourished. No distress.   HENT:   Head: Normocephalic and atraumatic.   Mouth/Throat: Oropharynx is clear and moist.   Eyes: EOM are normal. Pupils are equal, round, and reactive to light. No scleral icterus.   Neck: Normal range of motion. Neck supple.   Cardiovascular: Normal  rate, regular rhythm and normal heart sounds.   No murmur heard.  Pulmonary/Chest: Effort normal and breath sounds normal.   Abdominal: Soft. Bowel sounds are normal. She exhibits no distension. There is tenderness (minimal generalized).   Genitourinary:   Genitourinary Comments: Deferred    Musculoskeletal: She exhibits no edema or deformity.   Neurological: She is alert and oriented to person, place, and time. No cranial nerve deficit.   Skin: Skin is warm and dry. No rash noted.   Psychiatric: She has a normal mood and affect. Her behavior is normal. Thought content normal.   Nursing note and vitals reviewed.      Results Review:   I reviewed the patient's new clinical results.  CT Abdomen Pelvis With Contrast [[id number]] Alvaro as Reviewed   Order Status: Completed Collected: 12/04/18 1349    Updated: 12/04/18 1349   Narrative:     CT ABDOMEN AND PELVIS WITH IV CONTRAST     HISTORY: 78-year-old female with diverticulitis. Follow-up.     TECHNIQUE: Radiation dose reduction techniques were utilized, including  automated exposure control and exposure modulation based on body size.   3 mm images were obtained through the abdomen and pelvis after the  administration of IV contrast. Compared with previous CT from  11/16/2018.     FINDINGS: There has been interval development of 3 small pelvic complex  fluid collections. There is an approximately 3.2 x 2.2 cm air-fluid  collection between the thickened sigmoid colon and the left aspect of  the uterus, image 104. Slightly inferiorly anterior to the uterus is a  3.8 x 2.6 cm fluid collection and to the right is an approximately 2 x 1  cm fluid collection. These collections have a thickened enhancing wall.  There is persistent thickening of the sigmoid colon and there is mild  peridiverticular haziness. There is no free air. There is no bowel  obstruction. Appendix appears within normal limits. There is no acute  abnormality seen involving the liver, spleen, pancreas,  adrenals, or  kidneys. There are a few small renal cysts bilaterally as well as a  stable 10.5 cm left renal cyst with peripheral calcifications. There are  moderately extensive abdominal aortic atherosclerotic changes without  aneurysmal dilatation. Old L2 compression fracture appears stable.      Impression:     Interval development of 3 complex fluid collections within the pelvis  which likely represent abscesses. Unfortunately, these abscesses are not  accessible for CT-guided drainage.     11/27/2018 1342 11/27/2018 2012 Basic metabolic panel [[id number]]    (Abnormal)   Blood    Final result Glucose 114 Abnormally high  mg/dL   BUN 7 Abnormally low  mg/dL   Creatinine 0.65 mg/dL   eGFR Non African Amer 88 mL/min/1.73   eGFR  African Amer 107 mL/min/1.73   BUN/Creatinine Ratio 10.8    Sodium 136 mmol/L   Potassium 4.6 mmol/L   Chloride 97 Abnormally low  mmol/L   Total CO2 24.2 mmol/L   Calcium 9.3 mg/dL          11/20/2018 0626 11/20/2018 0731 Basic Metabolic Panel [[id number]]    (Abnormal)   Blood    Final result Glucose 107 Abnormally high  mg/dL   BUN 3 Abnormally low  mg/dL   Creatinine 0.48 Abnormally low  mg/dL   Sodium 135 Abnormally low  mmol/L   Potassium 4.0 mmol/L   Chloride 101 mmol/L   CO2 23.9 mmol/L   Calcium 8.2 Abnormally low  mg/dL   eGFR Non African Amer 125 mL/min/1.73   BUN/Creatinine Ratio 6.3 Abnormally low     Anion Gap 10.1 mmol/L              Assessment/Plan       Diverticulitis of large intestine with abscess without bleeding    Anemia    Sleep apnea    Family history of colon cancer    -Check labs including blood cultures, CBC, CMP. She is non-toxic but has had increased symptoms in the past day including abdominal pain  -IV and PO agents for pain  -Surgery Consultation. Clear liquid diet for now. Will go ahead and start on IV zosyn for now.      Assessment & Plan    I discussed the patients findings and my recommendations with patient, nursing staff, primary care team and consulting  provider    Calderon Lake MD  12/04/18  5:43 PM

## 2018-12-04 NOTE — TELEPHONE ENCOUNTER
Spoke with patient.  I have in contact with the hospitalist, Dr. Lake.  Based upon the results of her CT scan, she has developed 3 abscesses since her last scan and hospitalization for diverticulitis.  She states that she has only been able to tolerate a clear liquid diet since discharge, and that her left lower quadrant pain is gradually worsening.  [facility]  Patient her  are leaving their home shortly and will report to registration at [facility].

## 2018-12-04 NOTE — PLAN OF CARE
Problem: Patient Care Overview  Goal: Plan of Care Review  Outcome: Ongoing (interventions implemented as appropriate)   12/04/18 1753   Coping/Psychosocial   Plan of Care Reviewed With patient   Plan of Care Review   Progress no change   OTHER   Outcome Summary Pt direct admit this afternoon. A & O x4, up ad malka. C/o lower abd pain. New orders received. Will continue to monitor.     Goal: Individualization and Mutuality  Outcome: Ongoing (interventions implemented as appropriate)    Goal: Discharge Needs Assessment  Outcome: Ongoing (interventions implemented as appropriate)    Goal: Interprofessional Rounds/Family Conf  Outcome: Ongoing (interventions implemented as appropriate)      Problem: Infection, Risk/Actual (Adult)  Goal: Identify Related Risk Factors and Signs and Symptoms  Outcome: Outcome(s) achieved Date Met: 12/04/18    Goal: Infection Prevention/Resolution  Outcome: Ongoing (interventions implemented as appropriate)      Problem: Pain, Acute (Adult)  Goal: Identify Related Risk Factors and Signs and Symptoms  Outcome: Outcome(s) achieved Date Met: 12/04/18    Goal: Acceptable Pain Control/Comfort Level  Outcome: Ongoing (interventions implemented as appropriate)

## 2018-12-05 ENCOUNTER — ANESTHESIA EVENT (OUTPATIENT)
Dept: PERIOP | Facility: HOSPITAL | Age: 78
End: 2018-12-05

## 2018-12-05 ENCOUNTER — ANESTHESIA (OUTPATIENT)
Dept: PERIOP | Facility: HOSPITAL | Age: 78
End: 2018-12-05

## 2018-12-05 PROBLEM — R07.89 CHEST PAIN, ATYPICAL: Status: ACTIVE | Noted: 2018-12-05

## 2018-12-05 LAB
ANION GAP SERPL CALCULATED.3IONS-SCNC: 10.1 MMOL/L
BUN BLD-MCNC: 5 MG/DL (ref 8–23)
BUN/CREAT SERPL: 8.1 (ref 7–25)
CALCIUM SPEC-SCNC: 8.4 MG/DL (ref 8.6–10.5)
CHLORIDE SERPL-SCNC: 105 MMOL/L (ref 98–107)
CO2 SERPL-SCNC: 22.9 MMOL/L (ref 22–29)
CREAT BLD-MCNC: 0.62 MG/DL (ref 0.57–1)
GFR SERPL CREATININE-BSD FRML MDRD: 93 ML/MIN/1.73
GLUCOSE BLD-MCNC: 95 MG/DL (ref 65–99)
POTASSIUM BLD-SCNC: 3.9 MMOL/L (ref 3.5–5.2)
SODIUM BLD-SCNC: 138 MMOL/L (ref 136–145)
TROPONIN T SERPL-MCNC: <0.01 NG/ML (ref 0–0.03)

## 2018-12-05 PROCEDURE — 25010000002 FENTANYL CITRATE (PF) 100 MCG/2ML SOLUTION: Performed by: NURSE ANESTHETIST, CERTIFIED REGISTERED

## 2018-12-05 PROCEDURE — 25010000002 HYDROMORPHONE PER 4 MG: Performed by: NURSE ANESTHETIST, CERTIFIED REGISTERED

## 2018-12-05 PROCEDURE — 25010000002 HYDROMORPHONE PER 4 MG: Performed by: ANESTHESIOLOGY

## 2018-12-05 PROCEDURE — 80048 BASIC METABOLIC PNL TOTAL CA: CPT | Performed by: INTERNAL MEDICINE

## 2018-12-05 PROCEDURE — 25010000002 PIPERACILLIN SOD-TAZOBACTAM PER 1 G: Performed by: INTERNAL MEDICINE

## 2018-12-05 PROCEDURE — 88307 TISSUE EXAM BY PATHOLOGIST: CPT | Performed by: SURGERY

## 2018-12-05 PROCEDURE — 44143 PARTIAL REMOVAL OF COLON: CPT | Performed by: SURGERY

## 2018-12-05 PROCEDURE — 25010000002 FENTANYL CITRATE (PF) 100 MCG/2ML SOLUTION: Performed by: ANESTHESIOLOGY

## 2018-12-05 PROCEDURE — 84484 ASSAY OF TROPONIN QUANT: CPT | Performed by: INTERNAL MEDICINE

## 2018-12-05 PROCEDURE — 25010000002 MIDAZOLAM PER 1 MG: Performed by: ANESTHESIOLOGY

## 2018-12-05 PROCEDURE — 25010000002 HYDROMORPHONE 1 MG/ML SOLUTION: Performed by: INTERNAL MEDICINE

## 2018-12-05 PROCEDURE — 25010000002 FENTANYL CITRATE (PF) 100 MCG/2ML SOLUTION

## 2018-12-05 PROCEDURE — 25010000002 PROPOFOL 10 MG/ML EMULSION: Performed by: NURSE ANESTHETIST, CERTIFIED REGISTERED

## 2018-12-05 PROCEDURE — 99221 1ST HOSP IP/OBS SF/LOW 40: CPT | Performed by: SURGERY

## 2018-12-05 PROCEDURE — 0D1M0Z4 BYPASS DESCENDING COLON TO CUTANEOUS, OPEN APPROACH: ICD-10-PCS | Performed by: SURGERY

## 2018-12-05 PROCEDURE — 25010000002 ONDANSETRON PER 1 MG: Performed by: INTERNAL MEDICINE

## 2018-12-05 PROCEDURE — C1765 ADHESION BARRIER: HCPCS | Performed by: SURGERY

## 2018-12-05 PROCEDURE — 25010000002 SUCCINYLCHOLINE PER 20 MG: Performed by: NURSE ANESTHETIST, CERTIFIED REGISTERED

## 2018-12-05 PROCEDURE — 0DTN0ZZ RESECTION OF SIGMOID COLON, OPEN APPROACH: ICD-10-PCS | Performed by: SURGERY

## 2018-12-05 PROCEDURE — 0DB80ZZ EXCISION OF SMALL INTESTINE, OPEN APPROACH: ICD-10-PCS | Performed by: SURGERY

## 2018-12-05 PROCEDURE — 88304 TISSUE EXAM BY PATHOLOGIST: CPT | Performed by: SURGERY

## 2018-12-05 PROCEDURE — 0DTJ0ZZ RESECTION OF APPENDIX, OPEN APPROACH: ICD-10-PCS | Performed by: SURGERY

## 2018-12-05 DEVICE — PROXIMATE LINEAR CUTTER RELOAD, BLUE, 75MM
Type: IMPLANTABLE DEVICE | Site: SIGMOID COLON | Status: FUNCTIONAL
Brand: PROXIMATE

## 2018-12-05 RX ORDER — NALOXONE HCL 0.4 MG/ML
0.2 VIAL (ML) INJECTION AS NEEDED
Status: DISCONTINUED | OUTPATIENT
Start: 2018-12-05 | End: 2018-12-05 | Stop reason: HOSPADM

## 2018-12-05 RX ORDER — HYDROMORPHONE HYDROCHLORIDE 1 MG/ML
0.5 INJECTION, SOLUTION INTRAMUSCULAR; INTRAVENOUS; SUBCUTANEOUS
Status: DISCONTINUED | OUTPATIENT
Start: 2018-12-05 | End: 2018-12-06

## 2018-12-05 RX ORDER — SODIUM CHLORIDE 0.9 % (FLUSH) 0.9 %
1-10 SYRINGE (ML) INJECTION AS NEEDED
Status: DISCONTINUED | OUTPATIENT
Start: 2018-12-05 | End: 2018-12-05 | Stop reason: HOSPADM

## 2018-12-05 RX ORDER — PROMETHAZINE HYDROCHLORIDE 25 MG/ML
12.5 INJECTION, SOLUTION INTRAMUSCULAR; INTRAVENOUS ONCE AS NEEDED
Status: DISCONTINUED | OUTPATIENT
Start: 2018-12-05 | End: 2018-12-05 | Stop reason: HOSPADM

## 2018-12-05 RX ORDER — FAMOTIDINE 10 MG/ML
20 INJECTION, SOLUTION INTRAVENOUS ONCE
Status: COMPLETED | OUTPATIENT
Start: 2018-12-05 | End: 2018-12-05

## 2018-12-05 RX ORDER — HYDROMORPHONE HCL 110MG/55ML
PATIENT CONTROLLED ANALGESIA SYRINGE INTRAVENOUS AS NEEDED
Status: DISCONTINUED | OUTPATIENT
Start: 2018-12-05 | End: 2018-12-05 | Stop reason: SURG

## 2018-12-05 RX ORDER — FENTANYL CITRATE 50 UG/ML
INJECTION, SOLUTION INTRAMUSCULAR; INTRAVENOUS
Status: COMPLETED
Start: 2018-12-05 | End: 2018-12-05

## 2018-12-05 RX ORDER — ROCURONIUM BROMIDE 10 MG/ML
INJECTION, SOLUTION INTRAVENOUS AS NEEDED
Status: DISCONTINUED | OUTPATIENT
Start: 2018-12-05 | End: 2018-12-05 | Stop reason: SURG

## 2018-12-05 RX ORDER — PROMETHAZINE HYDROCHLORIDE 25 MG/1
25 TABLET ORAL ONCE AS NEEDED
Status: DISCONTINUED | OUTPATIENT
Start: 2018-12-05 | End: 2018-12-05 | Stop reason: HOSPADM

## 2018-12-05 RX ORDER — LIDOCAINE HYDROCHLORIDE 20 MG/ML
INJECTION, SOLUTION INFILTRATION; PERINEURAL AS NEEDED
Status: DISCONTINUED | OUTPATIENT
Start: 2018-12-05 | End: 2018-12-05 | Stop reason: SURG

## 2018-12-05 RX ORDER — HYDROMORPHONE HYDROCHLORIDE 1 MG/ML
0.5 INJECTION, SOLUTION INTRAMUSCULAR; INTRAVENOUS; SUBCUTANEOUS
Status: DISCONTINUED | OUTPATIENT
Start: 2018-12-05 | End: 2018-12-05 | Stop reason: HOSPADM

## 2018-12-05 RX ORDER — FENTANYL CITRATE 50 UG/ML
50 INJECTION, SOLUTION INTRAMUSCULAR; INTRAVENOUS
Status: DISCONTINUED | OUTPATIENT
Start: 2018-12-05 | End: 2018-12-05 | Stop reason: HOSPADM

## 2018-12-05 RX ORDER — EPHEDRINE SULFATE 50 MG/ML
INJECTION, SOLUTION INTRAVENOUS AS NEEDED
Status: DISCONTINUED | OUTPATIENT
Start: 2018-12-05 | End: 2018-12-05 | Stop reason: SURG

## 2018-12-05 RX ORDER — FENTANYL CITRATE 50 UG/ML
INJECTION, SOLUTION INTRAMUSCULAR; INTRAVENOUS AS NEEDED
Status: DISCONTINUED | OUTPATIENT
Start: 2018-12-05 | End: 2018-12-05 | Stop reason: SURG

## 2018-12-05 RX ORDER — LIDOCAINE HYDROCHLORIDE 10 MG/ML
0.5 INJECTION, SOLUTION EPIDURAL; INFILTRATION; INTRACAUDAL; PERINEURAL ONCE AS NEEDED
Status: DISCONTINUED | OUTPATIENT
Start: 2018-12-05 | End: 2018-12-05 | Stop reason: HOSPADM

## 2018-12-05 RX ORDER — MIDAZOLAM HYDROCHLORIDE 1 MG/ML
1 INJECTION INTRAMUSCULAR; INTRAVENOUS
Status: DISCONTINUED | OUTPATIENT
Start: 2018-12-05 | End: 2018-12-05 | Stop reason: HOSPADM

## 2018-12-05 RX ORDER — ONDANSETRON 2 MG/ML
4 INJECTION INTRAMUSCULAR; INTRAVENOUS ONCE AS NEEDED
Status: DISCONTINUED | OUTPATIENT
Start: 2018-12-05 | End: 2018-12-05 | Stop reason: HOSPADM

## 2018-12-05 RX ORDER — PROPOFOL 10 MG/ML
VIAL (ML) INTRAVENOUS AS NEEDED
Status: DISCONTINUED | OUTPATIENT
Start: 2018-12-05 | End: 2018-12-05 | Stop reason: SURG

## 2018-12-05 RX ORDER — SUCCINYLCHOLINE CHLORIDE 20 MG/ML
INJECTION INTRAMUSCULAR; INTRAVENOUS AS NEEDED
Status: DISCONTINUED | OUTPATIENT
Start: 2018-12-05 | End: 2018-12-05 | Stop reason: SURG

## 2018-12-05 RX ORDER — FLUMAZENIL 0.1 MG/ML
0.2 INJECTION INTRAVENOUS AS NEEDED
Status: DISCONTINUED | OUTPATIENT
Start: 2018-12-05 | End: 2018-12-05 | Stop reason: HOSPADM

## 2018-12-05 RX ORDER — SCOLOPAMINE TRANSDERMAL SYSTEM 1 MG/1
1 PATCH, EXTENDED RELEASE TRANSDERMAL
Status: DISCONTINUED | OUTPATIENT
Start: 2018-12-05 | End: 2018-12-05 | Stop reason: HOSPADM

## 2018-12-05 RX ORDER — EPHEDRINE SULFATE 50 MG/ML
5 INJECTION, SOLUTION INTRAVENOUS ONCE AS NEEDED
Status: DISCONTINUED | OUTPATIENT
Start: 2018-12-05 | End: 2018-12-05 | Stop reason: HOSPADM

## 2018-12-05 RX ORDER — SODIUM CHLORIDE, SODIUM LACTATE, POTASSIUM CHLORIDE, CALCIUM CHLORIDE 600; 310; 30; 20 MG/100ML; MG/100ML; MG/100ML; MG/100ML
9 INJECTION, SOLUTION INTRAVENOUS CONTINUOUS
Status: DISCONTINUED | OUTPATIENT
Start: 2018-12-05 | End: 2018-12-06

## 2018-12-05 RX ORDER — MAGNESIUM HYDROXIDE 1200 MG/15ML
LIQUID ORAL AS NEEDED
Status: DISCONTINUED | OUTPATIENT
Start: 2018-12-05 | End: 2018-12-05 | Stop reason: HOSPADM

## 2018-12-05 RX ORDER — MIDAZOLAM HYDROCHLORIDE 1 MG/ML
2 INJECTION INTRAMUSCULAR; INTRAVENOUS
Status: DISCONTINUED | OUTPATIENT
Start: 2018-12-05 | End: 2018-12-05 | Stop reason: HOSPADM

## 2018-12-05 RX ORDER — DIPHENHYDRAMINE HCL 25 MG
25 CAPSULE ORAL
Status: DISCONTINUED | OUTPATIENT
Start: 2018-12-05 | End: 2018-12-05 | Stop reason: HOSPADM

## 2018-12-05 RX ORDER — PROMETHAZINE HYDROCHLORIDE 25 MG/1
25 SUPPOSITORY RECTAL ONCE AS NEEDED
Status: DISCONTINUED | OUTPATIENT
Start: 2018-12-05 | End: 2018-12-05 | Stop reason: HOSPADM

## 2018-12-05 RX ADMIN — HYDROMORPHONE HYDROCHLORIDE 0.25 MG: 2 INJECTION INTRAMUSCULAR; INTRAVENOUS; SUBCUTANEOUS at 18:02

## 2018-12-05 RX ADMIN — TAZOBACTAM SODIUM AND PIPERACILLIN SODIUM 3.38 G: 375; 3 INJECTION, SOLUTION INTRAVENOUS at 16:45

## 2018-12-05 RX ADMIN — HYDROMORPHONE HYDROCHLORIDE 0.5 MG: 1 INJECTION, SOLUTION INTRAMUSCULAR; INTRAVENOUS; SUBCUTANEOUS at 10:19

## 2018-12-05 RX ADMIN — SCOPALAMINE 1 PATCH: 1 PATCH, EXTENDED RELEASE TRANSDERMAL at 14:53

## 2018-12-05 RX ADMIN — HYDROMORPHONE HYDROCHLORIDE 0.25 MG: 2 INJECTION INTRAMUSCULAR; INTRAVENOUS; SUBCUTANEOUS at 18:47

## 2018-12-05 RX ADMIN — SODIUM CHLORIDE 100 ML/HR: 9 INJECTION, SOLUTION INTRAVENOUS at 09:13

## 2018-12-05 RX ADMIN — FENTANYL CITRATE 50 MCG: 50 INJECTION, SOLUTION INTRAMUSCULAR; INTRAVENOUS at 14:53

## 2018-12-05 RX ADMIN — FENTANYL CITRATE 50 MCG: 50 INJECTION, SOLUTION INTRAMUSCULAR; INTRAVENOUS at 16:55

## 2018-12-05 RX ADMIN — CARVEDILOL 12.5 MG: 12.5 TABLET, FILM COATED ORAL at 09:10

## 2018-12-05 RX ADMIN — FENTANYL CITRATE 50 MCG: 50 INJECTION, SOLUTION INTRAMUSCULAR; INTRAVENOUS at 19:25

## 2018-12-05 RX ADMIN — LIDOCAINE HYDROCHLORIDE 80 MG: 20 INJECTION, SOLUTION INFILTRATION; PERINEURAL at 16:10

## 2018-12-05 RX ADMIN — ROCURONIUM BROMIDE 50 MG: 10 INJECTION INTRAVENOUS at 16:20

## 2018-12-05 RX ADMIN — HYDROMORPHONE HYDROCHLORIDE 0.5 MG: 1 INJECTION, SOLUTION INTRAMUSCULAR; INTRAVENOUS; SUBCUTANEOUS at 22:20

## 2018-12-05 RX ADMIN — HYDROMORPHONE HYDROCHLORIDE 0.25 MG: 2 INJECTION INTRAMUSCULAR; INTRAVENOUS; SUBCUTANEOUS at 18:30

## 2018-12-05 RX ADMIN — LOSARTAN POTASSIUM 50 MG: 50 TABLET, FILM COATED ORAL at 09:09

## 2018-12-05 RX ADMIN — HYDROMORPHONE HYDROCHLORIDE 0.5 MG: 1 INJECTION, SOLUTION INTRAMUSCULAR; INTRAVENOUS; SUBCUTANEOUS at 19:55

## 2018-12-05 RX ADMIN — OXYCODONE HYDROCHLORIDE AND ACETAMINOPHEN 1 TABLET: 7.5; 325 TABLET ORAL at 05:30

## 2018-12-05 RX ADMIN — PROPOFOL 150 MG: 10 INJECTION, EMULSION INTRAVENOUS at 16:10

## 2018-12-05 RX ADMIN — LEVOTHYROXINE SODIUM 25 MCG: 25 TABLET ORAL at 09:10

## 2018-12-05 RX ADMIN — FENTANYL CITRATE 100 MCG: 50 INJECTION, SOLUTION INTRAMUSCULAR; INTRAVENOUS at 16:10

## 2018-12-05 RX ADMIN — MIDAZOLAM HYDROCHLORIDE 1 MG: 2 INJECTION, SOLUTION INTRAMUSCULAR; INTRAVENOUS at 14:53

## 2018-12-05 RX ADMIN — FENTANYL CITRATE 50 MCG: 50 INJECTION, SOLUTION INTRAMUSCULAR; INTRAVENOUS at 19:05

## 2018-12-05 RX ADMIN — HYDROMORPHONE HYDROCHLORIDE 0.5 MG: 1 INJECTION, SOLUTION INTRAMUSCULAR; INTRAVENOUS; SUBCUTANEOUS at 19:15

## 2018-12-05 RX ADMIN — HYDROMORPHONE HYDROCHLORIDE 0.25 MG: 2 INJECTION INTRAMUSCULAR; INTRAVENOUS; SUBCUTANEOUS at 18:38

## 2018-12-05 RX ADMIN — SODIUM CHLORIDE, POTASSIUM CHLORIDE, SODIUM LACTATE AND CALCIUM CHLORIDE: 600; 310; 30; 20 INJECTION, SOLUTION INTRAVENOUS at 18:13

## 2018-12-05 RX ADMIN — FENTANYL CITRATE 100 MCG: 50 INJECTION, SOLUTION INTRAMUSCULAR; INTRAVENOUS at 16:39

## 2018-12-05 RX ADMIN — SUGAMMADEX 200 MG: 100 INJECTION, SOLUTION INTRAVENOUS at 18:28

## 2018-12-05 RX ADMIN — TAZOBACTAM SODIUM AND PIPERACILLIN SODIUM 3.38 G: 375; 3 INJECTION, SOLUTION INTRAVENOUS at 09:10

## 2018-12-05 RX ADMIN — EPHEDRINE SULFATE 10 MG: 50 INJECTION INTRAMUSCULAR; INTRAVENOUS; SUBCUTANEOUS at 17:12

## 2018-12-05 RX ADMIN — SUCCINYLCHOLINE CHLORIDE 100 MG: 20 INJECTION, SOLUTION INTRAMUSCULAR; INTRAVENOUS; PARENTERAL at 16:12

## 2018-12-05 RX ADMIN — FAMOTIDINE 20 MG: 10 INJECTION INTRAVENOUS at 14:53

## 2018-12-05 RX ADMIN — ONDANSETRON 4 MG: 2 INJECTION INTRAMUSCULAR; INTRAVENOUS at 21:35

## 2018-12-05 RX ADMIN — TAZOBACTAM SODIUM AND PIPERACILLIN SODIUM 3.38 G: 375; 3 INJECTION, SOLUTION INTRAVENOUS at 00:32

## 2018-12-05 RX ADMIN — FAMOTIDINE 20 MG: 20 TABLET, FILM COATED ORAL at 09:10

## 2018-12-05 RX ADMIN — SODIUM CHLORIDE, POTASSIUM CHLORIDE, SODIUM LACTATE AND CALCIUM CHLORIDE 9 ML/HR: 600; 310; 30; 20 INJECTION, SOLUTION INTRAVENOUS at 14:53

## 2018-12-05 NOTE — ANESTHESIA PROCEDURE NOTES
ANESTHESIA INTUBATION  Urgency: elective    Airway not difficult    General Information and Staff    Patient location during procedure: OR  Anesthesiologist: Clark Arias MD  CRNA: Maribel Denise CRNA    Indications and Patient Condition  Indications for airway management: airway protection    Preoxygenated: yes  Mask difficulty assessment: 0 - not attempted    Final Airway Details  Final airway type: endotracheal airway      Successful airway: ETT  Cuffed: yes   Successful intubation technique: direct laryngoscopy  Facilitating devices/methods: intubating stylet  Endotracheal tube insertion site: oral  Blade: Haider  Blade size: 2  ETT size (mm): 7.0  Cormack-Lehane Classification: grade IIa - partial view of glottis  Placement verified by: chest auscultation and capnometry   Cuff volume (mL): 7  Measured from: lips  ETT to lips (cm): 21  Number of attempts at approach: 1    Additional Comments  RSI.  EYES TAPED CLOSED PRIOR TO DL.  INTUBATION AS CHARTED ABOVE.  APPEARS ATRAUMATIC.  NO CHANGE TO DENTITION. +ETCO2. +BBS. +CR.

## 2018-12-05 NOTE — PLAN OF CARE
Problem: Patient Care Overview  Goal: Plan of Care Review  Outcome: Ongoing (interventions implemented as appropriate)   12/05/18 0551   Coping/Psychosocial   Plan of Care Reviewed With patient   Plan of Care Review   Progress no change   OTHER   Outcome Summary Patient is on IV antibiotics and IV fluids. Patient is alert and oriented. Up ad malka. PRN pain meds given. Patient prefers Dilaudid than Percocet. Patient had an episode of possible anxiety attack last night. Patient complained of soa and chest tightness/pain. VSS. EKG, chest x-ray and troponin level was done and all results are within normal. Will continue to monitor vital signs, labs and comfort.     Goal: Individualization and Mutuality  Outcome: Ongoing (interventions implemented as appropriate)    Goal: Discharge Needs Assessment  Outcome: Ongoing (interventions implemented as appropriate)    Goal: Interprofessional Rounds/Family Conf  Outcome: Ongoing (interventions implemented as appropriate)      Problem: Infection, Risk/Actual (Adult)  Goal: Infection Prevention/Resolution  Outcome: Ongoing (interventions implemented as appropriate)      Problem: Pain, Acute (Adult)  Goal: Acceptable Pain Control/Comfort Level  Outcome: Ongoing (interventions implemented as appropriate)

## 2018-12-05 NOTE — PROGRESS NOTES
Discharge Planning Assessment  [facility]     Patient Name: Moriah Petty  MRN: [id number]  Today's Date: 12/5/2018    Admit Date: 12/4/2018    Discharge Needs Assessment     Row Name 12/05/18 6169       Living Environment    Lives With  spouse    Name(s) of Who Lives With Patient  Sammy Petty [phone]    Current Living Arrangements  home/apartment/condo    Primary Care Provided by  self    Provides Primary Care For  no one    Family Caregiver if Needed  spouse    Quality of Family Relationships  helpful;involved;supportive    Able to Return to Prior Arrangements  other (see comments)    Living Arrangement Comments  Patient scheduled for surgery today, CCP will follow.        Resource/Environmental Concerns    Resource/Environmental Concerns  none    Transportation Concerns  car, none       Transition Planning    Patient/Family Anticipates Transition to  home with family    Patient/Family Anticipated Services at Transition  none    Transportation Anticipated  family or friend will provide       Discharge Needs Assessment    Readmission Within the Last 30 Days  previous discharge plan unsuccessful    Concerns to be Addressed  no discharge needs identified    Equipment Currently Used at Home  none    Anticipated Changes Related to Illness  none    Equipment Needed After Discharge  none        Discharge Plan     Row Name 12/05/18 2245       Plan    Plan  Undetermined, will follow up after surgery 12/5.     Patient/Family in Agreement with Plan  yes    Plan Comments  CCP role explained and face sheet verified; emergency contact is spouse Sammy Petty [phone], he will provide transportation at discharge; current pharmacy is [facility] on [address]; no DME; patient has a living will; no previous HH or inpatient rehab; plan is home with spouse, patient has 12 steps to enter home, CCP will follow up after scheduled surgery 12/5 to assess possible discharge needs. JOSE Knapp        Destination      No service  coordination in this encounter.      Durable Medical Equipment      No service coordination in this encounter.      Dialysis/Infusion      No service coordination in this encounter.      Home Medical Care      No service coordination in this encounter.      Community Resources      No service coordination in this encounter.          Demographic Summary     Row Name 12/05/18 1339       General Information    Admission Type  inpatient    Arrived From  home    Required Notices Provided  Important Message from Medicare    Reason for Consult  discharge planning        Functional Status     Row Name 12/05/18 5292       Functional Status    Usual Activity Tolerance  good    Current Activity Tolerance  moderate       Functional Status, IADL    Medications  independent    Meal Preparation  independent    Housekeeping  independent    Laundry  independent    Shopping  independent        Psychosocial    No documentation.       Abuse/Neglect    No documentation.       Legal     Row Name 12/05/18 2398       Financial/Legal    Finance Comments  Patient has a living will.         Substance Abuse    No documentation.       Patient Forms    No documentation.           Amna Wood RN

## 2018-12-05 NOTE — OP NOTE
Operative Note :   MD Marina Tomtamiko ABDI Malinda  1940    Procedure Date: 12/05/18    Pre-op Diagnosis:  Complicated diverticulitis with abscess    Post-Op Diagnosis:  Same    Procedure:   · Sigmoid colectomy with end colostomy and formation of Velazquez's pouch  · Drainage pelvic abscess  · Small bowel resection and primary anastomosis  · Incidental appendectomy    Surgeon: Emanuel Clark MD    Assistant: Honorio Obrien    Anesthesia:  General (general endotracheal tube)    EBL:   100ml    Specimens:   Jejunum with abscess cavity  Sigmoid colon  Appendix    Indications:  · See consult note, but essentially this is a 78-year-old lady who has had on-and-off troubles with colitis and diverticulitis were about 3 months and now presents with significantly worse CT scan with multiple abscesses and continued pain.  I discussed her options and she decided to go ahead with resection.    Findings:   · Abscess cavity in the pelvis with significant adherent small bowel  · Relatively short segment of significant only inflamed and indurated sigmoid colon    Recommendations:   · Routine postoperative monitoring    Description of procedure:    After obtaining informed consent, patient was brought to the operating room and placed under a general anesthetic.  Avelar catheter was placed.  Antibiotics are being administered routinely.  Generous lower midline incision was made second through the skin and subcutaneous tissues onto the fascia which was then opened and the peritoneum was then widely opened.  A large Alonso wound retractor was inserted.  Began my exploration.  There were several loops of small bowel which were fixed down into the pelvis stuck essentially onto the posterior superior aspect of the uterus and the sigmoid colon.  I began dissecting these free.  We did break into an abscess cavity and this was drained.  I was able eventually to free the small bowel away but because of the large amount of rind on the  small bowel and the very poor quality look of it as well as some areas of deserosalization I felt that resection of this segment of small bowel would be best.  NAHUM stapler was used 2 cut and seal the small bowel proximally and distally to this area and a resection segment of probably around 10-12 cm was removed by coming across the mesentery with the LigaSure device.  I continued my exploration we are able to get all the small bowel out.  The remainder of the small bowel was run and had a normal appearance.  I then began mobilizing the sigmoid colon, first working along the white line of Toldt laterally mobilizing the healthy-appearing descending and sigmoid colon and working my way down towards the pelvis.  I then worked below the sick segment of colon and incised the peritoneum on either side of the nasal rectum.  I then selected a segment of descending or proximal sigmoid colon which appeared quite soft and healthy, made a space between the mesentery and the sigmoid colon and then divided this: With the stapler.  I then used the LigaSure to begin taking the mesentery working my way down into the pelvis into we got beyond the clearly inflamed and indurated segment of sigmoid colon down to what appeared to be healthy rectosigmoid.  The rectosigmoid was divided with the contour stapler, a couple of centimeters above the pelvic brim.  There were a couple of bleeders along the mesentery which were taken with Vicryl suture.  The rectal stump was marked with a 3-0 Prolene.  The specimen was passed off.  I then irrigated the abdomen thoroughly.  I elected to remove the appendix electively and this was taken by dissecting the mesial appendix with the LigaSure, placing a 3-0 silk pursestring suture around the appendix and then placing a clamp on the appendix, tying it off with a Vicryl tie and then dividing the appendix.  The pursestring was used to dunk the appendiceal stump and then the appendiceal stump was oversewn  with 3-0 silk sutures.  This appeared to be good order and I then went about performing the small bowel anastomosis.  A side-to-side anastomosis was formed using the 75 mm NAHUM stapler and the common enterotomy was closed with 3-0 interlocking chromic suture and then oversewn with 3-0 silk's in the fashion of Vero.  The common mesenteric defect was closed with a running 2-0 Vicryl suture.  The abdomen was then again thoroughly irrigated and we inspected to ensure there was no bleeding.  The segment of descending colon which had been mobilized appeared to be plenty mobile to bring up with a diversion.  Because of the degree of inflammation and irritation I did not feel comfortable to perform a primary anastomosis at that time.  An appropriate site in the left lower quadrant was selected and a circular skin incision was made and dissected through the subcutaneous tissues onto the anterior fascia.  The rectus muscles were split and then the posterior rectus sheath was opened.  A Weesatche was used to grasp the sigmoid colon area ensure was oriented appropriately and brought it up through the abdominal wall without difficulty.  I then placed Seprafilm within the abdomen after returning the contents to their appropriate position.  The midline fascia was reapproximated with 0 PDS suture.  The subcutaneous tissues were closed with 2-0 Vicryl and the skin was closed with staples.  Sterile dressing was applied to the midline.  The colostomy was then matured with 3-0 chromic sutures after cutting off the staple line.  An ostomy appliance was placed.  The patient tolerated this adequately.    Emanuel Clark MD  General and Endoscopic Surgery  [facility]    [address] [place] [address], [address]  [place], [place], [address]  P: [phone]  F: [phone]      '

## 2018-12-05 NOTE — PROGRESS NOTES
Name: Moriah Petty ADMIT: 2018   : 1940  PCP: Jarred Carrizales Jr., MD    MRN: [id number] LOS: 1 days   AGE/SEX: 78 y.o. female  ROOM: [facility]   Subjective   Cc- abd pain    abd pain is fair  moderate  Improved with dilaudid    She had some chest discomfort  Last night  Central chest pressure  Also with some SOA  Was anxious at the time  Had CXR and EKG  Resolved with dilaudid  No recurrence    ROS  No f/c  No n/v  +CP/-palp  No soa/cough  +abd pain    Objective   Vital Signs  Temp:  [96.9 °F (36.1 °C)-98.9 °F (37.2 °C)] 97.6 °F (36.4 °C)  Heart Rate:  [60-65] 61  Resp:  [16] 16  BP: (119-148)/(65-82) 129/70  SpO2:  [94 %-98 %] 94 %  on   ;   Device (Oxygen Therapy): room air  Body mass index is 24.34 kg/m².    Physical Exam   Constitutional: She is oriented to person, place, and time. She appears well-developed and well-nourished.   HENT:   Head: Normocephalic and atraumatic.   Eyes: Conjunctivae are normal. No scleral icterus.   Neck: Neck supple. No tracheal deviation present.   Cardiovascular: Normal rate and regular rhythm.   No murmur heard.  Pulmonary/Chest: Effort normal and breath sounds normal.   Abdominal: Soft. There is tenderness (mild generalized tenderness). There is no guarding.   Genitourinary:   Genitourinary Comments: Deferred    Musculoskeletal: She exhibits no edema.   Neurological: She is alert and oriented to person, place, and time. She exhibits normal muscle tone.   Skin: Skin is warm and dry.   Psychiatric: She has a normal mood and affect. Her behavior is normal.       Results Review:       I reviewed the patient's new clinical results.  Results from last 7 days   Lab Units  18   1808   WBC 10*3/mm3  9.30   HEMOGLOBIN g/dL  11.6*   PLATELETS 10*3/mm3  404     Results from last 7 days   Lab Units  18   1741  18   1539   SODIUM mmol/L  133*   --    POTASSIUM mmol/L  4.0   --    CHLORIDE mmol/L  99   --    CO2 mmol/L  21.2*   --    BUN mg/dL  6*   --     CREATININE mg/dL  0.66  0.60   GLUCOSE mg/dL  92   --    Estimated Creatinine Clearance: 63.5 mL/min (by C-G formula based on SCr of 0.66 mg/dL).  Results from last 7 days   Lab Units  12/04/18   1741   ALBUMIN g/dL  3.80   BILIRUBIN mg/dL  0.3   ALK PHOS U/L  37*   AST (SGOT) U/L  20   ALT (SGPT) U/L  10     Results from last 7 days   Lab Units  12/04/18   1741   CALCIUM mg/dL  9.2   ALBUMIN g/dL  3.80       12/04/2018 2238  12/04/2018 2312  Troponin [[id number]]    Blood     Final result  Troponin T <0.010 ng/mL            XR Chest 1 View [[id number]] Alvaro as Reviewed   Order Status: Completed Collected: 12/04/18 2227    Updated: 12/04/18 2231   Narrative:     PORTABLE CHEST X-RAY     HISTORY: chest pain     COMPARISON: November 18, 2013.     FINDINGS: Portable AP view of the chest was obtained. The lungs are well  inflated. Apical hyperlucencies likely indicative of some underlying  emphysema. Mild fibrotic changes. There are calcified residua of  granulomatous disease present. No active airspace disease, edema, or  pleural fluid. Borderline cardiomegaly.            Impression:     No active disease by portable imaging     This report was finalized on 12/4/2018 10:28 PM by Doe Marshall M.D.      CT Abdomen Pelvis With Contrast [[id number]] Alvaro as Reviewed   Order Status: Completed Collected: 12/04/18 1349    Updated: 12/04/18 1349   Narrative:     CT ABDOMEN AND PELVIS WITH IV CONTRAST     HISTORY: 78-year-old female with diverticulitis. Follow-up.     TECHNIQUE: Radiation dose reduction techniques were utilized, including  automated exposure control and exposure modulation based on body size.   3 mm images were obtained through the abdomen and pelvis after the  administration of IV contrast. Compared with previous CT from  11/16/2018.     FINDINGS: There has been interval development of 3 small pelvic complex  fluid collections. There is an approximately 3.2 x 2.2 cm air-fluid  collection between the thickened  sigmoid colon and the left aspect of  the uterus, image 104. Slightly inferiorly anterior to the uterus is a  3.8 x 2.6 cm fluid collection and to the right is an approximately 2 x 1  cm fluid collection. These collections have a thickened enhancing wall.  There is persistent thickening of the sigmoid colon and there is mild  peridiverticular haziness. There is no free air. There is no bowel  obstruction. Appendix appears within normal limits. There is no acute  abnormality seen involving the liver, spleen, pancreas, adrenals, or  kidneys. There are a few small renal cysts bilaterally as well as a  stable 10.5 cm left renal cyst with peripheral calcifications. There are  moderately extensive abdominal aortic atherosclerotic changes without  aneurysmal dilatation. Old L2 compression fracture appears stable.      Impression:     Interval development of 3 complex fluid collections within the pelvis  which likely represent abscesses. Unfortunately, these abscesses are not  accessible for CT-guided drainage.     EKG personally viewed and interpreted   Sinus  No ischemia  LVH  qtc 458      carvedilol 12.5 mg Oral BID With Meals   famotidine 20 mg Oral Daily   levothyroxine 25 mcg Oral Daily   losartan 50 mg Oral Daily   piperacillin-tazobactam 3.375 g Intravenous Q8H   sodium chloride 3 mL Intravenous Q12H       sodium chloride 100 mL/hr Last Rate: 100 mL/hr (12/05/18 0913)   Diet Clear Liquid      Assessment/Plan      Active Hospital Problems    Diagnosis Date Noted   • **Diverticulitis of large intestine with abscess without bleeding [K57.20] 12/04/2018   • Chest pain, atypical [R07.89] 12/05/2018   • Family history of colon cancer [Z80.0] 11/06/2017   • Anemia [D64.9] 03/04/2017   • Sleep apnea [G47.30] 03/04/2017      Resolved Hospital Problems   No resolved problems to display.       Pleasant 79 yo who was admitted here under our service from 11/16/18-11/21/18 with diverticulitis. She was treated with IV abx and  eventually discharged on Augmentin and flagyl. She finished the course of abx. Pain began to recur and CT showed new development of abdominal abscesses.     - She is non-toxic but has had increased symptoms in the past day including abdominal pain  -IV and PO agents for pain  -Surgery Consultation. Clear liquid diet for now.  IV zosyn for now.   -CP/SOA overnight.  EKG, CXR, troponin negative. Repeating this morning. May have been related to anxiety. She has seen Dr. Trujillo [facility] in the past. No history of any CAD and had negative LHC in 2012 but is followed by him periodically as f/u.     D/W RN  Reviewed previous records    Calderon Lake MD  [facility]  12/05/18  11:06 AM

## 2018-12-05 NOTE — CONSULTS
"Adult Nutrition  Assessment/PES    Patient Name:  Moriah Petty  YOB: 1940  MRN: [id number]  Admit Date:  12/4/2018    Assessment Date:  12/5/2018    Comments:  Assessment due to nursing consult, MST score of 5 for weight loss and decreased appetite. Pt with recurrent issues for diverticulitis. Looks to be down about 10-15 lb over past few months. Pt is currently off the unit in OR. Will follow clinical course, intervene as warranted.     Reason for Assessment     Row Name 12/05/18 0937          Reason for Assessment    Reason For Assessment  nurse/nurse practitioner consult     Diagnosis  gastrointestinal disease diverticulitis, recurrent; new abd abscess     Identified At Risk by Screening Criteria  MST SCORE 2+;unintentional loss of 10 lbs or more in the past 2 mos decreased appetite         Nutrition/Diet History     Row Name 12/05/18 0939          Nutrition/Diet History    Typical Food/Fluid Intake  PO intake down with recurrent diverticulitis; here from 11/16-21 for same dx. Surgery consult pending due to abscesses seen on scans when taken at OP GI follow-up. She had completed 2 abx from previous adm.       Factors Affecting Nutritional Intake  abdominal pain;other (see comments) anxiety, noted to have panic attack last night         Anthropometrics     Row Name 12/05/18 0940          Anthropometrics    Height  170.2 cm (67\")        Admit Weight    Admit Weight  70.3 kg (155 lb)        Ideal Body Weight (IBW)    Ideal Body Weight (IBW) (kg)  61.86        Usual Body Weight (UBW)    Usual Body Weight  -- 161-170 lb over past year; 165 lb 5/208, 168 lb 11/5     % of Usual Body Weight Assessment  85-95% - mild deficit     Weight Loss  unintentional     Weight Loss Time Frame  ~ 10 lb.         Body Mass Index (BMI)    BMI Assessment  BMI 18.5-24.9: normal         Labs/Tests/Procedures/Meds     Row Name 12/05/18 0925          Labs/Procedures/Meds    Lab Results Reviewed  reviewed     Lab Results " "Comments  Na 133, BUN 6, h/h low        Diagnostic Tests/Procedures    Diagnostic Test/Procedure Reviewed  reviewed     Diagnostic Test/Procedures Comments  CT abd 11/27: Interval development of 3 complex fluid collections within the pelvis        Medications    Pertinent Medications Reviewed  reviewed     Pertinent Medications Comments  abx, ppi           Nutrition Prescription Ordered     Row Name 12/05/18 0949          Nutrition Prescription PO    Current PO Diet  Clear Liquid         Evaluation of Received Nutrient/Fluid Intake     Row Name 12/05/18 0949 12/05/18 0940       Calculation Measurements    Height  --  170.2 cm (67\")       Fluid Intake Evaluation    IV Fluid (mL)  2400  --        Problem/Interventions:  Problem 1     Row Name 12/05/18 1432          Nutrition Diagnoses Problem 1    Problem 1  Altered GI Function     Etiology (related to)  Medical Diagnosis     Gastrointestinal  Diverticulitis     Signs/Symptoms (evidenced by)  Unintended Weight Change     Unintended Weight Change  Loss                 Intervention Goal     Row Name 12/05/18 1433          Intervention Goal    General  Maintain nutrition;Disease management/therapy;Reduce/improve symptoms     PO  Establish PO     Weight  Maintain weight         Nutrition Intervention     Row Name 12/05/18 1433          Nutrition Intervention    RD/Tech Action  Other (comment);Follow Tx progress;Care plan reviewd pt currently in OR           Education/Evaluation     Row Name 12/05/18 1433          Education    Education  Will Instruct as appropriate        Monitor/Evaluation    Monitor  Per protocol           Electronically signed by:  Lou Avalos RD  12/05/18 2:34 PM  "

## 2018-12-05 NOTE — CONSULTS
"Cc: Complicated diverticulitis    History of presenting illness:   This is a very nice and reasonably healthy 78-year-old lady who has been having problems really since August of this year.  Prior to that she had never had diverticulitis.  She was treated twice as an outpatient with oral antibiotics for was described as \"colitis\" in August of this year and both time seem to respond reasonably well.  She was treated again in the early part of November of this year with initial resolution and then return of her abdominal pain which prompted a CT demonstrating significant diverticular disease without evidence for perforation.  She was hospitalized at that time and treated with intravenous antibiotics and seemed to get somewhat better, although really since then she's never felt completely well.  She had a repeat CT scan which demonstrated evidence for recurrent or persistent diverticulitis but now with multiple areas of intra-abdominal abscess.  These are mostly fluid but there is also some air seen within these.  She continues to complain of left lower quadrant pain and decreased appetite.  There has been some associated weight loss and subjective fevers.  She had a colonoscopy done earlier this year which demonstrated really only diverticular disease.  There is a family history of colon cancer (her mother)    Past Medical History: Back pain, gastroesophageal reflux disease, hypertension, hypothyroidism    Past Surgical History: Brain surgical procedure described as microvascular decompression, cardiac catheterization, laparoscopic cholecystectomy 2013 as only abdominal surgery, partial hip replacement, kyphoplasty    Medications: Noted to include carvedilol, aspirin, levothyroxine, multivitamin    Allergies: Morphine    Social History: She is a former smoker who quit around 30 years ago.  Admits to drinking occasional alcohol but has not done so recently.  Lives independently with her .    Family History: " Colon cancer in her mother, ovarian cancer in her daughter    Review of Systems:  Constitutional: Positive for subjective fever, decreased appetite, chills and weight loss  Neck: no swollen glands or dysphagia or odynophagia  Respiratory: negative for SOB, cough, hemoptysis or wheezing  Cardiovascular: negative for chest pain, palpitations or peripheral edema  Gastrointestinal: Positive for abdominal pain, reflux, diarrhea      Physical Exam:   body mass index 24.3  General: alert and oriented, appropriate, no acute distress  Neck: Supple without lymphadenopathy or thyromegaly, trachea is in the midline  Respiratory: Lungs are clear bilaterally without wheezing, no use of accessory muscles is noted  Cardiovascular: Regular rate and rhythm without murmur, no peripheral edema  Gastrointestinal: Soft, tender with mild guarding in the left lower quadrant but no rebound tenderness.  No mass.  No hernia or hepatosplenomegaly.    Laboratory data: White blood cell count 9.3, hemoglobin 11.6, sodium 138, potassium 3.9 creatinine 0.6    Imaging data: CT the abdomen and pelvis reviewed by me and compared to prior CT from around 3-4 weeks ago.  There is obvious thickening of the sigmoid portion of the colon with inflammatory changes in the surrounding region.  There are multiple abscesses including one with an air-fluid level, measuring around 3-4 cm.  These do not appear to be accessible percutaneously.      Assessment and plan:   -Complicated diverticulitis    Options discussed with the patient.  It is my opinion that she has failed medical therapy at this point and I do not think that further antibiotics will manage this.  I suspect that this is not something which can be managed percutaneously and as such I see her options as laparoscopy with washout or consideration for sigmoid resection with or without coloproctostomy.  In my view given her chronic illness and the likely inflamed nature of the colon her best and safest  option would be open sigmoid resection with end colostomy formation and consideration for reversal in the future presuming reasonable health.  The risks and benefits were detailed with the patient who expressed understanding.      Emanuel Clark MD, FACS  General, Minimally Invasive and Endoscopic Surgery  [facility]    [address], [address]  [place], [place], [address]  P: [phone]  F: [phone]

## 2018-12-05 NOTE — ANESTHESIA PREPROCEDURE EVALUATION
Anesthesia Evaluation     Patient summary reviewed and Nursing notes reviewed                Airway   Mallampati: II  Dental      Pulmonary    (+) pneumonia , a smoker Former, shortness of breath, sleep apnea,   Cardiovascular     ECG reviewed  Rhythm: regular  Rate: normal    (+) hypertension, dysrhythmias PVC, PAC,       Neuro/Psych  (+) headaches, dizziness/light headedness, numbness, psychiatric history Depression,     GI/Hepatic/Renal/Endo    (+)  GERD, GI bleeding, hypothyroidism,     Musculoskeletal     Abdominal    Substance History - negative use     OB/GYN negative ob/gyn ROS         Other   (+) arthritis                     Anesthesia Plan    ASA 3 - emergent     general   Rapid sequence(Had a clear liquid lunch at non today including jello  Rapid sequence indicated)  intravenous induction   Anesthetic plan, all risks, benefits, and alternatives have been provided, discussed and informed consent has been obtained with: patient.

## 2018-12-06 PROBLEM — E44.1 MILD MALNUTRITION (HCC): Status: ACTIVE | Noted: 2018-12-06

## 2018-12-06 LAB
ANION GAP SERPL CALCULATED.3IONS-SCNC: 12.2 MMOL/L
BUN BLD-MCNC: 6 MG/DL (ref 8–23)
BUN/CREAT SERPL: 10.9 (ref 7–25)
CALCIUM SPEC-SCNC: 7.8 MG/DL (ref 8.6–10.5)
CHLORIDE SERPL-SCNC: 104 MMOL/L (ref 98–107)
CO2 SERPL-SCNC: 20.8 MMOL/L (ref 22–29)
CREAT BLD-MCNC: 0.55 MG/DL (ref 0.57–1)
DEPRECATED RDW RBC AUTO: 48.3 FL (ref 37–54)
ERYTHROCYTE [DISTWIDTH] IN BLOOD BY AUTOMATED COUNT: 14.9 % (ref 11.7–13)
GFR SERPL CREATININE-BSD FRML MDRD: 107 ML/MIN/1.73
GLUCOSE BLD-MCNC: 146 MG/DL (ref 65–99)
HCT VFR BLD AUTO: 38.9 % (ref 35.6–45.5)
HGB BLD-MCNC: 13 G/DL (ref 11.9–15.5)
MCH RBC QN AUTO: 29.7 PG (ref 26.9–32)
MCHC RBC AUTO-ENTMCNC: 33.4 G/DL (ref 32.4–36.3)
MCV RBC AUTO: 88.8 FL (ref 80.5–98.2)
PLATELET # BLD AUTO: 387 10*3/MM3 (ref 140–500)
PMV BLD AUTO: 11.4 FL (ref 6–12)
POTASSIUM BLD-SCNC: 4 MMOL/L (ref 3.5–5.2)
RBC # BLD AUTO: 4.38 10*6/MM3 (ref 3.9–5.2)
SODIUM BLD-SCNC: 137 MMOL/L (ref 136–145)
WBC NRBC COR # BLD: 12.69 10*3/MM3 (ref 4.5–10.7)

## 2018-12-06 PROCEDURE — 85027 COMPLETE CBC AUTOMATED: CPT | Performed by: SURGERY

## 2018-12-06 PROCEDURE — 80048 BASIC METABOLIC PNL TOTAL CA: CPT | Performed by: SURGERY

## 2018-12-06 PROCEDURE — 99024 POSTOP FOLLOW-UP VISIT: CPT | Performed by: SURGERY

## 2018-12-06 PROCEDURE — 25010000002 PIPERACILLIN SOD-TAZOBACTAM PER 1 G: Performed by: INTERNAL MEDICINE

## 2018-12-06 PROCEDURE — 25010000002 HYDROMORPHONE 1 MG/ML SOLUTION: Performed by: INTERNAL MEDICINE

## 2018-12-06 PROCEDURE — 25010000002 HYDROMORPHONE PER 4 MG: Performed by: SURGERY

## 2018-12-06 RX ORDER — OXYCODONE HYDROCHLORIDE AND ACETAMINOPHEN 5; 325 MG/1; MG/1
1 TABLET ORAL EVERY 4 HOURS PRN
Status: DISCONTINUED | OUTPATIENT
Start: 2018-12-06 | End: 2018-12-12 | Stop reason: HOSPADM

## 2018-12-06 RX ORDER — OXYCODONE HYDROCHLORIDE AND ACETAMINOPHEN 5; 325 MG/1; MG/1
2 TABLET ORAL EVERY 4 HOURS PRN
Status: DISCONTINUED | OUTPATIENT
Start: 2018-12-06 | End: 2018-12-12 | Stop reason: HOSPADM

## 2018-12-06 RX ADMIN — TAZOBACTAM SODIUM AND PIPERACILLIN SODIUM 3.38 G: 375; 3 INJECTION, SOLUTION INTRAVENOUS at 17:31

## 2018-12-06 RX ADMIN — CARVEDILOL 12.5 MG: 12.5 TABLET, FILM COATED ORAL at 17:31

## 2018-12-06 RX ADMIN — SODIUM CHLORIDE, PRESERVATIVE FREE 3 ML: 5 INJECTION INTRAVENOUS at 20:30

## 2018-12-06 RX ADMIN — HYDROMORPHONE HYDROCHLORIDE 0.5 MG: 1 INJECTION, SOLUTION INTRAMUSCULAR; INTRAVENOUS; SUBCUTANEOUS at 00:20

## 2018-12-06 RX ADMIN — HYDROMORPHONE HYDROCHLORIDE 0.5 MG: 1 INJECTION, SOLUTION INTRAMUSCULAR; INTRAVENOUS; SUBCUTANEOUS at 06:50

## 2018-12-06 RX ADMIN — LOSARTAN POTASSIUM 50 MG: 50 TABLET, FILM COATED ORAL at 09:43

## 2018-12-06 RX ADMIN — HYDROMORPHONE HYDROCHLORIDE 0.5 MG: 1 INJECTION, SOLUTION INTRAMUSCULAR; INTRAVENOUS; SUBCUTANEOUS at 03:10

## 2018-12-06 RX ADMIN — SODIUM CHLORIDE, PRESERVATIVE FREE 3 ML: 5 INJECTION INTRAVENOUS at 15:56

## 2018-12-06 RX ADMIN — LEVOTHYROXINE SODIUM 25 MCG: 25 TABLET ORAL at 12:11

## 2018-12-06 RX ADMIN — SODIUM CHLORIDE 100 ML/HR: 9 INJECTION, SOLUTION INTRAVENOUS at 00:01

## 2018-12-06 RX ADMIN — OXYCODONE HYDROCHLORIDE AND ACETAMINOPHEN 1 TABLET: 7.5; 325 TABLET ORAL at 01:25

## 2018-12-06 RX ADMIN — TAZOBACTAM SODIUM AND PIPERACILLIN SODIUM 3.38 G: 375; 3 INJECTION, SOLUTION INTRAVENOUS at 01:29

## 2018-12-06 RX ADMIN — HYDROMORPHONE HYDROCHLORIDE 1 MG: 1 INJECTION, SOLUTION INTRAMUSCULAR; INTRAVENOUS; SUBCUTANEOUS at 15:52

## 2018-12-06 RX ADMIN — OXYCODONE HYDROCHLORIDE AND ACETAMINOPHEN 1 TABLET: 7.5; 325 TABLET ORAL at 12:12

## 2018-12-06 RX ADMIN — CARVEDILOL 12.5 MG: 12.5 TABLET, FILM COATED ORAL at 09:42

## 2018-12-06 RX ADMIN — HYDROMORPHONE HYDROCHLORIDE 1 MG: 1 INJECTION, SOLUTION INTRAMUSCULAR; INTRAVENOUS; SUBCUTANEOUS at 20:31

## 2018-12-06 RX ADMIN — HYDROMORPHONE HYDROCHLORIDE 0.5 MG: 1 INJECTION, SOLUTION INTRAMUSCULAR; INTRAVENOUS; SUBCUTANEOUS at 09:49

## 2018-12-06 RX ADMIN — HYDROMORPHONE HYDROCHLORIDE 1 MG: 1 INJECTION, SOLUTION INTRAMUSCULAR; INTRAVENOUS; SUBCUTANEOUS at 22:49

## 2018-12-06 RX ADMIN — FAMOTIDINE 20 MG: 20 TABLET, FILM COATED ORAL at 09:42

## 2018-12-06 RX ADMIN — OXYCODONE HYDROCHLORIDE AND ACETAMINOPHEN 1 TABLET: 7.5; 325 TABLET ORAL at 07:46

## 2018-12-06 RX ADMIN — TAZOBACTAM SODIUM AND PIPERACILLIN SODIUM 3.38 G: 375; 3 INJECTION, SOLUTION INTRAVENOUS at 09:49

## 2018-12-06 RX ADMIN — SODIUM CHLORIDE 100 ML/HR: 9 INJECTION, SOLUTION INTRAVENOUS at 20:32

## 2018-12-06 NOTE — NURSING NOTE
12/06/18 1000   Colostomy LLQ   Placement Date: 12/05/18   Inserted by: DR. KIMBROUGH  Hand Hygiene Completed: Yes  Colostomy Type: Descending/sigmoid  Location: LLQ  Stoma Size (cm): 25 cm   Stomal Appliance 2 piece;Intact;Drainable   Stoma Appearance round;moist;red;protruding above skin level   Peristomal Assessment FÉLIX   Stoma Function serosanguineous   CWOCN visit to assess new colostomy.  [place] 2 piece pouch intact.  Stoma is red/  Moist.  Area is painful to touch per patient; pain meds provided by RN.  Patient not ready to look at stoma yet but states she understands what has happened in surgery.  Not yet ready for more instruction; supplies and literature left in patient's room.  Will check on status tomorrow.

## 2018-12-06 NOTE — ANESTHESIA POSTPROCEDURE EVALUATION
Patient: Moriah Petty    Procedure Summary     Date:  12/05/18 Room / Location:  [facility] OR  / [facility] MAIN OR    Anesthesia Start:  1604 Anesthesia Stop:  1850    Procedure:  exploratory laparotomy with sigmoid resection, possible colostomy, APPENDECTOMY (N/A Abdomen) Diagnosis:       Diverticulitis of large intestine with abscess without bleeding      (Diverticulitis of large intestine with abscess without bleeding [K57.20])    Surgeon:  Emanuel Clark MD Provider:  Leonardo Ramirez MD    Anesthesia Type:  general ASA Status:  3 - Emergent          Anesthesia Type: general  Last vitals  BP   118/60 (12/05/18 2121)   Temp   36.4 °C (97.6 °F) (12/05/18 2121)   Pulse   80 (12/05/18 2121)   Resp   16 (12/05/18 2121)     SpO2   99 % (12/05/18 2121)     Anesthesia Post Evaluation

## 2018-12-06 NOTE — PLAN OF CARE
Problem: Patient Care Overview  Goal: Plan of Care Review  Outcome: Ongoing (interventions implemented as appropriate)   12/06/18 0551   Plan of Care Review   Progress improving   OTHER   Outcome Summary med for pain, c-ostomy intact-no output, f/c inplace, iv abt, positioned     Goal: Discharge Needs Assessment  Outcome: Ongoing (interventions implemented as appropriate)    Goal: Interprofessional Rounds/Family Conf  Outcome: Ongoing (interventions implemented as appropriate)      Problem: Infection, Risk/Actual (Adult)  Goal: Infection Prevention/Resolution  Outcome: Ongoing (interventions implemented as appropriate)      Problem: Pain, Acute (Adult)  Goal: Acceptable Pain Control/Comfort Level  Outcome: Ongoing (interventions implemented as appropriate)

## 2018-12-06 NOTE — PROGRESS NOTES
Postoperative day #1 sigmoid colectomy, small bowel resection and appendectomy    Subjective:  Complains of incisional pain area no nausea but is having some belching.  No ostomy function.    Objective:  Temperature 97.4 heart rate 88 blood pressure 116/73  Gen.: Awake alert and oriented, no acute distress  Gastrointestinal: Abdomen is soft, mildly distended and appropriately tender.  Dressings are clean.  Ostomy is viable and mildly edematous with no output.    Assessment and plan:  -Severe acute diverticulitis, complicated with abscess formation.  Now status post sigmoid resection and proximally diverted.  Continue pain control.  Leave at clear liquids for now.  Plan to discontinue Avelar catheter tomorrow.  Attempt to mobilize today.    Emanuel Clark MD  General and Endoscopic Surgery  [facility], [place], operated by [facility]    [address], [address]  [place], [place], [address]  P: [phone]  F: [phone]

## 2018-12-06 NOTE — PROGRESS NOTES
Discharge Planning Assessment  [facility]     Patient Name: Moriah Petty  MRN: [id number]  Today's Date: 12/6/2018    Admit Date: 12/4/2018    Discharge Needs Assessment    No documentation.       Discharge Plan     Row Name 12/06/18 1631       Plan    Plan Comments  The patient transferred from [facility] to [facility] room 677. CCP will continue to follow for any needs that may arise. RADHA Narayanan RN, CCP.         Destination      No service coordination in this encounter.      Durable Medical Equipment      No service coordination in this encounter.      Dialysis/Infusion      No service coordination in this encounter.      Home Medical Care      No service coordination in this encounter.      Community Resources      No service coordination in this encounter.          Demographic Summary    No documentation.       Functional Status    No documentation.       Psychosocial    No documentation.       Abuse/Neglect    No documentation.       Legal    No documentation.       Substance Abuse    No documentation.       Patient Forms    No documentation.           Shanique Narayanan RN

## 2018-12-06 NOTE — CONSULTS
"Adult Nutrition  Assessment/PES    Patient Name:  Moriah Petty  YOB: 1940  MRN: [id number]  Admit Date:  12/4/2018    Assessment Date:  12/6/2018    Comments:  Nutrition follow-up, pt is POD 1 from sigmoid colectomy & colostomy, and incidental appendectomy. Initially consulted by nursing re: weight loss and decreased appetite. Pt indicates she's been battling with diverticulitis for past 3 months - she has lost ~ 10 lb or 6% body weight over these 3 months. Today, she is tolerating clears, no nausea, but having pain around her colostomy and ^ belching. We discussed diet progression, supplements for ^ protein/calories. Will start Boost Breeze today and change over to Boost Plus as diet advances. She meets criteria for mild malnutrition of chronic illness based on weight loss and energy intake. Will cont to follow.     Reason for Assessment     Row Name 12/06/18 1412          Reason for Assessment    Reason For Assessment  follow-up protocol         Nutrition/Diet History     Row Name 12/06/18 1413          Nutrition/Diet History    Typical Food/Fluid Intake  POD 1 colectomy and SB resection. Tolerating clears, no nausea, + belching. Said the broth tasted good, knows she needs to drink more water.      Food Preferences  \"beef, potatoes and salad sound really good\"     Supplemental Drinks/Foods/Additives  pt does not use at home but agrees to try boost supplements while healing     Factors Affecting Nutritional Intake  pain         Anthropometrics     Row Name 12/06/18 1415          Usual Body Weight (UBW)    Usual Body Weight  74.8 kg (165 lb) pt indicates that's been her weight for past year     % of Usual Body Weight Assessment  85-95% - mild deficit     Weight Loss  unintentional     Weight Loss Time Frame  10 lb/3 mos        Body Mass Index (BMI)    BMI Assessment  BMI 18.5-24.9: normal         Labs/Tests/Procedures/Meds     Row Name 12/06/18 1417          Labs/Procedures/Meds    Lab Results " "Reviewed  reviewed     Lab Results Comments  glu, bun        Diagnostic Tests/Procedures    Diagnostic Test/Procedure Reviewed  reviewed, pertinent     Diagnostic Test/Procedures Comments  12/5: sigmoid colectomy and SB resection with ostomy; incidental appendectomy        Medications    Pertinent Medications Reviewed  reviewed         Physical Findings     Row Name 12/06/18 1417          Physical Findings    Gastrointestinal  ostomy     Skin  other (see comments) abd incision         Estimated/Assessed Needs     Row Name 12/06/18 1418          Calculation Measurements    Weight Used For Calculations  70.5 kg (155 lb 6.8 oz)        Estimated/Assessed Needs    Additional Documentation  Protein Requirements (Group);Fluid Requirements (Group);KCAL/KG (Group)        KCAL/KG    14 Kcal/Kg (kcal)  987     15 Kcal/Kg (kcal)  1057.5     18 Kcal/Kg (kcal)  1269     20 Kcal/Kg (kcal)  1410     25 Kcal/Kg (kcal)  1762.5     30 Kcal/Kg (kcal)  2115     35 Kcal/Kg (kcal)  2467.5     40 Kcal/Kg (kcal)  2820     45 Kcal/Kg (kcal)  3172.5     50 Kcal/Kg (kcal)  3525     kcal/kg (Specify)  25                     Protein Requirements    Est Protein Requirement Amount (gms/kg)  1.2 gm protein     Estimated Protein Requirements (gms/day)  84.6        Fluid Requirements    Estimated Fluid Requirement Method  RDA Method               Nutrition Prescription Ordered     Row Name 12/06/18 1418          Nutrition Prescription PO    Current PO Diet  Clear Liquid         Evaluation of Received Nutrient/Fluid Intake     Row Name 12/06/18 1418          Calculation Measurements    Weight Used For Calculations  70.5 kg (155 lb 6.8 oz)        PO Evaluation    Number of Days PO Intake Evaluated  1 day     % PO Intake  \"drank the broth\"         Evaluation of Prescribed Nutrient/Fluid Intake     Row Name 12/06/18 1418          Calculation Measurements    Weight Used For Calculations  70.5 kg (155 lb 6.8 oz)         Malnutrition Severity Assessment     " Row Name 12/06/18 1419          Malnutrition Severity Assessment    Malnutrition Type  Chronic Illness Malnutrition        Weight Status (Chronic)    Weight Loss  Mild (>5% / 3 mo) 6% or 10 lb        Energy Intake Status (Chronic)    Energy Intake  Mod (<75% / > or equal to 1 mo)        Criteria Met (Must meet criteria for severity in at least 2 of these categories: M Wasting, Fat Loss, Fluid, Secondary Signs, Wt. Status, Intake)    Patient meets criteria for   Mild malnutrition           Problem/Interventions:    Problem 2     Row Name 12/06/18 1423          Nutrition Diagnoses Problem 2    Problem 2  Malnutrition     Etiology (related to)  Medical Diagnosis     Gastrointestinal  Diverticulitis;Bowel resection;Colostomy     Signs/Symptoms (evidenced by)  % UBW;Unintended Weight Change     Percent (%) UBW  93 %     Unintended Weight Change  Loss     Number of Pounds Lost  10 lb     Weight loss time period  3 mos               Intervention Goal     Row Name 12/06/18 1424          Intervention Goal    General  Maintain nutrition;Nutrition support treatment;Disease management/therapy;Provide information regarding MNT for treatment/condition;Meet nutritional needs for age/condition;Reduce/improve symptoms     PO  Tolerate PO;Increase intake;PO intake (%);Advance diet     PO Intake %  75 %     Weight  Maintain weight         Nutrition Intervention     Row Name 12/06/18 1424          Nutrition Intervention    RD/Tech Action  Recommend/ordered;Follow Tx progress;Care plan reviewd;Encourage intake     Recommended/Ordered  Supplement         Nutrition Prescription     Row Name 12/06/18 1424          Nutrition Prescription PO    PO Prescription  Begin/change supplement     Supplement  Boost Breeze     Supplement Frequency  3 times a day     New PO Prescription Ordered?  Yes         Education/Evaluation     Row Name 12/06/18 1425          Education    Education  Provided education regarding     Provided education regarding   Diet rationale;Other (comment) Diet progression post-resection, supplements        Monitor/Evaluation    Monitor  Per protocol;I&O;PO intake;Supplement intake;Pertinent labs;Symptoms     Education Follow-up  Reinforce PRN           Electronically signed by:  Lou Avalos RD  12/06/18 2:26 PM

## 2018-12-06 NOTE — PLAN OF CARE
Problem: Nutrition, Imbalanced: Inadequate Oral Intake (Adult)  Intervention: Promote/Optimize Nutrition   12/06/18 1433   Nutrition Interventions   Oral Nutrition Promotion calorie dense liquids provided     Added Boost Breeze to regimen (clear liq supple)    Goal: Identify Related Risk Factors and Signs and Symptoms  Outcome: Outcome(s) achieved Date Met: 12/06/18 12/06/18 1433   Nutrition, Imbalanced: Inadequate Oral Intake (Adult)   Related Risk Factors (Nutrition Imbalance, Inadequate Oral Intake) appetite decreased;chronic illness/infection   Signs and Symptoms (Nutrition Imbalance, Inadequate Oral Intake: Signs and Symptoms) weight decreased (percent weight loss, percent usual body weight, body mass index less than 18.5) (Adults)

## 2018-12-06 NOTE — PROGRESS NOTES
Name: Moriah Petty ADMIT: 2018   : 1940  PCP: Jarred Carrizales Jr., MD    MRN: [id number] LOS: 2 days   AGE/SEX: 78 y.o. female  ROOM: [facility]   Subjective   Cc- abd pain      S/p OR yesterday    abd pain is moderate- severe today  Only partially Improved with dilaudid  On some sips  No chest pain      ROS  No f/c  No n/v  -CP/-palp  No soa/cough  +abd pain    Objective   Vital Signs  Temp:  [97 °F (36.1 °C)-98.4 °F (36.9 °C)] 97 °F (36.1 °C)  Heart Rate:  [70-88] 74  Resp:  [16] 16  BP: ()/(60-76) 104/63  SpO2:  [92 %-100 %] 97 %  on  Flow (L/min):  [2-4] 2;   Device (Oxygen Therapy): nasal cannula  Body mass index is 24.34 kg/m².    Physical Exam   Constitutional: She is oriented to person, place, and time. She appears well-developed and well-nourished.   In some pain   HENT:   Head: Normocephalic and atraumatic.   Eyes: Conjunctivae are normal. No scleral icterus.   Neck: Neck supple. No tracheal deviation present.   Cardiovascular: Normal rate and regular rhythm.   No murmur heard.  Pulmonary/Chest: Effort normal and breath sounds normal.   Abdominal: Soft. There is tenderness (mild generalized tenderness). There is no guarding.   Post op changes  +ostomy.  +but decreased bs   Genitourinary:   Genitourinary Comments: Deferred    Musculoskeletal: She exhibits no edema.   Neurological: She is alert and oriented to person, place, and time. She exhibits normal muscle tone.   Skin: Skin is warm and dry.   Psychiatric: She has a normal mood and affect. Her behavior is normal.       Results Review:       I reviewed the patient's new clinical results.  Results from last 7 days   Lab Units  18   0909  18   1808   WBC 10*3/mm3  12.69*  9.30   HEMOGLOBIN g/dL  13.0  11.6*   PLATELETS 10*3/mm3  387  404     Results from last 7 days   Lab Units  18   0909  18   1034  18   1741  18   1539   SODIUM mmol/L  137  138  133*   --    POTASSIUM mmol/L  4.0  3.9  4.0   --     CHLORIDE mmol/L  104  105  99   --    CO2 mmol/L  20.8*  22.9  21.2*   --    BUN mg/dL  6*  5*  6*   --    CREATININE mg/dL  0.55*  0.62  0.66  0.60   GLUCOSE mg/dL  146*  95  92   --    Estimated Creatinine Clearance: 63.5 mL/min (A) (by C-G formula based on SCr of 0.55 mg/dL (L)).  Results from last 7 days   Lab Units  12/04/18   1741   ALBUMIN g/dL  3.80   BILIRUBIN mg/dL  0.3   ALK PHOS U/L  37*   AST (SGOT) U/L  20   ALT (SGPT) U/L  10     Results from last 7 days   Lab Units  12/06/18   0909  12/05/18   1034  12/04/18   1741   CALCIUM mg/dL  7.8*  8.4*  9.2   ALBUMIN g/dL   --    --   3.80       12/04/2018 2238  12/04/2018 2312  Troponin [[id number]]    Blood     Final result  Troponin T <0.010 ng/mL            XR Chest 1 View [[id number]] Alvaro as Reviewed   Order Status: Completed Collected: 12/04/18 2227    Updated: 12/04/18 2231   Narrative:     PORTABLE CHEST X-RAY     HISTORY: chest pain     COMPARISON: November 18, 2013.     FINDINGS: Portable AP view of the chest was obtained. The lungs are well  inflated. Apical hyperlucencies likely indicative of some underlying  emphysema. Mild fibrotic changes. There are calcified residua of  granulomatous disease present. No active airspace disease, edema, or  pleural fluid. Borderline cardiomegaly.            Impression:     No active disease by portable imaging     This report was finalized on 12/4/2018 10:28 PM by Doe Marshall M.D.      CT Abdomen Pelvis With Contrast [[id number]] Alvaro as Reviewed   Order Status: Completed Collected: 12/04/18 1349    Updated: 12/04/18 1349   Narrative:     CT ABDOMEN AND PELVIS WITH IV CONTRAST     HISTORY: 78-year-old female with diverticulitis. Follow-up.     TECHNIQUE: Radiation dose reduction techniques were utilized, including  automated exposure control and exposure modulation based on body size.   3 mm images were obtained through the abdomen and pelvis after the  administration of IV contrast. Compared with previous CT  from  11/16/2018.     FINDINGS: There has been interval development of 3 small pelvic complex  fluid collections. There is an approximately 3.2 x 2.2 cm air-fluid  collection between the thickened sigmoid colon and the left aspect of  the uterus, image 104. Slightly inferiorly anterior to the uterus is a  3.8 x 2.6 cm fluid collection and to the right is an approximately 2 x 1  cm fluid collection. These collections have a thickened enhancing wall.  There is persistent thickening of the sigmoid colon and there is mild  peridiverticular haziness. There is no free air. There is no bowel  obstruction. Appendix appears within normal limits. There is no acute  abnormality seen involving the liver, spleen, pancreas, adrenals, or  kidneys. There are a few small renal cysts bilaterally as well as a  stable 10.5 cm left renal cyst with peripheral calcifications. There are  moderately extensive abdominal aortic atherosclerotic changes without  aneurysmal dilatation. Old L2 compression fracture appears stable.      Impression:     Interval development of 3 complex fluid collections within the pelvis  which likely represent abscesses. Unfortunately, these abscesses are not  accessible for CT-guided drainage.     EKG personally viewed and interpreted   Sinus  No ischemia  LVH  qtc 458      carvedilol 12.5 mg Oral BID With Meals   famotidine 20 mg Oral Daily   levothyroxine 25 mcg Oral Daily   losartan 50 mg Oral Daily   piperacillin-tazobactam 3.375 g Intravenous Q8H   sodium chloride 3 mL Intravenous Q12H       sodium chloride 100 mL/hr Last Rate: 100 mL/hr (12/06/18 0001)   Diet Clear Liquid      Assessment/Plan      Active Hospital Problems    Diagnosis Date Noted   • **Diverticulitis of large intestine with abscess without bleeding [K57.20] 12/04/2018   • Mild malnutrition (CMS/HCC) [E44.1] 12/06/2018   • Chest pain, atypical [R07.89] 12/05/2018   • Family history of colon cancer [Z80.0] 11/06/2017   • Anemia [D64.9]  03/04/2017   • Sleep apnea [G47.30] 03/04/2017      Resolved Hospital Problems   No resolved problems to display.       Leigh 79 yo who was admitted here under our service from 11/16/18-11/21/18 with diverticulitis. She was treated with IV abx and eventually discharged on Augmentin and flagyl. She finished the course of abx. Pain began to recur and CT showed new development of abdominal abscesses.     She was admitted and started on IV abx. 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 Clark on 12/5/18.    -IV and PO agents for pain. Will increase doses as pain not controlled. Close monitoring  - IV zosyn   -She has seen Dr. [facility] in the past. No history of any CAD and had negative LHC in 2012 but is followed by him periodically as f/u. Some chest pain earlier in stay without evidence of ischemia on testing.     D/W RN  Reviewed previous records    Calderon Lake MD  [facility]  12/06/18  2:42 PM

## 2018-12-06 NOTE — PROGRESS NOTES
Malnutrition Severity Assessment    Patient Name:  Moriah Petty  YOB: 1940  MRN: [id number]  Admit Date:  12/4/2018    Patient meets criteria for : Mild malnutrition    Comments:  Pt meets criteria for mild malnutrition based on weight status (6% weight change/3 months) and decreased energy intake r/t recurrent diverticulitis.     Full RD assessment in separate note.     Malnutrition Type: Chronic Illness Malnutrition     Malnutrition Type (last 8 hours)      Malnutrition Severity Assessment     Row Name 12/06/18 1419       Malnutrition Severity Assessment    Malnutrition Type  Chronic Illness Malnutrition    Row Name 12/06/18 1419       Weight Status (Chronic)    Weight Loss  Mild (>5% / 3 mo) 6% or 10 lb    Row Name 12/06/18 1419       Energy Intake Status (Chronic)    Energy Intake  Mod (<75% / > or equal to 1 mo)    Row Name 12/06/18 1419       Criteria Met (Must meet criteria for severity in at least 2 of these categories: M Wasting, Fat Loss, Fluid, Secondary Signs, Wt. Status, Intake)    Patient meets criteria for   Mild malnutrition          Electronically signed by:  Lou Avalos RD  12/06/18 2:21 PM

## 2018-12-07 LAB
ANION GAP SERPL CALCULATED.3IONS-SCNC: 12.1 MMOL/L
BUN BLD-MCNC: 7 MG/DL (ref 8–23)
BUN/CREAT SERPL: 12.7 (ref 7–25)
CALCIUM SPEC-SCNC: 7.5 MG/DL (ref 8.6–10.5)
CHLORIDE SERPL-SCNC: 103 MMOL/L (ref 98–107)
CO2 SERPL-SCNC: 20.9 MMOL/L (ref 22–29)
CREAT BLD-MCNC: 0.55 MG/DL (ref 0.57–1)
CYTO UR: NORMAL
DEPRECATED RDW RBC AUTO: 51.5 FL (ref 37–54)
ERYTHROCYTE [DISTWIDTH] IN BLOOD BY AUTOMATED COUNT: 15 % (ref 11.7–13)
GFR SERPL CREATININE-BSD FRML MDRD: 107 ML/MIN/1.73
GLUCOSE BLD-MCNC: 123 MG/DL (ref 65–99)
HCT VFR BLD AUTO: 32.1 % (ref 35.6–45.5)
HGB BLD-MCNC: 9.9 G/DL (ref 11.9–15.5)
LAB AP CASE REPORT: NORMAL
MCH RBC QN AUTO: 28.9 PG (ref 26.9–32)
MCHC RBC AUTO-ENTMCNC: 30.8 G/DL (ref 32.4–36.3)
MCV RBC AUTO: 93.6 FL (ref 80.5–98.2)
PATH REPORT.FINAL DX SPEC: NORMAL
PATH REPORT.GROSS SPEC: NORMAL
PLATELET # BLD AUTO: 330 10*3/MM3 (ref 140–500)
PMV BLD AUTO: 11.2 FL (ref 6–12)
POTASSIUM BLD-SCNC: 4 MMOL/L (ref 3.5–5.2)
RBC # BLD AUTO: 3.43 10*6/MM3 (ref 3.9–5.2)
SODIUM BLD-SCNC: 136 MMOL/L (ref 136–145)
WBC NRBC COR # BLD: 14.85 10*3/MM3 (ref 4.5–10.7)

## 2018-12-07 PROCEDURE — 85027 COMPLETE CBC AUTOMATED: CPT | Performed by: INTERNAL MEDICINE

## 2018-12-07 PROCEDURE — 99024 POSTOP FOLLOW-UP VISIT: CPT | Performed by: SURGERY

## 2018-12-07 PROCEDURE — 25010000002 PIPERACILLIN SOD-TAZOBACTAM PER 1 G: Performed by: INTERNAL MEDICINE

## 2018-12-07 PROCEDURE — 25010000002 HYDROMORPHONE 1 MG/ML SOLUTION: Performed by: INTERNAL MEDICINE

## 2018-12-07 PROCEDURE — 80048 BASIC METABOLIC PNL TOTAL CA: CPT | Performed by: INTERNAL MEDICINE

## 2018-12-07 RX ADMIN — CARVEDILOL 12.5 MG: 12.5 TABLET, FILM COATED ORAL at 17:12

## 2018-12-07 RX ADMIN — HYDROMORPHONE HYDROCHLORIDE 1 MG: 1 INJECTION, SOLUTION INTRAMUSCULAR; INTRAVENOUS; SUBCUTANEOUS at 05:46

## 2018-12-07 RX ADMIN — TAZOBACTAM SODIUM AND PIPERACILLIN SODIUM 3.38 G: 375; 3 INJECTION, SOLUTION INTRAVENOUS at 16:40

## 2018-12-07 RX ADMIN — SODIUM CHLORIDE 100 ML/HR: 9 INJECTION, SOLUTION INTRAVENOUS at 06:52

## 2018-12-07 RX ADMIN — CARVEDILOL 12.5 MG: 12.5 TABLET, FILM COATED ORAL at 08:23

## 2018-12-07 RX ADMIN — LOSARTAN POTASSIUM 50 MG: 50 TABLET, FILM COATED ORAL at 08:23

## 2018-12-07 RX ADMIN — OXYCODONE AND ACETAMINOPHEN 1 TABLET: 5; 325 TABLET ORAL at 06:52

## 2018-12-07 RX ADMIN — SODIUM CHLORIDE 100 ML/HR: 9 INJECTION, SOLUTION INTRAVENOUS at 17:11

## 2018-12-07 RX ADMIN — OXYCODONE AND ACETAMINOPHEN 2 TABLET: 5; 325 TABLET ORAL at 17:14

## 2018-12-07 RX ADMIN — OXYCODONE AND ACETAMINOPHEN 2 TABLET: 5; 325 TABLET ORAL at 11:57

## 2018-12-07 RX ADMIN — TAZOBACTAM SODIUM AND PIPERACILLIN SODIUM 3.38 G: 375; 3 INJECTION, SOLUTION INTRAVENOUS at 00:01

## 2018-12-07 RX ADMIN — LEVOTHYROXINE SODIUM 25 MCG: 25 TABLET ORAL at 08:23

## 2018-12-07 RX ADMIN — OXYCODONE AND ACETAMINOPHEN 2 TABLET: 5; 325 TABLET ORAL at 22:28

## 2018-12-07 RX ADMIN — TAZOBACTAM SODIUM AND PIPERACILLIN SODIUM 3.38 G: 375; 3 INJECTION, SOLUTION INTRAVENOUS at 08:23

## 2018-12-07 RX ADMIN — SODIUM CHLORIDE, PRESERVATIVE FREE 3 ML: 5 INJECTION INTRAVENOUS at 22:28

## 2018-12-07 RX ADMIN — FAMOTIDINE 20 MG: 20 TABLET, FILM COATED ORAL at 08:23

## 2018-12-07 NOTE — PLAN OF CARE
Problem: Patient Care Overview  Goal: Plan of Care Review  Outcome: Ongoing (interventions implemented as appropriate)   12/07/18 1641   Coping/Psychosocial   Plan of Care Reviewed With patient;spouse   Plan of Care Review   Progress no change   OTHER   Outcome Summary midline abd island dsg cdi. L colostomy stoma pink and raised, no flatus and no stool. bowel sounds absent. voiding without diff, needs enc to walk only made it 200 ft. drowy with pain pills but better pain relief. enc cdb. scds on.      Goal: Individualization and Mutuality  Outcome: Ongoing (interventions implemented as appropriate)    Goal: Discharge Needs Assessment  Outcome: Ongoing (interventions implemented as appropriate)    Goal: Interprofessional Rounds/Family Conf  Outcome: Ongoing (interventions implemented as appropriate)      Problem: Infection, Risk/Actual (Adult)  Goal: Infection Prevention/Resolution  Outcome: Ongoing (interventions implemented as appropriate)      Problem: Pain, Acute (Adult)  Goal: Acceptable Pain Control/Comfort Level  Outcome: Ongoing (interventions implemented as appropriate)      Problem: Nutrition, Imbalanced: Inadequate Oral Intake (Adult)  Goal: Improved Oral Intake  Outcome: Ongoing (interventions implemented as appropriate)    Goal: Prevent Further Weight Loss  Outcome: Ongoing (interventions implemented as appropriate)

## 2018-12-07 NOTE — NURSING NOTE
"   12/07/18 1050   Colostomy LLQ   Placement Date: 12/05/18   Inserted by: DR. KIMBROUGH  Hand Hygiene Completed: Yes  Colostomy Type: Descending/sigmoid  Location: LLQ  Stoma Size (cm): 25 cm   Stomal Appliance 2 piece;Intact;Changed   Stoma Appearance round;moist;red   Peristomal Assessment Clean;Intact   Accessories/Skin Care cleansed with water   Stoma Function serosanguineous   CWOCN follow up for colostomy pouch change and instruction.  Pouch changed with [place] 2 piece 2 1/4\" flat.  Stoma is red/ moist.  Peristomal skin is intact.  Steps of pouch changed verbalized and demonstrated to patient.  She will require continued instruction.  "

## 2018-12-07 NOTE — PROGRESS NOTES
Name: Moraih Petty ADMIT: 2018   : 1940  PCP: Jarred Carrizales Jr., MD    MRN: [id number] LOS: 3 days   AGE/SEX: 78 y.o. female  ROOM: [facility]   Subjective   Cc- abd pain      abd pain is moderate- today  partially with meds  Tolerating liquids    ROS  No f/c  No n/v  -CP/-palp  No soa/cough  +abd pain    Objective   Vital Signs  Temp:  [97 °F (36.1 °C)-99.2 °F (37.3 °C)] 97 °F (36.1 °C)  Heart Rate:  [70-78] 78  Resp:  [16] 16  BP: (106-123)/(56-65) 111/57  SpO2:  [95 %-97 %] 97 %  on  Flow (L/min):  [2] 2;   Device (Oxygen Therapy): nasal cannula  Body mass index is 24.34 kg/m².    Physical Exam   Constitutional: She is oriented to person, place, and time. She appears well-developed and well-nourished.   In some pain   HENT:   Head: Normocephalic and atraumatic.   Eyes: Conjunctivae are normal. No scleral icterus.   Neck: Neck supple. No tracheal deviation present.   Cardiovascular: Normal rate and regular rhythm.   No murmur heard.  Pulmonary/Chest: Effort normal and breath sounds normal.   Abdominal: Soft. There is tenderness (mild generalized tenderness). There is no guarding.   Post op changes  +ostomy.  +but decreased bs   Genitourinary:   Genitourinary Comments: Deferred    Musculoskeletal: She exhibits no edema.   Neurological: She is alert and oriented to person, place, and time. She exhibits normal muscle tone.   Skin: Skin is warm and dry.   Psychiatric: She has a normal mood and affect. Her behavior is normal.       Results Review:       I reviewed the patient's new clinical results.  Results from last 7 days   Lab Units  18   0607  18   0909  18   1808   WBC 10*3/mm3  14.85*  12.69*  9.30   HEMOGLOBIN g/dL  9.9*  13.0  11.6*   PLATELETS 10*3/mm3  330  387  404     Results from last 7 days   Lab Units  18   0607  18   0909  18   1034  18   1741   SODIUM mmol/L  136  137  138  133*   POTASSIUM mmol/L  4.0  4.0  3.9  4.0   CHLORIDE mmol/L  103  104   105  99   CO2 mmol/L  20.9*  20.8*  22.9  21.2*   BUN mg/dL  7*  6*  5*  6*   CREATININE mg/dL  0.55*  0.55*  0.62  0.66   GLUCOSE mg/dL  123*  146*  95  92   Estimated Creatinine Clearance: 63.5 mL/min (A) (by C-G formula based on SCr of 0.55 mg/dL (L)).  Results from last 7 days   Lab Units  12/04/18   1741   ALBUMIN g/dL  3.80   BILIRUBIN mg/dL  0.3   ALK PHOS U/L  37*   AST (SGOT) U/L  20   ALT (SGPT) U/L  10     Results from last 7 days   Lab Units  12/07/18   0607  12/06/18   0909  12/05/18   1034  12/04/18   1741   CALCIUM mg/dL  7.5*  7.8*  8.4*  9.2   ALBUMIN g/dL   --    --    --   3.80       12/04/2018 2238  12/04/2018 2312  Troponin [[id number]]    Blood     Final result  Troponin T <0.010 ng/mL            XR Chest 1 View [[id number]] Alvaro as Reviewed   Order Status: Completed Collected: 12/04/18 2227    Updated: 12/04/18 2231   Narrative:     PORTABLE CHEST X-RAY     HISTORY: chest pain     COMPARISON: November 18, 2013.     FINDINGS: Portable AP view of the chest was obtained. The lungs are well  inflated. Apical hyperlucencies likely indicative of some underlying  emphysema. Mild fibrotic changes. There are calcified residua of  granulomatous disease present. No active airspace disease, edema, or  pleural fluid. Borderline cardiomegaly.            Impression:     No active disease by portable imaging     This report was finalized on 12/4/2018 10:28 PM by Doe Marshall M.D.      CT Abdomen Pelvis With Contrast [[id number]] Alvaro as Reviewed   Order Status: Completed Collected: 12/04/18 1349    Updated: 12/04/18 1349   Narrative:     CT ABDOMEN AND PELVIS WITH IV CONTRAST     HISTORY: 78-year-old female with diverticulitis. Follow-up.     TECHNIQUE: Radiation dose reduction techniques were utilized, including  automated exposure control and exposure modulation based on body size.   3 mm images were obtained through the abdomen and pelvis after the  administration of IV contrast. Compared with previous CT  from  11/16/2018.     FINDINGS: There has been interval development of 3 small pelvic complex  fluid collections. There is an approximately 3.2 x 2.2 cm air-fluid  collection between the thickened sigmoid colon and the left aspect of  the uterus, image 104. Slightly inferiorly anterior to the uterus is a  3.8 x 2.6 cm fluid collection and to the right is an approximately 2 x 1  cm fluid collection. These collections have a thickened enhancing wall.  There is persistent thickening of the sigmoid colon and there is mild  peridiverticular haziness. There is no free air. There is no bowel  obstruction. Appendix appears within normal limits. There is no acute  abnormality seen involving the liver, spleen, pancreas, adrenals, or  kidneys. There are a few small renal cysts bilaterally as well as a  stable 10.5 cm left renal cyst with peripheral calcifications. There are  moderately extensive abdominal aortic atherosclerotic changes without  aneurysmal dilatation. Old L2 compression fracture appears stable.      Impression:     Interval development of 3 complex fluid collections within the pelvis  which likely represent abscesses. Unfortunately, these abscesses are not  accessible for CT-guided drainage.     EKG personally viewed and interpreted   Sinus  No ischemia  LVH  qtc 458      carvedilol 12.5 mg Oral BID With Meals   famotidine 20 mg Oral Daily   levothyroxine 25 mcg Oral Daily   losartan 50 mg Oral Daily   piperacillin-tazobactam 3.375 g Intravenous Q8H   sodium chloride 3 mL Intravenous Q12H       sodium chloride 100 mL/hr Last Rate: 100 mL/hr (12/07/18 1711)   Diet Clear Liquid      Assessment/Plan      Active Hospital Problems    Diagnosis Date Noted   • **Diverticulitis of large intestine with abscess without bleeding [K57.20] 12/04/2018   • Mild malnutrition (CMS/HCC) [E44.1] 12/06/2018   • Chest pain, atypical [R07.89] 12/05/2018   • Family history of colon cancer [Z80.0] 11/06/2017   • Anemia [D64.9]  03/04/2017   • Sleep apnea [G47.30] 03/04/2017   • Hypertension [I10] 03/04/2017      Resolved Hospital Problems   No resolved problems to display.       Leigh 79 yo who was admitted here under our service from 11/16/18-11/21/18 with diverticulitis. She was treated with IV abx and eventually discharged on Augmentin and flagyl. She finished the course of abx. Pain began to recur and CT showed new development of abdominal abscesses.     She was admitted and started on IV abx. 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 Clark on 12/5/18.    -IV and PO agents for pain.  - IV zosyn   -Continue ambulation and bowel recover  -continue BP meds    D/W RN, Dr. Clark  Reviewed previous records    Calderon Lake MD  [facility]  12/07/18  6:23 PM

## 2018-12-07 NOTE — PROGRESS NOTES
Postoperative day 2 from export her laparotomy with sigmoid resection, end colostomy and small bowel resection and anastomosis with incidental appendectomy    Subjective:  Pain is better.  Voiding with Avelar catheter out.  She has been out of bed a couple of times.  No significant nausea and belching from yesterday's improved.  No ostomy output yet.    Objective:  Maximum temperature 99.2, heart rate 76 blood pressure 123/65  Gen.: Awake alert and oriented, no acute distress  Gastrointestinal: Abdomen is soft and less tender today although , particularly in the midline.  Ostomy is viable with minimal edema.  No output.    Labs are reviewed.  Chemistry largely unremarkable.  White blood cell count 14.8, hemoglobin is 9.9 (down from 13 yesterday)    Assessment and plan:  -Complicated diverticulitis now status post sigmoid resection and end colostomy.  Small bowel resection was for a segment of small bowel involved in the abscess cavity and deserosalized during dissection.  No GI function yet but tolerating clears.  Would leave there for the time being.  Hemoglobin dipped from 13 on postop day 1-9.9.  Do not suspect any active bleeding, more likely represents equilibration after her fluids have normalized more.  Monitor.  Continue antibiotics.  Mobilize as able.    Emanuel Clark MD  General and Endoscopic Surgery  [facility]    [address], [address]  [place], [place], [address]  P: [phone]  F: [phone]

## 2018-12-07 NOTE — PLAN OF CARE
Problem: Patient Care Overview  Goal: Plan of Care Review  Outcome: Ongoing (interventions implemented as appropriate)   12/07/18 1122   Plan of Care Review   Progress improving   OTHER   Outcome Summary med for pain, vdg, c-ostomy with sm amt liquid drainage, refuses to wlk     Goal: Discharge Needs Assessment  Outcome: Ongoing (interventions implemented as appropriate)    Goal: Interprofessional Rounds/Family Conf  Outcome: Ongoing (interventions implemented as appropriate)      Problem: Infection, Risk/Actual (Adult)  Goal: Infection Prevention/Resolution  Outcome: Ongoing (interventions implemented as appropriate)      Problem: Pain, Acute (Adult)  Goal: Acceptable Pain Control/Comfort Level  Outcome: Ongoing (interventions implemented as appropriate)

## 2018-12-08 PROCEDURE — 25010000002 HYDROMORPHONE 1 MG/ML SOLUTION: Performed by: INTERNAL MEDICINE

## 2018-12-08 PROCEDURE — 25010000002 PIPERACILLIN SOD-TAZOBACTAM PER 1 G: Performed by: INTERNAL MEDICINE

## 2018-12-08 PROCEDURE — 99024 POSTOP FOLLOW-UP VISIT: CPT | Performed by: SURGERY

## 2018-12-08 PROCEDURE — 25010000002 ENOXAPARIN PER 10 MG: Performed by: INTERNAL MEDICINE

## 2018-12-08 RX ORDER — DEXTROSE, SODIUM CHLORIDE, AND POTASSIUM CHLORIDE 5; .45; .15 G/100ML; G/100ML; G/100ML
75 INJECTION INTRAVENOUS CONTINUOUS
Status: DISCONTINUED | OUTPATIENT
Start: 2018-12-08 | End: 2018-12-09

## 2018-12-08 RX ADMIN — CARVEDILOL 12.5 MG: 12.5 TABLET, FILM COATED ORAL at 09:35

## 2018-12-08 RX ADMIN — ENOXAPARIN SODIUM 40 MG: 40 INJECTION SUBCUTANEOUS at 16:55

## 2018-12-08 RX ADMIN — LOSARTAN POTASSIUM 50 MG: 50 TABLET, FILM COATED ORAL at 09:36

## 2018-12-08 RX ADMIN — LEVOTHYROXINE SODIUM 25 MCG: 25 TABLET ORAL at 09:36

## 2018-12-08 RX ADMIN — SODIUM CHLORIDE, PRESERVATIVE FREE 3 ML: 5 INJECTION INTRAVENOUS at 20:13

## 2018-12-08 RX ADMIN — TAZOBACTAM SODIUM AND PIPERACILLIN SODIUM 3.38 G: 375; 3 INJECTION, SOLUTION INTRAVENOUS at 16:55

## 2018-12-08 RX ADMIN — POTASSIUM CHLORIDE, DEXTROSE MONOHYDRATE AND SODIUM CHLORIDE 75 ML/HR: 150; 5; 450 INJECTION, SOLUTION INTRAVENOUS at 12:56

## 2018-12-08 RX ADMIN — TAZOBACTAM SODIUM AND PIPERACILLIN SODIUM 3.38 G: 375; 3 INJECTION, SOLUTION INTRAVENOUS at 01:30

## 2018-12-08 RX ADMIN — OXYCODONE AND ACETAMINOPHEN 2 TABLET: 5; 325 TABLET ORAL at 09:36

## 2018-12-08 RX ADMIN — TAZOBACTAM SODIUM AND PIPERACILLIN SODIUM 3.38 G: 375; 3 INJECTION, SOLUTION INTRAVENOUS at 09:35

## 2018-12-08 RX ADMIN — CARVEDILOL 12.5 MG: 12.5 TABLET, FILM COATED ORAL at 20:12

## 2018-12-08 RX ADMIN — OXYCODONE AND ACETAMINOPHEN 2 TABLET: 5; 325 TABLET ORAL at 16:55

## 2018-12-08 RX ADMIN — OXYCODONE AND ACETAMINOPHEN 2 TABLET: 5; 325 TABLET ORAL at 21:14

## 2018-12-08 RX ADMIN — FAMOTIDINE 20 MG: 20 TABLET, FILM COATED ORAL at 09:35

## 2018-12-08 RX ADMIN — SODIUM CHLORIDE 100 ML/HR: 9 INJECTION, SOLUTION INTRAVENOUS at 03:03

## 2018-12-08 RX ADMIN — HYDROMORPHONE HYDROCHLORIDE 1 MG: 1 INJECTION, SOLUTION INTRAMUSCULAR; INTRAVENOUS; SUBCUTANEOUS at 05:21

## 2018-12-08 NOTE — PLAN OF CARE
Problem: Patient Care Overview  Goal: Plan of Care Review  Outcome: Ongoing (interventions implemented as appropriate)   12/08/18 9811   Coping/Psychosocial   Plan of Care Reviewed With patient;spouse   OTHER   Outcome Summary VSS, medicated for pain x 2 with good relief, midline abd incision dry & intact with old dried drainage, colostomy stoma pink, small amount serosangenous drainage,no stool or flatus, reluctant to ambulate, only sat up in chair for brief time, enc ambulation     Goal: Individualization and Mutuality  Outcome: Ongoing (interventions implemented as appropriate)    Goal: Discharge Needs Assessment  Outcome: Ongoing (interventions implemented as appropriate)    Goal: Interprofessional Rounds/Family Conf  Outcome: Ongoing (interventions implemented as appropriate)      Problem: Infection, Risk/Actual (Adult)  Goal: Infection Prevention/Resolution  Outcome: Ongoing (interventions implemented as appropriate)      Problem: Pain, Acute (Adult)  Goal: Acceptable Pain Control/Comfort Level  Outcome: Ongoing (interventions implemented as appropriate)      Problem: Nutrition, Imbalanced: Inadequate Oral Intake (Adult)  Goal: Improved Oral Intake  Outcome: Ongoing (interventions implemented as appropriate)    Goal: Prevent Further Weight Loss  Outcome: Ongoing (interventions implemented as appropriate)

## 2018-12-08 NOTE — PROGRESS NOTES
Name: Moriah Petty ADMIT: 2018   : 1940  PCP: Jarred Carrizales Jr., MD    MRN: [id number] LOS: 4 days   AGE/SEX: 78 y.o. female  ROOM: [facility]   Subjective   Cc- abd pain      abd pain is moderate- today  Partially improved with meds  Tolerating liquids  Still no bowel function  ambulating    ROS  No f/c  No n/v  -CP/-palp  No soa/cough  +abd pain    Objective   Vital Signs  Temp:  [96.9 °F (36.1 °C)-98.9 °F (37.2 °C)] 96.9 °F (36.1 °C)  Heart Rate:  [62-79] 69  Resp:  [16-18] 18  BP: (100-150)/(53-79) 150/79  SpO2:  [96 %-98 %] 98 %  on  Flow (L/min):  [2] 2;   Device (Oxygen Therapy): nasal cannula  Body mass index is 24.34 kg/m².    Physical Exam   Constitutional: She is oriented to person, place, and time. She appears well-developed and well-nourished.   In some pain   HENT:   Head: Normocephalic and atraumatic.   Eyes: Conjunctivae are normal. No scleral icterus.   Neck: Neck supple. No tracheal deviation present.   Cardiovascular: Normal rate and regular rhythm.   No murmur heard.  Pulmonary/Chest: Effort normal and breath sounds normal.   Abdominal: Soft. There is tenderness (mild generalized tenderness). There is no guarding.   Post op changes  +ostomy.     Genitourinary:   Genitourinary Comments: Deferred    Musculoskeletal: She exhibits no edema.   Neurological: She is alert and oriented to person, place, and time. She exhibits normal muscle tone.   Skin: Skin is warm and dry.   Psychiatric: She has a normal mood and affect. Her behavior is normal.       Results Review:       I reviewed the patient's new clinical results.  Results from last 7 days   Lab Units  18   0607  18   0909  18   1808   WBC 10*3/mm3  14.85*  12.69*  9.30   HEMOGLOBIN g/dL  9.9*  13.0  11.6*   PLATELETS 10*3/mm3  330  387  404     Results from last 7 days   Lab Units  18   0607  18   0909  18   1034  18   1741   SODIUM mmol/L  136  137  138  133*   POTASSIUM mmol/L  4.0  4.0  3.9   4.0   CHLORIDE mmol/L  103  104  105  99   CO2 mmol/L  20.9*  20.8*  22.9  21.2*   BUN mg/dL  7*  6*  5*  6*   CREATININE mg/dL  0.55*  0.55*  0.62  0.66   GLUCOSE mg/dL  123*  146*  95  92   Estimated Creatinine Clearance: 63.5 mL/min (A) (by C-G formula based on SCr of 0.55 mg/dL (L)).  Results from last 7 days   Lab Units  12/04/18   1741   ALBUMIN g/dL  3.80   BILIRUBIN mg/dL  0.3   ALK PHOS U/L  37*   AST (SGOT) U/L  20   ALT (SGPT) U/L  10     Results from last 7 days   Lab Units  12/07/18   0607  12/06/18   0909  12/05/18   1034  12/04/18   1741   CALCIUM mg/dL  7.5*  7.8*  8.4*  9.2   ALBUMIN g/dL   --    --    --   3.80       12/04/2018 2238  12/04/2018 2312  Troponin [[id number]]    Blood     Final result  Troponin T <0.010 ng/mL            XR Chest 1 View [[id number]] Alvaro as Reviewed   Order Status: Completed Collected: 12/04/18 2227    Updated: 12/04/18 2231   Narrative:     PORTABLE CHEST X-RAY     HISTORY: chest pain     COMPARISON: November 18, 2013.     FINDINGS: Portable AP view of the chest was obtained. The lungs are well  inflated. Apical hyperlucencies likely indicative of some underlying  emphysema. Mild fibrotic changes. There are calcified residua of  granulomatous disease present. No active airspace disease, edema, or  pleural fluid. Borderline cardiomegaly.            Impression:     No active disease by portable imaging     This report was finalized on 12/4/2018 10:28 PM by Doe Marshall M.D.      CT Abdomen Pelvis With Contrast [[id number]] Alvaro as Reviewed   Order Status: Completed Collected: 12/04/18 1349    Updated: 12/04/18 1349   Narrative:     CT ABDOMEN AND PELVIS WITH IV CONTRAST     HISTORY: 78-year-old female with diverticulitis. Follow-up.     TECHNIQUE: Radiation dose reduction techniques were utilized, including  automated exposure control and exposure modulation based on body size.   3 mm images were obtained through the abdomen and pelvis after the  administration of IV  contrast. Compared with previous CT from  11/16/2018.     FINDINGS: There has been interval development of 3 small pelvic complex  fluid collections. There is an approximately 3.2 x 2.2 cm air-fluid  collection between the thickened sigmoid colon and the left aspect of  the uterus, image 104. Slightly inferiorly anterior to the uterus is a  3.8 x 2.6 cm fluid collection and to the right is an approximately 2 x 1  cm fluid collection. These collections have a thickened enhancing wall.  There is persistent thickening of the sigmoid colon and there is mild  peridiverticular haziness. There is no free air. There is no bowel  obstruction. Appendix appears within normal limits. There is no acute  abnormality seen involving the liver, spleen, pancreas, adrenals, or  kidneys. There are a few small renal cysts bilaterally as well as a  stable 10.5 cm left renal cyst with peripheral calcifications. There are  moderately extensive abdominal aortic atherosclerotic changes without  aneurysmal dilatation. Old L2 compression fracture appears stable.      Impression:     Interval development of 3 complex fluid collections within the pelvis  which likely represent abscesses. Unfortunately, these abscesses are not  accessible for CT-guided drainage.     EKG personally viewed and interpreted   Sinus  No ischemia  LVH  qtc 458      carvedilol 12.5 mg Oral BID With Meals   famotidine 20 mg Oral Daily   levothyroxine 25 mcg Oral Daily   losartan 50 mg Oral Daily   piperacillin-tazobactam 3.375 g Intravenous Q8H   sodium chloride 3 mL Intravenous Q12H       dextrose 5 % and sodium chloride 0.45 % with KCl 20 mEq/L 75 mL/hr Last Rate: 75 mL/hr (12/08/18 1256)   Diet Full Liquid      Assessment/Plan      Active Hospital Problems    Diagnosis Date Noted   • **Diverticulitis of large intestine with abscess without bleeding [K57.20] 12/04/2018   • Mild malnutrition (CMS/HCC) [E44.1] 12/06/2018   • Chest pain, atypical [R07.89] 12/05/2018   •  Family history of colon cancer [Z80.0] 11/06/2017   • Anemia [D64.9] 03/04/2017   • Sleep apnea [G47.30] 03/04/2017   • Hypertension [I10] 03/04/2017      Resolved Hospital Problems   No resolved problems to display.       Leigh 77 yo who was admitted here under our service from 11/16/18-11/21/18 with diverticulitis. She was treated with IV abx and eventually discharged on Augmentin and flagyl. She finished the course of abx. Pain began to recur and CT showed new development of abdominal abscesses.     She was admitted and started on IV abx. 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 Clark on 12/5/18.    -IV and PO agents for pain.  - IV zosyn   -Continue ambulation and bowel recovery  -continue BP meds  -lovenox    D/W RN  Reviewed previous records    Calderon Lake MD  [facility]  12/08/18  3:45 PM

## 2018-12-08 NOTE — PLAN OF CARE
Problem: Patient Care Overview  Goal: Plan of Care Review  Outcome: Ongoing (interventions implemented as appropriate)   12/08/18 8799   Coping/Psychosocial   Plan of Care Reviewed With patient   Plan of Care Review   Progress improving   OTHER   Outcome Summary afeb. iv zosyn and ivf's continue. no c/o nausea. cynthia po analgesia. colostomy stoma moist, pink with no stool or flatus yet. amb around nsg station with asst of 1.      Goal: Individualization and Mutuality  Outcome: Ongoing (interventions implemented as appropriate)    Goal: Discharge Needs Assessment  Outcome: Ongoing (interventions implemented as appropriate)    Goal: Interprofessional Rounds/Family Conf  Outcome: Ongoing (interventions implemented as appropriate)      Problem: Infection, Risk/Actual (Adult)  Goal: Infection Prevention/Resolution  Outcome: Ongoing (interventions implemented as appropriate)      Problem: Pain, Acute (Adult)  Goal: Acceptable Pain Control/Comfort Level  Outcome: Ongoing (interventions implemented as appropriate)      Problem: Nutrition, Imbalanced: Inadequate Oral Intake (Adult)  Goal: Improved Oral Intake  Outcome: Ongoing (interventions implemented as appropriate)    Goal: Prevent Further Weight Loss  Outcome: Ongoing (interventions implemented as appropriate)

## 2018-12-08 NOTE — PROGRESS NOTES
Postoperative day 3 status post ex-lap with sigmoid colectomy, end colostomy, small bowel resection with anastomosis and appendectomy    S: Doing fine, feeling tired.  Tolerating clear liquid diet without any nausea or vomiting.  No bowel function yet    O:   Vitals:    12/07/18 2221 12/08/18 0313 12/08/18 0730 12/08/18 1120   BP: 140/64 144/73 131/66 120/68   BP Location: Left arm Left arm Right arm Left arm   Patient Position: Lying Lying Lying Lying   Pulse: 62 72 69 79   Resp: 16 16 18 16   Temp: 97.7 °F (36.5 °C) 97.7 °F (36.5 °C) 98.2 °F (36.8 °C) 97.4 °F (36.3 °C)   TempSrc: Oral Oral Oral Oral   SpO2: 97% 97% 98% 96%   Weight:       Height:         Alert and oriented ×3, no acute distress  Abdomen soft, slightly tender around the incision mildly distended.  Ostomy pink and viable without stool or gas.  Incision clean dry and intact with staplers  No lower extremity edemas    No labs today    On Zosyn and IV fluids    Postoperative day 3 status post ex-lap with sigmoid colectomy, end colostomy, small bowel resection with anastomosis and appendectomy, recovering slowly as suspected.  No return of bowel function yet.  She has been tolerating clear liquid diet without any nausea or vomiting    - Advance to full liquid diet  - Labs in the morning  - SCDs and Lovenox  - Ambulate, physical therapy ordered  - Switch IV fluids    Oleksandr Fernandez MD, FACS  General, Minimally Invasive and Endoscopic Surgery  [facility]    [address] [place] [address], [address]  [place], [place], [address]  P: [phone]  F: [phone]

## 2018-12-09 PROBLEM — E87.6 HYPOKALEMIA: Status: ACTIVE | Noted: 2018-12-09

## 2018-12-09 LAB
ANION GAP SERPL CALCULATED.3IONS-SCNC: 11.6 MMOL/L
BACTERIA SPEC AEROBE CULT: NORMAL
BACTERIA SPEC AEROBE CULT: NORMAL
BASOPHILS # BLD AUTO: 0.05 10*3/MM3 (ref 0–0.2)
BASOPHILS NFR BLD AUTO: 0.5 % (ref 0–1.5)
BUN BLD-MCNC: 2 MG/DL (ref 8–23)
BUN/CREAT SERPL: 4.2 (ref 7–25)
CALCIUM SPEC-SCNC: 8.2 MG/DL (ref 8.6–10.5)
CHLORIDE SERPL-SCNC: 101 MMOL/L (ref 98–107)
CO2 SERPL-SCNC: 25.4 MMOL/L (ref 22–29)
CREAT BLD-MCNC: 0.48 MG/DL (ref 0.57–1)
DEPRECATED RDW RBC AUTO: 50.4 FL (ref 37–54)
EOSINOPHIL # BLD AUTO: 0.37 10*3/MM3 (ref 0–0.7)
EOSINOPHIL NFR BLD AUTO: 3.8 % (ref 0.3–6.2)
ERYTHROCYTE [DISTWIDTH] IN BLOOD BY AUTOMATED COUNT: 14.9 % (ref 11.7–13)
GFR SERPL CREATININE-BSD FRML MDRD: 125 ML/MIN/1.73
GLUCOSE BLD-MCNC: 141 MG/DL (ref 65–99)
HCT VFR BLD AUTO: 32.5 % (ref 35.6–45.5)
HGB BLD-MCNC: 10.3 G/DL (ref 11.9–15.5)
IMM GRANULOCYTES # BLD: 0.04 10*3/MM3 (ref 0–0.03)
IMM GRANULOCYTES NFR BLD: 0.4 % (ref 0–0.5)
LYMPHOCYTES # BLD AUTO: 1.18 10*3/MM3 (ref 0.9–4.8)
LYMPHOCYTES NFR BLD AUTO: 12.2 % (ref 19.6–45.3)
MCH RBC QN AUTO: 29.3 PG (ref 26.9–32)
MCHC RBC AUTO-ENTMCNC: 31.7 G/DL (ref 32.4–36.3)
MCV RBC AUTO: 92.6 FL (ref 80.5–98.2)
MONOCYTES # BLD AUTO: 0.9 10*3/MM3 (ref 0.2–1.2)
MONOCYTES NFR BLD AUTO: 9.3 % (ref 5–12)
NEUTROPHILS # BLD AUTO: 7.15 10*3/MM3 (ref 1.9–8.1)
NEUTROPHILS NFR BLD AUTO: 73.8 % (ref 42.7–76)
PLATELET # BLD AUTO: 380 10*3/MM3 (ref 140–500)
PMV BLD AUTO: 10.6 FL (ref 6–12)
POTASSIUM BLD-SCNC: 3.4 MMOL/L (ref 3.5–5.2)
RBC # BLD AUTO: 3.51 10*6/MM3 (ref 3.9–5.2)
SODIUM BLD-SCNC: 138 MMOL/L (ref 136–145)
WBC NRBC COR # BLD: 9.69 10*3/MM3 (ref 4.5–10.7)

## 2018-12-09 PROCEDURE — 80048 BASIC METABOLIC PNL TOTAL CA: CPT | Performed by: SURGERY

## 2018-12-09 PROCEDURE — 25010000002 PIPERACILLIN SOD-TAZOBACTAM PER 1 G: Performed by: INTERNAL MEDICINE

## 2018-12-09 PROCEDURE — 85025 COMPLETE CBC W/AUTO DIFF WBC: CPT | Performed by: SURGERY

## 2018-12-09 PROCEDURE — 25010000002 ENOXAPARIN PER 10 MG: Performed by: INTERNAL MEDICINE

## 2018-12-09 PROCEDURE — 25010000002 ONDANSETRON PER 1 MG: Performed by: INTERNAL MEDICINE

## 2018-12-09 PROCEDURE — 99024 POSTOP FOLLOW-UP VISIT: CPT | Performed by: SURGERY

## 2018-12-09 RX ORDER — POTASSIUM CHLORIDE 1.5 G/1.77G
40 POWDER, FOR SOLUTION ORAL 2 TIMES DAILY
Status: COMPLETED | OUTPATIENT
Start: 2018-12-09 | End: 2018-12-09

## 2018-12-09 RX ADMIN — OXYCODONE AND ACETAMINOPHEN 2 TABLET: 5; 325 TABLET ORAL at 18:36

## 2018-12-09 RX ADMIN — OXYCODONE AND ACETAMINOPHEN 2 TABLET: 5; 325 TABLET ORAL at 14:37

## 2018-12-09 RX ADMIN — LEVOTHYROXINE SODIUM 25 MCG: 25 TABLET ORAL at 09:08

## 2018-12-09 RX ADMIN — OXYCODONE AND ACETAMINOPHEN 2 TABLET: 5; 325 TABLET ORAL at 06:28

## 2018-12-09 RX ADMIN — CARVEDILOL 12.5 MG: 12.5 TABLET, FILM COATED ORAL at 18:08

## 2018-12-09 RX ADMIN — ENOXAPARIN SODIUM 40 MG: 40 INJECTION SUBCUTANEOUS at 16:11

## 2018-12-09 RX ADMIN — ONDANSETRON 4 MG: 2 INJECTION INTRAMUSCULAR; INTRAVENOUS at 18:12

## 2018-12-09 RX ADMIN — OXYCODONE AND ACETAMINOPHEN 2 TABLET: 5; 325 TABLET ORAL at 10:41

## 2018-12-09 RX ADMIN — TAZOBACTAM SODIUM AND PIPERACILLIN SODIUM 3.38 G: 375; 3 INJECTION, SOLUTION INTRAVENOUS at 01:42

## 2018-12-09 RX ADMIN — TAZOBACTAM SODIUM AND PIPERACILLIN SODIUM 3.38 G: 375; 3 INJECTION, SOLUTION INTRAVENOUS at 16:11

## 2018-12-09 RX ADMIN — TAZOBACTAM SODIUM AND PIPERACILLIN SODIUM 3.38 G: 375; 3 INJECTION, SOLUTION INTRAVENOUS at 09:08

## 2018-12-09 RX ADMIN — CARVEDILOL 12.5 MG: 12.5 TABLET, FILM COATED ORAL at 09:08

## 2018-12-09 RX ADMIN — OXYCODONE AND ACETAMINOPHEN 2 TABLET: 5; 325 TABLET ORAL at 22:30

## 2018-12-09 RX ADMIN — POTASSIUM CHLORIDE 40 MEQ: 1.5 POWDER, FOR SOLUTION ORAL at 12:48

## 2018-12-09 RX ADMIN — LOSARTAN POTASSIUM 50 MG: 50 TABLET, FILM COATED ORAL at 09:08

## 2018-12-09 RX ADMIN — SODIUM CHLORIDE, PRESERVATIVE FREE 3 ML: 5 INJECTION INTRAVENOUS at 10:45

## 2018-12-09 RX ADMIN — FAMOTIDINE 20 MG: 20 TABLET, FILM COATED ORAL at 09:08

## 2018-12-09 RX ADMIN — POTASSIUM CHLORIDE 40 MEQ: 1.5 POWDER, FOR SOLUTION ORAL at 20:47

## 2018-12-09 RX ADMIN — SODIUM CHLORIDE, PRESERVATIVE FREE 3 ML: 5 INJECTION INTRAVENOUS at 20:47

## 2018-12-09 RX ADMIN — POTASSIUM CHLORIDE, DEXTROSE MONOHYDRATE AND SODIUM CHLORIDE 75 ML/HR: 150; 5; 450 INJECTION, SOLUTION INTRAVENOUS at 01:43

## 2018-12-09 RX ADMIN — OXYCODONE AND ACETAMINOPHEN 2 TABLET: 5; 325 TABLET ORAL at 01:48

## 2018-12-09 NOTE — PLAN OF CARE
Problem: Patient Care Overview  Goal: Plan of Care Review  Outcome: Ongoing (interventions implemented as appropriate)   12/09/18 0459   Coping/Psychosocial   Plan of Care Reviewed With patient   Plan of Care Review   Progress no change   OTHER   Outcome Summary afebrile. IV zosyn continues. cynthia small amt po liquids. no c/o nausea. colostomy without flatus or stool yet. abd incision C,D,I. pt refused several times to ambulate in [place]. slow to move/progress.      Goal: Individualization and Mutuality  Outcome: Ongoing (interventions implemented as appropriate)    Goal: Discharge Needs Assessment  Outcome: Ongoing (interventions implemented as appropriate)    Goal: Interprofessional Rounds/Family Conf  Outcome: Ongoing (interventions implemented as appropriate)      Problem: Infection, Risk/Actual (Adult)  Goal: Infection Prevention/Resolution  Outcome: Ongoing (interventions implemented as appropriate)      Problem: Pain, Acute (Adult)  Goal: Acceptable Pain Control/Comfort Level  Outcome: Ongoing (interventions implemented as appropriate)      Problem: Nutrition, Imbalanced: Inadequate Oral Intake (Adult)  Goal: Improved Oral Intake  Outcome: Ongoing (interventions implemented as appropriate)

## 2018-12-09 NOTE — PROGRESS NOTES
Continued Stay Note  [facility]     Patient Name: Moriah Petty  MRN: [id number]  Today's Date: 12/9/2018    Admit Date: 12/4/2018    Discharge Plan     Row Name 12/09/18 2722       Plan    Plan  referral pending for [facility]    Patient/Family in Agreement with Plan  yes    Plan Comments  CCP consult noted: called and spoke with pt. Pt confirmed she is now interested in SNF at [place] vs going home and she would like to check bed availability at [facility]--states she has been there several times in the past. In basket referral sent. CCP to follow.............JW        Discharge Codes    No documentation.             Anaya Quesada RN

## 2018-12-09 NOTE — DISCHARGE PLACEMENT REQUEST
"Moriah Elizondo (78 y.o. Female)     Date of Birth Social Security Number Address Home Phone MRN    1940  [facility] [place] [address] [phone] [id number]    Buddhism Marital Status          Confucianist        Admission Date Admission Type Admitting Provider Attending Provider Department, Room/Bed    12/4/18 Urgent Calderon Lake MD Jackson, Alan David, MD [address], P677/1    Discharge Date Discharge Disposition Discharge Destination                       Attending Provider:  Calderon Lake MD    Allergies:  Morphine And Related    Isolation:  None   Infection:  None   Code Status:  CPR    Ht:  170.2 cm (67\")   Wt:  70.5 kg (155 lb 6.4 oz)    Admission Cmt:  None   Principal Problem:  Diverticulitis of large intestine with abscess without bleeding [K57.20]                 Active Insurance as of 12/4/2018     Primary Coverage     Payor Plan Insurance Group Employer/Plan Group    MEDICARE MEDICARE A & B      Payor Plan Address Payor Plan Phone Number Payor Plan Fax Number Effective Dates    [address] [phone]  9/1/2004 - None Entered    [facility] [id number]       Subscriber Name Subscriber Birth Date Member ID       MORIAH ELIZONDO 1940 [id number]           Secondary Coverage     Payor Plan Insurance Group Employer/Plan Group    [id number]     Payor Plan Address Payor Plan Phone Number Payor Plan Fax Number Effective Dates    [address]   1/1/2014 - None Entered    [facility] [id number]       Subscriber Name Subscriber Birth Date Member ID       CLEVELAND ELIZONDO 1940 [id number]                 Emergency Contacts      (Rel.) Home Phone Work Phone Mobile Phone    Cleveland Elizondo (Spouse) [phone] -- [phone]    Reza Bloton (Son) -- [phone]              "

## 2018-12-09 NOTE — SIGNIFICANT NOTE
"   12/09/18 0844   Rehab Time/Intention   Evaluation Not Performed patient/family declined evaluation  (pt refused. \"I have already been walking this morning.\" Will try back again as time allows)   Rehab Treatment   Discipline physical therapist     "

## 2018-12-09 NOTE — PROGRESS NOTES
Postoperative day 4 status post ex-lap with sigmoid colectomy, end colostomy, small bowel resection with anastomosis and appendectomy    S: No issues, tolerating FLD. Complaining of urinary frequency but not dysuria    O:   Vitals:    12/08/18 1949 12/08/18 2358 12/09/18 0330 12/09/18 0738   BP: 141/69 154/70 169/74 176/80   BP Location: Left arm Left arm Left arm Left arm   Patient Position: Lying Lying Lying Lying   Pulse: 76 70 70 79   Resp: 18 18 18 18   Temp: 98.2 °F (36.8 °C) 97.3 °F (36.3 °C) 98.7 °F (37.1 °C)    TempSrc: Oral Oral Oral    SpO2: 95% 97% 98% 96%   Weight:       Height:         AOx3, nad  Abdomen soft and not tender, incisions c/d/i. Staplers in place and no signs of infection. Ostomy pink and viable without gas or stool    K 3.4  Otherwise stable labs    Postoperative day 4 status post ex-lap with sigmoid colectomy, end colostomy, small bowel resection with anastomosis and appendectomy, doing fine, no bowel function yet but tolerating FLD without nausea or vomiting. Urinary frequency likely due to IVF, no dysuria    - DC IVF  - Replace K  - Regular diet  - Ambulate  - SCDs and Lovenox    Oleksandr Fernandez MD, FACS  General, Minimally Invasive and Endoscopic Surgery  [facility]    [address], [address]  [place], [place], [address]  P: [phone]  F: [phone]

## 2018-12-09 NOTE — PLAN OF CARE
Problem: Patient Care Overview  Goal: Plan of Care Review  Outcome: Ongoing (interventions implemented as appropriate)   12/09/18 1411   Coping/Psychosocial   Plan of Care Reviewed With patient   OTHER   Outcome Summary VSS, voiding frequently clear pale yel urine in good amounts, midline incision dry & intact with staples, colostomy intact draining serosangenous drainage, no stool or flatus, up in chair for meals, short ambulation in graham, moving better today & more willing to doi so, diet advanced to regular, cynthia well, med for pain q 4 hrs with good relief     Goal: Individualization and Mutuality  Outcome: Ongoing (interventions implemented as appropriate)    Goal: Discharge Needs Assessment  Outcome: Ongoing (interventions implemented as appropriate)    Goal: Interprofessional Rounds/Family Conf  Outcome: Ongoing (interventions implemented as appropriate)      Problem: Infection, Risk/Actual (Adult)  Goal: Infection Prevention/Resolution  Outcome: Ongoing (interventions implemented as appropriate)      Problem: Pain, Acute (Adult)  Goal: Acceptable Pain Control/Comfort Level  Outcome: Ongoing (interventions implemented as appropriate)      Problem: Nutrition, Imbalanced: Inadequate Oral Intake (Adult)  Goal: Improved Oral Intake  Outcome: Ongoing (interventions implemented as appropriate)    Goal: Prevent Further Weight Loss  Outcome: Ongoing (interventions implemented as appropriate)

## 2018-12-09 NOTE — PROGRESS NOTES
Name: Moriah Petty ADMIT: 2018   : 1940  PCP: Jarred Carrizales Jr., MD    MRN: [id number] LOS: 5 days   AGE/SEX: 78 y.o. female  ROOM: [facility]   Subjective   Cc- abd pain      abd pain is moderate- today  Partially improved with meds  Tolerating regular diet now  Lots of urine output  Not much ostomy output  ambulating    ROS  No f/c  No n/v  -CP/-palp  No soa/cough  +abd pain    Objective   Vital Signs  Temp:  [97.3 °F (36.3 °C)-98.7 °F (37.1 °C)] 98.7 °F (37.1 °C)  Heart Rate:  [70-79] 79  Resp:  [16-18] 18  BP: (141-176)/(69-87) 154/87  SpO2:  [93 %-98 %] 96 %  on  Flow (L/min):  [0.5-2] 0.5;   Device (Oxygen Therapy): room air  Body mass index is 24.34 kg/m².    Physical Exam   Constitutional: She is oriented to person, place, and time. She appears well-developed and well-nourished.   In some pain   HENT:   Head: Normocephalic and atraumatic.   Eyes: Conjunctivae are normal. No scleral icterus.   Neck: Neck supple. No tracheal deviation present.   Cardiovascular: Normal rate and regular rhythm.   No murmur heard.  Pulmonary/Chest: Effort normal and breath sounds normal.   Abdominal: Soft. There is tenderness (mild generalized tenderness). There is no guarding.   Post op changes  +ostomy.     Genitourinary:   Genitourinary Comments: Deferred    Musculoskeletal: She exhibits no edema.   Neurological: She is alert and oriented to person, place, and time. She exhibits normal muscle tone.   Skin: Skin is warm and dry.   Psychiatric: She has a normal mood and affect. Her behavior is normal.       Results Review:       I reviewed the patient's new clinical results.  Results from last 7 days   Lab Units  18   0653  18   0607  18   0909  18   1808   WBC 10*3/mm3  9.69  14.85*  12.69*  9.30   HEMOGLOBIN g/dL  10.3*  9.9*  13.0  11.6*   PLATELETS 10*3/mm3  380  330  387  404     Results from last 7 days   Lab Units  18   0653  18   0607  18   0909  18   1036    SODIUM mmol/L  138  136  137  138   POTASSIUM mmol/L  3.4*  4.0  4.0  3.9   CHLORIDE mmol/L  101  103  104  105   CO2 mmol/L  25.4  20.9*  20.8*  22.9   BUN mg/dL  2*  7*  6*  5*   CREATININE mg/dL  0.48*  0.55*  0.55*  0.62   GLUCOSE mg/dL  141*  123*  146*  95   Estimated Creatinine Clearance: 63.5 mL/min (A) (by C-G formula based on SCr of 0.48 mg/dL (L)).  Results from last 7 days   Lab Units  12/04/18   1741   ALBUMIN g/dL  3.80   BILIRUBIN mg/dL  0.3   ALK PHOS U/L  37*   AST (SGOT) U/L  20   ALT (SGPT) U/L  10     Results from last 7 days   Lab Units  12/09/18   0653  12/07/18   0607  12/06/18   0909  12/05/18   1034  12/04/18   1741   CALCIUM mg/dL  8.2*  7.5*  7.8*  8.4*  9.2   ALBUMIN g/dL   --    --    --    --   3.80       12/04/2018 2238  12/04/2018 2312  Troponin [[id number]]    Blood     Final result  Troponin T <0.010 ng/mL            XR Chest 1 View [[id number]] Alvaro as Reviewed   Order Status: Completed Collected: 12/04/18 2227    Updated: 12/04/18 2231   Narrative:     PORTABLE CHEST X-RAY     HISTORY: chest pain     COMPARISON: November 18, 2013.     FINDINGS: Portable AP view of the chest was obtained. The lungs are well  inflated. Apical hyperlucencies likely indicative of some underlying  emphysema. Mild fibrotic changes. There are calcified residua of  granulomatous disease present. No active airspace disease, edema, or  pleural fluid. Borderline cardiomegaly.            Impression:     No active disease by portable imaging     This report was finalized on 12/4/2018 10:28 PM by Doe Marshall M.D.      CT Abdomen Pelvis With Contrast [[id number]] Alvaro as Reviewed   Order Status: Completed Collected: 12/04/18 1349    Updated: 12/04/18 1349   Narrative:     CT ABDOMEN AND PELVIS WITH IV CONTRAST     HISTORY: 78-year-old female with diverticulitis. Follow-up.     TECHNIQUE: Radiation dose reduction techniques were utilized, including  automated exposure control and exposure modulation based on  body size.   3 mm images were obtained through the abdomen and pelvis after the  administration of IV contrast. Compared with previous CT from  11/16/2018.     FINDINGS: There has been interval development of 3 small pelvic complex  fluid collections. There is an approximately 3.2 x 2.2 cm air-fluid  collection between the thickened sigmoid colon and the left aspect of  the uterus, image 104. Slightly inferiorly anterior to the uterus is a  3.8 x 2.6 cm fluid collection and to the right is an approximately 2 x 1  cm fluid collection. These collections have a thickened enhancing wall.  There is persistent thickening of the sigmoid colon and there is mild  peridiverticular haziness. There is no free air. There is no bowel  obstruction. Appendix appears within normal limits. There is no acute  abnormality seen involving the liver, spleen, pancreas, adrenals, or  kidneys. There are a few small renal cysts bilaterally as well as a  stable 10.5 cm left renal cyst with peripheral calcifications. There are  moderately extensive abdominal aortic atherosclerotic changes without  aneurysmal dilatation. Old L2 compression fracture appears stable.      Impression:     Interval development of 3 complex fluid collections within the pelvis  which likely represent abscesses. Unfortunately, these abscesses are not  accessible for CT-guided drainage.     EKG personally viewed and interpreted   Sinus  No ischemia  LVH  qtc 458      carvedilol 12.5 mg Oral BID With Meals   enoxaparin 40 mg Subcutaneous Q24H   famotidine 20 mg Oral Daily   levothyroxine 25 mcg Oral Daily   losartan 50 mg Oral Daily   piperacillin-tazobactam 3.375 g Intravenous Q8H   potassium chloride 40 mEq Oral BID   sodium chloride 3 mL Intravenous Q12H      Diet Regular      Assessment/Plan      Active Hospital Problems    Diagnosis Date Noted   • **Diverticulitis of large intestine with abscess without bleeding [K57.20] 12/04/2018   • Hypokalemia [E87.6] 12/09/2018    • Mild malnutrition (CMS/HCC) [E44.1] 12/06/2018   • Chest pain, atypical [R07.89] 12/05/2018   • Family history of colon cancer [Z80.0] 11/06/2017   • Anemia [D64.9] 03/04/2017   • Sleep apnea [G47.30] 03/04/2017   • Hypertension [I10] 03/04/2017      Resolved Hospital Problems   No resolved problems to display.       Pleasant 79 yo who was admitted here under our service from 11/16/18-11/21/18 with diverticulitis. She was treated with IV abx and eventually discharged on Augmentin and flagyl. She finished the course of abx. Pain began to recur and CT showed new development of abdominal abscesses.     She was admitted and started on IV abx. 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 Clark on 12/5/18.    -PRN agents for pain.  - IV zosyn - will defer to Surgery the duration of abx  -Continue ambulation and bowel recovery  -continue BP meds  -K  Replacement ordered. Repeat labs in am  -iazbel OSUNA/CONCHIS RN  Reviewed previous records    Dispo- asking about subacute rehab, will consult CCP    Calderon Lake MD  [facility]  12/09/18  4:00 PM

## 2018-12-10 PROBLEM — D62 POSTOPERATIVE ANEMIA DUE TO ACUTE BLOOD LOSS: Status: ACTIVE | Noted: 2017-03-04

## 2018-12-10 PROBLEM — R07.89 CHEST PAIN, ATYPICAL: Status: RESOLVED | Noted: 2018-12-05 | Resolved: 2018-12-10

## 2018-12-10 PROBLEM — E87.6 HYPOKALEMIA: Status: RESOLVED | Noted: 2018-12-09 | Resolved: 2018-12-10

## 2018-12-10 LAB
ANION GAP SERPL CALCULATED.3IONS-SCNC: 7.8 MMOL/L
BUN BLD-MCNC: 4 MG/DL (ref 8–23)
BUN/CREAT SERPL: 8.9 (ref 7–25)
CALCIUM SPEC-SCNC: 8.4 MG/DL (ref 8.6–10.5)
CHLORIDE SERPL-SCNC: 99 MMOL/L (ref 98–107)
CO2 SERPL-SCNC: 26.2 MMOL/L (ref 22–29)
CREAT BLD-MCNC: 0.45 MG/DL (ref 0.57–1)
DEPRECATED RDW RBC AUTO: 49.7 FL (ref 37–54)
ERYTHROCYTE [DISTWIDTH] IN BLOOD BY AUTOMATED COUNT: 14.7 % (ref 11.7–13)
GFR SERPL CREATININE-BSD FRML MDRD: 134 ML/MIN/1.73
GLUCOSE BLD-MCNC: 104 MG/DL (ref 65–99)
HCT VFR BLD AUTO: 33.7 % (ref 35.6–45.5)
HGB BLD-MCNC: 10.6 G/DL (ref 11.9–15.5)
MAGNESIUM SERPL-MCNC: 2.2 MG/DL (ref 1.6–2.4)
MCH RBC QN AUTO: 28.8 PG (ref 26.9–32)
MCHC RBC AUTO-ENTMCNC: 31.5 G/DL (ref 32.4–36.3)
MCV RBC AUTO: 91.6 FL (ref 80.5–98.2)
PHOSPHATE SERPL-MCNC: 0.6 MG/DL (ref 2.5–4.5)
PLATELET # BLD AUTO: 378 10*3/MM3 (ref 140–500)
PMV BLD AUTO: 10.7 FL (ref 6–12)
POTASSIUM BLD-SCNC: 4 MMOL/L (ref 3.5–5.2)
RBC # BLD AUTO: 3.68 10*6/MM3 (ref 3.9–5.2)
SODIUM BLD-SCNC: 133 MMOL/L (ref 136–145)
WBC NRBC COR # BLD: 8.72 10*3/MM3 (ref 4.5–10.7)

## 2018-12-10 PROCEDURE — 85027 COMPLETE CBC AUTOMATED: CPT | Performed by: INTERNAL MEDICINE

## 2018-12-10 PROCEDURE — 97110 THERAPEUTIC EXERCISES: CPT

## 2018-12-10 PROCEDURE — 25010000002 ENOXAPARIN PER 10 MG: Performed by: INTERNAL MEDICINE

## 2018-12-10 PROCEDURE — 80048 BASIC METABOLIC PNL TOTAL CA: CPT | Performed by: INTERNAL MEDICINE

## 2018-12-10 PROCEDURE — 83735 ASSAY OF MAGNESIUM: CPT | Performed by: INTERNAL MEDICINE

## 2018-12-10 PROCEDURE — 97162 PT EVAL MOD COMPLEX 30 MIN: CPT

## 2018-12-10 PROCEDURE — 84100 ASSAY OF PHOSPHORUS: CPT | Performed by: INTERNAL MEDICINE

## 2018-12-10 PROCEDURE — 99024 POSTOP FOLLOW-UP VISIT: CPT | Performed by: SURGERY

## 2018-12-10 PROCEDURE — 25010000002 PIPERACILLIN SOD-TAZOBACTAM PER 1 G: Performed by: INTERNAL MEDICINE

## 2018-12-10 RX ADMIN — LEVOTHYROXINE SODIUM 25 MCG: 25 TABLET ORAL at 08:46

## 2018-12-10 RX ADMIN — CARVEDILOL 12.5 MG: 12.5 TABLET, FILM COATED ORAL at 08:46

## 2018-12-10 RX ADMIN — ENOXAPARIN SODIUM 40 MG: 40 INJECTION SUBCUTANEOUS at 17:14

## 2018-12-10 RX ADMIN — TAZOBACTAM SODIUM AND PIPERACILLIN SODIUM 3.38 G: 375; 3 INJECTION, SOLUTION INTRAVENOUS at 23:46

## 2018-12-10 RX ADMIN — TAZOBACTAM SODIUM AND PIPERACILLIN SODIUM 3.38 G: 375; 3 INJECTION, SOLUTION INTRAVENOUS at 08:47

## 2018-12-10 RX ADMIN — OXYCODONE AND ACETAMINOPHEN 2 TABLET: 5; 325 TABLET ORAL at 02:30

## 2018-12-10 RX ADMIN — OXYCODONE AND ACETAMINOPHEN 2 TABLET: 5; 325 TABLET ORAL at 18:58

## 2018-12-10 RX ADMIN — OXYCODONE AND ACETAMINOPHEN 2 TABLET: 5; 325 TABLET ORAL at 11:02

## 2018-12-10 RX ADMIN — OXYCODONE AND ACETAMINOPHEN 2 TABLET: 5; 325 TABLET ORAL at 14:38

## 2018-12-10 RX ADMIN — LOSARTAN POTASSIUM 50 MG: 50 TABLET, FILM COATED ORAL at 08:46

## 2018-12-10 RX ADMIN — OXYCODONE AND ACETAMINOPHEN 2 TABLET: 5; 325 TABLET ORAL at 23:45

## 2018-12-10 RX ADMIN — CARVEDILOL 12.5 MG: 12.5 TABLET, FILM COATED ORAL at 18:58

## 2018-12-10 RX ADMIN — TAZOBACTAM SODIUM AND PIPERACILLIN SODIUM 3.38 G: 375; 3 INJECTION, SOLUTION INTRAVENOUS at 01:23

## 2018-12-10 RX ADMIN — OXYCODONE AND ACETAMINOPHEN 2 TABLET: 5; 325 TABLET ORAL at 06:38

## 2018-12-10 RX ADMIN — TAZOBACTAM SODIUM AND PIPERACILLIN SODIUM 3.38 G: 375; 3 INJECTION, SOLUTION INTRAVENOUS at 17:14

## 2018-12-10 RX ADMIN — FAMOTIDINE 20 MG: 20 TABLET, FILM COATED ORAL at 14:35

## 2018-12-10 NOTE — NURSING NOTE
12/10/18 1130   Oxygen Therapy   Device (Oxygen Therapy) room air   Colostomy LLQ   Placement Date: 12/05/18   Inserted by: DR. KIMBROUGH  Hand Hygiene Completed: Yes  Colostomy Type: Descending/sigmoid  Location: LLQ  Stoma Size (cm): 25 cm   Stomal Appliance 2 piece;Intact;Drainable   Stoma Appearance moist;red   Peristomal Assessment FÉLIX   Stoma Function stool   Stool Color brown   CWOCN follow up for colostomy assessment.  Liquid brown stool in pouch.  Pouching system intact.  Patient did not want to try pouch change today; plan to change pouching system with patient tomorrow.  She expressed concerns about having no help at home and mood very low.  We talked about home health for home and availability of assistance for her at discharge.  Encouraged patient to plan for instruction tomorrow and goal of working toward independence.

## 2018-12-10 NOTE — PROGRESS NOTES
Postoperative day #5 from laparotomy, small bowel resection, sigmoid resection and end colostomy    Subjective:  Gradually better.  Tolerating some regular food but appetite quite poor.  Colostomy is functioning now.    Objective:  Patient is afebrile, heart rate 75 blood pressure 137/79  Gen.: Awake alert and oriented, no acute distress  Gastrointestinal: Abdomen is soft, appropriately tender, midline incision clean dry and intact.  Ostomy is viable and stool seen in bag, no gas.    Assessment and plan:  Complicated diverticulitis status post Velazquez's type procedure with the associated small bowel resection for small bowel involved in the abscess  Beginning to show signs of return of GI function.  Continue to mobilize.  Finish course of antibiotics on Wednesday.  Continue to increase diet.  Colostomy teaching.    Emanuel Clark MD  General and Endoscopic Surgery  [facility]    [address], [address]  [place], [place], [address]  P: [phone]  F: [phone]

## 2018-12-10 NOTE — PLAN OF CARE
"Problem: Patient Care Overview  Goal: Plan of Care Review  Outcome: Ongoing (interventions implemented as appropriate)   12/10/18 3228   Coping/Psychosocial   Plan of Care Reviewed With patient   OTHER   Outcome Summary midline incision dry & intact with staples, colostomy with small amount brown watery drainage, no flatus or stool with substance, voiding less frequently, medicated for pain q 4 hrs with good relief, states she \"is feeling a little down\" sat in chair for awhile this am, enc ambulation in graham     Goal: Individualization and Mutuality  Outcome: Ongoing (interventions implemented as appropriate)    Goal: Discharge Needs Assessment  Outcome: Ongoing (interventions implemented as appropriate)    Goal: Interprofessional Rounds/Family Conf  Outcome: Ongoing (interventions implemented as appropriate)      Problem: Infection, Risk/Actual (Adult)  Goal: Infection Prevention/Resolution  Outcome: Ongoing (interventions implemented as appropriate)      Problem: Pain, Acute (Adult)  Goal: Acceptable Pain Control/Comfort Level  Outcome: Ongoing (interventions implemented as appropriate)      Problem: Nutrition, Imbalanced: Inadequate Oral Intake (Adult)  Goal: Improved Oral Intake  Outcome: Ongoing (interventions implemented as appropriate)    Goal: Prevent Further Weight Loss  Outcome: Ongoing (interventions implemented as appropriate)      Problem: Fall Risk (Adult)  Goal: Absence of Fall  Outcome: Ongoing (interventions implemented as appropriate)        "

## 2018-12-10 NOTE — PROGRESS NOTES
"    DAILY PROGRESS NOTE  [facility]    Patient Identification:  Name: Moriah Petty  Age: 78 y.o.  Sex: female  :  1940  MRN: [id number]         Primary Care Physician: Jarred Carrizales Jr., MD    Subjective:  Interval History: tolerating diet though only eating 15% - some nausea but no emesis - abd pains controlled PO - minimal colostomy output - no cp/sob    Objective:spouse at  - answered all ?s as counseled another 10-15min     Scheduled Meds:  carvedilol 12.5 mg Oral BID With Meals   enoxaparin 40 mg Subcutaneous Q24H   famotidine 20 mg Oral Daily   levothyroxine 25 mcg Oral Daily   losartan 50 mg Oral Daily   piperacillin-tazobactam 3.375 g Intravenous Q8H   sodium chloride 3 mL Intravenous Q12H     Continuous Infusions:     Vital signs in last 24 hours:  Temp:  [97.3 °F (36.3 °C)-98.7 °F (37.1 °C)] 98 °F (36.7 °C)  Heart Rate:  [71-80] 80  Resp:  [16-18] 18  BP: (126-173)/(71-92) 173/86    Intake/Output:    Intake/Output Summary (Last 24 hours) at 12/10/2018 1040  Last data filed at 12/10/2018 0647  Gross per 24 hour   Intake 1356 ml   Output 5050 ml   Net -3694 ml       Exam:  /86 (BP Location: Right arm, Patient Position: Lying)   Pulse 80   Temp 98 °F (36.7 °C) (Oral)   Resp 18   Ht 170.2 cm (67\")   Wt 70.5 kg (155 lb 6.4 oz)   SpO2 94%   BMI 24.34 kg/m²     General Appearance:    Alert, cooperative, no distress, AAOx3, in chair, conversational, not toxic                         Throat:   Lips, tongue, gums normal; oral mucosa pink and moist                           Neck:   No JVD                         Lungs:    Clear to auscultation bilaterally, respirations unlabored                         Heart:    Regular rate and rhythm, S1 and S2 normal                  Abdomen:     Soft, postop-tender w/o r/g, bowel sounds active, wound is clean/dry - no colostomy output currently                  Extremities:   Extremities normal, atraumatic, no cyanosis or edema                "         Pulses:   Pulses palpable in lower extremities                           Data Review:  Labs in chart were reviewed.    Assessment:  Active Hospital Problems    Diagnosis Date Noted   • **Diverticulitis of large intestine with abscess without bleeding [K57.20] 12/04/2018   • Hypokalemia [E87.6] 12/09/2018   • Mild malnutrition (CMS/HCC) [E44.1] 12/06/2018   • Chest pain, atypical [R07.89] 12/05/2018   • Family history of colon cancer [Z80.0] 11/06/2017   • Anemia [D64.9] 03/04/2017   • Sleep apnea [G47.30] 03/04/2017   • Hypertension [I10] 03/04/2017      Resolved Hospital Problems   No resolved problems to display.       Plan:  12/5/18 sigmoid colectomy w/ Velazquez's pouch   -tolerating reg diet though intake is quite low    -clarify Zosyn duration w/ surgery    -Hgb stable/improving to 10.6    K replaced     Hypophosphoremia - would replace IV but unable secondary to shortage - should correct w/ regular diet - hold additional PO as intake minimal     Lovenox for DVT ppx    Dispo - DONNA pending CCP when ultimately OK from surgical standpoint     Terence Diop MD  12/10/2018  10:40 AM

## 2018-12-10 NOTE — PROGRESS NOTES
Adult Nutrition  Assessment/PES    Patient Name:  Moriah Petty  YOB: 1940  MRN: [id number]  Admit Date:  12/4/2018    Assessment Date:  12/10/2018    Comments:  Follow up for weight loss, malnutrition, poor appetite. Diet has advanced to solids since last seen but having nausea at times. Cont to send ONS, get food prefs and encourage intake. Following.     Reason for Assessment     Row Name 12/10/18 1145          Reason for Assessment    Reason For Assessment  follow-up protocol         Nutrition/Diet History     Row Name 12/10/18 1145          Nutrition/Diet History    Typical Food/Fluid Intake  Intake/appetite not great. Most she's eaten is 50%, often less (15-25%). Pain & nausea issues. Good UOP but little colostomy OP at this point.     Factors Affecting Nutritional Intake  nausea;pain           Labs/Tests/Procedures/Meds     Row Name 12/10/18 1147          Labs/Procedures/Meds    Lab Results Reviewed  reviewed        Diagnostic Tests/Procedures    Diagnostic Test/Procedure Reviewed  reviewed     Diagnostic Test/Procedures Comments  POD 5 colectomy/ostomy        Medications    Pertinent Medications Reviewed  reviewed     Pertinent Medications Comments  ppi, antiemetic, abx         Physical Findings     Row Name 12/10/18 1150          Physical Findings    Gastrointestinal  ostomy;nausea     Skin  other (see comments) abd incision           Nutrition Prescription Ordered     Row Name 12/10/18 1151          Nutrition Prescription PO    Current PO Diet  Regular     Supplement  Boost Breeze (Ensure)     Supplement Frequency  3 times a day         Evaluation of Received Nutrient/Fluid Intake     Row Name 12/10/18 1151          PO Evaluation    Number of Days PO Intake Evaluated  3 days     % PO Intake  25-50%               Problem/Interventions:            Intervention Goal     Row Name 12/10/18 1150          Intervention Goal    General  Maintain nutrition;Disease management/therapy;Reduce/improve  symptoms     PO  Tolerate PO;Increase intake;PO intake (%)     PO Intake %  75 %     Weight  No significant weight loss         Nutrition Intervention     Row Name 12/10/18 1156          Nutrition Intervention    RD/Tech Action  Interview for preference;Encourage intake;Follow Tx progress;Care plan reviewd           Education/Evaluation     Row Name 12/10/18 1156          Education    Education  Previous education by RD/LD        Monitor/Evaluation    Monitor  Per protocol           Electronically signed by:  Lou Avalos RD  12/10/18 11:57 AM

## 2018-12-10 NOTE — PLAN OF CARE
Problem: Patient Care Overview  Goal: Plan of Care Review  Outcome: Ongoing (interventions implemented as appropriate)   12/10/18 4078   Coping/Psychosocial   Plan of Care Reviewed With patient   Plan of Care Review   Progress improving   OTHER   Outcome Summary Pt agreeable to PT this am after declining PT earlier today. Pt is s/p exploratory lap with sigmoid resection. She c/o post op pain, weakness, and decreased functional mobility. Pt able to ambulate approx 70 feet with CGA x 2. She is limited by increased pain with activity. She was independent at baseline and hopes to DC to SNU for strengthening prior to returning home. She does have a flight of steps to enter home and another flight up to bedroom. She will continue to benefit from skilled PT.

## 2018-12-10 NOTE — THERAPY EVALUATION
Acute Care - Physical Therapy Initial Evaluation  [facility]     Patient Name: Moriah Petty  : 1940  MRN: [id number]  Today's Date: 12/10/2018   Onset of Illness/Injury or Date of Surgery: 18  Date of Referral to PT: 12/10/18  Referring Physician: Jadon      Admit Date: 2018    Visit Dx:     ICD-10-CM ICD-9-CM   1. Diverticulitis of large intestine with abscess without bleeding K57.20 562.11     569.5   2. Difficulty walking R26.2 719.7     Patient Active Problem List   Diagnosis   • Abnormal electrocardiogram   • Non-specific colitis   • Acute post-traumatic headache   • Postoperative anemia due to acute blood loss   • Arthritis   • Hematochezia   • Chest pain   • Compression fracture of lumbar vertebra (CMS/HCC)   • Closed wedge compression fracture of second lumbar vertebra (CMS/HCC)   • Constipation   • Dizziness   • Dyslipidemia   • Fatigue   • Ventricular premature beats   • Gastroesophageal reflux disease without esophagitis   • Hypertension   • Hypotension   • Insomnia   • Left lower quadrant pain   • Low back pain   • Osteoporosis   • Premature atrial contraction   • Peripheral nerve disease   • Pneumonia   • Nausea   • Sensorineural hearing loss   • Shortness of breath   • Sinus bradycardia   • Sleep apnea   • Disorder of thyroid   • Trigeminal neuralgia   • Rectal bleeding   • Family history of colon cancer   • Adenomatous polyp of colon   • Hypothyroidism   • Osteoporosis, post-menopausal   • Diverticulitis of large intestine with abscess without bleeding   • Mild malnutrition (CMS/HCC)     Past Medical History:   Diagnosis Date   • Adenomatous polyp of colon 2017   • Anemia    • Bradycardia    • Colitis    • Colon polyps    • Compression fracture of L1 lumbar vertebra (CMS/HCC)    • Compression fracture of L2 (CMS/HCC)    • Depression    • Diverticulosis    • GERD (gastroesophageal reflux disease)    • GI hemorrhage    • Hearing loss    • History of blood transfusion 2013     S/P CHOLECYSTECTOMY SX  BHE   • History of trigeminal neuralgia    • Hypertension    • Insomnia    • Memory loss    • OA (osteoarthritis)    • Osteoarthritis    • Osteoporosis    • PAC (premature atrial contraction)    • Peripheral neuralgia    • Pneumonia     RUL   • PVC's (premature ventricular contractions)    • Sleep apnea    • Thyroid disorder      Past Surgical History:   Procedure Laterality Date   • BRAIN SURGERY Left 08/2014    MICROVASCULAR DECOMPRESSION LEFT EAR   • CARDIAC CATHETERIZATION  03/2002   • CHOLECYSTECTOMY N/A 11/20/2013    DR. ESME GOLDMAN   • COLONOSCOPY N/A 02/21/2012    INT/EXT HEMORRHOIDS, DIVERTICULOSIS, 2 HYPERPLASTIC POLYPS, TORT,    • COLONOSCOPY  02/27/2015    eh, tics, torts, ih   REPEAT IN 5 YRS      • JOINT REPLACEMENT  03/15/2004    HIP-PARTIAL DR. SIMRAN KRAMER   • JOINT REPLACEMENT Bilateral 2013    KNEE   • KYPHOPLASTY      COMPRESSION FX OF L2   • TRIGEMINAL NERVE DECOMPRESSION          PT ASSESSMENT (last 12 hours)      Physical Therapy Evaluation     Row Name 12/10/18 1440          PT Evaluation Time/Intention    Subjective Information  complains of;fatigue;pain  -EJ     Document Type  evaluation  -EJ     Mode of Treatment  physical therapy  -EJ     Patient Effort  adequate  -EJ     Symptoms Noted During/After Treatment  increased pain  -EJ     Row Name 12/10/18 1447          General Information    Onset of Illness/Injury or Date of Surgery  12/04/18  -EJ     Referring Physician  Jadon  -EJ     Patient Observations  alert;cooperative;agree to therapy  -EJ     General Observations of Patient  supine in bed, no acute distress  -EJ     Prior Level of Function  independent:;all household mobility;community mobility;ADL's  -EJ     Equipment Currently Used at Home  none  -EJ     Existing Precautions/Restrictions  fall  -EJ     Barriers to Rehab  none identified  -EJ     Row Name 12/10/18 1445          Relationship/Environment    Lives With  spouse   -[facility] Name 12/10/18 1440          Resource/Environmental Concerns    Current Living Arrangements  home/apartment/condo  -[facility] Name 12/10/18 1440          Home Main Entrance    Number of Stairs, Main Entrance  other (see comments) 13  -[facility] Name 12/10/18 1440          Cognitive Assessment/Intervention- PT/OT    Orientation Status (Cognition)  oriented x 3  -EJ     Follows Commands (Cognition)  WNL  -     Personal Safety Interventions  fall prevention program maintained;gait belt;nonskid shoes/slippers when out of bed;supervised activity  -[facility] Name 12/10/18 1440          Bed Mobility Assessment/Treatment    Bed Mobility Assessment/Treatment  supine-sit;sit-supine  -     Supine-Sit [place] (Bed Mobility)  verbal cues;minimum assist (75% patient effort)  [facility]     Sit-Supine [place] (Bed Mobility)  minimum assist (75% patient effort)  -[facility] Name 12/10/18 1440          Transfer Assessment/Treatment    Transfer Assessment/Treatment  sit-stand transfer;stand-sit transfer  -     Sit-Stand [place] (Transfers)  verbal cues;contact guard;minimum assist (75% patient effort);2 person assist  -     Stand-Sit Riley (Transfers)  verbal cues;nonverbal cues (demo/gesture);contact guard;2 person assist  -[facility] Name 12/10/18 1440          Gait/Stairs Assessment/Training    [place]iley Level (Gait)  verbal cues;nonverbal cues (demo/gesture);contact guard;minimum assist (75% patient effort);2 person assist  -     Distance in Feet (Gait)  70  -EJ     Deviations/Abnormal Patterns (Gait)  antalgic;chari decreased;stride length decreased  -[facility] Name 12/10/18 1440          General ROM    GENERAL ROM COMMENTS  WFL  -[facility] Name 12/10/18 1440          MMT (Manual Muscle Testing)    General MMT Comments  post op weakness  -[facility] Name 12/10/18 1440          Pain Assessment    Additional Documentation  Pain Scale: Numbers Pre/Post-Treatment (Group)  -[facility]  Name 12/10/18 1440          Pain Scale: Numbers Pre/Post-Treatment    Pain Scale: Numbers, Pretreatment  7/10  -EJ     Pain Scale: Numbers, Post-Treatment  7/10  -EJ     Pain Location  abdomen  -EJ     Row Name             Wound 12/05/18 1834 Other (See comments) midline abdomen incision    Wound - Properties Group Date first assessed: 12/05/18  -VB Time first assessed: 1834  -VB Side: Other (See comments)  -VB Orientation: midline  -KR Location: abdomen  -VB Type: incision  -VB    Row Name 12/10/18 1440          Plan of Care Review    Plan of Care Reviewed With  patient  -EJ     Row Name 12/10/18 1440          Physical Therapy Clinical Impression    Date of Referral to PT  12/10/18  -EJ     PT Diagnosis (PT Clinical Impression)  s/p exploratory lap and sigmoid resection  -EJ     Patient/Family Goals Statement (PT Clinical Impression)  Pt would like to go to SNU at [place]. She has multiple flights of stairs up to bedroom at home.  -EJ     Criteria for Skilled Interventions Met (PT Clinical Impression)  treatment indicated  -EJ     Impairments Found (describe specific impairments)  gait, locomotion, and balance  -EJ     Rehab Potential (PT Clinical Summary)  good, to achieve stated therapy goals  -EJ     Row Name 12/10/18 1440          Physical Therapy Goals    Bed Mobility Goal Selection (PT)  bed mobility, PT goal 1  -EJ     Transfer Goal Selection (PT)  transfer, PT goal 1  -EJ     Gait Training Goal Selection (PT)  gait training, PT goal 1  -EJ     Row Name 12/10/18 1440          Bed Mobility Goal 1 (PT)    Activity/Assistive Device (Bed Mobility Goal 1, PT)  bed mobility activities, all  -EJ     [place] Level/Cues Needed (Bed Mobility Goal 1, PT)  standby assist  -EJ     Time Frame (Bed Mobility Goal 1, PT)  1 week  -EJ     Row Name 12/10/18 1440          Transfer Goal 1 (PT)    Activity/Assistive Device (Transfer Goal 1, PT)  transfers, all  -EJ     [place] Level/Cues Needed (Transfer Goal 1, PT)  standby  assist  -EJ     Time Frame (Transfer Goal 1, PT)  1 week  -EJ     Row Name 12/10/18 1440          Gait Training Goal 1 (PT)    Activity/Assistive Device (Gait Training Goal 1, PT)  gait (walking locomotion)  -EJ     [place] Level (Gait Training Goal 1, PT)  standby assist  -EJ     Distance (Gait Goal 1, PT)  150  -EJ     Time Frame (Gait Training Goal 1, PT)  1 week  -EJ     Row Name 12/10/18 1440          Positioning and Restraints    Pre-Treatment Position  in bed  -EJ     Post Treatment Position  bed  -EJ     In Bed  notified nsg;supine;call light within reach;encouraged to call for assist  -EJ     Row Name 12/10/18 1440          Living Environment    Home Accessibility  stairs to enter home  -EJ       User Key  (r) = Recorded By, (t) = Taken By, (c) = Cosigned By    Initials Name Provider Type    Concepción Velazquez, PT Physical Therapist    Anny Fragoso RN Registered Nurse    Marie Alvarez RN Registered Nurse          PT Recommendation and Plan  Anticipated Discharge Disposition (PT): skilled nursing facility  Planned Therapy Interventions (PT Eval): bed mobility training, gait training, home exercise program, ROM (range of motion), strengthening, transfer training  Therapy Frequency (PT Clinical Impression): daily  Outcome Summary/Treatment Plan (PT)  Anticipated Discharge Disposition (PT): skilled nursing facility  Plan of Care Reviewed With: patient  Progress: improving  Outcome Summary: Pt agreeable to PT this am after declining PT earlier today. Pt is s/p exploratory lap with sigmoid resection. She c/o post op pain, weakness, and decreased functional mobility. Pt able to ambulate approx 70 feet with CGA x 2. She is limited by increased pain with activity. She was independent at baseline and hopes to DC to SNU for strengthening prior to returning home. She does have a flight of steps to enter home and another flight up to bedroom. She will continue to benefit from skilled PT.  Outcome  Measures     Row Name 12/10/18 1500             How much help from another person do you currently need...    Turning from your back to your side while in flat bed without using bedrails?  3  -EJ      Moving from lying on back to sitting on the side of a flat bed without bedrails?  3  -EJ      Moving to and from a bed to a chair (including a wheelchair)?  3  -EJ      Standing up from a chair using your arms (e.g., wheelchair, bedside chair)?  3  -EJ      Climbing 3-5 steps with a railing?  2  -EJ      To walk in hospital room?  3  -EJ      AM-PAC 6 Clicks Score  17  -EJ         Functional Assessment    Outcome Measure Options  AM-PAC 6 Clicks Basic Mobility (PT)  -EJ        User Key  (r) = Recorded By, (t) = Taken By, (c) = Cosigned By    Initials Name Provider Type    Concepción Velazquez, PT Physical Therapist         Time Calculation:   PT Charges     Row Name 12/10/18 1517             Time Calculation    Start Time  1440  -EJ      Stop Time  1505  -EJ      Time Calculation (min)  25 min  -EJ      PT Received On  12/10/18  -EJ      PT - Next Appointment  12/11/18  -EJ      PT Goal Re-Cert Due Date  12/17/18  -EJ         Timed Charges    89268 - PT Therapeutic Exercise Minutes  15  -EJ        User Key  (r) = Recorded By, (t) = Taken By, (c) = Cosigned By    Initials Name Provider Type    Concepción Velazquez, PT Physical Therapist        Therapy Suggested Charges     Code   Minutes Charges    43166 (CPT®) Hc Pt Neuromusc Re Education Ea 15 Min      47796 (CPT®) Hc Pt Ther Proc Ea 15 Min 15 1    51033 (CPT®) Hc Gait Training Ea 15 Min      01922 (CPT®) Hc Pt Therapeutic Act Ea 15 Min      29297 (CPT®) Hc Pt Manual Therapy Ea 15 Min      56633 (CPT®) Hc Pt Iontophoresis Ea 15 Min      91494 (CPT®) Hc Pt Elec Stim Ea-Per 15 Min      81681 (CPT®) Hc Pt Ultrasound Ea 15 Min      73931 (CPT®) Hc Pt Self Care/Mgmt/Train Ea 15 Min      38219 (CPT®) Hc Pt Prosthetic (S) Train Initial Encounter, Each 15 Min      27431  (CPT®) Hc Pt Orthotic(S)/Prosthetic(S) Encounter, Each 15 Min      61937 (CPT®) Hc Orthotic(S) Mgmt/Train Initial Encounter, Each 15min      Total  15 1        Therapy Charges for Today     Code Description Service Date Service Provider Modifiers Qty    78777592359 HC PT EVAL MOD COMPLEXITY 2 12/10/2018 Concepción Malin, PT GP 1    63878242923 HC PT THER PROC EA 15 MIN 12/10/2018 Concepción Malin, PT GP 1    49135093296 HC PT THER SUPP EA 15 MIN 12/10/2018 Concepción Malin, PT GP 1          PT G-Codes  Outcome Measure Options: AM-PAC 6 Clicks Basic Mobility (PT)  AM-PAC 6 Clicks Score: 17      Concepción Malin, PT  12/10/2018

## 2018-12-10 NOTE — PROGRESS NOTES
Discharge Planning Assessment  [facility]     Patient Name: Moriah Petty  MRN: [id number]  Today's Date: 12/10/2018    Admit Date: 12/4/2018    Discharge Needs Assessment    No documentation.       Discharge Plan     Row Name 12/10/18 1111       Plan    Plan  Referral to [facility] for skilled nursing     Plan Comments  Spoke with Catherine  [phone] she is following for [facility]. Deloris Ponce, JOSE        Destination      Service Provider Request Status Selected Services Address Phone Number Fax Number    [facility] Pending - Request Sent N/A [address] [phone]       Anaya Quesada RN 12/9/2018 1852    Pt states she has been to [facility] a couple of times in the past.                 Durable Medical Equipment      No service coordination in this encounter.      Dialysis/Infusion      No service coordination in this encounter.      Home Medical Care      No service coordination in this encounter.      Community Resources      No service coordination in this encounter.          Demographic Summary    No documentation.       Functional Status    No documentation.       Psychosocial    No documentation.       Abuse/Neglect    No documentation.       Legal    No documentation.       Substance Abuse    No documentation.       Patient Forms    No documentation.           Deloris Ponce, RN

## 2018-12-10 NOTE — PLAN OF CARE
Problem: Patient Care Overview  Goal: Plan of Care Review  Outcome: Ongoing (interventions implemented as appropriate)   12/10/18 0551   Coping/Psychosocial   Plan of Care Reviewed With patient   Plan of Care Review   Progress improving   OTHER   Outcome Summary Voiding freq.Colostomy bag intact but just small amt of serosanginous drainage present. Pt burping. Midline incision intact with staples. Medicated with percocets for pain with good relief. Afebrile and VS stable     Goal: Individualization and Mutuality  Outcome: Ongoing (interventions implemented as appropriate)    Goal: Discharge Needs Assessment  Outcome: Ongoing (interventions implemented as appropriate)    Goal: Interprofessional Rounds/Family Conf  Outcome: Ongoing (interventions implemented as appropriate)      Problem: Infection, Risk/Actual (Adult)  Goal: Infection Prevention/Resolution  Outcome: Ongoing (interventions implemented as appropriate)      Problem: Pain, Acute (Adult)  Goal: Acceptable Pain Control/Comfort Level  Outcome: Ongoing (interventions implemented as appropriate)      Problem: Nutrition, Imbalanced: Inadequate Oral Intake (Adult)  Goal: Improved Oral Intake  Outcome: Ongoing (interventions implemented as appropriate)    Goal: Prevent Further Weight Loss  Outcome: Ongoing (interventions implemented as appropriate)      Problem: Fall Risk (Adult)  Goal: Identify Related Risk Factors and Signs and Symptoms  Outcome: Outcome(s) achieved Date Met: 12/10/18    Goal: Absence of Fall  Outcome: Ongoing (interventions implemented as appropriate)

## 2018-12-11 PROCEDURE — 99024 POSTOP FOLLOW-UP VISIT: CPT | Performed by: SURGERY

## 2018-12-11 PROCEDURE — 25010000002 ENOXAPARIN PER 10 MG: Performed by: INTERNAL MEDICINE

## 2018-12-11 PROCEDURE — 97110 THERAPEUTIC EXERCISES: CPT

## 2018-12-11 PROCEDURE — 25010000002 PIPERACILLIN SOD-TAZOBACTAM PER 1 G: Performed by: INTERNAL MEDICINE

## 2018-12-11 RX ADMIN — CARVEDILOL 12.5 MG: 12.5 TABLET, FILM COATED ORAL at 19:09

## 2018-12-11 RX ADMIN — ENOXAPARIN SODIUM 40 MG: 40 INJECTION SUBCUTANEOUS at 16:29

## 2018-12-11 RX ADMIN — SODIUM CHLORIDE, PRESERVATIVE FREE 3 ML: 5 INJECTION INTRAVENOUS at 21:04

## 2018-12-11 RX ADMIN — OXYCODONE AND ACETAMINOPHEN 2 TABLET: 5; 325 TABLET ORAL at 10:58

## 2018-12-11 RX ADMIN — TAZOBACTAM SODIUM AND PIPERACILLIN SODIUM 3.38 G: 375; 3 INJECTION, SOLUTION INTRAVENOUS at 07:53

## 2018-12-11 RX ADMIN — OXYCODONE AND ACETAMINOPHEN 2 TABLET: 5; 325 TABLET ORAL at 05:25

## 2018-12-11 RX ADMIN — CARVEDILOL 12.5 MG: 12.5 TABLET, FILM COATED ORAL at 07:53

## 2018-12-11 RX ADMIN — TAZOBACTAM SODIUM AND PIPERACILLIN SODIUM 3.38 G: 375; 3 INJECTION, SOLUTION INTRAVENOUS at 16:29

## 2018-12-11 RX ADMIN — OXYCODONE AND ACETAMINOPHEN 2 TABLET: 5; 325 TABLET ORAL at 21:03

## 2018-12-11 RX ADMIN — OXYCODONE AND ACETAMINOPHEN 2 TABLET: 5; 325 TABLET ORAL at 16:28

## 2018-12-11 RX ADMIN — LEVOTHYROXINE SODIUM 25 MCG: 25 TABLET ORAL at 08:01

## 2018-12-11 RX ADMIN — FAMOTIDINE 20 MG: 20 TABLET, FILM COATED ORAL at 08:01

## 2018-12-11 RX ADMIN — LOSARTAN POTASSIUM 50 MG: 50 TABLET, FILM COATED ORAL at 08:01

## 2018-12-11 NOTE — PROGRESS NOTES
Discharge Planning Assessment  [facility]     Patient Name: Moriah Petty  MRN: [id number]  Today's Date: 12/11/2018    Admit Date: 12/4/2018    Discharge Needs Assessment    No documentation.       Discharge Plan     Row Name 12/11/18 0942       Plan    Plan  [facility] for short term rehab    Plan Comments  Return call from Mónica with [facility], she said pt is accepted and they do have a bed for her.  IMM Letter reviewed with pt, she signed and a copy was given to her.  I updated pt that a short term rehab bed is available at [facility], I advised I do not find clincal to support Medicare benefit for ambulance transfer, pt said her  can transport her.  Pt provided a list of area home health, she said she has had home health 3 different times but can't recall the agency, she said she prefers to wait and select home health after she has been to rehab due to she may not need it.   Josie Whitaker, JOSE        Destination - Selection Complete      Service Provider Request Status Selected Services Address Phone Number Fax Number    [facility] Selected Skilled Nursing [address] [id number] [phone]       Anaya Quesada, RN 12/9/2018 1852    Pt states she has been to [facility] a couple of times in the past.                Josie Whitaker, RN

## 2018-12-11 NOTE — THERAPY TREATMENT NOTE
Acute Care - Physical Therapy Treatment Note  [facility]     Patient Name: Moriah Petty  : 1940  MRN: [id number]  Today's Date: 2018  Onset of Illness/Injury or Date of Surgery: 18  Date of Referral to PT: 12/10/18  Referring Physician: Jadon    Admit Date: 2018    Visit Dx:    ICD-10-CM ICD-9-CM   1. Diverticulitis of large intestine with abscess without bleeding K57.20 562.11     569.5   2. Difficulty walking R26.2 719.7     Patient Active Problem List   Diagnosis   • Abnormal electrocardiogram   • Non-specific colitis   • Acute post-traumatic headache   • Postoperative anemia due to acute blood loss   • Arthritis   • Hematochezia   • Chest pain   • Compression fracture of lumbar vertebra (CMS/HCC)   • Closed wedge compression fracture of second lumbar vertebra (CMS/HCC)   • Constipation   • Dizziness   • Dyslipidemia   • Fatigue   • Ventricular premature beats   • Gastroesophageal reflux disease without esophagitis   • Hypertension   • Hypotension   • Insomnia   • Left lower quadrant pain   • Low back pain   • Osteoporosis   • Premature atrial contraction   • Peripheral nerve disease   • Pneumonia   • Nausea   • Sensorineural hearing loss   • Shortness of breath   • Sinus bradycardia   • Sleep apnea   • Disorder of thyroid   • Trigeminal neuralgia   • Rectal bleeding   • Family history of colon cancer   • Adenomatous polyp of colon   • Hypothyroidism   • Osteoporosis, post-menopausal   • Diverticulitis of large intestine with abscess without bleeding   • Mild malnutrition (CMS/HCC)       Therapy Treatment    Rehabilitation Treatment Summary     Row Name 18 1641             Treatment Time/Intention    Discipline  physical therapist  -MA      Document Type  therapy note (daily note)  -MA      Subjective Information  no complaints  -MA      Mode of Treatment  physical therapy  -MA      Patient/Family Observations  Supine in bed with HOB elevated, no acute distress noted at rest,  spouse at bedside  -MA      Care Plan Review  patient/other agree to care plan  -MA      Therapy Frequency (PT Clinical Impression)  daily  -MA      Patient Effort  good  -MA      Existing Precautions/Restrictions  fall  -MA      Recorded by [MA] Shirley Riley, PT 12/11/18 1645      Row Name 12/11/18 1641             Cognitive Assessment/Intervention- PT/OT    Orientation Status (Cognition)  oriented x 3  -MA      Follows Commands (Cognition)  WFL  -MA      Personal Safety Interventions  fall prevention program maintained;gait belt;nonskid shoes/slippers when out of bed  -MA      Recorded by [MA] Shirley Riley, PT 12/11/18 1645      Row Name 12/11/18 1641             Safety Issues, Functional Mobility    Impairments Affecting Function (Mobility)  strength;endurance/activity tolerance;pain;balance  -MA      Recorded by [MA] Shirley Riley, PT 12/11/18 1645      Row Name 12/11/18 1641             Bed Mobility Assessment/Treatment    Bed Mobility Assessment/Treatment  supine-sit;sit-supine  -MA      Supine-Sit [place]ie (Bed Mobility)  supervision  -MA      Sit-Supine [place] (Bed Mobility)  supervision  -MA      Recorded by [MA] Shirley Riley, PT 12/11/18 1645      Row Name 12/11/18 1641             Transfer Assessment/Treatment    Transfer Assessment/Treatment  sit-stand transfer;stand-sit transfer  -MA      Recorded by [MA] Shirley Riley, PT 12/11/18 1645      Row Name 12/11/18 1641             Sit-Stand Transfer    Sit-Stand [place]ie (Transfers)  contact guard;verbal cues  -MA      Recorded by [MA] Shirley Riley, PT 12/11/18 1645      Row Name 12/11/18 1641             Stand-Sit Transfer    Stand-Sit [place]ie (Transfers)  contact guard;verbal cues  -MA      Recorded by [MA] Shirley Riley, PT 12/11/18 1645      Row Name 12/11/18 1641             Gait/Stairs Assessment/Training    Gait/Stairs Assessment/Training  gait/ambulation independence  -MA       [place] Level (Gait)  stand by assist;contact guard;verbal cues  -MA      Assistive Device (Gait)  -- HHAx1  -MA      Distance in Feet (Gait)  150  -MA      Deviations/Abnormal Patterns (Gait)  festinating/shuffling;chari decreased  -MA      Comment (Gait/Stairs)  Progressed to no AD today- mildly unsteady and performed a scissoring gait pattern. Limited 2/2 fatigue.  -MA      Recorded by [MA] Shirley Riley, PT 12/11/18 1645      Row Name 12/11/18 1641             Positioning and Restraints    Pre-Treatment Position  in bed  -MA      Post Treatment Position  bed  -MA      In Bed  fowlers;call light within reach;encouraged to call for assist;with family/caregiver  -MA      Recorded by [MA] Shirley Riley, PT 12/11/18 1645      Row Name 12/11/18 1641             Pain Scale: Numbers Pre/Post-Treatment    Pain Scale: Numbers, Pretreatment  5/10  -MA      Pain Scale: Numbers, Post-Treatment  5/10  -MA      Pain Location  abdomen  -MA      Recorded by [MA] Shirley Riley, PT 12/11/18 1645      Row Name                Wound 12/05/18 1834 Other (See comments) midline abdomen incision    Wound - Properties Group Date first assessed: 12/05/18 [VB] Time first assessed: 1834 [VB] Side: Other (See comments) [VB] Orientation: midline [KR] Location: abdomen [VB] Type: incision [VB] Recorded by:  [KR] Marie Holloway RN 12/05/18 1928 [VB] Anny Cespedes RN 12/05/18 1834    Row Name 12/11/18 1641             Plan of Care Review    Plan of Care Reviewed With  patient  -MA      Recorded by [MA] Shirley Riley, PT 12/11/18 1645      Row Name 12/11/18 1641             Outcome Summary/Treatment Plan (PT)    Daily Summary of Progress (PT)  progress toward functional goals is good  -MA      Anticipated Discharge Disposition (PT)  home with assist;home with home health  -MA      Recorded by [MA] Shirley Riley, PT 12/11/18 1645        User Key  (r) = Recorded By, (t) = Taken By, (c) = Cosigned By     Initials Name Effective Dates Discipline    Anny Fragoso RN 06/16/16 -  Nurse    Marie Alvarez RN 06/16/16 -  Nurse    Shirley Tavarez, PT 04/03/18 -  PT          Wound 12/05/18 1834 Other (See comments) midline abdomen incision (Active)   Dressing Appearance open to air 12/11/2018  8:00 AM   Closure Staples;Approximated 12/11/2018  8:00 AM   Periwound pink;ecchymotic 12/11/2018  8:00 AM   Periwound Temperature warm 12/11/2018  8:00 AM   Periwound Skin Turgor soft 12/11/2018  8:00 AM   Drainage Amount none 12/11/2018  8:00 AM   Dressing Care, Wound open to air 12/11/2018  8:00 AM           Physical Therapy Education     Title: PT OT SLP Therapies (In Progress)     Topic: Physical Therapy (In Progress)     Point: Mobility training (In Progress)     Learning Progress Summary           Patient Acceptance, E, NR by MA at 12/11/2018  4:47 PM                   Point: Home exercise program (In Progress)     Learning Progress Summary           Patient Acceptance, E, NR by MA at 12/11/2018  4:47 PM                   Point: Body mechanics (In Progress)     Learning Progress Summary           Patient Acceptance, E, NR by MA at 12/11/2018  4:47 PM                   Point: Precautions (In Progress)     Learning Progress Summary           Patient Acceptance, E, NR by MA at 12/11/2018  4:47 PM                               User Key     Initials Effective Dates Name Provider Type Discipline    MA 04/03/18 -  Shirley Riley, PT Physical Therapist PT                PT Recommendation and Plan  Anticipated Discharge Disposition (PT): home with assist, home with home health  Therapy Frequency (PT Clinical Impression): daily  Outcome Summary/Treatment Plan (PT)  Daily Summary of Progress (PT): progress toward functional goals is good  Anticipated Discharge Disposition (PT): home with assist, home with home health  Plan of Care Reviewed With: patient  Progress: improving  Outcome Summary: Progressed ambulation  distance as well as required HHAx1 today. No new complaints. Will continue to advance as able.  Outcome Measures     Row Name 12/11/18 1600 12/10/18 1500          How much help from another person do you currently need...    Turning from your back to your side while in flat bed without using bedrails?  4  -MA  3  -EJ     Moving from lying on back to sitting on the side of a flat bed without bedrails?  4  -MA  3  -EJ     Moving to and from a bed to a chair (including a wheelchair)?  3  -MA  3  -EJ     Standing up from a chair using your arms (e.g., wheelchair, bedside chair)?  3  -MA  3  -EJ     Climbing 3-5 steps with a railing?  2  -MA  2  -EJ     To walk in hospital room?  3  -MA  3  -EJ     AM-PAC 6 Clicks Score  19  -MA  17  -EJ        Functional Assessment    Outcome Measure Options  AM-PAC 6 Clicks Basic Mobility (PT)  -MA  AM-PAC 6 Clicks Basic Mobility (PT)  -EJ       User Key  (r) = Recorded By, (t) = Taken By, (c) = Cosigned By    Initials Name Provider Type    Concepción Velazquez, PT Physical Therapist    Shirley Tavarez, PT Physical Therapist         Time Calculation:   PT Charges     Row Name 12/11/18 1641             Time Calculation    Start Time  1626  -MA      Stop Time  1641  -MA      Time Calculation (min)  15 min  -MA      PT Received On  12/11/18  -MA      PT - Next Appointment  12/12/18  -MA         Time Calculation- PT    Total Timed Code Minutes- PT  13 minute(s)  -MA        User Key  (r) = Recorded By, (t) = Taken By, (c) = Cosigned By    Initials Name Provider Type    Shirley Tavarez, PT Physical Therapist        Therapy Suggested Charges     Code   Minutes Charges    93081 (CPT®) Hc Pt Neuromusc Re Education Ea 15 Min      50372 (CPT®) Hc Pt Ther Proc Ea 15 Min 15 1    15345 (CPT®) Hc Gait Training Ea 15 Min      33080 (CPT®) Hc Pt Therapeutic Act Ea 15 Min      40776 (CPT®) Hc Pt Manual Therapy Ea 15 Min      41328 (CPT®) Hc Pt Iontophoresis Ea 15 Min      03186 (CPT®) Hc  Pt Elec Stim Ea-Per 15 Min      70416 (CPT®) Hc Pt Ultrasound Ea 15 Min      52048 (CPT®) Hc Pt Self Care/Mgmt/Train Ea 15 Min      78936 (CPT®) Hc Pt Prosthetic (S) Train Initial Encounter, Each 15 Min      05223 (CPT®) Hc Pt Orthotic(S)/Prosthetic(S) Encounter, Each 15 Min      47558 (CPT®) Hc Orthotic(S) Mgmt/Train Initial Encounter, Each 15min      Total  15 1        Therapy Charges for Today     Code Description Service Date Service Provider Modifiers Qty    77923314605 HC PT THER PROC EA 15 MIN 12/11/2018 Shirley Riley, PT GP 1          PT G-Codes  Outcome Measure Options: AM-PAC 6 Clicks Basic Mobility (PT)  AM-PAC 6 Clicks Score: 19    Shirley Riley, PT  12/11/2018

## 2018-12-11 NOTE — PROGRESS NOTES
"    DAILY PROGRESS NOTE  [facility]    Patient Identification:  Name: Moirah Petty  Age: 78 y.o.  Sex: female  :  1940  MRN: [id number]         Primary Care Physician: Jarred Carrizales Jr., MD    Subjective:  Interval History: no new issues - continues to clinically improve - no emesis/cp/sob    Objective:no fm - d/w rn     Scheduled Meds:  carvedilol 12.5 mg Oral BID With Meals   enoxaparin 40 mg Subcutaneous Q24H   famotidine 20 mg Oral Daily   levothyroxine 25 mcg Oral Daily   losartan 50 mg Oral Daily   piperacillin-tazobactam 3.375 g Intravenous Q8H   sodium chloride 3 mL Intravenous Q12H     Continuous Infusions:     Vital signs in last 24 hours:  Temp:  [96.9 °F (36.1 °C)-98.3 °F (36.8 °C)] 97.6 °F (36.4 °C)  Heart Rate:  [74-83] 74  Resp:  [18] 18  BP: (131-170)/(81-91) 131/87    Intake/Output:    Intake/Output Summary (Last 24 hours) at 2018 1359  Last data filed at 2018 1100  Gross per 24 hour   Intake 380 ml   Output 2580 ml   Net -2200 ml       Exam:  /87 (BP Location: Right arm, Patient Position: Lying)   Pulse 74   Temp 97.6 °F (36.4 °C) (Oral)   Resp 18   Ht 170.2 cm (67\")   Wt 70.5 kg (155 lb 6.4 oz)   SpO2 96%   BMI 24.34 kg/m²     General Appearance:    Alert, cooperative, no distress, AAOx3, clinically fine                          Throat:   Lips, tongue, gums normal; oral mucosa pink and moist                           Neck:   Supple, symmetrical, trachea midline, no JVD                         Lungs:    Clear to auscultation bilaterally, respirations unlabored                          Heart:    Regular rate and rhythm, S1 and S2 normal                  Abdomen:     Soft, postop ttp, bowel sounds active, colostomy w/ output                 Extremities:   No cyanosis or edema                          Data Review:  Labs in chart were reviewed.    Assessment:  Active Hospital Problems    Diagnosis Date Noted   • **Diverticulitis of large intestine with " abscess without bleeding [K57.20] 12/04/2018   • Mild malnutrition (CMS/HCC) [E44.1] 12/06/2018   • Family history of colon cancer [Z80.0] 11/06/2017   • Postoperative anemia due to acute blood loss [D62] 03/04/2017   • Sleep apnea [G47.30] 03/04/2017   • Hypertension [I10] 03/04/2017      Resolved Hospital Problems    Diagnosis Date Noted Date Resolved   • Hypokalemia [E87.6] 12/09/2018 12/10/2018   • Chest pain, atypical [R07.89] 12/05/2018 12/10/2018       Plan:  Plans to DC to rehabilitation tomorrow as surgery wants patient to be on antibiotics until Wednesday if okay with disposition from their standpoint otherwise seems more than medically stable    Hemoglobin stable and improving to 10.6 so no need for further rechecks    Hypokalemia corrected    Terence Diop MD  12/11/2018  1:59 PM

## 2018-12-11 NOTE — PLAN OF CARE
Problem: Patient Care Overview  Goal: Plan of Care Review  Outcome: Ongoing (interventions implemented as appropriate)   12/11/18 0600   Plan of Care Review   Progress improving   OTHER   Outcome Summary med for pain, abd inc open to air-staples intact, vdg, c-ostomy intact and functioning with dark brown stool     Goal: Discharge Needs Assessment  Outcome: Ongoing (interventions implemented as appropriate)    Goal: Interprofessional Rounds/Family Conf  Outcome: Ongoing (interventions implemented as appropriate)      Problem: Infection, Risk/Actual (Adult)  Goal: Infection Prevention/Resolution  Outcome: Ongoing (interventions implemented as appropriate)      Problem: Pain, Acute (Adult)  Goal: Acceptable Pain Control/Comfort Level  Outcome: Ongoing (interventions implemented as appropriate)      Problem: Fall Risk (Adult)  Goal: Absence of Fall  Outcome: Ongoing (interventions implemented as appropriate)

## 2018-12-11 NOTE — PROGRESS NOTES
Postoperative day #6    Subjective:  Continues to improve.  Appetite seems to be better today.  Colostomy continues to function.  Still complains of a fair amount of pain but this is improving.  Mobility still an issue.    Objective:  Patient afebrile, vital stable  Gen.: Awake alert and oriented, no acute distress  Gastrointestinal: Abdomen soft, less tender and seems to be softer today.  Incision clean dry and intact.  Ostomy viable and functioning.    Assessment and plan:  Complicated diverticulitis status post Velazquez's procedure with associated small bowel resection.  GI function improving, continue to increase diet as tolerated.  Physical therapy assisting with mobilization.  Hopefully ready for discharge in the next couple of days.    Emanuel Clark MD  General and Endoscopic Surgery  [facility] operated by [facility]    [address], [address]  [place], [place], [address]  P: [phone]  F: [phone]

## 2018-12-11 NOTE — PLAN OF CARE
Problem: Patient Care Overview  Goal: Plan of Care Review   12/11/18 8479   Coping/Psychosocial   Plan of Care Reviewed With patient   Plan of Care Review   Progress improving   OTHER   Outcome Summary Progressed ambulation distance as well as required HHAx1 today. No new complaints. Will continue to advance as able.

## 2018-12-11 NOTE — NURSING NOTE
"   12/11/18 1430   Colostomy LLQ   Placement Date: 12/05/18   Inserted by: DR. KIMBROUGH  Hand Hygiene Completed: Yes  Colostomy Type: Descending/sigmoid  Location: LLQ  Stoma Size (cm): 25 cm   Stomal Appliance 2 piece;Changed   Stoma Appearance round;moist;red;protruding above skin level   Peristomal Assessment Clean;Intact   Accessories/Skin Care cleansed with water   Stoma Function stool   Stool Color brown, dark   Output (mL) 100 mL   CWOCN instructed on pouch change today using 2 piece Olga 2 1/4\" flat cut to fit.  All steps reviewed with good attention from patient.  Some hands on with snapping pouch onto flange. Instructed in crusting with stoma powder prn skin breakdown. Reviewed emptying pouch with patient and I have also provided her with closed end pouches for future use.  Supplies restocked for potential discharge tomorrow.    "

## 2018-12-12 VITALS
HEIGHT: 67 IN | WEIGHT: 155.4 LBS | HEART RATE: 74 BPM | OXYGEN SATURATION: 97 % | TEMPERATURE: 98 F | SYSTOLIC BLOOD PRESSURE: 175 MMHG | BODY MASS INDEX: 24.39 KG/M2 | DIASTOLIC BLOOD PRESSURE: 83 MMHG | RESPIRATION RATE: 18 BRPM

## 2018-12-12 PROCEDURE — 25010000002 PIPERACILLIN SOD-TAZOBACTAM PER 1 G: Performed by: INTERNAL MEDICINE

## 2018-12-12 RX ORDER — AMOXICILLIN AND CLAVULANATE POTASSIUM 875; 125 MG/1; MG/1
1 TABLET, FILM COATED ORAL 2 TIMES DAILY
Qty: 8 TABLET | Refills: 0
Start: 2018-12-13 | End: 2018-12-12 | Stop reason: SDUPTHER

## 2018-12-12 RX ORDER — OXYCODONE HYDROCHLORIDE AND ACETAMINOPHEN 5; 325 MG/1; MG/1
1-2 TABLET ORAL EVERY 4 HOURS PRN
Qty: 36 TABLET | Refills: 0 | Status: SHIPPED | OUTPATIENT
Start: 2018-12-12 | End: 2018-12-16

## 2018-12-12 RX ORDER — AMOXICILLIN AND CLAVULANATE POTASSIUM 875; 125 MG/1; MG/1
1 TABLET, FILM COATED ORAL 2 TIMES DAILY
Qty: 8 TABLET | Refills: 0
Start: 2018-12-13 | End: 2018-12-19

## 2018-12-12 RX ADMIN — OXYCODONE AND ACETAMINOPHEN 2 TABLET: 5; 325 TABLET ORAL at 12:07

## 2018-12-12 RX ADMIN — TAZOBACTAM SODIUM AND PIPERACILLIN SODIUM 3.38 G: 375; 3 INJECTION, SOLUTION INTRAVENOUS at 00:36

## 2018-12-12 RX ADMIN — LEVOTHYROXINE SODIUM 25 MCG: 25 TABLET ORAL at 07:52

## 2018-12-12 RX ADMIN — LOSARTAN POTASSIUM 50 MG: 50 TABLET, FILM COATED ORAL at 07:52

## 2018-12-12 RX ADMIN — FAMOTIDINE 20 MG: 20 TABLET, FILM COATED ORAL at 07:53

## 2018-12-12 RX ADMIN — CARVEDILOL 12.5 MG: 12.5 TABLET, FILM COATED ORAL at 07:52

## 2018-12-12 RX ADMIN — OXYCODONE AND ACETAMINOPHEN 2 TABLET: 5; 325 TABLET ORAL at 02:16

## 2018-12-12 RX ADMIN — OXYCODONE AND ACETAMINOPHEN 2 TABLET: 5; 325 TABLET ORAL at 07:52

## 2018-12-12 RX ADMIN — TAZOBACTAM SODIUM AND PIPERACILLIN SODIUM 3.38 G: 375; 3 INJECTION, SOLUTION INTRAVENOUS at 07:52

## 2018-12-12 NOTE — SIGNIFICANT NOTE
12/12/18 1332   PT Discharge Summary   Reason for Discharge Discharge from facility   Discharge Destination SNF

## 2018-12-12 NOTE — DISCHARGE SUMMARY
"Date of Admission: 12/4/2018  Date of Discharge:  12/12/2018  Primary Care Physician: Jarred Carrizales Jr., MD     Discharge Diagnosis:  Active Hospital Problems    Diagnosis Date Noted   • **Diverticulitis of large intestine with abscess without bleeding [K57.20] 12/04/2018   • Mild malnutrition (CMS/HCC) [E44.1] 12/06/2018   • Family history of colon cancer [Z80.0] 11/06/2017   • Postoperative anemia due to acute blood loss [D62] 03/04/2017   • Sleep apnea [G47.30] 03/04/2017   • Hypertension [I10] 03/04/2017      Resolved Hospital Problems    Diagnosis Date Noted Date Resolved   • Hypokalemia [E87.6] 12/09/2018 12/10/2018   • Chest pain, atypical [R07.89] 12/05/2018 12/10/2018       Presenting Problem/History of Present Illness:  Acute diverticulitis [K57.92]     Hospital Course:  The patient is a 78 y.o. female with a history of diverticulitis recently admitted for same and discharged on oral antibiotics 11/21/18 who presented with worsening abdominal pain following her completion of antibiotics with initial improvement.  Please see admission H&P from 12/04/18 for further details.  She was found to have a newly developed abdominal abscess.  She was started on zosyn of which she ended up having 8 days.  Dr. Clark with general surgery was consulted and she underwent sigmoid colectomy with colostomy and Velazquez's pouch, pelvic abscess drainage, small bowel resection with anastomosis, and incidental appendectomy on 12/5/18.  She slowly recovered and was tolerating a regular diet with normal ostomy output at the time of discharge.  She will be discharged on the remainder of a 14d course of antibiotics with augmentin.  She will need to follow up with Dr. Clark in 2 weeks.      Exam Today:  Blood pressure 175/83, pulse 74, temperature 98 °F (36.7 °C), temperature source Oral, resp. rate 18, height 170.2 cm (67\"), weight 70.5 kg (155 lb 6.4 oz), SpO2 97 %.  General Appearance:    Alert, cooperative, no distress, AAOx3, " clinically fine   Throat:   Lips, tongue, gums normal; oral mucosa pink and moist  Neck:    Supple, symmetrical, trachea midline, no JVD  Lungs:    Clear to auscultation bilaterally, respirations unlabored  Heart:    Regular rate and rhythm, S1 and S2 normal  Abdomen:     Soft, postop ttp, bowel sounds active, colostomy w/ output  Extremities:    No cyanosis or edema    Procedures Performed:  Procedure(s):  exploratory laparotomy with sigmoid resection, possible colostomy, APPENDECTOMY      Consults:   Consults     Date and Time Order Name Status Description    12/4/2018 1746 Inpatient General Surgery Consult Completed     11/16/2018 3243 [facility] (on-call MD unless specified) Completed            Discharge Disposition:  Skilled Nursing Facility (DC - External)    Discharge Medications:     Discharge Medications      New Medications      Instructions Start Date   amoxicillin-clavulanate 875-125 MG per tablet  Commonly known as:  AUGMENTIN   1 tablet, Oral, 2 Times Daily      oxyCODONE-acetaminophen 5-325 MG per tablet  Commonly known as:  PERCOCET   1-2 tablets, Oral, Every 4 Hours PRN         Changes to Medications      Instructions Start Date   losartan 50 MG tablet  Commonly known as:  COZAAR  What changed:    · how much to take  · how to take this  · when to take this   TAKE 1 TABLET BY MOUTH EVERY DAY         Continue These Medications      Instructions Start Date   acetaminophen 325 MG tablet  Commonly known as:  TYLENOL   650 mg, Oral, Every 4 Hours PRN      ASPIR-81 PO   81 mg, Oral, Nightly      Calcium + D3 600-200 MG-UNIT tablet   1 tablet, Oral, Nightly      carvedilol 12.5 MG tablet  Commonly known as:  COREG   q 12 hours      desonide 0.05 % cream  Commonly known as:  DESOWEN   APPLY SPARINGLY TO THE CENTRAL FACE AND NOSE 2 TIMES A DAY  prn      fish oil 1200 MG capsule capsule   1,200 mg, Oral, 2 Times Daily With Meals      hydrocortisone 25 MG suppository  Commonly known as:  ANUSOL-HC   25 mg, Rectal,  Nightly PRN      levothyroxine 25 MCG tablet  Commonly known as:  SYNTHROID, LEVOTHROID   TAKE 1 TABLET BY MOUTH DAILY      MULTI VITAMIN DAILY PO   1 tablet, Oral, Daily      omeprazole 20 MG capsule  Commonly known as:  priLOSEC   20 mg, Oral, Daily      ondansetron ODT 4 MG disintegrating tablet  Commonly known as:  ZOFRAN-ODT   4 mg, Oral, Every 6 Hours PRN         Stop These Medications    hyoscyamine 0.125 MG SL tablet  Commonly known as:  LEVSIN            Discharge Diet:   Diet Instructions     Diet: Regular; Thin      Discharge Diet:  Regular    Fluid Consistency:  Thin          Activity at Discharge:   Activity Instructions     Activity as Tolerated      No lifting/pushing/pulling greater than 10lbs for 2 weeks          Follow-up Appointments:  Future Appointments   Date Time Provider Department Center   1/7/2019  1:00 PM Morgan Collins MD [facility] None   2/25/2019 10:00 AM Jarred Carrizales Jr., MD [facility] None     Additional Instructions for the Follow-ups that You Need to Schedule     Discharge Follow-up with Specialty: General Practitioner/PCP at [facility]   As directed      Specialty:  General Practitioner/PCP at [facility]    Follow Up Details:  1-3d         Discharge Follow-up with Specified Provider: Dr. Emanuel Clark (General Surgery); 2 Weeks   As directed      To:  Dr. Emanuel Clark (General Surgery)    Follow Up:  2 Weeks                Doe Plaza MD  12/12/18  12:27 PM    Time Spent on Discharge Activities: Greater than 30 minutes.

## 2018-12-12 NOTE — NURSING NOTE
"CWOCN follow up- patient doing well this am and planning on rehab today. I showed her some ostomy accessories- the belt, ostomy-secrets underwear. Also provided the support group info. She was appreciative. She is still quite overwhelmed with the ostomy and does not have questions today- \"I don't even know what to ask.\" I gave her our phone number and recommend that she call us after rehab and she could come in as an outpatient. She stated that she might. Also discussed home health for after rehab. Patient verbalized understanding.   "

## 2018-12-12 NOTE — PROGRESS NOTES
Discharge Planning Assessment  [facility]     Patient Name: Moriah Petty  MRN: [id number]  Today's Date: 12/12/2018    Admit Date: 12/4/2018    Discharge Needs Assessment    No documentation.       Discharge Plan     Row Name 12/12/18 7712       Plan    Plan Comments  Pt with discharge order.  I spoke with pt's  Sammy Petty [phone] by phone and he confirmed he will transport pt and will be at the hospital about 2PM.  Discharge clinical faxed to [facility] and Mónica with [facility] notified of discharge.  I updated pt that her  will be here about 2PM to transport her to [facility].   Josie Whitaker, JOSE    Final Discharge Disposition Code  03 - skilled nursing facility (SNF)        Destination - Selection Complete      Service Provider Request Status Selected Services Address Phone Number Fax Number    [facility] Selected Skilled Nursing [address] [address] [phone]       Anaya Quesada RN 12/9/2018 1852    Pt states she has been to [facility] a couple of times in the past.                 Durable Medical Equipment      No service coordination in this encounter.      Dialysis/Infusion      No service coordination in this encounter.      Home Medical Care      No service coordination in this encounter.      Community Resources      No service coordination in this encounter.        Expected Discharge Date and Time     Expected Discharge Date Expected Discharge Time    Dec 12, 2018          Josie Whitaker, RN

## 2018-12-12 NOTE — PLAN OF CARE
Problem: Patient Care Overview  Goal: Plan of Care Review  Outcome: Ongoing (interventions implemented as appropriate)   12/12/18 0440   Coping/Psychosocial   Plan of Care Reviewed With patient   Plan of Care Review   Progress improving   OTHER   Outcome Summary Afebrile, BP borderline. Pain well controlled. Incision with staples CDI. Colostomy with minimal dark brown output. Voided freely. Slept well.      Goal: Individualization and Mutuality  Outcome: Ongoing (interventions implemented as appropriate)    Goal: Discharge Needs Assessment  Outcome: Ongoing (interventions implemented as appropriate)    Goal: Interprofessional Rounds/Family Conf  Outcome: Ongoing (interventions implemented as appropriate)      Problem: Infection, Risk/Actual (Adult)  Goal: Infection Prevention/Resolution  Outcome: Ongoing (interventions implemented as appropriate)      Problem: Pain, Acute (Adult)  Goal: Acceptable Pain Control/Comfort Level  Outcome: Ongoing (interventions implemented as appropriate)      Problem: Nutrition, Imbalanced: Inadequate Oral Intake (Adult)  Goal: Improved Oral Intake  Outcome: Ongoing (interventions implemented as appropriate)    Goal: Prevent Further Weight Loss  Outcome: Ongoing (interventions implemented as appropriate)      Problem: Fall Risk (Adult)  Goal: Absence of Fall  Outcome: Ongoing (interventions implemented as appropriate)

## 2018-12-13 NOTE — PROGRESS NOTES
Discharge Planning Assessment  [facility]     Patient Name: Moriah Petty  MRN: [id number]  Today's Date: 12/13/2018    Admit Date: 12/4/2018    Discharge Needs Assessment    No documentation.       Discharge Plan     Row Name 12/13/18 1347       Plan    Plan Comments  Return bill from Mónica / Chidi advisint pt was admitted to [facility] at skilled level        Destination - Selection Complete      Service Provider Request Status Selected Services Address Phone Number Fax Number    [facility] Selected Skilled Nursing [address] [address] [phone]       Anaya Quesada RN 12/9/2018 1852    Pt states she has been to [facility] a couple of times in the past.                  Josie Whitaker RN

## 2018-12-14 ENCOUNTER — EPISODE CHANGES (OUTPATIENT)
Dept: CASE MANAGEMENT | Facility: OTHER | Age: 78
End: 2018-12-14

## 2018-12-26 ENCOUNTER — OFFICE VISIT (OUTPATIENT)
Dept: SURGERY | Facility: CLINIC | Age: 78
End: 2018-12-26

## 2018-12-26 DIAGNOSIS — K57.20 DIVERTICULITIS OF LARGE INTESTINE WITH ABSCESS WITHOUT BLEEDING: Primary | ICD-10-CM

## 2018-12-26 PROCEDURE — 99024 POSTOP FOLLOW-UP VISIT: CPT | Performed by: SURGERY

## 2018-12-26 RX ORDER — AMOXICILLIN AND CLAVULANATE POTASSIUM 875; 125 MG/1; MG/1
1 TABLET, FILM COATED ORAL 2 TIMES DAILY
COMMUNITY
End: 2019-01-11

## 2018-12-27 NOTE — PROGRESS NOTES
Follow-up complicated diverticulitis    Subjective:  Patient is doing well.  Strength is getting better, appetite improving and colostomy is functioning.    Objective:  Abdomen is soft and benign, incision is nicely healed.  Ostomy is viable with stool in bag.    Assessment and plan:  Complicated diverticulitis with abscess, status post Velazquez's procedure with colostomy, small bowel resection and incidental appendectomy.  Recovering well to this point.  Plan to follow-up in one month.  Consider colostomy closure this summer.  Colonoscopy done around a year ago, only diverticulosis was seen.    Emanuel Clark MD  General and Endoscopic Surgery  [facility]    [address], [address]  [place], [place], [address]  P: [phone]  F: [phone]

## 2019-01-03 ENCOUNTER — PATIENT OUTREACH (OUTPATIENT)
Dept: CASE MANAGEMENT | Facility: OTHER | Age: 79
End: 2019-01-03

## 2019-01-11 ENCOUNTER — OFFICE VISIT (OUTPATIENT)
Dept: INTERNAL MEDICINE | Facility: CLINIC | Age: 79
End: 2019-01-11

## 2019-01-11 VITALS
WEIGHT: 149 LBS | OXYGEN SATURATION: 96 % | HEART RATE: 77 BPM | SYSTOLIC BLOOD PRESSURE: 138 MMHG | BODY MASS INDEX: 23.34 KG/M2 | TEMPERATURE: 97.4 F | DIASTOLIC BLOOD PRESSURE: 88 MMHG

## 2019-01-11 DIAGNOSIS — E88.09 HYPOALBUMINEMIA DUE TO PROTEIN-CALORIE MALNUTRITION (HCC): ICD-10-CM

## 2019-01-11 DIAGNOSIS — I10 ESSENTIAL HYPERTENSION: Primary | ICD-10-CM

## 2019-01-11 DIAGNOSIS — D50.0 IRON DEFICIENCY ANEMIA DUE TO CHRONIC BLOOD LOSS: ICD-10-CM

## 2019-01-11 DIAGNOSIS — E44.1 MILD MALNUTRITION (HCC): ICD-10-CM

## 2019-01-11 DIAGNOSIS — E78.5 DYSLIPIDEMIA: ICD-10-CM

## 2019-01-11 DIAGNOSIS — E46 HYPOALBUMINEMIA DUE TO PROTEIN-CALORIE MALNUTRITION (HCC): ICD-10-CM

## 2019-01-11 DIAGNOSIS — Z09 POSTOP CHECK: ICD-10-CM

## 2019-01-11 PROCEDURE — 99214 OFFICE O/P EST MOD 30 MIN: CPT | Performed by: FAMILY MEDICINE

## 2019-01-11 RX ORDER — OXYCODONE HYDROCHLORIDE AND ACETAMINOPHEN 5; 325 MG/1; MG/1
1 TABLET ORAL EVERY 4 HOURS PRN
Refills: 0 | COMMUNITY
Start: 2019-01-04 | End: 2019-01-28

## 2019-01-11 RX ORDER — NUT.TX.GLUCOSE INTOLERANCE,SOY
30 BAR ORAL 2 TIMES DAILY
Qty: 946 ML | Refills: 5 | Status: SHIPPED | OUTPATIENT
Start: 2019-01-11 | End: 2019-03-25

## 2019-01-11 NOTE — PROGRESS NOTES
Subjective   Moriah Petty is a 78 y.o. female.     Chief Complaint   Patient presents with   • GI Problem   • Hypertension   • Hyperlipidemia   Postoperative follow-up      History of Present Illness   Current outpatient and discharge medications have been reconciled for the patient.  Reviewed by: Jarred Carrizales Jr., MD  Patient is postop follow-up partial colectomy for diverticular abscesses as well as excision of one area of small intestine.  She's had some anemia and protein calorie malnutrition slowly improving postoperatively she does have a colostomy.  She close surgical follow-up with Dr. Coates.    We will get screening labs today.  CT of her abdomen is reviewed she has a large renal cysts chronically off the left kidney and does have some plaque/calcifications in the aorta without aneurysm.  We discussed her lipid panel in the future she may need low-dose statin such as Crestor 5 mg.  She does have high HDL.  Treatment of hypertension is reviewed.      The following portions of the patient's history were reviewed and updated as appropriate: allergies, current medications, past social history and problem list.    Review of Systems   Constitutional: Negative.    HENT: Negative.    Eyes: Negative.    Respiratory: Negative.    Cardiovascular: Negative.    Gastrointestinal: Positive for abdominal pain.   Endocrine: Negative.    Genitourinary: Negative.    Musculoskeletal: Negative.    Skin: Negative.    Allergic/Immunologic: Negative.    Neurological: Negative.    Hematological: Negative.    Psychiatric/Behavioral: Negative.        Objective   Vitals:    01/11/19 1021   BP: 138/88   Pulse: 77   Temp: 97.4 °F (36.3 °C)   SpO2: 96%     Physical Exam   Constitutional: She is oriented to person, place, and time. She appears well-developed and well-nourished.   HENT:   Head: Normocephalic and atraumatic.   Right Ear: Tympanic membrane and external ear normal.   Left Ear: Tympanic membrane and external ear normal.    Nose: Nose normal.   Mouth/Throat: Oropharynx is clear and moist.   Eyes: Conjunctivae and EOM are normal. Pupils are equal, round, and reactive to light.   Neck: Normal range of motion. Neck supple. No JVD present. No thyromegaly present.   Cardiovascular: Normal rate, regular rhythm, normal heart sounds and intact distal pulses.   Pulmonary/Chest: Effort normal and breath sounds normal.   Abdominal: Soft. Bowel sounds are normal.       Musculoskeletal: Normal range of motion.   Lymphadenopathy:     She has no cervical adenopathy.   Neurological: She is alert and oriented to person, place, and time. No cranial nerve deficit. Coordination normal.   Skin: Skin is warm and dry. No rash noted.   Psychiatric: She has a normal mood and affect. Her behavior is normal. Judgment and thought content normal.   Vitals reviewed.      Assessment/Plan   Problem List Items Addressed This Visit        Cardiovascular and Mediastinum    Hypertension - Primary    Relevant Orders    CBC & Differential    Comprehensive Metabolic Panel    Iron and TIBC    Ferritin    Lipid Panel With / Chol / HDL Ratio    TSH    T4, Free    T3, Free       Digestive    Mild malnutrition (CMS/HCC)    Relevant Orders    CBC & Differential    Comprehensive Metabolic Panel    Iron and TIBC    Ferritin    Lipid Panel With / Chol / HDL Ratio    TSH    T4, Free    T3, Free       Other    Dyslipidemia    Relevant Orders    CBC & Differential    Comprehensive Metabolic Panel    Iron and TIBC    Ferritin    Lipid Panel With / Chol / HDL Ratio    TSH    T4, Free    T3, Free      Other Visit Diagnoses     Postop check        Relevant Orders    CBC & Differential    Comprehensive Metabolic Panel    Iron and TIBC    Ferritin    Lipid Panel With / Chol / HDL Ratio    TSH    T4, Free    T3, Free    Iron deficiency anemia due to chronic blood loss        Relevant Orders    CBC & Differential    Comprehensive Metabolic Panel    Iron and TIBC    Ferritin    Lipid Panel  With / Chol / HDL Ratio    TSH    T4, Free    T3, Free    Hypoalbuminemia due to protein-calorie malnutrition (CMS/HCC)        Relevant Orders    CBC & Differential    Comprehensive Metabolic Panel    Iron and TIBC    Ferritin    Lipid Panel With / Chol / HDL Ratio    TSH    T4, Free    T3, Free      Screening labs including iron study recheck in 3 months follow-up with general surgery advise ProMod protein supplement over-the-counter.

## 2019-01-12 LAB
ALBUMIN SERPL-MCNC: 4.6 G/DL (ref 3.5–5.2)
ALBUMIN/GLOB SERPL: 1.7 G/DL
ALP SERPL-CCNC: 40 U/L (ref 39–117)
ALT SERPL-CCNC: 13 U/L (ref 1–33)
AST SERPL-CCNC: 15 U/L (ref 1–32)
BASOPHILS # BLD AUTO: 0.11 10*3/MM3 (ref 0–0.2)
BASOPHILS NFR BLD AUTO: 2.3 % (ref 0–1.5)
BILIRUB SERPL-MCNC: 0.4 MG/DL (ref 0.1–1.2)
BUN SERPL-MCNC: 13 MG/DL (ref 8–23)
BUN/CREAT SERPL: 16.5 (ref 7–25)
CALCIUM SERPL-MCNC: 10.8 MG/DL (ref 8.6–10.5)
CHLORIDE SERPL-SCNC: 97 MMOL/L (ref 98–107)
CHOLEST SERPL-MCNC: 252 MG/DL (ref 0–200)
CHOLEST/HDLC SERPL: 4.42 {RATIO}
CO2 SERPL-SCNC: 26.7 MMOL/L (ref 22–29)
CREAT SERPL-MCNC: 0.79 MG/DL (ref 0.57–1)
EOSINOPHIL # BLD AUTO: 0.21 10*3/MM3 (ref 0–0.7)
EOSINOPHIL NFR BLD AUTO: 4.4 % (ref 0.3–6.2)
ERYTHROCYTE [DISTWIDTH] IN BLOOD BY AUTOMATED COUNT: 15.7 % (ref 11.7–13)
FERRITIN SERPL-MCNC: 222.6 NG/ML (ref 13–150)
GLOBULIN SER CALC-MCNC: 2.7 GM/DL
GLUCOSE SERPL-MCNC: 103 MG/DL (ref 65–99)
HCT VFR BLD AUTO: 39.4 % (ref 35.6–45.5)
HDLC SERPL-MCNC: 57 MG/DL (ref 40–60)
HGB BLD-MCNC: 12.1 G/DL (ref 11.9–15.5)
IMM GRANULOCYTES # BLD AUTO: 0 10*3/MM3 (ref 0–0.03)
IMM GRANULOCYTES NFR BLD AUTO: 0 % (ref 0–0.5)
IRON SATN MFR SERPL: 19 % (ref 20–50)
IRON SERPL-MCNC: 62 MCG/DL (ref 37–145)
LDLC SERPL CALC-MCNC: 175 MG/DL (ref 0–100)
LYMPHOCYTES # BLD AUTO: 1.79 10*3/MM3 (ref 0.9–4.8)
LYMPHOCYTES NFR BLD AUTO: 37.1 % (ref 19.6–45.3)
MCH RBC QN AUTO: 28.1 PG (ref 26.9–32)
MCHC RBC AUTO-ENTMCNC: 30.7 G/DL (ref 32.4–36.3)
MCV RBC AUTO: 91.6 FL (ref 80.5–98.2)
MONOCYTES # BLD AUTO: 0.4 10*3/MM3 (ref 0.2–1.2)
MONOCYTES NFR BLD AUTO: 8.3 % (ref 5–12)
NEUTROPHILS # BLD AUTO: 2.31 10*3/MM3 (ref 1.9–8.1)
NEUTROPHILS NFR BLD AUTO: 47.9 % (ref 42.7–76)
PLATELET # BLD AUTO: 304 10*3/MM3 (ref 140–500)
POTASSIUM SERPL-SCNC: 5 MMOL/L (ref 3.5–5.2)
PROT SERPL-MCNC: 7.3 G/DL (ref 6–8.5)
RBC # BLD AUTO: 4.3 10*6/MM3 (ref 3.9–5.2)
SODIUM SERPL-SCNC: 137 MMOL/L (ref 136–145)
T3FREE SERPL-MCNC: 2.7 PG/ML (ref 2–4.4)
T4 FREE SERPL-MCNC: 1.34 NG/DL (ref 0.93–1.7)
TIBC SERPL-MCNC: 328 MCG/DL
TRIGL SERPL-MCNC: 100 MG/DL (ref 0–150)
TSH SERPL DL<=0.005 MIU/L-ACNC: 1.84 MIU/ML (ref 0.27–4.2)
UIBC SERPL-MCNC: 266 MCG/DL
VLDLC SERPL CALC-MCNC: 20 MG/DL (ref 5–40)
WBC # BLD AUTO: 4.82 10*3/MM3 (ref 4.5–10.7)

## 2019-01-14 ENCOUNTER — PATIENT OUTREACH (OUTPATIENT)
Dept: CASE MANAGEMENT | Facility: OTHER | Age: 79
End: 2019-01-14

## 2019-01-14 NOTE — OUTREACH NOTE
No needs or concerns voiced.  Current with home health services and denies need for any further assistance.

## 2019-01-21 ENCOUNTER — EPISODE CHANGES (OUTPATIENT)
Dept: CASE MANAGEMENT | Facility: OTHER | Age: 79
End: 2019-01-21

## 2019-01-28 ENCOUNTER — OFFICE VISIT (OUTPATIENT)
Dept: SURGERY | Facility: CLINIC | Age: 79
End: 2019-01-28

## 2019-01-28 DIAGNOSIS — K57.20 DIVERTICULITIS OF LARGE INTESTINE WITH ABSCESS WITHOUT BLEEDING: Primary | ICD-10-CM

## 2019-01-28 PROCEDURE — 99024 POSTOP FOLLOW-UP VISIT: CPT | Performed by: SURGERY

## 2019-01-28 NOTE — PROGRESS NOTES
Follow-up Velazquez's procedure for complicated diverticulitis    Subjective:  Overall feeling much better.  Minimal abdominal discomfort.  Eating well and getting used to taking care of her colostomy.    Objective:  Abdomen is soft and benign.  Midline incision looks good.  She stands up it almost appears there is a bulge around her stoma, but I cannot appreciate a true stomal hernia defect.    Assessment and plan:  -Complicated diverticulitis status post Velazquez's procedure with sigmoid resection and end colostomy.  Overall doing well.  I'm going to follow her up in May of this year.  I'm going to get a CT previous to that to evaluate her abdominal wall and a short that she looks like everything is cleared up to the point where she would be appropriate for reversal of her colostomy.    Emanuel Clark MD  General and Endoscopic Surgery  [facility]    [address] [place] [address], [address]  [place], [place], [address]  P: [phone]  F: [phone]

## 2019-02-12 DIAGNOSIS — M81.0 AGE-RELATED OSTEOPOROSIS WITHOUT CURRENT PATHOLOGICAL FRACTURE: Primary | ICD-10-CM

## 2019-02-17 ENCOUNTER — RESULTS ENCOUNTER (OUTPATIENT)
Dept: OBSTETRICS AND GYNECOLOGY | Facility: CLINIC | Age: 79
End: 2019-02-17

## 2019-02-17 DIAGNOSIS — M81.0 AGE-RELATED OSTEOPOROSIS WITHOUT CURRENT PATHOLOGICAL FRACTURE: ICD-10-CM

## 2019-02-26 ENCOUNTER — INFUSION (OUTPATIENT)
Dept: ONCOLOGY | Facility: HOSPITAL | Age: 79
End: 2019-02-26

## 2019-02-26 VITALS
DIASTOLIC BLOOD PRESSURE: 90 MMHG | WEIGHT: 150.2 LBS | SYSTOLIC BLOOD PRESSURE: 170 MMHG | HEART RATE: 78 BPM | TEMPERATURE: 97.8 F | OXYGEN SATURATION: 96 % | BODY MASS INDEX: 23.52 KG/M2

## 2019-02-26 DIAGNOSIS — M81.0 OSTEOPOROSIS, UNSPECIFIED OSTEOPOROSIS TYPE, UNSPECIFIED PATHOLOGICAL FRACTURE PRESENCE: Primary | ICD-10-CM

## 2019-02-26 PROCEDURE — 96372 THER/PROPH/DIAG INJ SC/IM: CPT | Performed by: NURSE PRACTITIONER

## 2019-02-26 PROCEDURE — 25010000002 DENOSUMAB 60 MG/ML SOLUTION: Performed by: OBSTETRICS & GYNECOLOGY

## 2019-02-26 RX ADMIN — DENOSUMAB 60 MG: 60 INJECTION SUBCUTANEOUS at 15:14

## 2019-02-26 NOTE — PROGRESS NOTES
Arrived ambulatory for prolia injection. Indication and side effects reviewed. Denies recent dental work. Labs and medications verified. Prolia administered in Left arm without incidence. Instructed to call prescribing MD for any concerns or questions and instructed on how to schedule future appts.  Pt vu and discharged ambulatory.

## 2019-03-25 ENCOUNTER — OFFICE VISIT (OUTPATIENT)
Dept: SURGERY | Facility: CLINIC | Age: 79
End: 2019-03-25

## 2019-03-25 VITALS — BODY MASS INDEX: 23.86 KG/M2 | WEIGHT: 152 LBS | HEIGHT: 67 IN | HEART RATE: 72 BPM

## 2019-03-25 DIAGNOSIS — K57.20 DIVERTICULITIS OF LARGE INTESTINE WITH ABSCESS WITHOUT BLEEDING: Primary | ICD-10-CM

## 2019-03-25 PROCEDURE — 99213 OFFICE O/P EST LOW 20 MIN: CPT | Performed by: SURGERY

## 2019-03-25 NOTE — PROGRESS NOTES
Follow-up complicated diverticulitis    Subjective:  This nice lady is now a little over 3 months out from a Velazquez's procedure for complicated and recurrent diverticulitis.  She is overall doing well but recently has noticed a bulge around her stoma.  There is no pain associated with this in the appliance continues to function well.    Review of systems:  Respiratory: Negative for cough or hemoptysis    Physical exam:  Body mass index 23.8  General: Awake alert and oriented, no acute distress  Eyes: Extraocular movements are intact, no scleral icterus  Abdomen: Soft and benign, stoma viable.  There is a small parastomal hernia which is easily reducible and nontender.    Assessment and plan:  -Complicated diverticulitis, now several months status post Velazquez's procedure.  -Parastomal hernia, stable and reducible  -Discussed with patient that I think it is wise to continue her current plan.  She will have a CT of the abdomen and pelvis to evaluate this hernia done next month and then she will follow-up in May.  She thinks that she would probably wish to go ahead with colostomy reversal and stomal site hernia repair at that time which I think will be reasonable.    Emanuel Clark MD  General and Endoscopic Surgery  [facility]    [address], [address]  [place], [place], [address]  P: [phone]  F: [phone]

## 2019-04-12 ENCOUNTER — OFFICE VISIT (OUTPATIENT)
Dept: INTERNAL MEDICINE | Facility: CLINIC | Age: 79
End: 2019-04-12

## 2019-04-12 VITALS
DIASTOLIC BLOOD PRESSURE: 96 MMHG | WEIGHT: 153 LBS | BODY MASS INDEX: 23.96 KG/M2 | TEMPERATURE: 97 F | OXYGEN SATURATION: 95 % | HEART RATE: 58 BPM | SYSTOLIC BLOOD PRESSURE: 168 MMHG

## 2019-04-12 DIAGNOSIS — E03.9 ACQUIRED HYPOTHYROIDISM: ICD-10-CM

## 2019-04-12 DIAGNOSIS — E78.00 PURE HYPERCHOLESTEROLEMIA: ICD-10-CM

## 2019-04-12 DIAGNOSIS — I10 ESSENTIAL HYPERTENSION: Primary | ICD-10-CM

## 2019-04-12 PROCEDURE — 99213 OFFICE O/P EST LOW 20 MIN: CPT | Performed by: FAMILY MEDICINE

## 2019-04-12 RX ORDER — ROSUVASTATIN CALCIUM 5 MG/1
5 TABLET, COATED ORAL DAILY
Qty: 30 TABLET | Refills: 5 | Status: SHIPPED | OUTPATIENT
Start: 2019-04-12 | End: 2019-11-12 | Stop reason: SDUPTHER

## 2019-04-12 NOTE — PROGRESS NOTES
Subjective   Moriah Petty is a 78 y.o. female.     Chief Complaint   Patient presents with   • Hypertension   • Hyperlipidemia   • GI Problem         History of Present Illness   Patient is doing very well recovering from prior surgery requiring a colostomy.  She had diverticular abscesses.  She will go back to general surgery after CT scan and consider reversal of colostomy.  Her labs from January show improvement overall as far as healing from surgery.  She has some plaque in aorta as reviewed with her on prior CT.  Given the elevated LDL and total cholesterol she agrees to try Crestor 5 mg.  We will see her back in 6 months to recheck labs that time pros and cons of the Crestor reviewed with her.  Antihypertensives will be continued and she will follow-up with general surgery in regards to possible colostomy reversal.      The following portions of the patient's history were reviewed and updated as appropriate: allergies, current medications, past social history and problem list.    Review of Systems   Constitutional: Negative.    HENT: Negative.    Eyes: Negative.    Respiratory: Negative.    Cardiovascular: Negative.    Gastrointestinal: Negative.    Endocrine: Negative.    Genitourinary: Negative.    Musculoskeletal: Positive for arthralgias.   Skin: Negative.    Allergic/Immunologic: Negative.    Neurological: Negative.    Hematological: Negative.    Psychiatric/Behavioral: Negative.        Objective   Vitals:    04/12/19 1000   BP: 168/96   Pulse: 58   Temp: 97 °F (36.1 °C)   SpO2: 95%     Physical Exam   Constitutional: She is oriented to person, place, and time. She appears well-developed and well-nourished.   HENT:   Head: Normocephalic and atraumatic.   Right Ear: Tympanic membrane and external ear normal.   Left Ear: Tympanic membrane and external ear normal.   Nose: Nose normal.   Mouth/Throat: Oropharynx is clear and moist.   Eyes: Conjunctivae and EOM are normal. Pupils are equal, round, and  reactive to light.   Neck: Normal range of motion. Neck supple. No JVD present. No thyromegaly present.   Cardiovascular: Normal rate, regular rhythm, normal heart sounds and intact distal pulses.   Pulmonary/Chest: Effort normal and breath sounds normal.   Abdominal: Soft. Bowel sounds are normal.       Musculoskeletal: Normal range of motion.   Lymphadenopathy:     She has no cervical adenopathy.   Neurological: She is alert and oriented to person, place, and time. No cranial nerve deficit. Coordination normal.   Skin: Skin is warm and dry. No rash noted.   Psychiatric: She has a normal mood and affect. Her behavior is normal. Judgment and thought content normal.   Vitals reviewed.      Assessment/Plan   Problem List Items Addressed This Visit        Cardiovascular and Mediastinum    Pure hypercholesterolemia    Relevant Medications    rosuvastatin (CRESTOR) 5 MG tablet    Hypertension - Primary       Endocrine    Hypothyroidism      Start Crestor 5 recheck labs in 6 months with follow-up.

## 2019-04-15 ENCOUNTER — HOSPITAL ENCOUNTER (OUTPATIENT)
Dept: CT IMAGING | Facility: HOSPITAL | Age: 79
Discharge: HOME | End: 2019-04-15
Attending: SURGERY | Admitting: SURGERY

## 2019-04-15 DIAGNOSIS — K57.20 DIVERTICULITIS OF LARGE INTESTINE WITH ABSCESS WITHOUT BLEEDING: ICD-10-CM

## 2019-04-15 LAB — CREAT BLDA-MCNC: 0.7 MG/DL (ref 0.6–1.3)

## 2019-04-15 PROCEDURE — 25010000002 IOPAMIDOL 61 % SOLUTION: Performed by: SURGERY

## 2019-04-15 PROCEDURE — 82565 ASSAY OF CREATININE: CPT

## 2019-04-15 PROCEDURE — 74177 CT ABD & PELVIS W/CONTRAST: CPT

## 2019-04-15 RX ADMIN — IOPAMIDOL 85 ML: 612 INJECTION, SOLUTION INTRAVENOUS at 13:14

## 2019-05-06 ENCOUNTER — OFFICE VISIT (OUTPATIENT)
Dept: SURGERY | Facility: CLINIC | Age: 79
End: 2019-05-06

## 2019-05-06 DIAGNOSIS — Z93.3 COLOSTOMY STATUS (HCC): Primary | ICD-10-CM

## 2019-05-06 DIAGNOSIS — K65.9 PERITONITIS (HCC): ICD-10-CM

## 2019-05-06 PROCEDURE — 99214 OFFICE O/P EST MOD 30 MIN: CPT | Performed by: SURGERY

## 2019-05-06 RX ORDER — ALVIMOPAN 12 MG/1
12 CAPSULE ORAL ONCE
Status: CANCELLED | OUTPATIENT
Start: 2019-05-28 | End: 2019-05-13

## 2019-05-06 NOTE — PROGRESS NOTES
Cc: Complicated diverticulitis     History of presenting illness:   This is a very nice and reasonably healthy 78-year-old lady who underwent sigmoid colectomy with diverting colostomy as well as small bowel resection in December 2018.  She had been really sick for a couple of months at that time and at this point had developed multiple abscesses and as such underwent a relatively urgent operation.  In the postoperative phase she did have an abscess which drained well and really she recovered without too much incident.  More recently she has noticed a bulge around her stoma and recent CT confirmed a small parastomal hernia.  Overall she is eating well, her stoma is functioning well and she is feeling stronger.  However, this stomal hernia is enlarging.  She wishes to be considered for reversal.     Past Medical History: Back pain, gastroesophageal reflux disease, hypertension, hypothyroidism, diverticulitis     Past Surgical History:  Exploratory laparotomy with sigmoid colectomy, formation of Miguel's pouch, and colostomy and segmental small bowel resection for deserosalization in December 2018, brain surgical procedure described as microvascular decompression, cardiac catheterization, laparoscopic cholecystectomy 2013 as only abdominal surgery, partial hip replacement, kyphoplasty, she had a colonoscopy done around a year ago     Medications: Noted to include carvedilol, aspirin, levothyroxine, multivitamin     Allergies: Morphine     Social History: She is a former smoker who quit around 30 years ago.  Admits to drinking occasional alcohol but has not done so recently.  Lives independently with her .     Family History: Colon cancer in her mother, ovarian cancer in her daughter     Review of Systems:  Constitutional:  Negative for fever, chills, change in appetite  Neck: no swollen glands or dysphagia or odynophagia  Respiratory: negative for SOB, cough, hemoptysis or wheezing  Cardiovascular: negative  for chest pain, palpitations or peripheral edema  Gastrointestinal:  Negative for diarrhea, abdominal pain, vomiting        Physical Exam:   body mass index 24.0  General: alert and oriented, appropriate, no acute distress  Neck: Supple without lymphadenopathy or thyromegaly, trachea is in the midline  Respiratory: Lungs are clear bilaterally without wheezing, no use of accessory muscles is noted  Cardiovascular: Regular rate and rhythm without murmur, no peripheral edema  Gastrointestinal: Soft, midline incision well-healed.  Stoma is viable and functioning.  Small reducible parastomal hernia is noted.  Minimal tenderness.     Laboratory data:  None recently     Imaging data:  Recent CT is reviewed by me.  Relatively small parastomal hernia is noted.  Visualization of the pelvis is limited to some extent by her hip prosthesis, but certainly no abscess is appreciated.        Assessment and plan:   -Complicated diverticulitis status post sigmoid colectomy and diverting colostomy  -Parastomal hernia     Options discussed with the patient and her .  It has been nearly 6 months since her colostomy, and her parastomal hernia is bothering her more.  She wishes to have this reversed, and this certainly seems reasonable.  I had a long discussion with her regarding potential risks of the operation, including bleeding, infection, injury to intra-abdominal structures, anastomotic leak, the possible need for diverting loop ileostomy and the need for parastomal hernia repair, possibly involving a temporary mesh placement.  Patient and her  discussed and agreed.  They further understand that her stoma site will likely be left open to be dressed.    Procedure to be performed: Open colostomy reversal with possible diverting loop ileostomy and repair of parastomal hernia     Emanuel Clark MD, FACS  General, Minimally Invasive and Endoscopic Surgery  [address]  [place], [place], [address]  P: [phone]

## 2019-05-17 ENCOUNTER — APPOINTMENT (OUTPATIENT)
Dept: GENERAL RADIOLOGY | Facility: HOSPITAL | Age: 79
End: 2019-05-17

## 2019-05-17 ENCOUNTER — HOSPITAL ENCOUNTER (EMERGENCY)
Facility: HOSPITAL | Age: 79
Discharge: HOME | End: 2019-05-17
Attending: EMERGENCY MEDICINE | Admitting: EMERGENCY MEDICINE

## 2019-05-17 VITALS
DIASTOLIC BLOOD PRESSURE: 98 MMHG | RESPIRATION RATE: 16 BRPM | TEMPERATURE: 98.4 F | BODY MASS INDEX: 24.69 KG/M2 | HEIGHT: 66 IN | OXYGEN SATURATION: 97 % | SYSTOLIC BLOOD PRESSURE: 191 MMHG | HEART RATE: 70 BPM

## 2019-05-17 DIAGNOSIS — M70.21 OLECRANON BURSITIS OF RIGHT ELBOW: Primary | ICD-10-CM

## 2019-05-17 PROCEDURE — 73070 X-RAY EXAM OF ELBOW: CPT

## 2019-05-17 PROCEDURE — 99283 EMERGENCY DEPT VISIT LOW MDM: CPT

## 2019-05-17 RX ORDER — TRAMADOL HYDROCHLORIDE 50 MG/1
50 TABLET ORAL EVERY 6 HOURS PRN
Qty: 20 TABLET | Refills: 0 | Status: SHIPPED | OUTPATIENT
Start: 2019-05-17 | End: 2019-06-13

## 2019-05-17 RX ORDER — TRAMADOL HYDROCHLORIDE 50 MG/1
50 TABLET ORAL ONCE
Status: COMPLETED | OUTPATIENT
Start: 2019-05-17 | End: 2019-05-17

## 2019-05-17 RX ADMIN — TRAMADOL HYDROCHLORIDE 50 MG: 50 TABLET, FILM COATED ORAL at 21:08

## 2019-05-20 ENCOUNTER — TELEPHONE (OUTPATIENT)
Dept: SURGERY | Facility: CLINIC | Age: 79
End: 2019-05-20

## 2019-05-20 ENCOUNTER — APPOINTMENT (OUTPATIENT)
Dept: PREADMISSION TESTING | Facility: HOSPITAL | Age: 79
End: 2019-05-20

## 2019-05-20 VITALS
WEIGHT: 154.4 LBS | HEIGHT: 66 IN | HEART RATE: 53 BPM | SYSTOLIC BLOOD PRESSURE: 158 MMHG | RESPIRATION RATE: 20 BRPM | DIASTOLIC BLOOD PRESSURE: 73 MMHG | BODY MASS INDEX: 24.81 KG/M2 | TEMPERATURE: 98.1 F | OXYGEN SATURATION: 97 %

## 2019-05-20 DIAGNOSIS — Z93.3 COLOSTOMY STATUS (HCC): ICD-10-CM

## 2019-05-20 DIAGNOSIS — K65.9 PERITONITIS (HCC): ICD-10-CM

## 2019-05-20 LAB
ANION GAP SERPL CALCULATED.3IONS-SCNC: 11.7 MMOL/L
BASOPHILS # BLD AUTO: 0.06 10*3/MM3 (ref 0–0.2)
BASOPHILS NFR BLD AUTO: 1.2 % (ref 0–1.5)
BUN BLD-MCNC: 13 MG/DL (ref 8–23)
BUN/CREAT SERPL: 16.9 (ref 7–25)
CALCIUM SPEC-SCNC: 9.3 MG/DL (ref 8.6–10.5)
CHLORIDE SERPL-SCNC: 98 MMOL/L (ref 98–107)
CO2 SERPL-SCNC: 25.3 MMOL/L (ref 22–29)
CREAT BLD-MCNC: 0.77 MG/DL (ref 0.57–1)
DEPRECATED RDW RBC AUTO: 48.9 FL (ref 37–54)
EOSINOPHIL # BLD AUTO: 0.12 10*3/MM3 (ref 0–0.4)
EOSINOPHIL NFR BLD AUTO: 2.3 % (ref 0.3–6.2)
ERYTHROCYTE [DISTWIDTH] IN BLOOD BY AUTOMATED COUNT: 14.6 % (ref 12.3–15.4)
GFR SERPL CREATININE-BSD FRML MDRD: 72 ML/MIN/1.73
GLUCOSE BLD-MCNC: 102 MG/DL (ref 65–99)
HCT VFR BLD AUTO: 37.9 % (ref 34–46.6)
HGB BLD-MCNC: 12.1 G/DL (ref 12–15.9)
IMM GRANULOCYTES # BLD AUTO: 0.01 10*3/MM3 (ref 0–0.05)
IMM GRANULOCYTES NFR BLD AUTO: 0.2 % (ref 0–0.5)
LYMPHOCYTES # BLD AUTO: 1.4 10*3/MM3 (ref 0.7–3.1)
LYMPHOCYTES NFR BLD AUTO: 27.2 % (ref 19.6–45.3)
MCH RBC QN AUTO: 29.4 PG (ref 26.6–33)
MCHC RBC AUTO-ENTMCNC: 31.9 G/DL (ref 31.5–35.7)
MCV RBC AUTO: 92 FL (ref 79–97)
MONOCYTES # BLD AUTO: 0.53 10*3/MM3 (ref 0.1–0.9)
MONOCYTES NFR BLD AUTO: 10.3 % (ref 5–12)
NEUTROPHILS # BLD AUTO: 3.02 10*3/MM3 (ref 1.7–7)
NEUTROPHILS NFR BLD AUTO: 58.8 % (ref 42.7–76)
NRBC BLD AUTO-RTO: 0 /100 WBC (ref 0–0.2)
PLATELET # BLD AUTO: 226 10*3/MM3 (ref 140–450)
PMV BLD AUTO: 11.1 FL (ref 6–12)
POTASSIUM BLD-SCNC: 4.9 MMOL/L (ref 3.5–5.2)
RBC # BLD AUTO: 4.12 10*6/MM3 (ref 3.77–5.28)
SODIUM BLD-SCNC: 135 MMOL/L (ref 136–145)
WBC NRBC COR # BLD: 5.14 10*3/MM3 (ref 3.4–10.8)

## 2019-05-20 PROCEDURE — 85025 COMPLETE CBC W/AUTO DIFF WBC: CPT | Performed by: SURGERY

## 2019-05-20 PROCEDURE — 80048 BASIC METABOLIC PNL TOTAL CA: CPT | Performed by: SURGERY

## 2019-05-20 PROCEDURE — 36415 COLL VENOUS BLD VENIPUNCTURE: CPT

## 2019-05-20 RX ORDER — DESONIDE 0.5 MG/G
CREAM TOPICAL AS NEEDED
COMMUNITY
End: 2022-05-23

## 2019-05-20 RX ORDER — LOSARTAN POTASSIUM 50 MG/1
50 TABLET ORAL DAILY
COMMUNITY
End: 2020-10-16 | Stop reason: SDUPTHER

## 2019-05-20 RX ORDER — LEVOTHYROXINE SODIUM 0.03 MG/1
25 TABLET ORAL DAILY
COMMUNITY
End: 2019-07-22 | Stop reason: SDUPTHER

## 2019-05-20 RX ORDER — CARVEDILOL 12.5 MG/1
12.5 TABLET ORAL 2 TIMES DAILY WITH MEALS
COMMUNITY
End: 2020-12-31 | Stop reason: SDUPTHER

## 2019-05-22 ENCOUNTER — EPISODE CHANGES (OUTPATIENT)
Dept: CASE MANAGEMENT | Facility: OTHER | Age: 79
End: 2019-05-22

## 2019-05-23 ENCOUNTER — PATIENT OUTREACH (OUTPATIENT)
Dept: CASE MANAGEMENT | Facility: OTHER | Age: 79
End: 2019-05-23

## 2019-05-23 ENCOUNTER — EPISODE CHANGES (OUTPATIENT)
Dept: CASE MANAGEMENT | Facility: OTHER | Age: 79
End: 2019-05-23

## 2019-05-23 NOTE — OUTREACH NOTE
Patient Outreach Note    Plans for follow-up tomorrow with ortho and pending surgery next week for colostomy takedown.  No needs or concerns voiced and declined CA services today.      Shmuel Triana RN    5/23/2019, 4:43 PM

## 2019-05-28 ENCOUNTER — HOSPITAL ENCOUNTER (INPATIENT)
Facility: HOSPITAL | Age: 79
LOS: 9 days | Discharge: NURSING, REHAB OR LONG-TERM CARE | End: 2019-06-06
Attending: SURGERY | Admitting: SURGERY

## 2019-05-28 ENCOUNTER — ANESTHESIA (OUTPATIENT)
Dept: PERIOP | Facility: HOSPITAL | Age: 79
End: 2019-05-28

## 2019-05-28 ENCOUNTER — ANESTHESIA EVENT (OUTPATIENT)
Dept: PERIOP | Facility: HOSPITAL | Age: 79
End: 2019-05-28

## 2019-05-28 DIAGNOSIS — Z93.3 COLOSTOMY STATUS (HCC): ICD-10-CM

## 2019-05-28 PROCEDURE — 25010000002 ONDANSETRON PER 1 MG: Performed by: NURSE ANESTHETIST, CERTIFIED REGISTERED

## 2019-05-28 PROCEDURE — 0DN80ZZ RELEASE SMALL INTESTINE, OPEN APPROACH: ICD-10-PCS | Performed by: SURGERY

## 2019-05-28 PROCEDURE — 25010000002 FENTANYL CITRATE (PF) 100 MCG/2ML SOLUTION: Performed by: NURSE ANESTHETIST, CERTIFIED REGISTERED

## 2019-05-28 PROCEDURE — 25010000002 PROPOFOL 10 MG/ML EMULSION: Performed by: NURSE ANESTHETIST, CERTIFIED REGISTERED

## 2019-05-28 PROCEDURE — 0DBM0ZZ EXCISION OF DESCENDING COLON, OPEN APPROACH: ICD-10-PCS | Performed by: SURGERY

## 2019-05-28 PROCEDURE — 25010000002 HYDROMORPHONE PER 4 MG: Performed by: NURSE ANESTHETIST, CERTIFIED REGISTERED

## 2019-05-28 PROCEDURE — 88304 TISSUE EXAM BY PATHOLOGIST: CPT | Performed by: SURGERY

## 2019-05-28 PROCEDURE — 25010000002 HYDROMORPHONE PER 4 MG: Performed by: SURGERY

## 2019-05-28 PROCEDURE — 25010000002 ERTAPENEM 1 GM/100ML SOLUTION: Performed by: SURGERY

## 2019-05-28 PROCEDURE — 44625 REPAIR BOWEL OPENING: CPT | Performed by: PHYSICIAN ASSISTANT

## 2019-05-28 PROCEDURE — 44625 REPAIR BOWEL OPENING: CPT | Performed by: SURGERY

## 2019-05-28 PROCEDURE — 25010000002 DEXAMETHASONE PER 1 MG: Performed by: NURSE ANESTHETIST, CERTIFIED REGISTERED

## 2019-05-28 DEVICE — PROXIMATE LINEAR CUTTER RELOAD, BLUE, 75MM
Type: IMPLANTABLE DEVICE | Site: ABDOMEN | Status: FUNCTIONAL
Brand: PROXIMATE

## 2019-05-28 DEVICE — CLIP LIGAT VASC HORIZON TI LG ORNG 6CT: Type: IMPLANTABLE DEVICE | Site: ABDOMEN | Status: FUNCTIONAL

## 2019-05-28 RX ORDER — PROMETHAZINE HYDROCHLORIDE 25 MG/1
25 TABLET ORAL ONCE AS NEEDED
Status: DISCONTINUED | OUTPATIENT
Start: 2019-05-28 | End: 2019-05-28 | Stop reason: HOSPADM

## 2019-05-28 RX ORDER — DEXTROSE, SODIUM CHLORIDE, AND POTASSIUM CHLORIDE 5; .45; .15 G/100ML; G/100ML; G/100ML
75 INJECTION INTRAVENOUS CONTINUOUS
Status: DISCONTINUED | OUTPATIENT
Start: 2019-05-28 | End: 2019-05-29

## 2019-05-28 RX ORDER — NALOXONE HCL 0.4 MG/ML
0.1 VIAL (ML) INJECTION
Status: DISCONTINUED | OUTPATIENT
Start: 2019-05-28 | End: 2019-06-06 | Stop reason: HOSPADM

## 2019-05-28 RX ORDER — FAMOTIDINE 10 MG/ML
20 INJECTION, SOLUTION INTRAVENOUS ONCE
Status: COMPLETED | OUTPATIENT
Start: 2019-05-28 | End: 2019-05-28

## 2019-05-28 RX ORDER — CARVEDILOL 12.5 MG/1
12.5 TABLET ORAL 2 TIMES DAILY WITH MEALS
Status: DISCONTINUED | OUTPATIENT
Start: 2019-05-28 | End: 2019-06-06 | Stop reason: HOSPADM

## 2019-05-28 RX ORDER — LEVOTHYROXINE SODIUM 0.03 MG/1
25 TABLET ORAL DAILY
Status: DISCONTINUED | OUTPATIENT
Start: 2019-05-28 | End: 2019-06-06 | Stop reason: HOSPADM

## 2019-05-28 RX ORDER — ONDANSETRON 2 MG/ML
4 INJECTION INTRAMUSCULAR; INTRAVENOUS ONCE AS NEEDED
Status: DISCONTINUED | OUTPATIENT
Start: 2019-05-28 | End: 2019-05-28 | Stop reason: HOSPADM

## 2019-05-28 RX ORDER — PROPOFOL 10 MG/ML
VIAL (ML) INTRAVENOUS AS NEEDED
Status: DISCONTINUED | OUTPATIENT
Start: 2019-05-28 | End: 2019-05-28 | Stop reason: SURG

## 2019-05-28 RX ORDER — PROMETHAZINE HYDROCHLORIDE 25 MG/ML
12.5 INJECTION, SOLUTION INTRAMUSCULAR; INTRAVENOUS ONCE AS NEEDED
Status: DISCONTINUED | OUTPATIENT
Start: 2019-05-28 | End: 2019-05-28 | Stop reason: HOSPADM

## 2019-05-28 RX ORDER — NALOXONE HCL 0.4 MG/ML
0.2 VIAL (ML) INJECTION AS NEEDED
Status: DISCONTINUED | OUTPATIENT
Start: 2019-05-28 | End: 2019-05-28 | Stop reason: HOSPADM

## 2019-05-28 RX ORDER — HYDROMORPHONE HYDROCHLORIDE 1 MG/ML
0.5 INJECTION, SOLUTION INTRAMUSCULAR; INTRAVENOUS; SUBCUTANEOUS
Status: DISCONTINUED | OUTPATIENT
Start: 2019-05-28 | End: 2019-06-06 | Stop reason: HOSPADM

## 2019-05-28 RX ORDER — PANTOPRAZOLE SODIUM 40 MG/1
40 TABLET, DELAYED RELEASE ORAL EVERY MORNING
Status: DISCONTINUED | OUTPATIENT
Start: 2019-05-29 | End: 2019-06-06 | Stop reason: HOSPADM

## 2019-05-28 RX ORDER — LABETALOL HYDROCHLORIDE 5 MG/ML
5 INJECTION, SOLUTION INTRAVENOUS
Status: DISCONTINUED | OUTPATIENT
Start: 2019-05-28 | End: 2019-05-28 | Stop reason: HOSPADM

## 2019-05-28 RX ORDER — FENTANYL CITRATE 50 UG/ML
INJECTION, SOLUTION INTRAMUSCULAR; INTRAVENOUS AS NEEDED
Status: DISCONTINUED | OUTPATIENT
Start: 2019-05-28 | End: 2019-05-28 | Stop reason: SURG

## 2019-05-28 RX ORDER — HYDROCODONE BITARTRATE AND ACETAMINOPHEN 7.5; 325 MG/1; MG/1
1 TABLET ORAL EVERY 4 HOURS PRN
Status: DISCONTINUED | OUTPATIENT
Start: 2019-05-28 | End: 2019-06-06 | Stop reason: HOSPADM

## 2019-05-28 RX ORDER — HYDRALAZINE HYDROCHLORIDE 20 MG/ML
5 INJECTION INTRAMUSCULAR; INTRAVENOUS
Status: DISCONTINUED | OUTPATIENT
Start: 2019-05-28 | End: 2019-05-28 | Stop reason: HOSPADM

## 2019-05-28 RX ORDER — HYDROMORPHONE HYDROCHLORIDE 1 MG/ML
0.5 INJECTION, SOLUTION INTRAMUSCULAR; INTRAVENOUS; SUBCUTANEOUS
Status: DISCONTINUED | OUTPATIENT
Start: 2019-05-28 | End: 2019-05-28 | Stop reason: HOSPADM

## 2019-05-28 RX ORDER — ONDANSETRON 2 MG/ML
INJECTION INTRAMUSCULAR; INTRAVENOUS AS NEEDED
Status: DISCONTINUED | OUTPATIENT
Start: 2019-05-28 | End: 2019-05-28 | Stop reason: SURG

## 2019-05-28 RX ORDER — FLUMAZENIL 0.1 MG/ML
0.2 INJECTION INTRAVENOUS AS NEEDED
Status: DISCONTINUED | OUTPATIENT
Start: 2019-05-28 | End: 2019-05-28 | Stop reason: HOSPADM

## 2019-05-28 RX ORDER — ACETAMINOPHEN 325 MG/1
650 TABLET ORAL ONCE AS NEEDED
Status: DISCONTINUED | OUTPATIENT
Start: 2019-05-28 | End: 2019-05-28 | Stop reason: HOSPADM

## 2019-05-28 RX ORDER — ALVIMOPAN 12 MG/1
12 CAPSULE ORAL ONCE
Status: COMPLETED | OUTPATIENT
Start: 2019-05-28 | End: 2019-05-28

## 2019-05-28 RX ORDER — LIDOCAINE HYDROCHLORIDE 10 MG/ML
0.5 INJECTION, SOLUTION EPIDURAL; INFILTRATION; INTRACAUDAL; PERINEURAL ONCE AS NEEDED
Status: DISCONTINUED | OUTPATIENT
Start: 2019-05-28 | End: 2019-05-28 | Stop reason: HOSPADM

## 2019-05-28 RX ORDER — FENTANYL CITRATE 50 UG/ML
50 INJECTION, SOLUTION INTRAMUSCULAR; INTRAVENOUS
Status: DISCONTINUED | OUTPATIENT
Start: 2019-05-28 | End: 2019-05-28 | Stop reason: HOSPADM

## 2019-05-28 RX ORDER — ACETAMINOPHEN 325 MG/1
650 TABLET ORAL EVERY 4 HOURS PRN
Status: DISCONTINUED | OUTPATIENT
Start: 2019-05-28 | End: 2019-06-06 | Stop reason: HOSPADM

## 2019-05-28 RX ORDER — HYDROCODONE BITARTRATE AND ACETAMINOPHEN 7.5; 325 MG/1; MG/1
1 TABLET ORAL ONCE AS NEEDED
Status: DISCONTINUED | OUTPATIENT
Start: 2019-05-28 | End: 2019-05-28 | Stop reason: HOSPADM

## 2019-05-28 RX ORDER — MAGNESIUM HYDROXIDE 1200 MG/15ML
LIQUID ORAL AS NEEDED
Status: DISCONTINUED | OUTPATIENT
Start: 2019-05-28 | End: 2019-05-28 | Stop reason: HOSPADM

## 2019-05-28 RX ORDER — OXYCODONE AND ACETAMINOPHEN 7.5; 325 MG/1; MG/1
1 TABLET ORAL ONCE AS NEEDED
Status: DISCONTINUED | OUTPATIENT
Start: 2019-05-28 | End: 2019-05-28 | Stop reason: HOSPADM

## 2019-05-28 RX ORDER — SODIUM CHLORIDE 0.9 % (FLUSH) 0.9 %
1-10 SYRINGE (ML) INJECTION AS NEEDED
Status: DISCONTINUED | OUTPATIENT
Start: 2019-05-28 | End: 2019-05-28 | Stop reason: HOSPADM

## 2019-05-28 RX ORDER — GLYCOPYRROLATE 0.2 MG/ML
INJECTION INTRAMUSCULAR; INTRAVENOUS AS NEEDED
Status: DISCONTINUED | OUTPATIENT
Start: 2019-05-28 | End: 2019-05-28 | Stop reason: SURG

## 2019-05-28 RX ORDER — DIPHENHYDRAMINE HCL 25 MG
25 CAPSULE ORAL
Status: DISCONTINUED | OUTPATIENT
Start: 2019-05-28 | End: 2019-05-28 | Stop reason: HOSPADM

## 2019-05-28 RX ORDER — ACETAMINOPHEN 160 MG/5ML
650 SOLUTION ORAL EVERY 4 HOURS PRN
Status: DISCONTINUED | OUTPATIENT
Start: 2019-05-28 | End: 2019-06-06 | Stop reason: HOSPADM

## 2019-05-28 RX ORDER — ROCURONIUM BROMIDE 10 MG/ML
INJECTION, SOLUTION INTRAVENOUS AS NEEDED
Status: DISCONTINUED | OUTPATIENT
Start: 2019-05-28 | End: 2019-05-28 | Stop reason: SURG

## 2019-05-28 RX ORDER — PROMETHAZINE HYDROCHLORIDE 25 MG/ML
6.25 INJECTION, SOLUTION INTRAMUSCULAR; INTRAVENOUS
Status: DISCONTINUED | OUTPATIENT
Start: 2019-05-28 | End: 2019-05-28 | Stop reason: HOSPADM

## 2019-05-28 RX ORDER — PROMETHAZINE HYDROCHLORIDE 25 MG/1
25 SUPPOSITORY RECTAL ONCE AS NEEDED
Status: DISCONTINUED | OUTPATIENT
Start: 2019-05-28 | End: 2019-05-28 | Stop reason: HOSPADM

## 2019-05-28 RX ORDER — EPHEDRINE SULFATE 50 MG/ML
5 INJECTION, SOLUTION INTRAVENOUS ONCE AS NEEDED
Status: DISCONTINUED | OUTPATIENT
Start: 2019-05-28 | End: 2019-05-28 | Stop reason: HOSPADM

## 2019-05-28 RX ORDER — ONDANSETRON 2 MG/ML
4 INJECTION INTRAMUSCULAR; INTRAVENOUS EVERY 6 HOURS PRN
Status: DISCONTINUED | OUTPATIENT
Start: 2019-05-28 | End: 2019-06-06 | Stop reason: HOSPADM

## 2019-05-28 RX ORDER — LOSARTAN POTASSIUM 50 MG/1
50 TABLET ORAL DAILY
Status: DISCONTINUED | OUTPATIENT
Start: 2019-05-29 | End: 2019-06-06 | Stop reason: HOSPADM

## 2019-05-28 RX ORDER — EPHEDRINE SULFATE 50 MG/ML
INJECTION, SOLUTION INTRAVENOUS AS NEEDED
Status: DISCONTINUED | OUTPATIENT
Start: 2019-05-28 | End: 2019-05-28 | Stop reason: SURG

## 2019-05-28 RX ORDER — ONDANSETRON 4 MG/1
4 TABLET, FILM COATED ORAL EVERY 6 HOURS PRN
Status: DISCONTINUED | OUTPATIENT
Start: 2019-05-28 | End: 2019-06-06 | Stop reason: HOSPADM

## 2019-05-28 RX ORDER — ALVIMOPAN 12 MG/1
12 CAPSULE ORAL 2 TIMES DAILY
Status: DISCONTINUED | OUTPATIENT
Start: 2019-05-28 | End: 2019-05-30

## 2019-05-28 RX ORDER — ACETAMINOPHEN 650 MG/1
650 SUPPOSITORY RECTAL EVERY 4 HOURS PRN
Status: DISCONTINUED | OUTPATIENT
Start: 2019-05-28 | End: 2019-06-06 | Stop reason: HOSPADM

## 2019-05-28 RX ORDER — DEXAMETHASONE SODIUM PHOSPHATE 10 MG/ML
INJECTION INTRAMUSCULAR; INTRAVENOUS AS NEEDED
Status: DISCONTINUED | OUTPATIENT
Start: 2019-05-28 | End: 2019-05-28 | Stop reason: SURG

## 2019-05-28 RX ORDER — SODIUM CHLORIDE, SODIUM LACTATE, POTASSIUM CHLORIDE, CALCIUM CHLORIDE 600; 310; 30; 20 MG/100ML; MG/100ML; MG/100ML; MG/100ML
9 INJECTION, SOLUTION INTRAVENOUS CONTINUOUS
Status: DISCONTINUED | OUTPATIENT
Start: 2019-05-28 | End: 2019-05-28

## 2019-05-28 RX ORDER — LIDOCAINE HYDROCHLORIDE 20 MG/ML
INJECTION, SOLUTION INFILTRATION; PERINEURAL AS NEEDED
Status: DISCONTINUED | OUTPATIENT
Start: 2019-05-28 | End: 2019-05-28 | Stop reason: SURG

## 2019-05-28 RX ORDER — DIPHENHYDRAMINE HYDROCHLORIDE 50 MG/ML
12.5 INJECTION INTRAMUSCULAR; INTRAVENOUS
Status: DISCONTINUED | OUTPATIENT
Start: 2019-05-28 | End: 2019-05-28 | Stop reason: HOSPADM

## 2019-05-28 RX ADMIN — POTASSIUM CHLORIDE, DEXTROSE MONOHYDRATE AND SODIUM CHLORIDE 75 ML/HR: 150; 5; 450 INJECTION, SOLUTION INTRAVENOUS at 16:32

## 2019-05-28 RX ADMIN — SODIUM CHLORIDE, POTASSIUM CHLORIDE, SODIUM LACTATE AND CALCIUM CHLORIDE: 600; 310; 30; 20 INJECTION, SOLUTION INTRAVENOUS at 11:34

## 2019-05-28 RX ADMIN — GLYCOPYRROLATE 0.1 MG: 0.2 INJECTION INTRAMUSCULAR; INTRAVENOUS at 10:30

## 2019-05-28 RX ADMIN — LIDOCAINE HYDROCHLORIDE 80 MG: 20 INJECTION, SOLUTION INFILTRATION; PERINEURAL at 09:52

## 2019-05-28 RX ADMIN — SODIUM CHLORIDE, POTASSIUM CHLORIDE, SODIUM LACTATE AND CALCIUM CHLORIDE 9 ML/HR: 600; 310; 30; 20 INJECTION, SOLUTION INTRAVENOUS at 08:58

## 2019-05-28 RX ADMIN — FENTANYL CITRATE 150 MCG: 50 INJECTION, SOLUTION INTRAMUSCULAR; INTRAVENOUS at 09:49

## 2019-05-28 RX ADMIN — ONDANSETRON 4 MG: 2 INJECTION INTRAMUSCULAR; INTRAVENOUS at 12:40

## 2019-05-28 RX ADMIN — CARVEDILOL 12.5 MG: 12.5 TABLET, FILM COATED ORAL at 18:31

## 2019-05-28 RX ADMIN — ROCURONIUM BROMIDE 10 MG: 10 INJECTION INTRAVENOUS at 12:25

## 2019-05-28 RX ADMIN — HYDROMORPHONE HYDROCHLORIDE 0.5 MG: 1 INJECTION, SOLUTION INTRAMUSCULAR; INTRAVENOUS; SUBCUTANEOUS at 13:41

## 2019-05-28 RX ADMIN — EPHEDRINE SULFATE 10 MG: 50 INJECTION INTRAMUSCULAR; INTRAVENOUS; SUBCUTANEOUS at 10:42

## 2019-05-28 RX ADMIN — ALVIMOPAN 12 MG: 12 CAPSULE ORAL at 08:48

## 2019-05-28 RX ADMIN — ROCURONIUM BROMIDE 40 MG: 10 INJECTION INTRAVENOUS at 09:52

## 2019-05-28 RX ADMIN — EPHEDRINE SULFATE 10 MG: 50 INJECTION INTRAMUSCULAR; INTRAVENOUS; SUBCUTANEOUS at 12:34

## 2019-05-28 RX ADMIN — PROPOFOL 160 MG: 10 INJECTION, EMULSION INTRAVENOUS at 09:52

## 2019-05-28 RX ADMIN — ERTAPENEM SODIUM 1 G: 1 INJECTION, POWDER, LYOPHILIZED, FOR SOLUTION INTRAMUSCULAR; INTRAVENOUS at 09:57

## 2019-05-28 RX ADMIN — HYDROMORPHONE HYDROCHLORIDE 0.5 MG: 1 INJECTION, SOLUTION INTRAMUSCULAR; INTRAVENOUS; SUBCUTANEOUS at 18:27

## 2019-05-28 RX ADMIN — EPHEDRINE SULFATE 15 MG: 50 INJECTION INTRAMUSCULAR; INTRAVENOUS; SUBCUTANEOUS at 10:04

## 2019-05-28 RX ADMIN — FENTANYL CITRATE 50 MCG: 50 INJECTION, SOLUTION INTRAMUSCULAR; INTRAVENOUS at 11:50

## 2019-05-28 RX ADMIN — HYDROMORPHONE HYDROCHLORIDE 0.5 MG: 1 INJECTION, SOLUTION INTRAMUSCULAR; INTRAVENOUS; SUBCUTANEOUS at 23:22

## 2019-05-28 RX ADMIN — EPHEDRINE SULFATE 5 MG: 50 INJECTION INTRAMUSCULAR; INTRAVENOUS; SUBCUTANEOUS at 11:23

## 2019-05-28 RX ADMIN — FAMOTIDINE 20 MG: 10 INJECTION INTRAVENOUS at 08:58

## 2019-05-28 RX ADMIN — HYDROMORPHONE HYDROCHLORIDE 0.5 MG: 1 INJECTION, SOLUTION INTRAMUSCULAR; INTRAVENOUS; SUBCUTANEOUS at 20:41

## 2019-05-28 RX ADMIN — HYDROMORPHONE HYDROCHLORIDE 0.5 MG: 1 INJECTION, SOLUTION INTRAMUSCULAR; INTRAVENOUS; SUBCUTANEOUS at 13:57

## 2019-05-28 RX ADMIN — ROCURONIUM BROMIDE 10 MG: 10 INJECTION INTRAVENOUS at 11:50

## 2019-05-28 RX ADMIN — HYDROMORPHONE HYDROCHLORIDE 0.5 MG: 1 INJECTION, SOLUTION INTRAMUSCULAR; INTRAVENOUS; SUBCUTANEOUS at 13:51

## 2019-05-28 RX ADMIN — GLYCOPYRROLATE 0.1 MG: 0.2 INJECTION INTRAMUSCULAR; INTRAVENOUS at 11:50

## 2019-05-28 RX ADMIN — FENTANYL CITRATE 50 MCG: 50 INJECTION INTRAMUSCULAR; INTRAVENOUS at 13:20

## 2019-05-28 RX ADMIN — FENTANYL CITRATE 50 MCG: 50 INJECTION, SOLUTION INTRAMUSCULAR; INTRAVENOUS at 12:25

## 2019-05-28 RX ADMIN — FENTANYL CITRATE 50 MCG: 50 INJECTION INTRAMUSCULAR; INTRAVENOUS at 13:09

## 2019-05-28 RX ADMIN — DEXAMETHASONE SODIUM PHOSPHATE 6 MG: 10 INJECTION INTRAMUSCULAR; INTRAVENOUS at 11:25

## 2019-05-28 RX ADMIN — HYDROMORPHONE HYDROCHLORIDE 0.5 MG: 1 INJECTION, SOLUTION INTRAMUSCULAR; INTRAVENOUS; SUBCUTANEOUS at 15:06

## 2019-05-28 RX ADMIN — SUGAMMADEX 200 MG: 100 INJECTION, SOLUTION INTRAVENOUS at 12:45

## 2019-05-28 RX ADMIN — ALVIMOPAN 12 MG: 12 CAPSULE ORAL at 20:45

## 2019-05-28 RX ADMIN — HYDROMORPHONE HYDROCHLORIDE 0.5 MG: 1 INJECTION, SOLUTION INTRAMUSCULAR; INTRAVENOUS; SUBCUTANEOUS at 13:31

## 2019-05-28 NOTE — ANESTHESIA POSTPROCEDURE EVALUATION
Patient: Moriah Petty    Procedure Summary     Date:  05/28/19 Room / Location:  [facility] OR 03 / [facility] MAIN OR    Anesthesia Start:  0946 Anesthesia Stop:  1303    Procedure:  COLOSTOMY TAKEDOWN (N/A Abdomen) Diagnosis:       Colostomy status (CMS/HCC)      (Colostomy status (CMS/HCC) [Z93.3])    Surgeon:  Emanuel Clark MD Provider:  Abe Fernández MD    Anesthesia Type:  general ASA Status:  3          Anesthesia Type: general  Last vitals  BP   151/87 (05/28/19 1335)   Temp   36.6 °C (97.9 °F) (05/28/19 1300)   Pulse   75 (05/28/19 1335)   Resp   16 (05/28/19 1335)     SpO2   95 % (05/28/19 1335)     Post Anesthesia Care and Evaluation    Patient location during evaluation: bedside  Pain management: adequate  Airway patency: patent  Anesthetic complications: No anesthetic complications    Cardiovascular status: acceptable  Respiratory status: acceptable  Hydration status: acceptable

## 2019-05-28 NOTE — ANESTHESIA PROCEDURE NOTES
Airway  Urgency: elective    Airway not difficult    General Information and Staff    Patient location during procedure: OR  Anesthesiologist: Abe Fernández MD  CRNA: Maribel Denise CRNA    Indications and Patient Condition  Indications for airway management: airway protection    Preoxygenated: yes  Mask difficulty assessment: 1 - vent by mask    Final Airway Details  Final airway type: endotracheal airway      Successful airway: ETT  Cuffed: yes   Successful intubation technique: direct laryngoscopy  Facilitating devices/methods: anterior pressure/BURP  Endotracheal tube insertion site: oral  Blade: Haider  Blade size: 2  ETT size (mm): 7.0  Cormack-Lehane Classification: grade IIa - partial view of glottis  Placement verified by: chest auscultation and capnometry   Cuff volume (mL): 7  Measured from: lips  ETT to lips (cm): 21  Number of attempts at approach: 1    Additional Comments  SIVI.  EYES TAPED CLOSED PRIOR TO DL.  INTUBATION AS CHARTED ABOVE.  APPEARS ATRAUMATIC.  NO CHANGE TO DENTITION. +ETCO2. +BBS. +CR.

## 2019-05-28 NOTE — ANESTHESIA PREPROCEDURE EVALUATION
Anesthesia Evaluation     Patient summary reviewed and Nursing notes reviewed   no history of anesthetic complications:  NPO Solid Status: > 6 hours  NPO Liquid Status: > 2 hours           Airway   Mallampati: II  TM distance: >3 FB  Neck ROM: full  No difficulty expected  Dental - normal exam     Pulmonary - normal exam   (+) sleep apnea (Not using CPAP),   Cardiovascular - normal exam    ECG reviewed    (+) hypertension, dysrhythmias PAC,   (-) angina      Neuro/Psych  (+) psychiatric history Depression,     GI/Hepatic/Renal/Endo    (+)  GERD,  hypothyroidism,     ROS Comment: For ostomy reversal    Musculoskeletal     Abdominal    Substance History      OB/GYN          Other                        Anesthesia Plan    ASA 3     general     Anesthetic plan, all risks, benefits, and alternatives have been provided, discussed and informed consent has been obtained with: patient.       absent

## 2019-05-29 ENCOUNTER — EPISODE CHANGES (OUTPATIENT)
Dept: CASE MANAGEMENT | Facility: OTHER | Age: 79
End: 2019-05-29

## 2019-05-29 LAB
ANION GAP SERPL CALCULATED.3IONS-SCNC: 11 MMOL/L
BASOPHILS # BLD AUTO: 0.07 10*3/MM3 (ref 0–0.2)
BASOPHILS NFR BLD AUTO: 0.5 % (ref 0–1.5)
BUN BLD-MCNC: 7 MG/DL (ref 8–23)
BUN/CREAT SERPL: 11.1 (ref 7–25)
CALCIUM SPEC-SCNC: 7.7 MG/DL (ref 8.6–10.5)
CHLORIDE SERPL-SCNC: 94 MMOL/L (ref 98–107)
CO2 SERPL-SCNC: 23 MMOL/L (ref 22–29)
CREAT BLD-MCNC: 0.63 MG/DL (ref 0.57–1)
CYTO UR: NORMAL
DEPRECATED RDW RBC AUTO: 46.4 FL (ref 37–54)
EOSINOPHIL # BLD AUTO: 0.01 10*3/MM3 (ref 0–0.4)
EOSINOPHIL NFR BLD AUTO: 0.1 % (ref 0.3–6.2)
ERYTHROCYTE [DISTWIDTH] IN BLOOD BY AUTOMATED COUNT: 13.8 % (ref 12.3–15.4)
GFR SERPL CREATININE-BSD FRML MDRD: 91 ML/MIN/1.73
GLUCOSE BLD-MCNC: 171 MG/DL (ref 65–99)
HCT VFR BLD AUTO: 41.1 % (ref 34–46.6)
HGB BLD-MCNC: 13.3 G/DL (ref 12–15.9)
IMM GRANULOCYTES # BLD AUTO: 0.08 10*3/MM3 (ref 0–0.05)
IMM GRANULOCYTES NFR BLD AUTO: 0.5 % (ref 0–0.5)
LAB AP CASE REPORT: NORMAL
LYMPHOCYTES # BLD AUTO: 1.15 10*3/MM3 (ref 0.7–3.1)
LYMPHOCYTES NFR BLD AUTO: 7.8 % (ref 19.6–45.3)
MCH RBC QN AUTO: 29.5 PG (ref 26.6–33)
MCHC RBC AUTO-ENTMCNC: 32.4 G/DL (ref 31.5–35.7)
MCV RBC AUTO: 91.1 FL (ref 79–97)
MONOCYTES # BLD AUTO: 1.24 10*3/MM3 (ref 0.1–0.9)
MONOCYTES NFR BLD AUTO: 8.5 % (ref 5–12)
NEUTROPHILS # BLD AUTO: 12.12 10*3/MM3 (ref 1.7–7)
NEUTROPHILS NFR BLD AUTO: 82.6 % (ref 42.7–76)
NRBC BLD AUTO-RTO: 0 /100 WBC (ref 0–0.2)
PATH REPORT.FINAL DX SPEC: NORMAL
PATH REPORT.GROSS SPEC: NORMAL
PLATELET # BLD AUTO: 295 10*3/MM3 (ref 140–450)
PMV BLD AUTO: 11 FL (ref 6–12)
POTASSIUM BLD-SCNC: 4.7 MMOL/L (ref 3.5–5.2)
RBC # BLD AUTO: 4.51 10*6/MM3 (ref 3.77–5.28)
SODIUM BLD-SCNC: 128 MMOL/L (ref 136–145)
WBC NRBC COR # BLD: 14.67 10*3/MM3 (ref 3.4–10.8)

## 2019-05-29 PROCEDURE — 99024 POSTOP FOLLOW-UP VISIT: CPT | Performed by: SURGERY

## 2019-05-29 PROCEDURE — 25810000003 SODIUM CHLORIDE 0.9 % WITH KCL 20 MEQ 20-0.9 MEQ/L-% SOLUTION: Performed by: SURGERY

## 2019-05-29 PROCEDURE — 25010000002 ENOXAPARIN PER 10 MG: Performed by: SURGERY

## 2019-05-29 PROCEDURE — 80048 BASIC METABOLIC PNL TOTAL CA: CPT | Performed by: SURGERY

## 2019-05-29 PROCEDURE — 25010000002 HYDROMORPHONE PER 4 MG: Performed by: SURGERY

## 2019-05-29 PROCEDURE — 85025 COMPLETE CBC W/AUTO DIFF WBC: CPT | Performed by: SURGERY

## 2019-05-29 RX ORDER — SODIUM CHLORIDE AND POTASSIUM CHLORIDE 150; 900 MG/100ML; MG/100ML
75 INJECTION, SOLUTION INTRAVENOUS CONTINUOUS
Status: DISCONTINUED | OUTPATIENT
Start: 2019-05-29 | End: 2019-06-01

## 2019-05-29 RX ADMIN — CARVEDILOL 12.5 MG: 12.5 TABLET, FILM COATED ORAL at 18:38

## 2019-05-29 RX ADMIN — CARVEDILOL 12.5 MG: 12.5 TABLET, FILM COATED ORAL at 08:59

## 2019-05-29 RX ADMIN — LOSARTAN POTASSIUM 50 MG: 50 TABLET, FILM COATED ORAL at 08:59

## 2019-05-29 RX ADMIN — HYDROMORPHONE HYDROCHLORIDE 0.5 MG: 1 INJECTION, SOLUTION INTRAMUSCULAR; INTRAVENOUS; SUBCUTANEOUS at 12:20

## 2019-05-29 RX ADMIN — PANTOPRAZOLE SODIUM 40 MG: 40 TABLET, DELAYED RELEASE ORAL at 06:41

## 2019-05-29 RX ADMIN — ALVIMOPAN 12 MG: 12 CAPSULE ORAL at 08:59

## 2019-05-29 RX ADMIN — HYDROCODONE BITARTRATE AND ACETAMINOPHEN 1 TABLET: 7.5; 325 TABLET ORAL at 20:03

## 2019-05-29 RX ADMIN — ALVIMOPAN 12 MG: 12 CAPSULE ORAL at 20:03

## 2019-05-29 RX ADMIN — POTASSIUM CHLORIDE, DEXTROSE MONOHYDRATE AND SODIUM CHLORIDE 75 ML/HR: 150; 5; 450 INJECTION, SOLUTION INTRAVENOUS at 04:42

## 2019-05-29 RX ADMIN — LEVOTHYROXINE SODIUM 25 MCG: 25 TABLET ORAL at 08:59

## 2019-05-29 RX ADMIN — HYDROMORPHONE HYDROCHLORIDE 0.5 MG: 1 INJECTION, SOLUTION INTRAMUSCULAR; INTRAVENOUS; SUBCUTANEOUS at 16:57

## 2019-05-29 RX ADMIN — HYDROMORPHONE HYDROCHLORIDE 0.5 MG: 1 INJECTION, SOLUTION INTRAMUSCULAR; INTRAVENOUS; SUBCUTANEOUS at 09:04

## 2019-05-29 RX ADMIN — ENOXAPARIN SODIUM 40 MG: 40 INJECTION SUBCUTANEOUS at 16:38

## 2019-05-29 RX ADMIN — HYDROMORPHONE HYDROCHLORIDE 0.5 MG: 1 INJECTION, SOLUTION INTRAMUSCULAR; INTRAVENOUS; SUBCUTANEOUS at 04:35

## 2019-05-29 RX ADMIN — POTASSIUM CHLORIDE AND SODIUM CHLORIDE 75 ML/HR: 900; 150 INJECTION, SOLUTION INTRAVENOUS at 16:57

## 2019-05-30 LAB
ANION GAP SERPL CALCULATED.3IONS-SCNC: 10.2 MMOL/L
BASOPHILS # BLD AUTO: 0.05 10*3/MM3 (ref 0–0.2)
BASOPHILS NFR BLD AUTO: 0.5 % (ref 0–1.5)
BUN BLD-MCNC: 5 MG/DL (ref 8–23)
BUN/CREAT SERPL: 9.6 (ref 7–25)
CALCIUM SPEC-SCNC: 7.5 MG/DL (ref 8.6–10.5)
CHLORIDE SERPL-SCNC: 99 MMOL/L (ref 98–107)
CO2 SERPL-SCNC: 22.8 MMOL/L (ref 22–29)
CREAT BLD-MCNC: 0.52 MG/DL (ref 0.57–1)
DEPRECATED RDW RBC AUTO: 47 FL (ref 37–54)
EOSINOPHIL # BLD AUTO: 0.23 10*3/MM3 (ref 0–0.4)
EOSINOPHIL NFR BLD AUTO: 2.5 % (ref 0.3–6.2)
ERYTHROCYTE [DISTWIDTH] IN BLOOD BY AUTOMATED COUNT: 14.1 % (ref 12.3–15.4)
GFR SERPL CREATININE-BSD FRML MDRD: 114 ML/MIN/1.73
GLUCOSE BLD-MCNC: 111 MG/DL (ref 65–99)
HCT VFR BLD AUTO: 33.5 % (ref 34–46.6)
HGB BLD-MCNC: 10.9 G/DL (ref 12–15.9)
IMM GRANULOCYTES # BLD AUTO: 0.04 10*3/MM3 (ref 0–0.05)
IMM GRANULOCYTES NFR BLD AUTO: 0.4 % (ref 0–0.5)
LYMPHOCYTES # BLD AUTO: 1.43 10*3/MM3 (ref 0.7–3.1)
LYMPHOCYTES NFR BLD AUTO: 15.7 % (ref 19.6–45.3)
MCH RBC QN AUTO: 29.5 PG (ref 26.6–33)
MCHC RBC AUTO-ENTMCNC: 32.5 G/DL (ref 31.5–35.7)
MCV RBC AUTO: 90.8 FL (ref 79–97)
MONOCYTES # BLD AUTO: 0.77 10*3/MM3 (ref 0.1–0.9)
MONOCYTES NFR BLD AUTO: 8.4 % (ref 5–12)
NEUTROPHILS # BLD AUTO: 6.61 10*3/MM3 (ref 1.7–7)
NEUTROPHILS NFR BLD AUTO: 72.5 % (ref 42.7–76)
NRBC BLD AUTO-RTO: 0.1 /100 WBC (ref 0–0.2)
PLATELET # BLD AUTO: 214 10*3/MM3 (ref 140–450)
PMV BLD AUTO: 11.1 FL (ref 6–12)
POTASSIUM BLD-SCNC: 4.7 MMOL/L (ref 3.5–5.2)
RBC # BLD AUTO: 3.69 10*6/MM3 (ref 3.77–5.28)
SODIUM BLD-SCNC: 132 MMOL/L (ref 136–145)
WBC NRBC COR # BLD: 9.13 10*3/MM3 (ref 3.4–10.8)

## 2019-05-30 PROCEDURE — 25810000003 SODIUM CHLORIDE 0.9 % WITH KCL 20 MEQ 20-0.9 MEQ/L-% SOLUTION: Performed by: SURGERY

## 2019-05-30 PROCEDURE — 97161 PT EVAL LOW COMPLEX 20 MIN: CPT

## 2019-05-30 PROCEDURE — 25010000002 ENOXAPARIN PER 10 MG: Performed by: SURGERY

## 2019-05-30 PROCEDURE — 80048 BASIC METABOLIC PNL TOTAL CA: CPT | Performed by: SURGERY

## 2019-05-30 PROCEDURE — 97110 THERAPEUTIC EXERCISES: CPT

## 2019-05-30 PROCEDURE — 85025 COMPLETE CBC W/AUTO DIFF WBC: CPT | Performed by: SURGERY

## 2019-05-30 PROCEDURE — 99024 POSTOP FOLLOW-UP VISIT: CPT | Performed by: SURGERY

## 2019-05-30 RX ADMIN — LEVOTHYROXINE SODIUM 25 MCG: 25 TABLET ORAL at 08:25

## 2019-05-30 RX ADMIN — CARVEDILOL 12.5 MG: 12.5 TABLET, FILM COATED ORAL at 08:25

## 2019-05-30 RX ADMIN — LOSARTAN POTASSIUM 50 MG: 50 TABLET, FILM COATED ORAL at 08:25

## 2019-05-30 RX ADMIN — HYDROCODONE BITARTRATE AND ACETAMINOPHEN 1 TABLET: 7.5; 325 TABLET ORAL at 00:20

## 2019-05-30 RX ADMIN — HYDROCODONE BITARTRATE AND ACETAMINOPHEN 1 TABLET: 7.5; 325 TABLET ORAL at 22:07

## 2019-05-30 RX ADMIN — PANTOPRAZOLE SODIUM 40 MG: 40 TABLET, DELAYED RELEASE ORAL at 06:33

## 2019-05-30 RX ADMIN — ENOXAPARIN SODIUM 40 MG: 40 INJECTION SUBCUTANEOUS at 16:41

## 2019-05-30 RX ADMIN — POTASSIUM CHLORIDE AND SODIUM CHLORIDE 75 ML/HR: 900; 150 INJECTION, SOLUTION INTRAVENOUS at 23:38

## 2019-05-30 RX ADMIN — CARVEDILOL 12.5 MG: 12.5 TABLET, FILM COATED ORAL at 18:48

## 2019-05-30 RX ADMIN — HYDROCODONE BITARTRATE AND ACETAMINOPHEN 1 TABLET: 7.5; 325 TABLET ORAL at 15:26

## 2019-05-30 RX ADMIN — POTASSIUM CHLORIDE AND SODIUM CHLORIDE 75 ML/HR: 900; 150 INJECTION, SOLUTION INTRAVENOUS at 10:51

## 2019-05-30 RX ADMIN — HYDROCODONE BITARTRATE AND ACETAMINOPHEN 1 TABLET: 7.5; 325 TABLET ORAL at 04:35

## 2019-05-30 RX ADMIN — HYDROCODONE BITARTRATE AND ACETAMINOPHEN 1 TABLET: 7.5; 325 TABLET ORAL at 09:23

## 2019-05-31 PROCEDURE — 25810000003 SODIUM CHLORIDE 0.9 % WITH KCL 20 MEQ 20-0.9 MEQ/L-% SOLUTION: Performed by: SURGERY

## 2019-05-31 PROCEDURE — 99024 POSTOP FOLLOW-UP VISIT: CPT | Performed by: SURGERY

## 2019-05-31 PROCEDURE — 25010000002 ENOXAPARIN PER 10 MG: Performed by: SURGERY

## 2019-05-31 RX ADMIN — LOSARTAN POTASSIUM 50 MG: 50 TABLET, FILM COATED ORAL at 09:06

## 2019-05-31 RX ADMIN — HYDROCODONE BITARTRATE AND ACETAMINOPHEN 1 TABLET: 7.5; 325 TABLET ORAL at 18:38

## 2019-05-31 RX ADMIN — HYDROCODONE BITARTRATE AND ACETAMINOPHEN 1 TABLET: 7.5; 325 TABLET ORAL at 13:59

## 2019-05-31 RX ADMIN — HYDROCODONE BITARTRATE AND ACETAMINOPHEN 1 TABLET: 7.5; 325 TABLET ORAL at 22:33

## 2019-05-31 RX ADMIN — CARVEDILOL 12.5 MG: 12.5 TABLET, FILM COATED ORAL at 18:39

## 2019-05-31 RX ADMIN — POTASSIUM CHLORIDE AND SODIUM CHLORIDE 75 ML/HR: 900; 150 INJECTION, SOLUTION INTRAVENOUS at 12:04

## 2019-05-31 RX ADMIN — PANTOPRAZOLE SODIUM 40 MG: 40 TABLET, DELAYED RELEASE ORAL at 06:23

## 2019-05-31 RX ADMIN — HYDROCODONE BITARTRATE AND ACETAMINOPHEN 1 TABLET: 7.5; 325 TABLET ORAL at 04:53

## 2019-05-31 RX ADMIN — LEVOTHYROXINE SODIUM 25 MCG: 25 TABLET ORAL at 09:08

## 2019-05-31 RX ADMIN — HYDROCODONE BITARTRATE AND ACETAMINOPHEN 1 TABLET: 7.5; 325 TABLET ORAL at 09:05

## 2019-05-31 RX ADMIN — CARVEDILOL 12.5 MG: 12.5 TABLET, FILM COATED ORAL at 09:06

## 2019-05-31 RX ADMIN — ENOXAPARIN SODIUM 40 MG: 40 INJECTION SUBCUTANEOUS at 18:47

## 2019-06-01 LAB
ANION GAP SERPL CALCULATED.3IONS-SCNC: 9.6 MMOL/L
BASOPHILS # BLD AUTO: 0.04 10*3/MM3 (ref 0–0.2)
BASOPHILS NFR BLD AUTO: 0.7 % (ref 0–1.5)
BUN BLD-MCNC: 3 MG/DL (ref 8–23)
BUN/CREAT SERPL: 7.9 (ref 7–25)
CALCIUM SPEC-SCNC: 7.9 MG/DL (ref 8.6–10.5)
CHLORIDE SERPL-SCNC: 101 MMOL/L (ref 98–107)
CO2 SERPL-SCNC: 22.4 MMOL/L (ref 22–29)
CREAT BLD-MCNC: 0.38 MG/DL (ref 0.57–1)
DEPRECATED RDW RBC AUTO: 46.4 FL (ref 37–54)
EOSINOPHIL # BLD AUTO: 0.3 10*3/MM3 (ref 0–0.4)
EOSINOPHIL NFR BLD AUTO: 5.2 % (ref 0.3–6.2)
ERYTHROCYTE [DISTWIDTH] IN BLOOD BY AUTOMATED COUNT: 13.8 % (ref 12.3–15.4)
GFR SERPL CREATININE-BSD FRML MDRD: >150 ML/MIN/1.73
GLUCOSE BLD-MCNC: 94 MG/DL (ref 65–99)
HCT VFR BLD AUTO: 30.3 % (ref 34–46.6)
HGB BLD-MCNC: 10 G/DL (ref 12–15.9)
IMM GRANULOCYTES # BLD AUTO: 0.03 10*3/MM3 (ref 0–0.05)
IMM GRANULOCYTES NFR BLD AUTO: 0.5 % (ref 0–0.5)
LYMPHOCYTES # BLD AUTO: 1.08 10*3/MM3 (ref 0.7–3.1)
LYMPHOCYTES NFR BLD AUTO: 18.6 % (ref 19.6–45.3)
MCH RBC QN AUTO: 30.1 PG (ref 26.6–33)
MCHC RBC AUTO-ENTMCNC: 33 G/DL (ref 31.5–35.7)
MCV RBC AUTO: 91.3 FL (ref 79–97)
MONOCYTES # BLD AUTO: 0.62 10*3/MM3 (ref 0.1–0.9)
MONOCYTES NFR BLD AUTO: 10.7 % (ref 5–12)
NEUTROPHILS # BLD AUTO: 3.74 10*3/MM3 (ref 1.7–7)
NEUTROPHILS NFR BLD AUTO: 64.3 % (ref 42.7–76)
NRBC BLD AUTO-RTO: 0 /100 WBC (ref 0–0.2)
PLATELET # BLD AUTO: 230 10*3/MM3 (ref 140–450)
PMV BLD AUTO: 11.3 FL (ref 6–12)
POTASSIUM BLD-SCNC: 4.1 MMOL/L (ref 3.5–5.2)
RBC # BLD AUTO: 3.32 10*6/MM3 (ref 3.77–5.28)
SODIUM BLD-SCNC: 133 MMOL/L (ref 136–145)
WBC NRBC COR # BLD: 5.81 10*3/MM3 (ref 3.4–10.8)

## 2019-06-01 PROCEDURE — 25010000002 ENOXAPARIN PER 10 MG: Performed by: SURGERY

## 2019-06-01 PROCEDURE — 80048 BASIC METABOLIC PNL TOTAL CA: CPT | Performed by: SURGERY

## 2019-06-01 PROCEDURE — 97110 THERAPEUTIC EXERCISES: CPT

## 2019-06-01 PROCEDURE — 25810000003 SODIUM CHLORIDE 0.9 % WITH KCL 20 MEQ 20-0.9 MEQ/L-% SOLUTION: Performed by: SURGERY

## 2019-06-01 PROCEDURE — 85025 COMPLETE CBC W/AUTO DIFF WBC: CPT | Performed by: SURGERY

## 2019-06-01 PROCEDURE — 99024 POSTOP FOLLOW-UP VISIT: CPT | Performed by: SURGERY

## 2019-06-01 RX ADMIN — CARVEDILOL 12.5 MG: 12.5 TABLET, FILM COATED ORAL at 18:02

## 2019-06-01 RX ADMIN — CARVEDILOL 12.5 MG: 12.5 TABLET, FILM COATED ORAL at 07:47

## 2019-06-01 RX ADMIN — PANTOPRAZOLE SODIUM 40 MG: 40 TABLET, DELAYED RELEASE ORAL at 06:05

## 2019-06-01 RX ADMIN — LEVOTHYROXINE SODIUM 25 MCG: 25 TABLET ORAL at 06:05

## 2019-06-01 RX ADMIN — HYDROCODONE BITARTRATE AND ACETAMINOPHEN 1 TABLET: 7.5; 325 TABLET ORAL at 16:14

## 2019-06-01 RX ADMIN — ENOXAPARIN SODIUM 40 MG: 40 INJECTION SUBCUTANEOUS at 16:15

## 2019-06-01 RX ADMIN — HYDROCODONE BITARTRATE AND ACETAMINOPHEN 1 TABLET: 7.5; 325 TABLET ORAL at 07:47

## 2019-06-01 RX ADMIN — LOSARTAN POTASSIUM 50 MG: 50 TABLET, FILM COATED ORAL at 07:47

## 2019-06-01 RX ADMIN — HYDROCODONE BITARTRATE AND ACETAMINOPHEN 1 TABLET: 7.5; 325 TABLET ORAL at 03:01

## 2019-06-01 RX ADMIN — POTASSIUM CHLORIDE AND SODIUM CHLORIDE 75 ML/HR: 900; 150 INJECTION, SOLUTION INTRAVENOUS at 03:03

## 2019-06-01 RX ADMIN — HYDROCODONE BITARTRATE AND ACETAMINOPHEN 1 TABLET: 7.5; 325 TABLET ORAL at 22:36

## 2019-06-01 RX ADMIN — HYDROCODONE BITARTRATE AND ACETAMINOPHEN 1 TABLET: 7.5; 325 TABLET ORAL at 11:51

## 2019-06-01 NOTE — PROGRESS NOTES
Postop day 4 colostomy closure    Subjective:  Feels better today.  Pain is improved.  No significant GI function.  Tolerating full liquids.      Objective:  Maximal temperature 99.3, vitals stable  General: Awake alert and oriented, no distress  Abdomen: Soft and appropriately tender, incision looks good, colostomy site healing.    Assessment and plan:  -Postop day 4 colostomy closure, recovering well to this point  -Awaiting return of GI function  -Increase activity    Bee Lebron MD  06/01/19      For Dr Clark

## 2019-06-01 NOTE — PLAN OF CARE
Problem: Patient Care Overview  Goal: Plan of Care Review  Outcome: Ongoing (interventions implemented as appropriate)   06/01/19 3000   Coping/Psychosocial   Plan of Care Reviewed With patient   Plan of Care Review   Progress improving   OTHER   Outcome Summary Pt doing much better. Inc ambulatino distance to 800', 2 laps around unit, with rwx and CGA. Pt will continue to benefit from skilled PT to address functional mobility deficits and decrease risk for falls.

## 2019-06-01 NOTE — THERAPY TREATMENT NOTE
Acute Care - Physical Therapy Treatment Note  [facility]     Patient Name: Moriah Petty  : 1940  MRN: [id number]  Today's Date: 2019  Onset of Illness/Injury or Date of Surgery: 19          Admit Date: 2019    Visit Dx:    ICD-10-CM ICD-9-CM   1. Colostomy status (CMS/HCC) Z93.3 V44.3     Patient Active Problem List   Diagnosis   • Abnormal electrocardiogram   • Non-specific colitis   • Acute post-traumatic headache   • Postoperative anemia due to acute blood loss   • Arthritis   • Hematochezia   • Chest pain   • Compression fracture of lumbar vertebra (CMS/HCC)   • Closed wedge compression fracture of second lumbar vertebra (CMS/HCC)   • Constipation   • Dizziness   • Dyslipidemia   • Fatigue   • Ventricular premature beats   • Gastroesophageal reflux disease without esophagitis   • Hypertension   • Hypotension   • Insomnia   • Left lower quadrant pain   • Low back pain   • Osteoporosis   • Premature atrial contraction   • Peripheral nerve disease   • Pneumonia   • Nausea   • Sensorineural hearing loss   • Shortness of breath   • Sinus bradycardia   • Sleep apnea   • Disorder of thyroid   • Trigeminal neuralgia   • Rectal bleeding   • Family history of colon cancer   • Adenomatous polyp of colon   • Hypothyroidism   • Osteoporosis, post-menopausal   • Diverticulitis of large intestine with abscess without bleeding   • Mild malnutrition (CMS/HCC)   • Pure hypercholesterolemia   • Colostomy status (CMS/HCC)       Therapy Treatment    Rehabilitation Treatment Summary     Row Name 19 1528             Treatment Time/Intention    Discipline  physical therapist  -      Document Type  therapy note (daily note)  -      Subjective Information  complains of;pain  -      Mode of Treatment  physical therapy  -CH      Patient/Family Observations  female pt, supine in bed, no acute distress noted  -      Care Plan Review  evaluation/treatment results reviewed  -      Total Minutes,  Physical Therapy Treatment  13  -CH      Therapy Frequency (PT Clinical Impression)  daily  -CH      Patient Effort  good  -CH      Existing Precautions/Restrictions  fall  -CH      Recorded by [CH] Asa Tidwell, PT 06/01/19 1530      Row Name 06/01/19 1528             Cognitive Assessment/Intervention    Additional Documentation  Cognitive Assessment/Intervention (Group)  -CH      Recorded by [CH] Asa Tidwell, PT 06/01/19 1530      Row Name 06/01/19 1528             Cognitive Assessment/Intervention- PT/OT    Orientation Status (Cognition)  oriented x 4  -CH      Follows Commands (Cognition)  WNL  -CH      Personal Safety Interventions  fall prevention program maintained;gait belt;nonskid shoes/slippers when out of bed  -CH      Recorded by [CH] Asa Tidwell, PT 06/01/19 1530      Row Name 06/01/19 1528             Bed Mobility Assessment/Treatment    Supine-Sit Schleicher (Bed Mobility)  supervision  -CH      Sit-Supine Schleicher (Bed Mobility)  supervision  -CH      Recorded by [CH] Asa Tidwell, PT 06/01/19 1530      Row Name 06/01/19 1528             Sit-Stand Transfer    Sit-Stand Schleicher (Transfers)  contact guard  -CH      Assistive Device (Sit-Stand Transfers)  walker, front-wheeled  -CH      Recorded by [CH] Asa Tidwell, PT 06/01/19 1530      Row Name 06/01/19 1528             Stand-Sit Transfer    Stand-Sit Schleicher (Transfers)  contact guard  -CH      Assistive Device (Stand-Sit Transfers)  walker, front-wheeled  -CH      Recorded by [CH] Asa Tidwell, PT 06/01/19 1530      Row Name 06/01/19 1528             Gait/Stairs Assessment/Training    Schleicher Level (Gait)  contact guard  -CH      Assistive Device (Gait)  walker, front-wheeled  -CH      Distance in Feet (Gait)  800  -CH      Pattern (Gait)  swing-through  -CH      Deviations/Abnormal Patterns (Gait)  antalgic;gait speed decreased  -CH      Recorded by [CH] Asa Tidwell, PT 06/01/19 1530      Row Name  06/01/19 1528             Therapeutic Exercise    Comment (Therapeutic Exercise)  B AP, LAQ; 10x each  -CH      Recorded by [CH] Asa Tidwell, PT 06/01/19 1530      Row Name 06/01/19 1528             Positioning and Restraints    Pre-Treatment Position  in bed  -CH      Post Treatment Position  bed  -CH      In Bed  supine;call light within reach;encouraged to call for assist  -CH      Recorded by [CH] Asa Tidwell, PT 06/01/19 1530      Row Name 06/01/19 1528             Pain Scale: Numbers Pre/Post-Treatment    Pain Scale: Numbers, Pretreatment  4/10  -CH      Pain Scale: Numbers, Post-Treatment  4/10  -CH      Recorded by [CH] Asa Tidwell, PT 06/01/19 1530      Row Name                Wound 05/28/19 1235 Other (See comments) midline abdomen incision    Wound - Properties Group Date first assessed: 05/28/19 [KH] Time first assessed: 1235 [KH] Side: Other (See comments) [KH] Orientation: midline [VL] Location: abdomen [KH] Type: incision [KH] Recorded by:  [KH] Saniya Serna, RN 05/28/19 1235 [VL] Iram Nguyen RN 05/29/19 1026      User Key  (r) = Recorded By, (t) = Taken By, (c) = Cosigned By    Initials Name Effective Dates Discipline     Saniya Serna, RN 06/16/16 -  Nurse    Iram Licona RN 06/16/16 -  Nurse    Asa Petersen, PT 04/03/18 -  PT          Wound 05/28/19 1235 Other (See comments) midline abdomen incision (Active)   Dressing Appearance dry;intact 6/1/2019  7:47 AM   Closure FÉLIX 6/1/2019  7:47 AM   Base dressing in place, unable to visualize 6/1/2019  7:47 AM   Drainage Amount none 6/1/2019  7:47 AM   Dressing Care, Wound gauze 6/1/2019  7:47 AM       Wound Left anterior;lower abdomen surgical (Active)   Dressing Appearance dry;intact 6/1/2019  7:47 AM   Closure FÉLIX 6/1/2019  7:47 AM   Base dressing in place, unable to visualize 6/1/2019  7:47 AM   Drainage Characteristics/Odor sanguineous 6/1/2019  3:01 AM   Drainage Amount scant 6/1/2019  7:47 AM   Care, Wound  sterile normal saline 6/1/2019  3:01 AM   Dressing Care, Wound gauze 6/1/2019  7:47 AM           Physical Therapy Education     Title: PT OT SLP Therapies (Done)     Topic: Physical Therapy (Done)     Point: Mobility training (Done)     Learning Progress Summary           Patient Acceptance, E, VU by  at 6/1/2019  3:30 PM    Acceptance, E, NR by AR at 5/30/2019  1:47 PM                   Point: Home exercise program (Done)     Learning Progress Summary           Patient Acceptance, E, VU by  at 6/1/2019  3:30 PM    Acceptance, E, NR by AR at 5/30/2019  1:47 PM                   Point: Body mechanics (Done)     Learning Progress Summary           Patient Acceptance, E, VU by  at 6/1/2019  3:30 PM    Acceptance, E, NR by AR at 5/30/2019  1:47 PM                   Point: Precautions (Done)     Learning Progress Summary           Patient Acceptance, E, VU by  at 6/1/2019  3:30 PM    Acceptance, E, NR by AR at 5/30/2019  1:47 PM                               User Key     Initials Effective Dates Name Provider Type Discipline    AR 04/03/18 -  Yenifer Brewer, PT Physical Therapist PT     04/03/18 -  Asa Tidwell, PT Physical Therapist PT                PT Recommendation and Plan  Therapy Frequency (PT Clinical Impression): daily  Plan of Care Reviewed With: patient  Progress: improving  Outcome Summary: Pt doing much better. Inc ambulatino distance to 800', 2 laps around unit, with rwx and CGA. Pt will continue to benefit from skilled PT to address functional mobility deficits and decrease risk for falls.  Outcome Measures     Row Name 06/01/19 1500             How much help from another person do you currently need...    Turning from your back to your side while in flat bed without using bedrails?  4  -CH      Moving from lying on back to sitting on the side of a flat bed without bedrails?  4  -CH      Moving to and from a bed to a chair (including a wheelchair)?  3  -CH      Standing up from a chair using  your arms (e.g., wheelchair, bedside chair)?  3  -CH      Climbing 3-5 steps with a railing?  2  -CH      To walk in hospital room?  3  -CH      AM-PAC 6 Clicks Score  19  -CH         Functional Assessment    Outcome Measure Options  AM-PAC 6 Clicks Basic Mobility (PT)  -        User Key  (r) = Recorded By, (t) = Taken By, (c) = Cosigned By    Initials Name Provider Type     Asa Tidwell, PT Physical Therapist         Time Calculation:   PT Charges     Row Name 06/01/19 1532             Time Calculation    Start Time  1513  -      Stop Time  1526  -      Time Calculation (min)  13 min  -      PT Received On  06/01/19  -      PT - Next Appointment  06/03/19  -        User Key  (r) = Recorded By, (t) = Taken By, (c) = Cosigned By    Initials Name Provider Type     Asa Tidwell, PT Physical Therapist        Therapy Charges for Today     Code Description Service Date Service Provider Modifiers Qty    52479858794 HC PT THER PROC EA 15 MIN 6/1/2019 Asa Tidwell, PT GP 1          PT G-Codes  Outcome Measure Options: AM-PAC 6 Clicks Basic Mobility (PT)  AM-PAC 6 Clicks Score: 19    Asa Tidwell PT  6/1/2019

## 2019-06-01 NOTE — PLAN OF CARE
Problem: Patient Care Overview  Goal: Plan of Care Review  Outcome: Ongoing (interventions implemented as appropriate)   06/01/19 0450   Coping/Psychosocial   Plan of Care Reviewed With patient   Plan of Care Review   Progress improving   OTHER   Outcome Summary VSS. C/o incisional abd pain, medicated with Lortab. Up with asst to ambulate. Wet to dry dressing changes BID to LLQ. Tolerating clear liquids. Has not yet passed gas or BM. Use of IS maintained. Will cont to monitor.      Goal: Individualization and Mutuality  Outcome: Ongoing (interventions implemented as appropriate)    Goal: Discharge Needs Assessment  Outcome: Ongoing (interventions implemented as appropriate)    Goal: Interprofessional Rounds/Family Conf  Outcome: Ongoing (interventions implemented as appropriate)      Problem: Pain, Acute (Adult)  Goal: Acceptable Pain Control/Comfort Level  Outcome: Ongoing (interventions implemented as appropriate)      Problem: Surgery Nonspecified (Adult)  Goal: Signs and Symptoms of Listed Potential Problems Will be Absent, Minimized or Managed (Surgery Nonspecified)  Outcome: Ongoing (interventions implemented as appropriate)    Goal: Anesthesia/Sedation Recovery  Outcome: Outcome(s) achieved Date Met: 06/01/19      Problem: Nutrition, Imbalanced: Inadequate Oral Intake (Adult)  Goal: Identify Related Risk Factors and Signs and Symptoms  Outcome: Ongoing (interventions implemented as appropriate)    Goal: Improved Oral Intake  Outcome: Ongoing (interventions implemented as appropriate)    Goal: Prevent Further Weight Loss  Outcome: Ongoing (interventions implemented as appropriate)

## 2019-06-02 PROCEDURE — 99024 POSTOP FOLLOW-UP VISIT: CPT | Performed by: SURGERY

## 2019-06-02 PROCEDURE — 25010000002 ENOXAPARIN PER 10 MG: Performed by: SURGERY

## 2019-06-02 RX ADMIN — HYDROCODONE BITARTRATE AND ACETAMINOPHEN 1 TABLET: 7.5; 325 TABLET ORAL at 08:14

## 2019-06-02 RX ADMIN — LEVOTHYROXINE SODIUM 25 MCG: 25 TABLET ORAL at 06:04

## 2019-06-02 RX ADMIN — HYDROCODONE BITARTRATE AND ACETAMINOPHEN 1 TABLET: 7.5; 325 TABLET ORAL at 15:59

## 2019-06-02 RX ADMIN — HYDROCODONE BITARTRATE AND ACETAMINOPHEN 1 TABLET: 7.5; 325 TABLET ORAL at 12:21

## 2019-06-02 RX ADMIN — PANTOPRAZOLE SODIUM 40 MG: 40 TABLET, DELAYED RELEASE ORAL at 06:04

## 2019-06-02 RX ADMIN — HYDROCODONE BITARTRATE AND ACETAMINOPHEN 1 TABLET: 7.5; 325 TABLET ORAL at 04:15

## 2019-06-02 RX ADMIN — HYDROCODONE BITARTRATE AND ACETAMINOPHEN 1 TABLET: 7.5; 325 TABLET ORAL at 20:36

## 2019-06-02 RX ADMIN — ENOXAPARIN SODIUM 40 MG: 40 INJECTION SUBCUTANEOUS at 15:59

## 2019-06-02 RX ADMIN — CARVEDILOL 12.5 MG: 12.5 TABLET, FILM COATED ORAL at 17:43

## 2019-06-02 RX ADMIN — LOSARTAN POTASSIUM 50 MG: 50 TABLET, FILM COATED ORAL at 08:14

## 2019-06-02 RX ADMIN — CARVEDILOL 12.5 MG: 12.5 TABLET, FILM COATED ORAL at 08:14

## 2019-06-02 NOTE — PROGRESS NOTES
"Postop day 5 colostomy closure    Subjective:  Feels better today.  Pain is improved.  No significant GI function.  Tolerating full liquids.      Objective:  /79 (BP Location: Right arm, Patient Position: Sitting)   Pulse 82   Temp 99.1 °F (37.3 °C) (Oral)   Resp 16   Ht 167.6 cm (66\")   Wt 67.2 kg (148 lb 2.4 oz)   SpO2 97%   Breastfeeding? No   BMI 23.91 kg/m²     General: Awake alert and oriented, no distress,  Up getting a bath  Abdomen: Soft and appropriately tender, incision looks good.    Assessment and plan:  -Postop day 5 colostomy closure, recovering well to this point  -Awaiting return of GI function  -Increase activity    Bee Lebron MD  06/02/19      For Dr Clark, who will return tomorrow.    "

## 2019-06-03 PROCEDURE — 99024 POSTOP FOLLOW-UP VISIT: CPT | Performed by: SURGERY

## 2019-06-03 PROCEDURE — 25010000002 ENOXAPARIN PER 10 MG: Performed by: SURGERY

## 2019-06-03 PROCEDURE — 97110 THERAPEUTIC EXERCISES: CPT

## 2019-06-03 RX ADMIN — CARVEDILOL 12.5 MG: 12.5 TABLET, FILM COATED ORAL at 17:22

## 2019-06-03 RX ADMIN — LOSARTAN POTASSIUM 50 MG: 50 TABLET, FILM COATED ORAL at 08:46

## 2019-06-03 RX ADMIN — HYDROCODONE BITARTRATE AND ACETAMINOPHEN 1 TABLET: 7.5; 325 TABLET ORAL at 08:52

## 2019-06-03 RX ADMIN — CARVEDILOL 12.5 MG: 12.5 TABLET, FILM COATED ORAL at 08:47

## 2019-06-03 RX ADMIN — HYDROCODONE BITARTRATE AND ACETAMINOPHEN 1 TABLET: 7.5; 325 TABLET ORAL at 04:48

## 2019-06-03 RX ADMIN — PANTOPRAZOLE SODIUM 40 MG: 40 TABLET, DELAYED RELEASE ORAL at 06:31

## 2019-06-03 RX ADMIN — HYDROCODONE BITARTRATE AND ACETAMINOPHEN 1 TABLET: 7.5; 325 TABLET ORAL at 21:23

## 2019-06-03 RX ADMIN — ENOXAPARIN SODIUM 40 MG: 40 INJECTION SUBCUTANEOUS at 16:18

## 2019-06-03 RX ADMIN — HYDROCODONE BITARTRATE AND ACETAMINOPHEN 1 TABLET: 7.5; 325 TABLET ORAL at 16:18

## 2019-06-03 RX ADMIN — LEVOTHYROXINE SODIUM 25 MCG: 25 TABLET ORAL at 06:31

## 2019-06-03 RX ADMIN — HYDROCODONE BITARTRATE AND ACETAMINOPHEN 1 TABLET: 7.5; 325 TABLET ORAL at 12:36

## 2019-06-03 RX ADMIN — HYDROCODONE BITARTRATE AND ACETAMINOPHEN 1 TABLET: 7.5; 325 TABLET ORAL at 00:29

## 2019-06-03 NOTE — PROGRESS NOTES
Continued Stay Note  [facility]     Patient Name: Moriah Petty  MRN: [id number]  Today's Date: 6/3/2019    Admit Date: 5/28/2019    Discharge Plan     Row Name 06/03/19 1650       Plan    Plan  Home with referral to [facility]    Plan Comments  Spoke with Mónica/RICK, patient recently walked 800 feet with PT and will not be appropriate/meet criteria for skilled bed. Discussed information with patient, she has additional concerns regarding 13 steps into home, called and left message with Mónica per patient request. Made referral to [facility] for PT services. JOSE Knapp        Discharge Codes    No documentation.       Expected Discharge Date and Time     Expected Discharge Date Expected Discharge Time    Jb 3, 2019             Amna Wood RN

## 2019-06-03 NOTE — PLAN OF CARE
Problem: Patient Care Overview  Goal: Plan of Care Review  Outcome: Ongoing (interventions implemented as appropriate)   06/03/19 5980   Coping/Psychosocial   Plan of Care Reviewed With patient   OTHER   Outcome Summary vss, no acute distress noted, cynthia oral meds for pain control, lg. bm this am, advanced diet, dressing changes cont. per order, amb in [place],      Goal: Individualization and Mutuality  Outcome: Ongoing (interventions implemented as appropriate)    Goal: Discharge Needs Assessment  Outcome: Ongoing (interventions implemented as appropriate)    Goal: Interprofessional Rounds/Family Conf  Outcome: Ongoing (interventions implemented as appropriate)      Problem: Pain, Acute (Adult)  Goal: Acceptable Pain Control/Comfort Level  Outcome: Ongoing (interventions implemented as appropriate)      Problem: Surgery Nonspecified (Adult)  Goal: Signs and Symptoms of Listed Potential Problems Will be Absent, Minimized or Managed (Surgery Nonspecified)  Outcome: Ongoing (interventions implemented as appropriate)      Problem: Nutrition, Imbalanced: Inadequate Oral Intake (Adult)  Goal: Identify Related Risk Factors and Signs and Symptoms  Outcome: Ongoing (interventions implemented as appropriate)

## 2019-06-03 NOTE — PLAN OF CARE
Problem: Patient Care Overview  Goal: Plan of Care Review  Outcome: Ongoing (interventions implemented as appropriate)   06/03/19 6606   Coping/Psychosocial   Plan of Care Reviewed With patient   Plan of Care Review   Progress improving   OTHER   Outcome Summary Pt tolerated treatment well this date. Ambulated 800ft w/ Rw and CGA. C/o minimal pain in abdomen. PT will continue to address functional mobility deficits as tolerated.

## 2019-06-03 NOTE — PLAN OF CARE
Problem: Patient Care Overview  Goal: Plan of Care Review  Outcome: Ongoing (interventions implemented as appropriate)   06/03/19 0520   Coping/Psychosocial   Plan of Care Reviewed With patient   Plan of Care Review   Progress improving   OTHER   Outcome Summary adequate pain control on oral pain meds Q4H, abdomen soft, tender, with hypoactive bowel sounds, has not passed flatus yet, no BM yet, voiding, packings changed this am to LLq wound, no nausea noted, tolerated Full liquids, for further care     Goal: Individualization and Mutuality  Outcome: Ongoing (interventions implemented as appropriate)    Goal: Discharge Needs Assessment  Outcome: Ongoing (interventions implemented as appropriate)

## 2019-06-03 NOTE — PROGRESS NOTES
Postop day #6 colostomy takedown    Subjective:  Continues to improve.  Patient had a good bowel movement and passing gas today.  Tolerating full liquids and wishes to take more.  Still requiring quite a lot of pain medicine but does feel that she is doing better.    Objective:  Patient is afebrile, vitals are stable  General: Awake alert and oriented, no distress  Abdomen: Soft and appropriately tender, incision looks good    Assessment and plan:  -Postop day 6 colostomy takedown, recovering adequately to this point  -Advance to soft diet today.  -Hopefully home in the next couple of days.    Emanuel Clark MD  General and Endoscopic Surgery  [facility]    [address], [address]  [place], [place], [address]  P: [phone]  F: [phone]

## 2019-06-03 NOTE — THERAPY TREATMENT NOTE
Acute Care - Physical Therapy Treatment Note  [facility]     Patient Name: Moriah Petty  : 1940  MRN: [id number]  Today's Date: 6/3/2019  Onset of Illness/Injury or Date of Surgery: 19          Admit Date: 2019    Visit Dx:    ICD-10-CM ICD-9-CM   1. Colostomy status (CMS/HCC) Z93.3 V44.3     Patient Active Problem List   Diagnosis   • Abnormal electrocardiogram   • Non-specific colitis   • Acute post-traumatic headache   • Postoperative anemia due to acute blood loss   • Arthritis   • Hematochezia   • Chest pain   • Compression fracture of lumbar vertebra (CMS/HCC)   • Closed wedge compression fracture of second lumbar vertebra (CMS/HCC)   • Constipation   • Dizziness   • Dyslipidemia   • Fatigue   • Ventricular premature beats   • Gastroesophageal reflux disease without esophagitis   • Hypertension   • Hypotension   • Insomnia   • Left lower quadrant pain   • Low back pain   • Osteoporosis   • Premature atrial contraction   • Peripheral nerve disease   • Pneumonia   • Nausea   • Sensorineural hearing loss   • Shortness of breath   • Sinus bradycardia   • Sleep apnea   • Disorder of thyroid   • Trigeminal neuralgia   • Rectal bleeding   • Family history of colon cancer   • Adenomatous polyp of colon   • Hypothyroidism   • Osteoporosis, post-menopausal   • Diverticulitis of large intestine with abscess without bleeding   • Mild malnutrition (CMS/HCC)   • Pure hypercholesterolemia   • Colostomy status (CMS/HCC)       Therapy Treatment    Rehabilitation Treatment Summary     Row Name 19 1628             Treatment Time/Intention    Discipline  physical therapy assistant  -SM      Document Type  therapy note (daily note)  -SM      Subjective Information  complains of;pain  -SM      Mode of Treatment  physical therapy  -SM      Patient Effort  good  -SM      Existing Precautions/Restrictions  fall  -SM      Recorded by [SM] Carmen Haider, \[facility]\"" 19 1636      Row Name 19 8951              Cognitive Assessment/Intervention- PT/OT    Orientation Status (Cognition)  oriented x 4  -SM      Follows Commands (Cognition)  WNL  -SM      Personal Safety Interventions  fall prevention program maintained;gait belt;nonskid shoes/slippers when out of bed  -SM      Recorded by [SM] Carmen Haider, hospitals 06/03/19 1636      Row Name 06/03/19 1628             Bed Mobility Assessment/Treatment    Bed Mobility Assessment/Treatment  supine-sit;sit-supine  -SM      Supine-Sit [place] (Bed Mobility)  conditional independence  -SM      Sit-Supine [place] (Bed Mobility)  conditional independence  -      Assistive Device (Bed Mobility)  bed rails  -SM      Recorded by [SM] Carmen Haider, hospitals 06/03/19 1636      Row Name 06/03/19 1628             Transfer Assessment/Treatment    Transfer Assessment/Treatment  sit-stand transfer;stand-sit transfer  -SM      Recorded by [SM] Carmen Haider, hospitals 06/03/19 1636      Row Name 06/03/19 1628             Sit-Stand Transfer    Sit-Stand [place] (Transfers)  stand by assist  -      Assistive Device (Sit-Stand Transfers)  walker, front-wheeled  -SM      Recorded by [SM] Carmen Haider, hospitals 06/03/19 1636      Row Name 06/03/19 1628             Stand-Sit Transfer    Stand-Sit [place] (Transfers)  stand by assist  -      Assistive Device (Stand-Sit Transfers)  walker, front-wheeled  -      Recorded by [SM] Carmen Haider, hospitals 06/03/19 1636      Row Name 06/03/19 1628             Gait/Stairs Assessment/Training    John [place] Level (Gait)  contact guard  -      Assistive Device (Gait)  walker, front-wheeled  -      Distance in Feet (Gait)  800  -SM2      Pattern (Gait)  step-through  -      Deviations/Abnormal Patterns (Gait)  chari decreased;stride length decreased  -SM      Bilateral Gait Deviations  forward flexed posture  -SM      Recorded by [SM] Carmen Haider, hospitals 06/03/19 1636  [SM2] Carmen Haider, hospitals  06/03/19 1637      Row Name 06/03/19 1628             Positioning and Restraints    Pre-Treatment Position  in bed  -SM      Post Treatment Position  bed  -SM      In Bed  supine;call light within reach;encouraged to call for assist  -SM      Recorded by [SM] Carmen Haider, [facility] 06/03/19 1636      Row Name 06/03/19 1628             Pain Scale: Numbers Pre/Post-Treatment    Pain Scale: Numbers, Pretreatment  2/10  -SM      Pain Scale: Numbers, Post-Treatment  2/10  -SM      Pain Location  abdomen  -SM      Pain Intervention(s)  Repositioned;Ambulation/increased activity;Rest  -SM      Recorded by [SM] Carmen Haider, [facility] 06/03/19 1636      Row Name                Wound 05/28/19 1235 Other (See comments) midline abdomen incision    Wound - Properties Group Date first assessed: 05/28/19 [KH] Time first assessed: 1235 [] Side: Other (See comments) [KH] Orientation: midline [VL] Location: abdomen [] Type: incision [KH] Recorded by:  [KH] Saniya Serna RN 05/28/19 1235 [VL] Iram Nguyen RN 05/29/19 1026      User Key  (r) = Recorded By, (t) = Taken By, (c) = Cosigned By    Initials Name Effective Dates Discipline     Saniya Serna RN 06/16/16 -  Nurse    Iram Licona RN 06/16/16 -  Nurse    Carmen Cevallos, [facility] 03/07/18 -  PT          Wound 05/28/19 1235 Other (See comments) midline abdomen incision (Active)   Dressing Appearance dry;intact 6/3/2019  8:52 AM   Closure FÉLIX 6/3/2019  8:52 AM   Base dressing in place, unable to visualize 6/3/2019  8:52 AM   Drainage Amount none 6/3/2019  4:48 AM   Dressing Care, Wound low-adherent 6/3/2019  8:52 AM       Wound Left anterior;lower abdomen surgical (Active)   Dressing Appearance dry;intact 6/3/2019  8:52 AM   Closure None 6/3/2019  8:52 AM   Base clean;moist;pink 6/3/2019  4:48 AM   Periwound pink;intact;dry;warm 6/3/2019  4:48 AM   Periwound Temperature warm 6/3/2019  4:48 AM   Drainage Characteristics/Odor serous 6/3/2019  4:48  AM   Drainage Amount scant 6/3/2019  4:48 AM   Care, Wound cleansed with;sterile normal saline 6/3/2019  4:00 PM   Dressing Care, Wound dressing changed;gauze, wet-to-dry 6/3/2019  4:00 PM           Physical Therapy Education     Title: PT OT SLP Therapies (Done)     Topic: Physical Therapy (Done)     Point: Mobility training (Done)     Learning Progress Summary           Patient Acceptance, E,TB,D, VU by  at 6/3/2019  4:36 PM    Acceptance, E, VU by  at 6/1/2019  3:30 PM    Acceptance, E, NR by AR at 5/30/2019  1:47 PM                   Point: Home exercise program (Done)     Learning Progress Summary           Patient Acceptance, E,TB,D, VU by  at 6/3/2019  4:36 PM    Acceptance, E, VU by  at 6/1/2019  3:30 PM    Acceptance, E, NR by AR at 5/30/2019  1:47 PM                   Point: Body mechanics (Done)     Learning Progress Summary           Patient Acceptance, E,TB,D, VU by  at 6/3/2019  4:36 PM    Acceptance, E, VU by  at 6/1/2019  3:30 PM    Acceptance, E, NR by AR at 5/30/2019  1:47 PM                   Point: Precautions (Done)     Learning Progress Summary           Patient Acceptance, E,TB,D, VU by  at 6/3/2019  4:36 PM    Acceptance, E, VU by  at 6/1/2019  3:30 PM    Acceptance, E, NR by AR at 5/30/2019  1:47 PM                               User Key     Initials Effective Dates Name Provider Type Discipline    AR 04/03/18 -  Yenifer Brewer, PT Physical Therapist PT     03/07/18 -  Carmen Haider, [facility] Physical Therapy Assistant PT     04/03/18 -  Asa Tidwell, PT Physical Therapist PT                PT Recommendation and Plan  Anticipated Discharge Disposition (PT): home with home health, skilled nursing facility  Outcome Summary/Treatment Plan (PT)  Anticipated Discharge Disposition (PT): home with home health, skilled nursing facility  Plan of Care Reviewed With: patient  Progress: improving  Outcome Summary: Pt tolerated treatment well this date. Ambulated 800ft w/ Rw and  CGA. C/o minimal pain in abdomen. PT will continue to address functional mobility deficits as tolerated.  Outcome Measures     Row Name 06/03/19 1600 06/01/19 1500          How much help from another person do you currently need...    Turning from your back to your side while in flat bed without using bedrails?  4  -SM  4  -CH     Moving from lying on back to sitting on the side of a flat bed without bedrails?  4  -SM  4  -CH     Moving to and from a bed to a chair (including a wheelchair)?  3  -SM  3  -CH     Standing up from a chair using your arms (e.g., wheelchair, bedside chair)?  3  -SM  3  -CH     Climbing 3-5 steps with a railing?  2  -SM  2  -CH     To walk in hospital room?  3  -SM  3  -CH     AM-PAC 6 Clicks Score  19  -SM  19  -CH        Functional Assessment    Outcome Measure Options  AM-PAC 6 Clicks Basic Mobility (PT)  -SM  AM-PAC 6 Clicks Basic Mobility (PT)  -       User Key  (r) = Recorded By, (t) = Taken By, (c) = Cosigned By    Initials Name Provider Type    Carmen Cevallos PTA Physical Therapy Assistant    CH Asa Tidwell, PT Physical Therapist         Time Calculation:   PT Charges     Row Name 06/03/19 1638             Time Calculation    Start Time  1628  -      Stop Time  1640  -      Time Calculation (min)  12 min  -      PT Received On  06/03/19  -      PT - Next Appointment  06/04/19  -        User Key  (r) = Recorded By, (t) = Taken By, (c) = Cosigned By    Initials Name Provider Type    Carmen Cevallos PTA Physical Therapy Assistant        Therapy Charges for Today     Code Description Service Date Service Provider Modifiers Qty    [id number] HC PT THER PROC EA 15 MIN 6/3/2019 Carmen Haider PTA GP 1          PT G-Codes  Outcome Measure Options: AM-PAC 6 Clicks Basic Mobility (PT)  AM-PAC 6 Clicks Score: 19    Carmen Haider PTA  6/3/2019

## 2019-06-04 PROCEDURE — 25010000002 ENOXAPARIN PER 10 MG: Performed by: SURGERY

## 2019-06-04 PROCEDURE — 99024 POSTOP FOLLOW-UP VISIT: CPT | Performed by: SURGERY

## 2019-06-04 RX ADMIN — HYDROCODONE BITARTRATE AND ACETAMINOPHEN 1 TABLET: 7.5; 325 TABLET ORAL at 21:33

## 2019-06-04 RX ADMIN — HYDROCODONE BITARTRATE AND ACETAMINOPHEN 1 TABLET: 7.5; 325 TABLET ORAL at 10:47

## 2019-06-04 RX ADMIN — PANTOPRAZOLE SODIUM 40 MG: 40 TABLET, DELAYED RELEASE ORAL at 06:40

## 2019-06-04 RX ADMIN — CARVEDILOL 12.5 MG: 12.5 TABLET, FILM COATED ORAL at 17:50

## 2019-06-04 RX ADMIN — HYDROCODONE BITARTRATE AND ACETAMINOPHEN 1 TABLET: 7.5; 325 TABLET ORAL at 05:19

## 2019-06-04 RX ADMIN — ENOXAPARIN SODIUM 40 MG: 40 INJECTION SUBCUTANEOUS at 15:41

## 2019-06-04 RX ADMIN — HYDROCODONE BITARTRATE AND ACETAMINOPHEN 1 TABLET: 7.5; 325 TABLET ORAL at 15:42

## 2019-06-04 RX ADMIN — HYDROCODONE BITARTRATE AND ACETAMINOPHEN 1 TABLET: 7.5; 325 TABLET ORAL at 01:23

## 2019-06-04 RX ADMIN — LEVOTHYROXINE SODIUM 25 MCG: 25 TABLET ORAL at 06:40

## 2019-06-04 RX ADMIN — LOSARTAN POTASSIUM 50 MG: 50 TABLET, FILM COATED ORAL at 08:27

## 2019-06-04 RX ADMIN — CARVEDILOL 12.5 MG: 12.5 TABLET, FILM COATED ORAL at 08:27

## 2019-06-04 NOTE — PROGRESS NOTES
Discharge Planning Assessment  [facility]     Patient Name: Moriah Petty  MRN: [id number]  Today's Date: 6/4/2019    Admit Date: 5/28/2019    Discharge Needs Assessment    No documentation.       Discharge Plan     Row Name 06/04/19 1246       Plan    Plan  Short term rehab [facility]  has offered a bed, waiting to reply from [facility]/s [place] with [facility]    Plan Comments  I updated pt that [facility] has offered a bed and felt they could skill her for a week, I advised [facility] and [facility] did not feel they could offer a bed.  Pt ask me to ask [facility] again to make sure they knew about her would and let them know she has long term care insurance, Mónica updated, she said for long term care insurance they all have different guidelines, she would have to be willing to come private pay and then be reimbursed by her long term care insurance.     Josie Whitaker RN     Destination      Service Provider Request Status Selected Services Address Phone Number Fax Number    [facility] Pending - Request Sent N/A [address] [address] [phone]    [facility] Pending - Request Sent N/A [address] [facility] [address] [phone]    [facility] Declined  due to walking 800ft N/A [address] [facility] [address] [phone]    [facility] Declined  no rehab bed to offer N/A [address] [facility] [address] [phone]    [facility] Declined  no rehab bed to offer N/A [address] [facility] [id number] [phone]    [facility] Declined  no rehab bed to offer N/A [address] [facility] [address] [phone]    [facility] Declined  no rehab bed to offer N/A [address] [facility] [address] [phone]       Home Medical Care      Service Provider Request Status Selected Services Address Phone Number Fax Number    [facility] Accepted N/A [address] [address] [phone]        Expected Discharge Date and Time     Expcted Discharge Date Expected Discharge Time    Jun 5, 2019      Josie Whitaker RN

## 2019-06-04 NOTE — PROGRESS NOTES
Postop day #7 colostomy takedown    Subjective:  Overall feels okay.  Pain gradually improving.  Tolerating a diet and bowels are functioning.    Objective:  Patient is afebrile, vital stable  Abdomen is soft benign  Incision looks good    Assessment and plan:  -Status post colostomy takedown  -Probably ready for disposition in the next day or 2  -I think we should try to get a wound VAC to improve her wound care  -Hopefully home in the next day or 2 if that can be arranged.    Emanuel Clark MD  General and Endoscopic Surgery  [facility]    [address] [place] [address], [address]  [place], [place], [address]  P: [phone]  F: [phone]

## 2019-06-04 NOTE — PROGRESS NOTES
Discharge Planning Assessment  [facility]     Patient Name: Moriah Petty  MRN: [id number]  Today's Date: 6/4/2019    Admit Date: 5/28/2019    Discharge Needs Assessment    No documentation.       Discharge Plan     Row Name 06/04/19 1600       Plan    Plan  Short term rehab [facility]  has offered a bed, or private pay at [facility] v/s home with [facility]    Plan Comments  I met with pt's  Sammy, he said he spoke with staff at [facility] and now he is going to tour [facility], he wanted to know why [facility] will accept under Medicare and [facility] will not, I encouraged him to ask facility that question when he goes to tour.  Mónica with [facility] updated me earlier that they can accept private pay and bed will be available tomorrow and they will have to eval daily for bed availability after tomorrow if not discharged.      Josie Whitaker RN        Destination - Selection Complete      Service Provider Request Status Selected Services Address Phone Number Fax Number    [facility] Selected Skilled Nursing [address] [facility] [address] [phone]    [facility] Accepted N/A [address] [facility] [address] [phone]    [facility] [place] Declined  no rehab bed to offer N/A [address] [facility] [address] [phone]    [facility] Declined  no rehab bed to offer, No skilled need N/A [address] [facility] [address] [phone]    [facility] Declined  no rehab bed to offer, no skilled need N/A [address] [facility] [address] [phone]    [facility] Declined  no rehab bed to offer N/A [address] [facility] [id number] [phone]    [facility] Declined  We cannot accept. MDS and DON state she is not skillable.   N/A [address]  [facility] [facility] [address] [phone]      Home Medical Care      Service Provider Request Status Selected Services Address Phone Number Fax Number    [facility] Accepted N/A [address] [address] [phone]     Josie Whitaker RN

## 2019-06-04 NOTE — PROGRESS NOTES
Discharge Planning Assessment  [facility]     Patient Name: Moriah Petty  MRN: [id number]  Today's Date: 6/4/2019    Admit Date: 5/28/2019    Discharge Needs Assessment    No documentation.       Discharge Plan     Row Name 06/04/19 1022       Plan    Plan  Short term rehab pending acceptance, referral to [facility], [facility] and [facility] v/s home with [facility]    Plan Comments  Call received from Mónica with [facility], she said facility has declined pt even though she has 13 steps to enter her home due to she is walking 800 feet and they do not feel Medicare will cover rehab.  I updated pt of [facility] declining her and why, pt said she has 13 steps to enter her home from both entrances, she said she is also concerned about the packing required BID, I advised home health will come 2 to 3 days / week but she and her family will need to be taught to do dressing changes, pt said there is no way she can do them and she knows her  can't, nor can her son.  pt is agreeable to let me check with other SNF to see if they can accept short term, referral to Carol with [facility], Kandi with [place] and Antonella with [facility].   Josie Whitaker RN        Destination      Service Provider Request Status Selected Services Address Phone Number Fax Number    [facility] Pending - Request Sent N/A [address] [facility] [address] [phone]    [facility] Pending - Request Sent N/A [address] [facility] [address] [phone]    [facility] Pending - Request Sent N/A [address] [facility] [address] [phone]    [facility] Pending - Request Sent N/A [address] [facility] [id number] [phone]    [facility] Declined  due to walking 800ft N/A [address] [facility]  [address] [phone]      Home Medical Care      Service Provider Request Status Selected Services Address Phone Number Fax Number    [facility] Accepted N/A [address] [address] [phone]        Expected Discharge Date and Time     Expcted Discharge Date Expected Discharge Time    Jun 5, 2019      Josie Whitaker RN

## 2019-06-04 NOTE — PLAN OF CARE
Problem: Patient Care Overview  Goal: Plan of Care Review  Outcome: Ongoing (interventions implemented as appropriate)   06/04/19 3461   Coping/Psychosocial   Plan of Care Reviewed With patient   Plan of Care Review   Progress improving   OTHER   Outcome Summary VSS. Medicated with Lortab for pain. BID wet to dry dressing changes continued. Up with asst x1 and walker to ambulate. Voiding well. Advanced to regular diet. Will cont to monitor.      Goal: Individualization and Mutuality  Outcome: Ongoing (interventions implemented as appropriate)    Goal: Discharge Needs Assessment  Outcome: Ongoing (interventions implemented as appropriate)    Goal: Interprofessional Rounds/Family Conf  Outcome: Ongoing (interventions implemented as appropriate)      Problem: Pain, Acute (Adult)  Goal: Acceptable Pain Control/Comfort Level  Outcome: Ongoing (interventions implemented as appropriate)      Problem: Surgery Nonspecified (Adult)  Goal: Signs and Symptoms of Listed Potential Problems Will be Absent, Minimized or Managed (Surgery Nonspecified)  Outcome: Ongoing (interventions implemented as appropriate)

## 2019-06-05 LAB — PLATELET # BLD AUTO: 358 10*3/MM3 (ref 140–450)

## 2019-06-05 PROCEDURE — 99024 POSTOP FOLLOW-UP VISIT: CPT | Performed by: SURGERY

## 2019-06-05 PROCEDURE — 85049 AUTOMATED PLATELET COUNT: CPT | Performed by: SURGERY

## 2019-06-05 PROCEDURE — 97530 THERAPEUTIC ACTIVITIES: CPT

## 2019-06-05 PROCEDURE — 25010000002 ENOXAPARIN PER 10 MG: Performed by: SURGERY

## 2019-06-05 RX ADMIN — PANTOPRAZOLE SODIUM 40 MG: 40 TABLET, DELAYED RELEASE ORAL at 06:05

## 2019-06-05 RX ADMIN — LEVOTHYROXINE SODIUM 25 MCG: 25 TABLET ORAL at 05:12

## 2019-06-05 RX ADMIN — HYDROCODONE BITARTRATE AND ACETAMINOPHEN 1 TABLET: 7.5; 325 TABLET ORAL at 19:21

## 2019-06-05 RX ADMIN — ENOXAPARIN SODIUM 40 MG: 40 INJECTION SUBCUTANEOUS at 16:19

## 2019-06-05 RX ADMIN — CARVEDILOL 12.5 MG: 12.5 TABLET, FILM COATED ORAL at 18:09

## 2019-06-05 RX ADMIN — LOSARTAN POTASSIUM 50 MG: 50 TABLET, FILM COATED ORAL at 08:25

## 2019-06-05 RX ADMIN — CARVEDILOL 12.5 MG: 12.5 TABLET, FILM COATED ORAL at 08:25

## 2019-06-05 RX ADMIN — HYDROCODONE BITARTRATE AND ACETAMINOPHEN 1 TABLET: 7.5; 325 TABLET ORAL at 12:27

## 2019-06-05 RX ADMIN — HYDROCODONE BITARTRATE AND ACETAMINOPHEN 1 TABLET: 7.5; 325 TABLET ORAL at 05:12

## 2019-06-05 NOTE — PROGRESS NOTES
Postop day #8 colostomy closure    Subjective:  Continued gradual improvement.  Patient is spacing her pain meds little bit better.  Tolerating a diet.    Objective:  Abdomen is soft, incision looks good, wound is clean    Assessment and plan:  -Colostomy closure, recovering well  -Consider discharge tomorrow if wound care can be arranged    Emanuel Clark MD  General and Endoscopic Surgery  [facility]    [address], [address]  [place], [place], [address]  P: [phone]  F: [phone]

## 2019-06-05 NOTE — PLAN OF CARE
Problem: Patient Care Overview  Goal: Plan of Care Review  Outcome: Ongoing (interventions implemented as appropriate)   06/05/19 1510   Coping/Psychosocial   Plan of Care Reviewed With patient   Plan of Care Review   Progress improving   OTHER   Outcome Summary Incision with staples, open to air. Old ostomy site dressing CD&I, will do dressing change this evening. C/o mild pain, medicating with PO pain medication Q4H PRN. Tolerating GI soft diet. Up ad malka. VSS.     Goal: Individualization and Mutuality  Outcome: Ongoing (interventions implemented as appropriate)    Goal: Discharge Needs Assessment  Outcome: Ongoing (interventions implemented as appropriate)    Goal: Interprofessional Rounds/Family Conf  Outcome: Ongoing (interventions implemented as appropriate)      Problem: Pain, Acute (Adult)  Goal: Acceptable Pain Control/Comfort Level  Outcome: Ongoing (interventions implemented as appropriate)      Problem: Surgery Nonspecified (Adult)  Goal: Signs and Symptoms of Listed Potential Problems Will be Absent, Minimized or Managed (Surgery Nonspecified)  Outcome: Ongoing (interventions implemented as appropriate)      Problem: Nutrition, Imbalanced: Inadequate Oral Intake (Adult)  Goal: Improved Oral Intake  Outcome: Ongoing (interventions implemented as appropriate)    Goal: Prevent Further Weight Loss  Outcome: Ongoing (interventions implemented as appropriate)

## 2019-06-05 NOTE — PLAN OF CARE
Problem: Patient Care Overview  Goal: Plan of Care Review  Outcome: Ongoing (interventions implemented as appropriate)   06/05/19 0426   Coping/Psychosocial   Plan of Care Reviewed With patient   Plan of Care Review   Progress improving   OTHER   Outcome Summary vss. midline dressing CDI. to change previous ostomy site thsi am. Lortab given for pain. ambulated in the graham last night     Goal: Individualization and Mutuality  Outcome: Ongoing (interventions implemented as appropriate)    Goal: Discharge Needs Assessment  Outcome: Ongoing (interventions implemented as appropriate)    Goal: Interprofessional Rounds/Family Conf  Outcome: Ongoing (interventions implemented as appropriate)      Problem: Pain, Acute (Adult)  Goal: Acceptable Pain Control/Comfort Level  Outcome: Ongoing (interventions implemented as appropriate)      Problem: Surgery Nonspecified (Adult)  Goal: Signs and Symptoms of Listed Potential Problems Will be Absent, Minimized or Managed (Surgery Nonspecified)  Outcome: Ongoing (interventions implemented as appropriate)      Problem: Nutrition, Imbalanced: Inadequate Oral Intake (Adult)  Goal: Identify Related Risk Factors and Signs and Symptoms  Outcome: Outcome(s) achieved Date Met: 06/05/19    Goal: Improved Oral Intake  Outcome: Ongoing (interventions implemented as appropriate)    Goal: Prevent Further Weight Loss  Outcome: Ongoing (interventions implemented as appropriate)

## 2019-06-05 NOTE — PLAN OF CARE
Problem: Patient Care Overview  Goal: Plan of Care Review   06/05/19 1135   Coping/Psychosocial   Plan of Care Reviewed With patient   Plan of Care Review   Progress improving   OTHER   Outcome Summary Pt cont to show good progress towards all goals with improvement in functional mobility. Pt amb 400 ft with CCA and FWW. Possible DC to SNF tomorrow

## 2019-06-05 NOTE — CONSULTS
Adult Nutrition  Assessment/PES    Patient Name:  Moriah Petty  YOB: 1940  MRN: [id number]  Admit Date:  5/28/2019    Assessment Date:  6/5/2019    Comments:  f/u: POD 8 Colostomy closure, possible d/c tomorrow. Wt stable with .8#. Tolerating GI soft diet at 50-75% of meals. Receiving clear liquid supplement, will adjust to reflect diet. Abdominal incision with noted healing. Will continue to monitor.     Reason for Assessment     Row Name 06/05/19 1626          Reason for Assessment    Reason For Assessment  follow-up protocol     Diagnosis  gastrointestinal disease POD 8 colostomy closure     Identified At Risk by Screening Criteria  unintentional loss of 10 lbs or more in the past 2 mos         Nutrition/Diet History     Row Name 06/05/19 1627          Nutrition/Diet History    Typical Food/Fluid Intake  f/u: POD 8 Colostomy closure, possible d/c tomorrow. Wt stable with .8#. Tolerating GI soft diet at 50-75% of meals. Receiving supplement.      Factors Affecting Nutritional Intake  early satiety           Labs/Tests/Procedures/Meds     Row Name 06/05/19 1628          Labs/Procedures/Meds    Lab Results Reviewed  reviewed, pertinent     Lab Results Comments  Na, BUN, Cr        Diagnostic Tests/Procedures    Diagnostic Test/Procedure Reviewed  reviewed        Medications    Pertinent Medications Reviewed  reviewed, pertinent     Pertinent Medications Comments  Lovenox, Synthroid, Protonix         Physical Findings     Row Name 06/05/19 1629          Physical Findings    Skin  other (see comments) Healing abd. incision         Nutrition Prescription Ordered     Row Name 06/05/19 1630          Nutrition Prescription PO    Current PO Diet  Regular     Fluid Consistency  Thin     Supplement  Boost Breeze (Ensure)     Supplement Frequency  2 times a day     Common Modifiers  GI Soft/[place]           Problem/Interventions:  Intervention Goal     Row Name 06/05/19 1630          Intervention  Goal    General  Maintain nutrition;Improved nutrition related lab(s);Disease management/therapy;Meet nutritional needs for age/condition     PO  Maintain intake;Meet estimated needs     Weight  Maintain weight         Nutrition Intervention     Row Name 06/05/19 1630          Nutrition Intervention    RD/Tech Action  Care plan reviewd;Follow Tx progress;Supplement offered/refused         Nutrition Prescription     Row Name 06/05/19 1630          Nutrition Prescription PO    PO Prescription  Begin/change supplement     Supplement  Boost Plus     Supplement Frequency  2 times a day     New PO Prescription Ordered?  Yes         Education/Evaluation     Row Name 06/05/19 1630          Education    Education  Will Instruct as appropriate        Monitor/Evaluation    Monitor  Per protocol;PO intake;Pertinent labs;Supplement intake;Weight;Skin status;Symptoms     Education Follow-up  Reinforce PRN           Electronically signed by:  Gabriella Nicole MS,RD,LD  06/05/19 4:31 PM

## 2019-06-05 NOTE — NURSING NOTE
WOCN: Wound VAC applied. Discussed with CCP. Instructed patient regarding NWPT. Dressing change due friday 06/05/19 1700   Wound Left anterior;lower abdomen surgical   No Date first assessed or Time first assessed found.   Side: Left  Orientation: anterior;lower  Location: abdomen  Type: (c) surgical   Dressing Appearance dry;intact   Base clean;granulating;moist;red   Periwound intact   Edges open   Wound Length (cm) 2 cm   Wound Width (cm) 3.5 cm   Wound Depth (cm) 3 cm   Drainage Amount none   Care, Wound cleansed with;sterile normal saline;negative pressure wound therapy   Dressing Care, Wound dressing applied;dressing changed;foam;transparent film   Periwound Care, Wound barrier film applied   NPWT (Negative Pressure Wound Therapy) 06/05/19 Left lower quad ostomy site take down wound   Placement Date: 06/05/19   Location: Left lower quad ostomy site take down wound   Therapy Setting continuous therapy   Dressing foam, black;transparent dressing   Pressure Setting 125 mmHg   Sponges Inserted 1

## 2019-06-05 NOTE — THERAPY TREATMENT NOTE
Acute Care - Physical Therapy Treatment Note  [facility]     Patient Name: Moriah Petty  : 1940  MRN: [id number]  Today's Date: 2019  Onset of Illness/Injury or Date of Surgery: 19          Admit Date: 2019    Visit Dx:    ICD-10-CM ICD-9-CM   1. Colostomy status (CMS/HCC) Z93.3 V44.3     Patient Active Problem List   Diagnosis   • Abnormal electrocardiogram   • Non-specific colitis   • Acute post-traumatic headache   • Postoperative anemia due to acute blood loss   • Arthritis   • Hematochezia   • Chest pain   • Compression fracture of lumbar vertebra (CMS/HCC)   • Closed wedge compression fracture of second lumbar vertebra (CMS/HCC)   • Constipation   • Dizziness   • Dyslipidemia   • Fatigue   • Ventricular premature beats   • Gastroesophageal reflux disease without esophagitis   • Hypertension   • Hypotension   • Insomnia   • Left lower quadrant pain   • Low back pain   • Osteoporosis   • Premature atrial contraction   • Peripheral nerve disease   • Pneumonia   • Nausea   • Sensorineural hearing loss   • Shortness of breath   • Sinus bradycardia   • Sleep apnea   • Disorder of thyroid   • Trigeminal neuralgia   • Rectal bleeding   • Family history of colon cancer   • Adenomatous polyp of colon   • Hypothyroidism   • Osteoporosis, post-menopausal   • Diverticulitis of large intestine with abscess without bleeding   • Mild malnutrition (CMS/HCC)   • Pure hypercholesterolemia   • Colostomy status (CMS/HCC)       Therapy Treatment    Rehabilitation Treatment Summary     Row Name 19 1344             Treatment Time/Intention    Discipline  physical therapist  -CA      Document Type  therapy note (daily note)  -CA      Subjective Information  no complaints  -CA      Mode of Treatment  physical therapy  -CA      Patient/Family Observations  supine in bed, NAD noted at rest  -CA      Patient Effort  good  -CA      Existing Precautions/Restrictions  fall  -CA      Recorded by [CA] Greyson  Lorri, PT 06/05/19 1345      Row Name 06/05/19 1344             Bed Mobility Assessment/Treatment    Supine-Sit Spout [place] (Bed Mobility)  conditional independence  -CA      Sit-Supine Sp[place] (Bed Mobility)  conditional independence  -CA      Assistive Device (Bed Mobility)  bed rails  -CA      Recorded by [CA] Lorri Rubi, PT 06/05/19 1345      Row Name 06/05/19 1344             Sit-Stand Transfer    Sit-Stand [place] (Transfers)  stand by assist  -CA      Assistive Device (Sit-Stand Transfers)  walker, front-wheeled  -CA      Recorded by [CA] Lorri Rubi, PT 06/05/19 1345      Row Name 06/05/19 1344             Stand-Sit Transfer    Stand-Sit [place] (Transfers)  stand by assist  -CA      Assistive Device (Stand-Sit Transfers)  walker, front-wheeled  -CA      Recorded by [CA] Lorri Rubi, PT 06/05/19 1345      Row Name 06/05/19 1344             Gait/Stairs Assessment/Training    Gait/Stairs Assessment/Training  gait pattern;distance ambulated  -CA      Spout Spring Level (Gait)  contact guard  -CA      Assistive Device (Gait)  walker, front-wheeled  -CA      Distance in Feet (Gait)  400  -CA      Pattern (Gait)  step-through  -CA      Deviations/Abnormal Patterns (Gait)  chari decreased;stride length decreased  -CA      Bilateral Gait Deviations  forward flexed posture  -CA      Recorded by [CA] Lorri Rubi, PT 06/05/19 1345      Row Name 06/05/19 1344             Therapeutic Exercise    Comment (Therapeutic Exercise)  x10 reps AP, LAQ, and seated marches  -CA      Recorded by [CA] Lorri Rubi, PT 06/05/19 1345      Row Name 06/05/19 1344             Positioning and Restraints    Pre-Treatment Position  in bed  -CA      Post Treatment Position  bed  -CA      In Bed  supine;encouraged to call for assist;call light within reach;with family/caregiver;side rails up x2  -CA      Recorded by [CA] Lorri Rubi, PT 06/05/19 1345      Row Name                Wound  05/28/19 1235 Other (See comments) midline abdomen incision    Wound - Properties Group Date first assessed: 05/28/19 [KH] Time first assessed: 1235 [KH] Side: Other (See comments) [KH] Orientation: midline [VL] Location: abdomen [KH] Type: incision [KH] Recorded by:  [KH] Saniya Serna RN 05/28/19 1235 [VL] Iram Nguyen RN 05/29/19 1026      User Key  (r) = Recorded By, (t) = Taken By, (c) = Cosigned By    Initials Name Effective Dates Discipline     Saniya Serna RN 06/16/16 -  Nurse    Iram Licona RN 06/16/16 -  Nurse    Lorri Adrian, FABRIZIO 06/13/18 -  PT          Wound 05/28/19 1235 Other (See comments) midline abdomen incision (Active)   Dressing Appearance dry;intact;no drainage 6/5/2019  8:25 AM   Closure FÉLIX 6/5/2019  8:25 AM   Base dressing in place, unable to visualize 6/5/2019  4:23 AM   Drainage Amount none 6/5/2019  8:25 AM   Dressing Care, Wound gauze;low-adherent 6/5/2019  8:25 AM       Wound Left anterior;lower abdomen surgical (Active)   Dressing Appearance dry;intact;no drainage 6/5/2019  8:25 AM   Closure None 6/5/2019  8:25 AM   Base clean;moist;pink 6/4/2019  3:41 PM   Edges open 6/4/2019  3:41 PM   Drainage Characteristics/Odor serous;malodorous 6/4/2019  3:41 PM   Drainage Amount none 6/5/2019  8:25 AM   Care, Wound cleansed with;sterile normal saline 6/5/2019  5:00 AM   Dressing Care, Wound gauze 6/5/2019  8:25 AM           Physical Therapy Education     Title: PT OT SLP Therapies (Done)     Topic: Physical Therapy (Done)     Point: Mobility training (Done)     Learning Progress Summary           Patient Acceptance, E, VU by CA at 6/5/2019  1:45 PM    Acceptance, E,TB,D, VU by RAISSA at 6/3/2019  4:36 PM    Acceptance, E, VU by VENKATA at 6/1/2019  3:30 PM    Acceptance, E, NR by AR at 5/30/2019  1:47 PM                   Point: Home exercise program (Done)     Learning Progress Summary           Patient Acceptance, SCARLET GRIFFIN by CA at 6/5/2019  1:45 PM    AcceptanceGABY,THAO CAPPS,  VU by  at 6/3/2019  4:36 PM    Acceptance, E, VU by  at 6/1/2019  3:30 PM    Acceptance, E, NR by AR at 5/30/2019  1:47 PM                   Point: Body mechanics (Done)     Learning Progress Summary           Patient Acceptance, E, VU by CA at 6/5/2019  1:45 PM    Acceptance, E,TB,D, VU by  at 6/3/2019  4:36 PM    Acceptance, E, VU by  at 6/1/2019  3:30 PM    Acceptance, E, NR by AR at 5/30/2019  1:47 PM                   Point: Precautions (Done)     Learning Progress Summary           Patient Acceptance, E, VU by CA at 6/5/2019  1:45 PM    Acceptance, E,TB,D, VU by  at 6/3/2019  4:36 PM    Acceptance, E, VU by  at 6/1/2019  3:30 PM    Acceptance, E, NR by AR at 5/30/2019  1:47 PM                               User Key     Initials Effective Dates Name Provider Type Discipline    AR 04/03/18 -  Yenifer Brewer, PT Physical Therapist PT     03/07/18 -  Carmen Haider, PTA Physical Therapy Assistant PT     04/03/18 -  Asa Tidwell, PT Physical Therapist PT    CA 06/13/18 -  Lorri Rubi, PT Physical Therapist PT                PT Recommendation and Plan     Plan of Care Reviewed With: patient  Progress: improving  Outcome Summary: Pt cont to show good progress towards all goals with improvement in functional mobility. Pt amb 400 ft with CCA and FWW. Possible DC to SNF tomorrow  Outcome Measures     Row Name 06/05/19 1300 06/03/19 1600          How much help from another person do you currently need...    Turning from your back to your side while in flat bed without using bedrails?  4  -CA  4  -SM     Moving from lying on back to sitting on the side of a flat bed without bedrails?  4  -CA  4  -SM     Moving to and from a bed to a chair (including a wheelchair)?  3  -CA  3  -SM     Standing up from a chair using your arms (e.g., wheelchair, bedside chair)?  3  -CA  3  -SM     Climbing 3-5 steps with a railing?  2  -CA  2  -SM     To walk in hospital room?  3  -CA  3  -SM     AM-PAC 6  Clicks Score  19  -CA  19  -        Functional Assessment    Outcome Measure Options  AM-PAC 6 Clicks Basic Mobility (PT)  -CA  AM-PAC 6 Clicks Basic Mobility (PT)  -       User Key  (r) = Recorded By, (t) = Taken By, (c) = Cosigned By    Initials Name Provider Type    Carmen Cevallos, PTA Physical Therapy Assistant    Lorri Adrian, PT Physical Therapist         Time Calculation:   PT Charges     Row Name 06/05/19 1346             Time Calculation    Start Time  1330  -CA      Stop Time  1340  -CA      Time Calculation (min)  10 min  -CA      PT Received On  06/05/19  -CA      PT - Next Appointment  06/07/19  -CA         Time Calculation- PT    Total Timed Code Minutes- PT  10 minute(s)  -CA        User Key  (r) = Recorded By, (t) = Taken By, (c) = Cosigned By    Initials Name Provider Type    Lorri Adrian PT Physical Therapist        Therapy Charges for Today     Code Description Service Date Service Provider Modifiers Qty    [id number] HC PT THERAPEUTIC ACT EA 15 MIN 6/5/2019 Lorri Rubi, PT GP 1          PT G-Codes  Outcome Measure Options: AM-PAC 6 Clicks Basic Mobility (PT)  AM-PAC 6 Clicks Score: 19    Lorri Rubi PT  6/5/2019

## 2019-06-05 NOTE — PROGRESS NOTES
Discharge Planning Assessment  [facility]     Patient Name: Moriah Petty  MRN: [id number]  Today's Date: 6/5/2019    Admit Date: 5/28/2019    Discharge Needs Assessment    No documentation.       Discharge Plan     Row Name 06/05/19 0940       Plan    Plan  Short term rehab [facility] or [facility]  (if wound vac is placed) v/s home with [facility]    Plan Comments Voice mail received from Mónica with [facility], she said if wound vac is placed they they can accept pt and skill her for about a week.    I updated pt, she said [facility] would be her first choice and now her  is agreeable to learn the wound care if necessary, pt said her  was OK with [facility] but [facility] is her first choice due to she has been there multiple times and is familiar with the staff.  I advised if vac is placed there will be no wound care provided except by dr/joshua or staff at SNF until the vac is dc'ed.  Return voice mail to Mónica that pt wants [facility] for skilled care if possible.    Josie Whitaker RN        Destination - Selection Complete      Service Provider Request Status Selected Services Address Phone Number Fax Number    [facility] Selected Skilled Nursing [address] [facility] [address] [phone]    [facility] Accepted N/A [address] [facility] [address] [phone]    [facility] Declined  no rehab bed to offer N/A [address] [facility] [address] [phone]    [facility] Declined  no rehab bed to offer, No skilled need N/A [address] [facility] [address] [phone]    [facility]  no [facility] bed to offer, no skilled need N/A [address] [facility] [address] [phone]    [facility] Declined  no rehab bed to offer N/A [address] [facility] [address] [phone]  [phone]    [facility] Declined  We cannot accept. MDS and DON state she is not skillable.   N/A [address] [facility] [address] [phone]      Durable Medical Equipment      No service coordination in this encounter.      Dialysis/Infusion      No service coordination in this encounter.      Home Medical Care      Service Provider Request Status Selected Services Address Phone Number Fax Number    [facility] Accepted N/A [address] [address] [phone]        Expected Discharge Date and Time     Expcted Discharge Date Expected Discharge Time    Jun 6, 2019          Josie Whitaker RN

## 2019-06-06 VITALS
RESPIRATION RATE: 16 BRPM | OXYGEN SATURATION: 95 % | BODY MASS INDEX: 23.81 KG/M2 | DIASTOLIC BLOOD PRESSURE: 70 MMHG | TEMPERATURE: 98 F | HEIGHT: 66 IN | HEART RATE: 64 BPM | SYSTOLIC BLOOD PRESSURE: 121 MMHG | WEIGHT: 148.15 LBS

## 2019-06-06 RX ORDER — HYDROCODONE BITARTRATE AND ACETAMINOPHEN 7.5; 325 MG/1; MG/1
1 TABLET ORAL EVERY 4 HOURS PRN
Qty: 35 TABLET | Refills: 0 | Status: SHIPPED | OUTPATIENT
Start: 2019-06-06 | End: 2019-06-07

## 2019-06-06 RX ADMIN — PANTOPRAZOLE SODIUM 40 MG: 40 TABLET, DELAYED RELEASE ORAL at 06:30

## 2019-06-06 RX ADMIN — CARVEDILOL 12.5 MG: 12.5 TABLET, FILM COATED ORAL at 08:05

## 2019-06-06 RX ADMIN — LOSARTAN POTASSIUM 50 MG: 50 TABLET, FILM COATED ORAL at 08:05

## 2019-06-06 RX ADMIN — LEVOTHYROXINE SODIUM 25 MCG: 25 TABLET ORAL at 06:30

## 2019-06-06 RX ADMIN — HYDROCODONE BITARTRATE AND ACETAMINOPHEN 1 TABLET: 7.5; 325 TABLET ORAL at 00:50

## 2019-06-06 RX ADMIN — HYDROCODONE BITARTRATE AND ACETAMINOPHEN 1 TABLET: 7.5; 325 TABLET ORAL at 06:30

## 2019-06-06 RX ADMIN — HYDROCODONE BITARTRATE AND ACETAMINOPHEN 1 TABLET: 7.5; 325 TABLET ORAL at 12:41

## 2019-06-06 NOTE — NURSING NOTE
Pt has wound vac in place.  Upon discharge per [facility], wound vac will have to be removed and a normal saline w-d dressing placed.  They have the wound vac machine there today (6/6) and will assess wound and place the vac on patient once she is there.

## 2019-06-06 NOTE — DISCHARGE SUMMARY
Discharge Summary    Patient name: Moriah Petty    Medical record number: [id number]    Admission date: 5/28/2019  Discharge date:  6/6/2019    Attending physician: Emanuel Clark MD    Primary care physician: Jarred Carrizales Jr., MD    Referring physician: Emanuel Clark MD  [address] [address]    Consulting physician(s): None    Condition on discharge: improving    Primary Diagnoses: Complicated diverticulitis status post procedure    Secondary Diagnoses: None    Operative Procedure: Colostomy closure    Hospital Course: The patient is a  78 y.o. female that was admitted to the hospital after undergoing the operation as described in her op note.  Postoperatively she did well.  There were no setbacks although she was a bit slow to recover.  Diet was gradually advanced after resumption of GI function.  Pain was adequately controlled with oral medications.  She was switched to a wound VAC and once this was approved she was discharged with plan to follow-up in 1 week for staple removal.    Discharge medications:      Discharge Medications      New Medications      Instructions Start Date   HYDROcodone-acetaminophen 7.5-325 MG per tablet  Commonly known as:  NORCO   1 tablet, Oral, Every 4 Hours PRN         Continue These Medications      Instructions Start Date   ASPIR-81 PO   81 mg, Oral, Nightly, TO HOLD 7 DAYS PRIOR TO OR      Calcium + D3 600-200 MG-UNIT tablet   1 tablet, Oral, 2 Times Daily      carvedilol 12.5 MG tablet  Commonly known as:  COREG   12.5 mg, Oral, 2 Times Daily With Meals      desonide 0.05 % cream  Commonly known as:  DESOWEN   Topical, As Needed      DICLOFENAC PO   75 mg, Oral, 2 Times Daily PRN, HELD FOR OR       fish oil 1200 MG capsule capsule   1,200 mg, Oral, 2 Times Daily With Meals, HELD FOR OR      levothyroxine 25 MCG tablet  Commonly known as:  SYNTHROID, LEVOTHROID   25 mcg, Oral, Daily      losartan 50 MG tablet  Commonly known as:  COZAAR   50 mg,  Oral, Daily      MULTI VITAMIN DAILY PO   1 tablet, Oral, Daily, HELD FOR OR      omeprazole 20 MG capsule  Commonly known as:  priLOSEC   20 mg, Oral, Daily      PROLIA 60 MG/ML solution syringe  Generic drug:  denosumab   60 mg, Subcutaneous, Every 6 Months      rosuvastatin 5 MG tablet  Commonly known as:  CRESTOR   5 mg, Oral, Daily      traMADol 50 MG tablet  Commonly known as:  ULTRAM   50 mg, Oral, Every 6 Hours PRN      V-C FORTE PO   1 mg, Oral, Daily, HELD FOR OR             Discharge instructions: No lifting over 10 pounds.  Wound VAC to be placed and changed Monday Wednesday Friday.  Continuous suction at 125 mmHg.      Follow-up appointment: Follow up with Emanuel Clark MD in the office in 1 weeks. Call for appointment at [phone].

## 2019-06-06 NOTE — PROGRESS NOTES
Discharge Planning Assessment  [facility]     Patient Name: Moriah Petty  MRN: [id number]  Today's Date: 6/6/2019    Admit Date: 5/28/2019    Discharge Needs Assessment    No documentation.       Discharge Plan     Row Name 06/06/19 1221       Plan    Plan  Short term rehab at [facility] with wound vac then home with [facility]    Plan Comments  Mónica with [facility] called to advise they have the wound vac for pt's use at facility, Pt's nurse updated, she said she is with pt now and pt wants to know if her  can pic the vac up so it can be changed out at the hospital. After checking with Mónica I updated pt's nurse that [facility] does not allow rented equiptment to leave the building and the nurse at [facility] will want to see the wound prior to placing their wound vac.  Voice mail left for [facility] advising of anticipated discharge today to [facility].    Josie Whitaker RN        Destination - Selection Complete      Service Provider Request Status Selected Services Address Phone Number Fax Number    [facility] Selected Skilled Nursing [address] [facility] [id number] [phone]    [facility] Declined  no rehab bed to offer N/A [address] [facility] [address] [phone]    [facility] Declined  no rehab bed to offer, No skilled need N/A [address] [facility] [address] [phone]    [facility] Declined  no rehab bed to offer, no skilled need N/A [address] [facility] [address] [phone]    [facility] Declined  no rehab bed to offer N/A [address] [facility] [id number] [phone]    [facility] Declined  1st choice SNF offered a bed N/A [address] [facility] [address] [phone]    [facility] Declined  We cannot accept. MDS and DON  state she is not skillable.   N/A [address] [facility] [address] [phone]         Home Medical Care      Service Provider Request Status Selected Services Address Phone Number Fax Number    [facility] Accepted N/A [address] [address] [phone]              Josie Whitaker RN

## 2019-06-06 NOTE — PROGRESS NOTES
Discharge Planning Assessment  [facility]     Patient Name: Moriah Petty  MRN: [id number]  Today's Date: 6/6/2019    Admit Date: 5/28/2019    Discharge Needs Assessment    No documentation.       Discharge Plan     Row Name 06/06/19 1428       Plan    Plan  Short term rehab at [facility] then home with [facility]    Plan Comments  Pt with a discharge order, discharge summary faxed to [facility], voice mail left earlier for [facility] that pt will be discharging to [facility]. Mónica with [facility] notified that pt's  will be transporting about 4PM.    Josie Whitaker RN    Final Discharge Disposition Code  03 - skilled nursing facility (SNF)     Destination - Selection Complete      Service Provider Request Status Selected Services Address Phone Number Fax Number    [facility] Selected Skilled Nursing [address] [facility] [address] [phone]    [facility] Declined  no rehab bed to offer N/A [address] [facility] [address] [phone]    [facility] Declined  no rehab bed to offer, No skilled need N/A [address] [facility] [address] [phone]    [facility] Declined  no rehab bed to offer, no skilled need N/A [address] [facility] [address] [phone]    [facility] Declined  no rehab bed to offer N/A [address] [facility] [id number] [phone]    [facility] Declined  1st choice SNF offered a bed N/A [address] [facility] [address] [phone]    [facility] Declined  We cannot accept. MDS and DON state she is not skillable.   N/A [address] [facility] [address] [phone]      Home Medical Care      Service Provider Request Status Selected Services Address Phone Number Fax Number    [facility] [place] Accepted N/A [address] [facility] [address] [facility] [address] [phone]        Expected Discharge Date and Time     Expected Discharge Date Expected Discharge Time    Jun 6, 2019      Josie Whitaker RN

## 2019-06-06 NOTE — PROGRESS NOTES
Discharge Planning Assessment  [facility]     Patient Name: Moriah Petty  MRN: [id number]  Today's Date: 6/6/2019    Admit Date: 5/28/2019    Discharge Needs Assessment    No documentation.       Discharge Plan     Row Name 06/06/19 1050       Plan    [facility] then home with [facility]    Plan Comments  Mónica with [facility] on unit, she said wound vac has been ordered and anticipates it will be available this afternoon and there is a rehab bed available for pt.   Janey with [facility] on unit and updated re discharge to SNF planned for today.  Antonella with [facility] updated by voice mail that pt will be going to [facility] for rehab.   Josie Whitaker RN        Destination - Selection Complete      Service Provider Request Status Selected Services Address Phone Number Fax Number    [facility] Selected Skilled Nursing [address] [address] [phone]    [facility] Declined  no rehab bed to offer N/A [address] [address] [phone]    [facility] Declined  no rehab bed to offer, No skilled need N/A [address] [address] [phone]    [facility] Declined  no rehab bed to offer, no skilled need N/A [address] [address] [phone]    [facility] Declined  no rehab bed to offer N/A [address] [id number] [phone]    [facility] Declined  1st choice SNF offered a bed N/A [address] [address] [phone]    [facility] Declined  We cannot accept. MDS and DON state she is not skillable.   N/A [address] [facility] [address] [phone]      Home Medical Care      Service Provider Request Status Selected Services  Address Phone Number Fax Number    [facility] Accepted N/A [address] [address] [phone]           Expected Discharge Date and Time     Expected Discharge Date Expected Discharge Time    Jun 6, 2019      Josie Whitaker RN

## 2019-06-06 NOTE — PLAN OF CARE
Problem: Patient Care Overview  Goal: Plan of Care Review  Outcome: Ongoing (interventions implemented as appropriate)   06/06/19 6996   Coping/Psychosocial   Plan of Care Reviewed With patient   Plan of Care Review   Progress improving   OTHER   Outcome Summary VSS. Midline inc CDI. Wound vac to LLQ in place, continuous therapy. Medicated with lortab q4hr. Tolerating regular diet. Saline locked. Up ad malka. Will cont to monitor.      Goal: Individualization and Mutuality  Outcome: Ongoing (interventions implemented as appropriate)    Goal: Discharge Needs Assessment  Outcome: Ongoing (interventions implemented as appropriate)    Goal: Interprofessional Rounds/Family Conf  Outcome: Ongoing (interventions implemented as appropriate)      Problem: Pain, Acute (Adult)  Goal: Acceptable Pain Control/Comfort Level  Outcome: Ongoing (interventions implemented as appropriate)      Problem: Surgery Nonspecified (Adult)  Goal: Signs and Symptoms of Listed Potential Problems Will be Absent, Minimized or Managed (Surgery Nonspecified)  Outcome: Ongoing (interventions implemented as appropriate)      Problem: Nutrition, Imbalanced: Inadequate Oral Intake (Adult)  Goal: Improved Oral Intake  Outcome: Ongoing (interventions implemented as appropriate)    Goal: Prevent Further Weight Loss  Outcome: Ongoing (interventions implemented as appropriate)

## 2019-06-07 ENCOUNTER — EPISODE CHANGES (OUTPATIENT)
Dept: CASE MANAGEMENT | Facility: OTHER | Age: 79
End: 2019-06-07

## 2019-06-13 ENCOUNTER — OFFICE VISIT (OUTPATIENT)
Dept: SURGERY | Facility: CLINIC | Age: 79
End: 2019-06-13

## 2019-06-13 DIAGNOSIS — Z93.3 COLOSTOMY STATUS (HCC): Primary | ICD-10-CM

## 2019-06-13 PROCEDURE — 99024 POSTOP FOLLOW-UP VISIT: CPT | Performed by: SURGERY

## 2019-06-13 NOTE — PROGRESS NOTES
Follow-up colostomy closure    Subjective:  Patient feels well.  She is now a little over 2 weeks out from colostomy closure and primary repair of stomal hernia.  Appetite is good.  Bowel function is good and she has good control.  Pain is minimal, she is using narcotics at night before she goes to sleep.  She has had a small amount of constipation over the last couple of days.    Objective:  Abdomen is soft and benign, midline incision well-healed, former stoma site with wound VAC in position    Assessment and plan:  -Postop colostomy closure, recovering well and without evidence of complication to this point  -Continue wound VAC for now, I suspect that within the next week or so she will probably need to be switched to wet-to-dry dressings  -Follow-up with me in 4 weeks  -Stool softeners as necessary  -Continued lifting restrictions given stomal site hernia    Emanuel Clark MD  General and Endoscopic Surgery  [facility]    [address], [address]  [place], [place], [address]  P: [phone]  F: [phone]

## 2019-07-01 ENCOUNTER — EPISODE CHANGES (OUTPATIENT)
Dept: CASE MANAGEMENT | Facility: OTHER | Age: 79
End: 2019-07-01

## 2019-07-05 ENCOUNTER — PATIENT OUTREACH (OUTPATIENT)
Dept: CASE MANAGEMENT | Facility: OTHER | Age: 79
End: 2019-07-05

## 2019-07-05 NOTE — OUTREACH NOTE
"Patient Outreach Note    Declined [facility] and reports relayed to the the person at the \"home.\"  States would \"about healed,\" denies redness, drainage, fever, pulling away at the edges.  No questions related to medication and advised to schedule a follow-up with Dr. Lu for transition visit from hospital to rehab and she is agreeable.  Denies need for CA services.      Shmuel Triana RN    7/5/2019, 11:05 AM      "

## 2019-07-09 ENCOUNTER — EPISODE CHANGES (OUTPATIENT)
Dept: CASE MANAGEMENT | Facility: OTHER | Age: 79
End: 2019-07-09

## 2019-07-12 RX ORDER — LEVOTHYROXINE SODIUM 0.03 MG/1
TABLET ORAL
Qty: 90 TABLET | Refills: 1 | Status: SHIPPED | OUTPATIENT
Start: 2019-07-12 | End: 2020-02-07

## 2019-07-22 ENCOUNTER — OFFICE VISIT (OUTPATIENT)
Dept: SURGERY | Facility: CLINIC | Age: 79
End: 2019-07-22

## 2019-07-22 DIAGNOSIS — Z93.3 COLOSTOMY STATUS (HCC): Primary | ICD-10-CM

## 2019-07-22 PROCEDURE — 99024 POSTOP FOLLOW-UP VISIT: CPT | Performed by: SURGERY

## 2019-07-23 NOTE — PROGRESS NOTES
Follow-up colostomy reversal    Subjective:  She is doing well.  She is now around 6 or 7 weeks out.  She is eating well and has no further pain.  Bowel function is pretty good and she has excellent control.  Strength is improving.    Objective:  Abdomen is soft benign, incision is nicely healed.  Former ostomy site is completely closed.    Assessment and plan:  -Postop colostomy reversal, recovering well  -She may begin gradually increasing her activity  -Follow-up with me in 6 months  -Colonoscopy at 1 year    Emanuel Clark MD  General and Endoscopic Surgery  [facility]    [address], [address]  [place], [place], [address]  P: [phone]  F: [phone]

## 2019-07-26 DIAGNOSIS — M81.0 AGE-RELATED OSTEOPOROSIS WITHOUT CURRENT PATHOLOGICAL FRACTURE: Primary | ICD-10-CM

## 2019-07-31 ENCOUNTER — RESULTS ENCOUNTER (OUTPATIENT)
Dept: OBSTETRICS AND GYNECOLOGY | Facility: CLINIC | Age: 79
End: 2019-07-31

## 2019-07-31 DIAGNOSIS — M81.0 AGE-RELATED OSTEOPOROSIS WITHOUT CURRENT PATHOLOGICAL FRACTURE: ICD-10-CM

## 2019-08-15 LAB
25(OH)D3+25(OH)D2 SERPL-MCNC: 46.4 NG/ML (ref 30–100)
ALBUMIN SERPL-MCNC: 4.4 G/DL (ref 3.5–5.2)
ALBUMIN/GLOB SERPL: 1.7 G/DL
ALP SERPL-CCNC: 50 U/L (ref 39–117)
ALT SERPL-CCNC: 13 U/L (ref 1–33)
AST SERPL-CCNC: 20 U/L (ref 1–32)
BILIRUB SERPL-MCNC: 0.4 MG/DL (ref 0.2–1.2)
BUN SERPL-MCNC: 12 MG/DL (ref 8–23)
BUN/CREAT SERPL: 16.2 (ref 7–25)
CALCIUM SERPL-MCNC: 9 MG/DL (ref 8.6–10.5)
CHLORIDE SERPL-SCNC: 101 MMOL/L (ref 98–107)
CO2 SERPL-SCNC: 26.7 MMOL/L (ref 22–29)
CREAT SERPL-MCNC: 0.74 MG/DL (ref 0.57–1)
GLOBULIN SER CALC-MCNC: 2.6 GM/DL
GLUCOSE SERPL-MCNC: 127 MG/DL (ref 65–99)
POTASSIUM SERPL-SCNC: 4.7 MMOL/L (ref 3.5–5.2)
PROT SERPL-MCNC: 7 G/DL (ref 6–8.5)
SODIUM SERPL-SCNC: 139 MMOL/L (ref 136–145)

## 2019-09-06 ENCOUNTER — INFUSION (OUTPATIENT)
Dept: ONCOLOGY | Facility: HOSPITAL | Age: 79
End: 2019-09-06

## 2019-09-06 VITALS
TEMPERATURE: 98.1 F | OXYGEN SATURATION: 96 % | DIASTOLIC BLOOD PRESSURE: 81 MMHG | BODY MASS INDEX: 24.5 KG/M2 | SYSTOLIC BLOOD PRESSURE: 152 MMHG | WEIGHT: 151.8 LBS | HEART RATE: 63 BPM

## 2019-09-06 DIAGNOSIS — M81.0 OSTEOPOROSIS, UNSPECIFIED OSTEOPOROSIS TYPE, UNSPECIFIED PATHOLOGICAL FRACTURE PRESENCE: Primary | ICD-10-CM

## 2019-09-06 PROCEDURE — 96372 THER/PROPH/DIAG INJ SC/IM: CPT | Performed by: NURSE PRACTITIONER

## 2019-09-06 PROCEDURE — 25010000002 DENOSUMAB 60 MG/ML SOLUTION PREFILLED SYRINGE: Performed by: OBSTETRICS & GYNECOLOGY

## 2019-09-06 RX ADMIN — DENOSUMAB 60 MG: 60 INJECTION SUBCUTANEOUS at 16:30

## 2019-09-06 NOTE — PROGRESS NOTES
Arrived  for prolia injection. Indication and side effects reviewed. Denies recent dental work. Labs and medications verified. Prolia administered in left  arm without incidence. Instructed to call prescribing MD for any concerns or questions and instructed on how to schedule future appts.  Pt vu and discharged ambulatory.

## 2019-10-14 ENCOUNTER — OFFICE VISIT (OUTPATIENT)
Dept: INTERNAL MEDICINE | Facility: CLINIC | Age: 79
End: 2019-10-14

## 2019-10-14 VITALS
HEIGHT: 66 IN | DIASTOLIC BLOOD PRESSURE: 83 MMHG | OXYGEN SATURATION: 96 % | TEMPERATURE: 98.6 F | SYSTOLIC BLOOD PRESSURE: 158 MMHG | RESPIRATION RATE: 16 BRPM | WEIGHT: 156 LBS | HEART RATE: 55 BPM | BODY MASS INDEX: 25.07 KG/M2

## 2019-10-14 DIAGNOSIS — E03.9 ACQUIRED HYPOTHYROIDISM: ICD-10-CM

## 2019-10-14 DIAGNOSIS — K57.20 DIVERTICULITIS OF LARGE INTESTINE WITH ABSCESS WITHOUT BLEEDING: ICD-10-CM

## 2019-10-14 DIAGNOSIS — K21.9 GASTROESOPHAGEAL REFLUX DISEASE WITHOUT ESOPHAGITIS: ICD-10-CM

## 2019-10-14 DIAGNOSIS — I10 ESSENTIAL HYPERTENSION: Primary | ICD-10-CM

## 2019-10-14 DIAGNOSIS — E78.00 PURE HYPERCHOLESTEROLEMIA: ICD-10-CM

## 2019-10-14 PROCEDURE — 90653 IIV ADJUVANT VACCINE IM: CPT | Performed by: FAMILY MEDICINE

## 2019-10-14 PROCEDURE — 99213 OFFICE O/P EST LOW 20 MIN: CPT | Performed by: FAMILY MEDICINE

## 2019-10-14 PROCEDURE — G0008 ADMIN INFLUENZA VIRUS VAC: HCPCS | Performed by: FAMILY MEDICINE

## 2019-10-14 NOTE — PROGRESS NOTES
Subjective   Moriah Petty is a 79 y.o. female.     Chief Complaint   Patient presents with   • Hyperlipidemia   • Hypertension   • Hypothyroidism         History of Present Illness   Delightful lady was recovered well from prior colostomy for diverticular abscess.  We are rechecking labs today in regards to statin therapy for hyperlipidemia.  Otherwise treatment of hypothyroidism is reviewed as well as hypertension.  She overall appears to be doing very well.    She will get high-dose flu shot today.    We discussed leg cramps which have been difficult for her she will try supplements as well as vitamin E, she Toby takes a calcium plus D supplement.  Vitamin B12 may be tried as well.    The following portions of the patient's history were reviewed and updated as appropriate: allergies, current medications, past social history and problem list.    Review of Systems   Constitutional: Negative.    HENT: Negative.    Eyes: Negative.    Respiratory: Negative.    Cardiovascular: Negative.    Gastrointestinal: Negative.    Endocrine: Negative.    Genitourinary: Negative.    Musculoskeletal: Negative.    Skin: Negative.    Allergic/Immunologic: Negative.    Neurological: Negative.    Hematological: Negative.    Psychiatric/Behavioral: Negative.        Objective   Vitals:    10/14/19 1216   BP: 158/83   Pulse: 55   Resp: 16   Temp: 98.6 °F (37 °C)   SpO2: 96%     Physical Exam   Constitutional: She is oriented to person, place, and time. She appears well-developed and well-nourished.   HENT:   Head: Normocephalic and atraumatic.   Right Ear: Tympanic membrane and external ear normal.   Left Ear: Tympanic membrane and external ear normal.   Nose: Nose normal.   Mouth/Throat: Oropharynx is clear and moist.   Eyes: Conjunctivae and EOM are normal. Pupils are equal, round, and reactive to light.   Neck: Normal range of motion. Neck supple. No JVD present. No thyromegaly present.   Cardiovascular: Normal rate, regular rhythm,  normal heart sounds and intact distal pulses.   Pulmonary/Chest: Effort normal and breath sounds normal.   Abdominal: Soft. Bowel sounds are normal.   Musculoskeletal: Normal range of motion.   Lymphadenopathy:     She has no cervical adenopathy.   Neurological: She is alert and oriented to person, place, and time. No cranial nerve deficit. Coordination normal.   Skin: Skin is warm and dry. No rash noted.   Psychiatric: She has a normal mood and affect. Her behavior is normal. Judgment and thought content normal.   Vitals reviewed.      Assessment/Plan   Problem List Items Addressed This Visit        Cardiovascular and Mediastinum    Pure hypercholesterolemia    Relevant Orders    TSH    T4, Free    T3, Free    CBC & Differential    Lipid Panel With / Chol / HDL Ratio    Comprehensive Metabolic Panel    Hypertension - Primary    Relevant Orders    TSH    T4, Free    T3, Free    CBC & Differential    Lipid Panel With / Chol / HDL Ratio    Comprehensive Metabolic Panel       Digestive    Diverticulitis of large intestine with abscess without bleeding    Gastroesophageal reflux disease without esophagitis       Endocrine    Hypothyroidism    Relevant Orders    TSH    T4, Free    T3, Free    CBC & Differential    Lipid Panel With / Chol / HDL Ratio    Comprehensive Metabolic Panel      Plan: Labs pending recheck in 6 months for Medicare wellness high-dose flu shot.

## 2019-10-15 LAB
ALBUMIN SERPL-MCNC: 4.4 G/DL (ref 3.5–5.2)
ALBUMIN/GLOB SERPL: 1.6 G/DL
ALP SERPL-CCNC: 49 U/L (ref 39–117)
ALT SERPL-CCNC: 14 U/L (ref 1–33)
AST SERPL-CCNC: 19 U/L (ref 1–32)
BASOPHILS # BLD AUTO: 0.06 10*3/MM3 (ref 0–0.2)
BASOPHILS NFR BLD AUTO: 1.1 % (ref 0–1.5)
BILIRUB SERPL-MCNC: 0.4 MG/DL (ref 0.2–1.2)
BUN SERPL-MCNC: 13 MG/DL (ref 8–23)
BUN/CREAT SERPL: 16 (ref 7–25)
CALCIUM SERPL-MCNC: 9.4 MG/DL (ref 8.6–10.5)
CHLORIDE SERPL-SCNC: 98 MMOL/L (ref 98–107)
CHOLEST SERPL-MCNC: 184 MG/DL (ref 0–200)
CHOLEST/HDLC SERPL: 1.94 {RATIO}
CO2 SERPL-SCNC: 26.1 MMOL/L (ref 22–29)
CREAT SERPL-MCNC: 0.81 MG/DL (ref 0.57–1)
EOSINOPHIL # BLD AUTO: 0.15 10*3/MM3 (ref 0–0.4)
EOSINOPHIL NFR BLD AUTO: 2.8 % (ref 0.3–6.2)
ERYTHROCYTE [DISTWIDTH] IN BLOOD BY AUTOMATED COUNT: 13.7 % (ref 12.3–15.4)
GLOBULIN SER CALC-MCNC: 2.7 GM/DL
GLUCOSE SERPL-MCNC: 94 MG/DL (ref 65–99)
HCT VFR BLD AUTO: 38.7 % (ref 34–46.6)
HDLC SERPL-MCNC: 95 MG/DL (ref 40–60)
HGB BLD-MCNC: 13 G/DL (ref 12–15.9)
IMM GRANULOCYTES # BLD AUTO: 0.01 10*3/MM3 (ref 0–0.05)
IMM GRANULOCYTES NFR BLD AUTO: 0.2 % (ref 0–0.5)
LDLC SERPL CALC-MCNC: 74 MG/DL (ref 0–100)
LYMPHOCYTES # BLD AUTO: 1.78 10*3/MM3 (ref 0.7–3.1)
LYMPHOCYTES NFR BLD AUTO: 33.6 % (ref 19.6–45.3)
MCH RBC QN AUTO: 30.3 PG (ref 26.6–33)
MCHC RBC AUTO-ENTMCNC: 33.6 G/DL (ref 31.5–35.7)
MCV RBC AUTO: 90.2 FL (ref 79–97)
MONOCYTES # BLD AUTO: 0.53 10*3/MM3 (ref 0.1–0.9)
MONOCYTES NFR BLD AUTO: 10 % (ref 5–12)
NEUTROPHILS # BLD AUTO: 2.76 10*3/MM3 (ref 1.7–7)
NEUTROPHILS NFR BLD AUTO: 52.3 % (ref 42.7–76)
NRBC BLD AUTO-RTO: 0 /100 WBC (ref 0–0.2)
PLATELET # BLD AUTO: 222 10*3/MM3 (ref 140–450)
POTASSIUM SERPL-SCNC: 4.9 MMOL/L (ref 3.5–5.2)
PROT SERPL-MCNC: 7.1 G/DL (ref 6–8.5)
RBC # BLD AUTO: 4.29 10*6/MM3 (ref 3.77–5.28)
SODIUM SERPL-SCNC: 136 MMOL/L (ref 136–145)
T3FREE SERPL-MCNC: 2.8 PG/ML (ref 2–4.4)
T4 FREE SERPL-MCNC: 1.32 NG/DL (ref 0.93–1.7)
TRIGL SERPL-MCNC: 75 MG/DL (ref 0–150)
TSH SERPL DL<=0.005 MIU/L-ACNC: 2.06 UIU/ML (ref 0.27–4.2)
VLDLC SERPL CALC-MCNC: 15 MG/DL
WBC # BLD AUTO: 5.29 10*3/MM3 (ref 3.4–10.8)

## 2019-11-12 RX ORDER — ROSUVASTATIN CALCIUM 5 MG/1
TABLET, COATED ORAL
Qty: 90 TABLET | Refills: 1 | Status: SHIPPED | OUTPATIENT
Start: 2019-11-12 | End: 2020-05-19

## 2019-12-16 ENCOUNTER — OFFICE VISIT (OUTPATIENT)
Dept: SURGERY | Facility: CLINIC | Age: 79
End: 2019-12-16

## 2019-12-16 VITALS — OXYGEN SATURATION: 97 % | WEIGHT: 150 LBS | HEART RATE: 52 BPM | HEIGHT: 66 IN | BODY MASS INDEX: 24.11 KG/M2

## 2019-12-16 DIAGNOSIS — Z93.3 COLOSTOMY STATUS (HCC): Primary | ICD-10-CM

## 2019-12-16 PROCEDURE — 99213 OFFICE O/P EST LOW 20 MIN: CPT | Performed by: SURGERY

## 2019-12-16 NOTE — PROGRESS NOTES
Chief complaint: Follow-up colostomy reversal    Subjective:  No complaints.  Strength has returned and she denies any significant abdominal pain.  Her bowels are functioning normally.  She has some mild discomfort with more strenuous work, but overall feels quite well.    Review of systems:  Constitutional: Negative for fever chills  Gastrointestinal: Negative for rectal bleeding, diarrhea or constipation    Physical exam:  Body mass index 24.2  General: Awake alert and oriented, no distress  Eyes: Extraocular movements are intact, no scleral icterus  Neck: Supple, no lymphadenopathy, trachea midline  Respiratory: No use of accessory muscles, good bilateral chest expansion  Gastrointestinal: Soft, benign.  Well-healed surgical incisions, former colostomy site is well-healed, no hernia appreciated.    Assessment and plan:  -Complicated diverticulitis with abscess, now status post Velazquez's procedure around a year ago, and approximately 6 months out from colostomy closure, recovering well  -No evidence of hernia at this time.  -At this time she may resume more vigorous activity from my standpoint.  -She needs colonoscopy in 6 months, reminder has been placed in the system.    Emanuel Clark MD  General and Endoscopic Surgery  [facility]    [address], [address]  [place], [place], [address]  P: [phone]  F: [phone]

## 2020-02-07 RX ORDER — LEVOTHYROXINE SODIUM 0.03 MG/1
TABLET ORAL
Qty: 90 TABLET | Refills: 1 | Status: SHIPPED | OUTPATIENT
Start: 2020-02-07 | End: 2020-08-14

## 2020-03-03 ENCOUNTER — TELEPHONE (OUTPATIENT)
Dept: OBSTETRICS AND GYNECOLOGY | Facility: CLINIC | Age: 80
End: 2020-03-03

## 2020-03-03 DIAGNOSIS — M81.0 AGE-RELATED OSTEOPOROSIS WITHOUT CURRENT PATHOLOGICAL FRACTURE: Primary | ICD-10-CM

## 2020-03-03 NOTE — TELEPHONE ENCOUNTER
Called pt about sending orders to EP for Prolia, pt aware to expect a call to schedule.kali Fam, please sign therapy plan.kali

## 2020-03-24 NOTE — PROGRESS NOTES
Contacted provider to advise that due to the current risks associated with COVID-19, [facility] infusion suite is moving non-essential treatments to May 18, 2020 or later. This patient's Prolia has been delayed.

## 2020-03-27 ENCOUNTER — APPOINTMENT (OUTPATIENT)
Dept: ONCOLOGY | Facility: HOSPITAL | Age: 80
End: 2020-03-27

## 2020-04-11 ENCOUNTER — E-VISIT (OUTPATIENT)
Dept: INTERNAL MEDICINE | Facility: CLINIC | Age: 80
End: 2020-04-11

## 2020-04-11 DIAGNOSIS — R10.9 FLANK PAIN: Primary | ICD-10-CM

## 2020-04-11 DIAGNOSIS — N30.00 ACUTE CYSTITIS WITHOUT HEMATURIA: ICD-10-CM

## 2020-04-11 PROCEDURE — 99421 OL DIG E/M SVC 5-10 MIN: CPT | Performed by: FAMILY MEDICINE

## 2020-04-11 RX ORDER — CEFDINIR 300 MG/1
300 CAPSULE ORAL 2 TIMES DAILY
Qty: 14 CAPSULE | Refills: 0 | Status: SHIPPED | OUTPATIENT
Start: 2020-04-11 | End: 2020-05-04

## 2020-04-11 NOTE — PROGRESS NOTES
Subjective   Moriah Petty is a 79 y.o. female.   Chief complaint: Urinary difficulty and frequency.  Back pain.  History of Present Illness   This is an E- visit lasting 15 minutes.  Patient has had recent onset of  an ache in the left side of the back  Between rib cage and hip.  This is associated with relatively clear urine.  There is a concern of urinary infection.  She is taking Tylenol and drinking cranberry juice.  She does not have a fever.     I talked her on the phone concerning the visit to confirm symptoms.  Treatment is discussed with her as below.  The following portions of the patient's history were reviewed and updated as appropriate: allergies, current medications, past family history, past medical history, past social history, past surgical history and problem list.    Review of Systems  Negative aside from back pain.    Fever negative  Cardiovascular negative  Pulmonary negative  Gastrointestinal negative  Objective   Physical Exam  As per patient description    Assessment/Plan   Diagnoses and all orders for this visit:    Flank pain    Acute cystitis without hematuria    Other orders  -     cefdinir (OMNICEF) 300 MG capsule; Take 1 capsule by mouth 2 (Two) Times a Day.

## 2020-04-11 NOTE — PATIENT INSTRUCTIONS
Cefdinir 300 mg twice daily for 7 days is sent to your pharmacy.  This can be taken with or without food.  Me know if you have other problems.            Urinary Tract Infection, Adult  A urinary tract infection (UTI) is an infection of any part of the urinary tract. The urinary tract includes:  · The kidneys.  · The ureters.  · The bladder.  · The urethra.  These organs make, store, and get rid of pee (urine) in the body.  What are the causes?  This is caused by germs (bacteria) in your genital area. These germs grow and cause swelling (inflammation) of your urinary tract.  What increases the risk?  You are more likely to develop this condition if:  · You have a small, thin tube (catheter) to drain pee.  · You cannot control when you pee or poop (incontinence).  · You are female, and:  ? You use these methods to prevent pregnancy:  ? A medicine that kills sperm (spermicide).  ? A device that blocks sperm (diaphragm).  ? You have low levels of a female hormone (estrogen).  ? You are pregnant.  · You have genes that add to your risk.  · You are sexually active.  · You take antibiotic medicines.  · You have trouble peeing because of:  ? A prostate that is bigger than normal, if you are male.  ? A blockage in the part of your body that drains pee from the bladder (urethra).  ? A kidney stone.  ? A nerve condition that affects your bladder (neurogenic bladder).  ? Not getting enough to drink.  ? Not peeing often enough.  · You have other conditions, such as:  ? Diabetes.  ? A weak disease-fighting system (immune system).  ? Sickle cell disease.  ? Gout.  ? Injury of the spine.  What are the signs or symptoms?  Symptoms of this condition include:  · Needing to pee right away (urgently).  · Peeing often.  · Peeing small amounts often.  · Pain or burning when peeing.  · Blood in the pee.  · Pee that smells bad or not like normal.  · Trouble peeing.  · Pee that is cloudy.  · Fluid coming from the vagina, if you are  female.  · Pain in the belly or lower back.  Other symptoms include:  · Throwing up (vomiting).  · No urge to eat.  · Feeling mixed up (confused).  · Being tired and grouchy (irritable).  · A fever.  · Watery poop (diarrhea).  How is this treated?  This condition may be treated with:  · Antibiotic medicine.  · Other medicines.  · Drinking enough water.  Follow these instructions at home:    Medicines  · Take over-the-counter and prescription medicines only as told by your doctor.  · If you were prescribed an antibiotic medicine, take it as told by your doctor. Do not stop taking it even if you start to feel better.  General instructions  · Make sure you:  ? Pee until your bladder is empty.  ? Do not hold pee for a long time.  ? Empty your bladder after sex.  ? Wipe from front to back after pooping if you are a female. Use each tissue one time when you wipe.  · Drink enough fluid to keep your pee pale yellow.  · Keep all follow-up visits as told by your doctor. This is important.  Contact a doctor if:  · You do not get better after 1-2 days.  · Your symptoms go away and then come back.  Get help right away if:  · You have very bad back pain.  · You have very bad pain in your lower belly.  · You have a fever.  · You are sick to your stomach (nauseous).  · You are throwing up.  Summary  · A urinary tract infection (UTI) is an infection of any part of the urinary tract.  · This condition is caused by germs in your genital area.  · There are many risk factors for a UTI. These include having a small, thin tube to drain pee and not being able to control when you pee or poop.  · Treatment includes antibiotic medicines for germs.  · Drink enough fluid to keep your pee pale yellow.  This information is not intended to replace advice given to you by your health care provider. Make sure you discuss any questions you have with your health care provider.  Document Released: 06/05/2009 Document Revised: 12/05/2019 Document  Reviewed: 06/27/2019  Elsevier Interactive Patient Education © 2020 [employer] Inc[employer]     (0) swallows foods/liquids without difficulty

## 2020-05-04 ENCOUNTER — OFFICE VISIT (OUTPATIENT)
Dept: INTERNAL MEDICINE | Facility: CLINIC | Age: 80
End: 2020-05-04

## 2020-05-04 VITALS
DIASTOLIC BLOOD PRESSURE: 96 MMHG | TEMPERATURE: 97.8 F | HEART RATE: 62 BPM | WEIGHT: 163 LBS | BODY MASS INDEX: 26.31 KG/M2 | SYSTOLIC BLOOD PRESSURE: 178 MMHG | OXYGEN SATURATION: 96 %

## 2020-05-04 DIAGNOSIS — N28.1 RENAL CYST, LEFT: ICD-10-CM

## 2020-05-04 DIAGNOSIS — E78.5 DYSLIPIDEMIA: ICD-10-CM

## 2020-05-04 DIAGNOSIS — I10 ESSENTIAL HYPERTENSION: Primary | ICD-10-CM

## 2020-05-04 DIAGNOSIS — Z90.49 HISTORY OF PARTIAL COLECTOMY: ICD-10-CM

## 2020-05-04 DIAGNOSIS — R10.9 LEFT FLANK PAIN: ICD-10-CM

## 2020-05-04 DIAGNOSIS — E03.9 ACQUIRED HYPOTHYROIDISM: ICD-10-CM

## 2020-05-04 DIAGNOSIS — E55.9 VITAMIN D DEFICIENCY: ICD-10-CM

## 2020-05-04 PROCEDURE — G0439 PPPS, SUBSEQ VISIT: HCPCS | Performed by: FAMILY MEDICINE

## 2020-05-04 PROCEDURE — 99214 OFFICE O/P EST MOD 30 MIN: CPT | Performed by: FAMILY MEDICINE

## 2020-05-04 NOTE — PATIENT INSTRUCTIONS
Medicare Wellness  Personal Prevention Plan of Service     Date of Office Visit:  2020  Encounter Provider:  Jarred Carrizales Jr., MD  Place of Service:  [facility] INTERNAL MEDICINE  Patient Name: Moriah Petty  :  1940    As part of the Medicare Wellness portion of your visit today, we are providing you with this personalized preventive plan of services (PPPS). This plan is based upon recommendations of the [place] Preventive Services Task Force (USPSTF) and the Advisory Committee on Immunization Practices (ACIP).    This lists the preventive care services that should be considered, and provides dates of when you are due. Items listed as completed are up-to-date and do not require any further intervention.    Health Maintenance   Topic Date Due   • TDAP/TD VACCINES (1 - Tdap) 1950   • ZOSTER VACCINE (1 of 2) 1989   • Pneumococcal Vaccine Once at 65 Years Old  2004   • MEDICARE ANNUAL WELLNESS  2019   • INFLUENZA VACCINE  2020   • DXA SCAN  2020   • LIPID PANEL  10/14/2020   • COLONOSCOPY  2023       No orders of the defined types were placed in this encounter.      No follow-ups on file.

## 2020-05-04 NOTE — PROGRESS NOTES
Subjective  Answers for HPI/ROS submitted by the patient on 4/30/2020   What is the primary reason for your visit?: Other  Please describe your symptoms.: no symptoms  Have you had these symptoms before?: No  How long have you been having these symptoms?: Other  Please list any medications you are currently taking for this condition.: none    Moriah Petty is a 79 y.o. female.     Chief Complaint   Patient presents with   • Annual Exam   • Hypertension         History of Present Illness   Delightful lady here for Medicare wellness as well as follow-up.  Reviewed prior history of partial colectomy and subsequent reversal of colostomy.  She has done well since that surgery within the past year.  Recently an ED visit was performed which appeared to show evidence of left flank pain relating to symptoms of onset of UTI.  She was treated with antibiotics but still has some recurrent flank discomfort.  Review of prior CT scan preceding the takedown of her colostomy indicated a very large renal cyst in the left flank area tenderness to the kidney itself.  Given the size of the cyst this could have some impact and discomfort in the area although this appears to be a very large simple cyst.  We discussed referral to urology for an opinion.    Otherwise she takes low-dose aspirin and treatment of hypertension is reviewed as well as treatment of bone density with Prolia twice yearly.  She is on low-dose levothyroxine 25 mcg daily.  Will continue current medications and set up lab work for her..  Treatment of hyperlipidemia is low-dose rosuvastatin 5 mg daily.      The following portions of the patient's history were reviewed and updated as appropriate: allergies, current medications, past social history and problem list.    Review of Systems   Constitutional: Negative.    HENT: Negative.    Eyes: Negative.    Respiratory: Negative.    Cardiovascular: Negative.    Gastrointestinal: Negative.    Endocrine: Negative.     Genitourinary: Negative.    Musculoskeletal: Positive for back pain.   Skin: Negative.    Allergic/Immunologic: Negative.    Neurological: Negative.    Hematological: Negative.    Psychiatric/Behavioral: Negative.        Objective   Vitals:    05/04/20 1240   BP: 178/96   Pulse: 62   Temp: 97.8 °F (36.6 °C)   SpO2: 96%     Physical Exam   Constitutional: She is oriented to person, place, and time. She appears well-developed and well-nourished.   HENT:   Head: Normocephalic and atraumatic.   Right Ear: Tympanic membrane and external ear normal.   Left Ear: Tympanic membrane and external ear normal.   Nose: Nose normal.   Mouth/Throat: Oropharynx is clear and moist.   Eyes: Pupils are equal, round, and reactive to light. Conjunctivae and EOM are normal.   Neck: Normal range of motion. Neck supple. No JVD present. No thyromegaly present.   Cardiovascular: Normal rate, regular rhythm, normal heart sounds and intact distal pulses.   Pulmonary/Chest: Effort normal and breath sounds normal.   Abdominal: Soft. Bowel sounds are normal.   Musculoskeletal: Normal range of motion.        Back:    Lymphadenopathy:     She has no cervical adenopathy.   Neurological: She is alert and oriented to person, place, and time. No cranial nerve deficit. Coordination normal.   Skin: Skin is warm and dry. No rash noted.   Psychiatric: She has a normal mood and affect. Her behavior is normal. Judgment and thought content normal.   Vitals reviewed.      Assessment/Plan   Problem List Items Addressed This Visit        Cardiovascular and Mediastinum    Hypertension - Primary    Relevant Orders    CBC & Differential    Comprehensive Metabolic Panel    Lipid Panel With / Chol / HDL Ratio    TSH    T4, Free    T3, Free    Urinalysis With Culture If Indicated -    Vitamin B12    Vitamin D 25 Hydroxy       Endocrine    Hypothyroidism       Other    Dyslipidemia    Relevant Orders    CBC & Differential    Comprehensive Metabolic Panel    Lipid  Panel With / Chol / HDL Ratio    TSH    T4, Free    T3, Free    Urinalysis With Culture If Indicated -    Vitamin B12    Vitamin D 25 Hydroxy      Other Visit Diagnoses     History of partial colectomy        Relevant Orders    CBC & Differential    Comprehensive Metabolic Panel    Lipid Panel With / Chol / HDL Ratio    TSH    T4, Free    T3, Free    Urinalysis With Culture If Indicated -    Vitamin B12    Vitamin D 25 Hydroxy    Left flank pain        Relevant Orders    Ambulatory Referral to Urology    CBC & Differential    Comprehensive Metabolic Panel    Lipid Panel With / Chol / HDL Ratio    TSH    T4, Free    T3, Free    Urinalysis With Culture If Indicated -    Vitamin B12    Vitamin D 25 Hydroxy    Renal cyst, left        Relevant Orders    Ambulatory Referral to Urology    CBC & Differential    Comprehensive Metabolic Panel    Lipid Panel With / Chol / HDL Ratio    TSH    T4, Free    T3, Free    Urinalysis With Culture If Indicated -    Vitamin B12    Vitamin D 25 Hydroxy    Vitamin D deficiency        Relevant Orders    Vitamin D 25 Hydroxy      Plan:- In reference to hypertension monitor blood pressure at home.  She has had previous follow-up with Dr. Trujillo cardiology with normal blood pressure at that time.    - Continue treatment of hyperlipidemia.  And hypothyroidism.    -.  Labs are pending.    -.  Referral to urology concerning left flank pain and large simple cyst.    Return visit in about 6 months.

## 2020-05-04 NOTE — PROGRESS NOTES
The ABCs of the Annual Wellness Visit  Subsequent Medicare Wellness Visit    Chief Complaint   Patient presents with   • Annual Exam   • Hypertension       Subjective   History of Present Illness:  Moriah Petty is a 79 y.o. female who presents for a Subsequent Medicare Wellness Visit.    HEALTH RISK ASSESSMENT    Recent Hospitalizations:  Recently treated at the following:  [facility]    Current Medical Providers:  Patient Care Team:  Jarred Carrizales Jr., MD as PCP - General (Family Medicine)  Jarred Carrizales Jr., MD as PCP - Claims Attributed  Anibal Trujillo Jr., MD (Cardiology)    Smoking Status:  Social History     Tobacco Use   Smoking Status Former Smoker   • Packs/day: 0.50   • Years: 30.00   • Pack years: 15.00   • Types: Cigarettes   • Start date: 1957   • Last attempt to quit: 1989   • Years since quittin.0   Smokeless Tobacco Never Used       Alcohol Consumption:  Social History     Substance and Sexual Activity   Alcohol Use Yes   • Alcohol/week: 7.0 standard drinks   • Types: 2 Shots of liquor, 2 Standard drinks or equivalent per week    Comment: 2 DRINKS PER DAY       Depression Screen:   PHQ-2/PHQ-9 Depression Screening 2020   Little interest or pleasure in doing things 0   Feeling down, depressed, or hopeless 0   Trouble falling or staying asleep, or sleeping too much -   Feeling tired or having little energy -   Poor appetite or overeating -   Feeling bad about yourself - or that you are a failure or have let yourself or your family down -   Trouble concentrating on things, such as reading the newspaper or watching television -   Moving or speaking so slowly that other people could have noticed. Or the opposite - being so fidgety or restless that you have been moving around a lot more than usual -   Thoughts that you would be better off dead, or of hurting yourself in some way -   Total Score 0   If you checked off any problems, how difficult have these problems made it  for you to do your work, take care of things at home, or get along with other people? -       Fall Risk Screen:  YELITZA Fall Risk Assessment was completed, and patient is at LOW risk for falls.Assessment completed on:5/4/2020    Health Habits and Functional and Cognitive Screening:  Functional & Cognitive Status 5/4/2020   Do you have difficulty preparing food and eating? No   Do you have difficulty bathing yourself, getting dressed or grooming yourself? No   Do you have difficulty using the toilet? No   Do you have difficulty moving around from place to place? No   Do you have trouble with steps or getting out of a bed or a chair? No   Current Diet Well Balanced Diet   Dental Exam Up to date   Eye Exam Up to date   Exercise (times per week) 1 times per week   Current Exercise Activities Include Walking   Do you need help using the phone?  No   Are you deaf or do you have serious difficulty hearing?  No   Do you need help with transportation? No   Do you need help shopping? No   Do you need help preparing meals?  No   Do you need help with housework?  No   Do you need help with laundry? No   Do you need help taking your medications? No   Do you need help managing money? No   Do you ever drive or ride in a car without wearing a seat belt? No   Have you felt unusual stress, anger or loneliness in the last month? No   Who do you live with? Spouse   If you need help, do you have trouble finding someone available to you? No   Have you been bothered in the last four weeks by sexual problems? No   Do you have difficulty concentrating, remembering or making decisions? No         Does the patient have evidence of cognitive impairment? No    Asprin use counseling:Taking ASA appropriately as indicated    Age-appropriate Screening Schedule:  Refer to the list below for future screening recommendations based on patient's age, sex and/or medical conditions. Orders for these recommended tests are listed in the plan section. The  patient has been provided with a written plan.    Health Maintenance   Topic Date Due   • TDAP/TD VACCINES (1 - Tdap) 09/18/1951   • ZOSTER VACCINE (1 of 2) 09/18/1989   • INFLUENZA VACCINE  08/01/2020   • DXA SCAN  08/16/2020   • LIPID PANEL  10/14/2020   • COLONOSCOPY  04/24/2023          The following portions of the patient's history were reviewed and updated as appropriate: allergies, current medications, past family history, past medical history, past social history, past surgical history and problem list.    Outpatient Medications Prior to Visit   Medication Sig Dispense Refill   • Aspirin (ASPIR-81 PO) Take 81 mg by mouth Every Night.     • Calcium Carb-Cholecalciferol (CALCIUM + D3) 600-200 MG-UNIT tablet Take 1 tablet by mouth 2 (Two) Times a Day.     • carvedilol (COREG) 12.5 MG tablet Take 12.5 mg by mouth 2 (Two) Times a Day With Meals.     • denosumab (PROLIA) 60 MG/ML solution syringe Inject 60 mg under the skin into the appropriate area as directed Every 6 (Six) Months.     • desonide (DESOWEN) 0.05 % cream Apply  topically to the appropriate area as directed As Needed for Irritation.     • levothyroxine (SYNTHROID, LEVOTHROID) 25 MCG tablet TAKE 1 TABLET BY MOUTH EVERY DAY 90 tablet 1   • losartan (COZAAR) 50 MG tablet Take 50 mg by mouth Daily.     • Multiple Vitamin (MULTI VITAMIN DAILY PO) Take 1 tablet by mouth Daily.     • Multiple Vitamins-Minerals (V-C FORTE PO) Take 1 mg by mouth Daily.     • Omega-3 Fatty Acids (FISH OIL) 1200 MG capsule capsule Take 1,200 mg by mouth 2 (Two) Times a Day With Meals.     • omeprazole (priLOSEC) 20 MG capsule Take 20 mg by mouth Daily.     • rosuvastatin (CRESTOR) 5 MG tablet TAKE 1 TABLET BY MOUTH EVERY DAY 90 tablet 1   • cefdinir (OMNICEF) 300 MG capsule Take 1 capsule by mouth 2 (Two) Times a Day. 14 capsule 0   • DICLOFENAC PO Take 75 mg by mouth 2 (Two) Times a Day As Needed.       No facility-administered medications prior to visit.        Patient  Active Problem List   Diagnosis   • Abnormal electrocardiogram   • Non-specific colitis   • Acute post-traumatic headache   • Postoperative anemia due to acute blood loss   • Arthritis   • Hematochezia   • Chest pain   • Compression fracture of lumbar vertebra (CMS/HCC)   • Closed wedge compression fracture of second lumbar vertebra (CMS/HCC)   • Constipation   • Dizziness   • Dyslipidemia   • Fatigue   • Ventricular premature beats   • Gastroesophageal reflux disease without esophagitis   • Hypertension   • Hypotension   • Insomnia   • Left lower quadrant pain   • Low back pain   • Osteoporosis   • Premature atrial contraction   • Peripheral nerve disease   • Pneumonia   • Nausea   • Sensorineural hearing loss   • Shortness of breath   • Sinus bradycardia   • Sleep apnea   • Disorder of thyroid   • Trigeminal neuralgia   • Rectal bleeding   • Family history of colon cancer   • Adenomatous polyp of colon   • Hypothyroidism   • Osteoporosis, post-menopausal   • Diverticulitis of large intestine with abscess without bleeding   • Mild malnutrition (CMS/HCC)   • Pure hypercholesterolemia   • Colostomy status (CMS/HCC)       Advanced Care Planning:  ACP discussion was held with the patient during this visit. Patient has an advance directive in EMR which is still valid.     Review of Systems    Compared to one year ago, the patient feels her physical health is better.  Compared to one year ago, the patient feels her mental health is the same.    Reviewed chart for potential of high risk medication in the elderly: not applicable  Reviewed chart for potential of harmful drug interactions in the elderly:not applicable    Objective         Vitals:    05/04/20 1240   BP: 178/96   BP Location: Left arm   Patient Position: Sitting   Cuff Size: Adult   Pulse: 62   Temp: 97.8 °F (36.6 °C)   TempSrc: Tympanic   SpO2: 96%   Weight: 73.9 kg (163 lb)   PainSc: 0-No pain       Body mass index is 26.31 kg/m².  Discussed the patient's  BMI with her. The BMI is in the acceptable range.    Physical Exam          Assessment/Plan   Medicare Risks and Personalized Health Plan  CMS Preventative Services Quick Reference  Cardiovascular risk  Osteoprorosis Risk    The above risks/problems have been discussed with the patient.  Pertinent information has been shared with the patient in the After Visit Summary.  Follow up plans and orders are seen below in the Assessment/Plan Section.    Diagnoses and all orders for this visit:    1. Essential hypertension (Primary)    2. History of partial colectomy    3. Dyslipidemia      Follow Up:  No follow-ups on file.     An After Visit Summary and PPPS were given to the patient.             Answers for HPI/ROS submitted by the patient on 4/30/2020   What is the primary reason for your visit?: Other  Please describe your symptoms.: no symptoms  Have you had these symptoms before?: No  How long have you been having these symptoms?: Other  Please list any medications you are currently taking for this condition.: none

## 2020-05-05 ENCOUNTER — TELEPHONE (OUTPATIENT)
Dept: INTERNAL MEDICINE | Facility: CLINIC | Age: 80
End: 2020-05-05

## 2020-05-05 ENCOUNTER — LAB (OUTPATIENT)
Dept: INTERNAL MEDICINE | Facility: CLINIC | Age: 80
End: 2020-05-05

## 2020-05-05 DIAGNOSIS — N28.1 RENAL CYST, LEFT: ICD-10-CM

## 2020-05-05 DIAGNOSIS — Z90.49 HISTORY OF PARTIAL COLECTOMY: ICD-10-CM

## 2020-05-05 DIAGNOSIS — I10 ESSENTIAL HYPERTENSION: Primary | ICD-10-CM

## 2020-05-05 DIAGNOSIS — R10.9 LEFT FLANK PAIN: ICD-10-CM

## 2020-05-05 DIAGNOSIS — E55.9 VITAMIN D DEFICIENCY: ICD-10-CM

## 2020-05-05 DIAGNOSIS — E78.5 DYSLIPIDEMIA: ICD-10-CM

## 2020-05-05 NOTE — TELEPHONE ENCOUNTER
HUB CAN RELAY MESSAGE: HERNAN FOR PATIENT TO CALL UROLOGY [phone] TO MAKE APPOINTMENT. I WILL SEND HER INFORMATION OVER TO THEIR OFFICE.

## 2020-05-06 LAB
25(OH)D3+25(OH)D2 SERPL-MCNC: 34.8 NG/ML (ref 30–100)
ALBUMIN SERPL-MCNC: 4.4 G/DL (ref 3.5–5.2)
ALBUMIN/GLOB SERPL: 1.6 G/DL
ALP SERPL-CCNC: 57 U/L (ref 39–117)
ALT SERPL-CCNC: 15 U/L (ref 1–33)
APPEARANCE UR: CLEAR
AST SERPL-CCNC: 17 U/L (ref 1–32)
BACTERIA #/AREA URNS HPF: NORMAL /HPF
BASOPHILS # BLD AUTO: 0.06 10*3/MM3 (ref 0–0.2)
BASOPHILS NFR BLD AUTO: 1.1 % (ref 0–1.5)
BILIRUB SERPL-MCNC: 0.6 MG/DL (ref 0.2–1.2)
BILIRUB UR QL STRIP: NEGATIVE
BUN SERPL-MCNC: 12 MG/DL (ref 8–23)
BUN/CREAT SERPL: 14.3 (ref 7–25)
CALCIUM SERPL-MCNC: 10.3 MG/DL (ref 8.6–10.5)
CHLORIDE SERPL-SCNC: 98 MMOL/L (ref 98–107)
CHOLEST SERPL-MCNC: 171 MG/DL (ref 0–200)
CHOLEST/HDLC SERPL: 1.99 {RATIO}
CO2 SERPL-SCNC: 28.1 MMOL/L (ref 22–29)
COLOR UR: YELLOW
CREAT SERPL-MCNC: 0.84 MG/DL (ref 0.57–1)
EOSINOPHIL # BLD AUTO: 0.14 10*3/MM3 (ref 0–0.4)
EOSINOPHIL NFR BLD AUTO: 2.5 % (ref 0.3–6.2)
EPI CELLS #/AREA URNS HPF: NORMAL /HPF (ref 0–10)
ERYTHROCYTE [DISTWIDTH] IN BLOOD BY AUTOMATED COUNT: 13 % (ref 12.3–15.4)
GLOBULIN SER CALC-MCNC: 2.7 GM/DL
GLUCOSE SERPL-MCNC: 118 MG/DL (ref 65–99)
GLUCOSE UR QL: NEGATIVE
HCT VFR BLD AUTO: 36.8 % (ref 34–46.6)
HDLC SERPL-MCNC: 86 MG/DL (ref 40–60)
HGB BLD-MCNC: 12.9 G/DL (ref 12–15.9)
HGB UR QL STRIP: NEGATIVE
IMM GRANULOCYTES # BLD AUTO: 0.02 10*3/MM3 (ref 0–0.05)
IMM GRANULOCYTES NFR BLD AUTO: 0.4 % (ref 0–0.5)
KETONES UR QL STRIP: NEGATIVE
LDLC SERPL CALC-MCNC: 69 MG/DL (ref 0–100)
LEUKOCYTE ESTERASE UR QL STRIP: NEGATIVE
LYMPHOCYTES # BLD AUTO: 1.56 10*3/MM3 (ref 0.7–3.1)
LYMPHOCYTES NFR BLD AUTO: 28.3 % (ref 19.6–45.3)
MCH RBC QN AUTO: 31.3 PG (ref 26.6–33)
MCHC RBC AUTO-ENTMCNC: 35.1 G/DL (ref 31.5–35.7)
MCV RBC AUTO: 89.3 FL (ref 79–97)
MICRO URNS: NORMAL
MICRO URNS: NORMAL
MONOCYTES # BLD AUTO: 0.52 10*3/MM3 (ref 0.1–0.9)
MONOCYTES NFR BLD AUTO: 9.4 % (ref 5–12)
MUCOUS THREADS URNS QL MICRO: PRESENT /HPF
NEUTROPHILS # BLD AUTO: 3.21 10*3/MM3 (ref 1.7–7)
NEUTROPHILS NFR BLD AUTO: 58.3 % (ref 42.7–76)
NITRITE UR QL STRIP: NEGATIVE
NRBC BLD AUTO-RTO: 0 /100 WBC (ref 0–0.2)
PH UR STRIP: 6.5 [PH] (ref 5–7.5)
PLATELET # BLD AUTO: 239 10*3/MM3 (ref 140–450)
POTASSIUM SERPL-SCNC: 4.7 MMOL/L (ref 3.5–5.2)
PROT SERPL-MCNC: 7.1 G/DL (ref 6–8.5)
PROT UR QL STRIP: NEGATIVE
RBC # BLD AUTO: 4.12 10*6/MM3 (ref 3.77–5.28)
RBC #/AREA URNS HPF: NORMAL /HPF (ref 0–2)
SODIUM SERPL-SCNC: 137 MMOL/L (ref 136–145)
SP GR UR: 1.02 (ref 1–1.03)
T3FREE SERPL-MCNC: 2.6 PG/ML (ref 2–4.4)
T4 FREE SERPL-MCNC: 1.18 NG/DL (ref 0.93–1.7)
TRIGL SERPL-MCNC: 82 MG/DL (ref 0–150)
TSH SERPL DL<=0.005 MIU/L-ACNC: 4 UIU/ML (ref 0.27–4.2)
URINALYSIS REFLEX: NORMAL
UROBILINOGEN UR STRIP-MCNC: 0.2 MG/DL (ref 0.2–1)
VIT B12 SERPL-MCNC: 564 PG/ML (ref 211–946)
VLDLC SERPL CALC-MCNC: 16.4 MG/DL (ref 5–40)
WBC # BLD AUTO: 5.51 10*3/MM3 (ref 3.4–10.8)
WBC #/AREA URNS HPF: NORMAL /HPF (ref 0–5)

## 2020-05-08 ENCOUNTER — PATIENT MESSAGE (OUTPATIENT)
Dept: INTERNAL MEDICINE | Facility: CLINIC | Age: 80
End: 2020-05-08

## 2020-05-08 NOTE — TELEPHONE ENCOUNTER
From: Moriah Petty  To: Jarred Carrizales Jr., MD  Sent: 5/8/2020 12:53 PM EDT  Subject: Referral Request    I haven't received a call from Dr. Packer as yet. I did receive test results. Should I call his office or wait for his call?  Thank you.  Moriah Petty

## 2020-05-19 RX ORDER — ROSUVASTATIN CALCIUM 5 MG/1
TABLET, COATED ORAL
Qty: 90 TABLET | Refills: 1 | Status: SHIPPED | OUTPATIENT
Start: 2020-05-19 | End: 2020-11-16

## 2020-05-26 ENCOUNTER — OFFICE VISIT (OUTPATIENT)
Dept: OBSTETRICS AND GYNECOLOGY | Facility: CLINIC | Age: 80
End: 2020-05-26

## 2020-05-26 ENCOUNTER — APPOINTMENT (OUTPATIENT)
Dept: WOMENS IMAGING | Facility: HOSPITAL | Age: 80
End: 2020-05-26

## 2020-05-26 ENCOUNTER — PROCEDURE VISIT (OUTPATIENT)
Dept: OBSTETRICS AND GYNECOLOGY | Facility: CLINIC | Age: 80
End: 2020-05-26

## 2020-05-26 VITALS
HEIGHT: 66 IN | SYSTOLIC BLOOD PRESSURE: 149 MMHG | DIASTOLIC BLOOD PRESSURE: 78 MMHG | WEIGHT: 164 LBS | BODY MASS INDEX: 26.36 KG/M2 | HEART RATE: 64 BPM

## 2020-05-26 DIAGNOSIS — M81.0 OSTEOPOROSIS, UNSPECIFIED OSTEOPOROSIS TYPE, UNSPECIFIED PATHOLOGICAL FRACTURE PRESENCE: ICD-10-CM

## 2020-05-26 DIAGNOSIS — Z00.00 ANNUAL PHYSICAL EXAM: Primary | ICD-10-CM

## 2020-05-26 DIAGNOSIS — Z12.31 VISIT FOR SCREENING MAMMOGRAM: Primary | ICD-10-CM

## 2020-05-26 PROCEDURE — 77063 BREAST TOMOSYNTHESIS BI: CPT | Performed by: OBSTETRICS & GYNECOLOGY

## 2020-05-26 PROCEDURE — 99212 OFFICE O/P EST SF 10 MIN: CPT | Performed by: OBSTETRICS & GYNECOLOGY

## 2020-05-26 PROCEDURE — 77067 SCR MAMMO BI INCL CAD: CPT | Performed by: RADIOLOGY

## 2020-05-26 PROCEDURE — 77067 SCR MAMMO BI INCL CAD: CPT | Performed by: OBSTETRICS & GYNECOLOGY

## 2020-05-26 PROCEDURE — G0101 CA SCREEN;PELVIC/BREAST EXAM: HCPCS | Performed by: OBSTETRICS & GYNECOLOGY

## 2020-05-26 PROCEDURE — 77063 BREAST TOMOSYNTHESIS BI: CPT | Performed by: RADIOLOGY

## 2020-05-26 NOTE — PROGRESS NOTES
HPI   Moriah Petty  is a 79 y.o. female who presents for 2 reasons.  First, she is due for her routine gynecologic exam.  She is feeling well.  Bowels and bladder are functioning normally.  She has no complaints today.  Mammogram was performed today.  The patient is current with colonoscopies.  Next, she has osteoporosis as well as a compression at L2.  She has been treated with Prolia and is very satisfied with this.    Chief Complaint   Patient presents with   • Annual Exam       Past Medical History:   Diagnosis Date   • Adenomatous polyp of colon 11/6/2017   • Anemia    • Bursitis of elbow     RIGHT ELBOW   • Carotid stenosis     LESS THAN 50%, CAROTID ULTRASOUND AT [facility]    • Colitis    • Colostomy in place (CMS/formerly [facility])    • Depression    • Diverticulosis    • GERD (gastroesophageal reflux disease)    • History of blood transfusion 11/2013   • History of trigeminal neuralgia    • Hyperlipidemia    • Hypertension    • Hypothyroidism    • Insomnia    • OA (osteoarthritis)    • Osteoporosis    • PVC (premature ventricular contraction)    • PVC's (premature ventricular contractions)    • Sleep apnea     DOES NOT WEAR CPAP       Past Surgical History:   Procedure Laterality Date   • BREAST BIOPSY Left 05/10/2018    Ultrasound-guided mammotome vacuum assisted left breast biopsy with placement of metallic clip (path: fragments of fibroepithelial lesion, consistent with fibroadenoma)-Dr. Sree Glass, [facility]   • BRONCHOSCOPY N/A 08/29/2001    Dr. Christiano Horton   • CARDIAC CATHETERIZATION  03/2002   • CARDIAC CATHETERIZATION Left 11/28/2012    Dr. Anibal Trujillo   • CHOLECYSTECTOMY N/A 11/20/2013    DR. ESME GOLDMAN   • COLONOSCOPY N/A 02/21/2012    INT/EXT HEMORRHOIDS, DIVERTICULOSIS, 2 HYPERPLASTIC POLYPS, TORT (path: Rectum hyperplastic polyp)-Dr. Morgan Collins   • COLONOSCOPY N/A 4/24/2018    NTEH, Diverticulosis, tortuous colon, IH.  No specimens collected    • COLONOSCOPY N/A 02/27/2015    Non-thrombosed  external hemorrhoids found on perianal exam, diverticulosis in the sigmoid and descending colon, tortuous colon, normal ileum, internal hemorrhoids-Dr. Morgan Collins   • COLONOSCOPY W/ POLYPECTOMY N/A 2004    Diverticulosis, small recto-sigmoid polyps, otherwise normal (path: Colon at 20 cm hyperplastic polyp) -Dr. Morgan Collins   • COLOSTOMY CLOSURE N/A 2019    Procedure: COLOSTOMY TAKEDOWN;  Surgeon: Emanuel Clark MD;  Location: [facility] OR;  Service: General   • EXPLORATORY LAPAROTOMY N/A 2018    Procedure: exploratory laparotomy with sigmoid resection, possible colostomy, APPENDECTOMY;  Surgeon: Emanuel Clark MD;  Location: [facility] OR;  Service: General   • KYPHOPLASTY N/A 2012    COMPRESSION FX OF L2   • TOTAL HIP ARTHROPLASTY Left 2004    Left bipolar hip replacement-Dr. Chioma Burch   • TOTAL KNEE ARTHROPLASTY Right 2013    Dr. Reza Longoria   • TRIGEMINAL NERVE DECOMPRESSION  2014    LEFT EAR        Social History     Socioeconomic History   • Marital status:      Spouse name: Not on file   • Number of children: Not on file   • Years of education: Not on file   • Highest education level: Not on file   Tobacco Use   • Smoking status: Former Smoker     Packs/day: 0.50     Years: 30.00     Pack years: 15.00     Types: Cigarettes     Start date: 1957     Last attempt to quit: 1989     Years since quittin.0   • Smokeless tobacco: Never Used   Substance and Sexual Activity   • Alcohol use: Yes     Alcohol/week: 7.0 standard drinks     Types: 2 Shots of liquor, 2 Standard drinks or equivalent per week     Comment: 2 DRINKS PER DAY   • Drug use: No   • Sexual activity: Not Currently     Partners: Male     Birth control/protection: Post-menopausal       The following portions of the patient's history were reviewed and updated as appropriate: allergies, current medications, past family history, past medical history, past social history,  past surgical history and problem list.    Review of Systems  This is negative for fever or chills.  Negative for nausea or vomiting.  Negative for abdominal or pelvic pain.  Negative for vaginal bleeding.  All other systems are reviewed and are negative        Physical Exam   Constitutional: She is oriented to person, place, and time. She appears well-developed and well-nourished.   HENT:   Head: Normocephalic and atraumatic.   Cardiovascular: Normal rate and regular rhythm.   Pulmonary/Chest: Effort normal and breath sounds normal. She has no wheezes. She has no rales.   The breasts are homogeneous.  There are no palpable lumps.  Nipple discharge and axillary adenopathy are absent.   Abdominal: Soft. She exhibits no distension. There is no tenderness.   Genitourinary: Vagina normal and uterus normal. There is no lesion on the right labia. There is no lesion on the left labia. Cervix exhibits no motion tenderness. Right adnexum displays no mass and no tenderness. Left adnexum displays no mass and no tenderness. No vaginal discharge found.   Neurological: She is alert and oriented to person, place, and time.   Skin: Skin is warm and dry.   Nursing note and vitals reviewed.      Assessment    Moriah was seen today for annual exam.    Diagnoses and all orders for this visit:    Annual physical exam    Osteoporosis, unspecified osteoporosis type, unspecified pathological fracture presence        Plan  1. Normal gynecologic exam  2. Counseled regarding the importance of regular screening mammograms.  Mammogram was performed today.  3. The patient is current with colon cancer screening.  4. Osteoporosis.  Counseled regarding the pathophysiology of this condition and questions answered.  Currently, the patient is taking Prolia as well as calcium, vitamin D and exercise.  We discussed the benefits and risks of continuing this.  DEXA will be performed.  The patient will consider her options once results are available.  She  does want to proceed with her Daphne dose of Prolia under any circumstances.    5. Return in about 1 year (around 2021).    Social History     Tobacco Use   Smoking Status Former Smoker   • Packs/day: 0.50   • Years: 30.00   • Pack years: 15.00   • Types: Cigarettes   • Start date: 1957   • Last attempt to quit: 1989   • Years since quittin.0   6.

## 2020-06-02 ENCOUNTER — PREP FOR SURGERY (OUTPATIENT)
Dept: OTHER | Facility: HOSPITAL | Age: 80
End: 2020-06-02

## 2020-06-02 ENCOUNTER — INFUSION (OUTPATIENT)
Dept: ONCOLOGY | Facility: HOSPITAL | Age: 80
End: 2020-06-02

## 2020-06-02 VITALS
BODY MASS INDEX: 26.28 KG/M2 | DIASTOLIC BLOOD PRESSURE: 75 MMHG | SYSTOLIC BLOOD PRESSURE: 136 MMHG | TEMPERATURE: 98.7 F | WEIGHT: 162.8 LBS | HEART RATE: 68 BPM | OXYGEN SATURATION: 95 %

## 2020-06-02 DIAGNOSIS — M81.0 OSTEOPOROSIS, POST-MENOPAUSAL: Primary | ICD-10-CM

## 2020-06-02 DIAGNOSIS — Z86.010 HISTORY OF COLON POLYPS: Primary | ICD-10-CM

## 2020-06-02 DIAGNOSIS — Z80.0 FAMILY HISTORY OF COLON CANCER: ICD-10-CM

## 2020-06-02 PROCEDURE — 96372 THER/PROPH/DIAG INJ SC/IM: CPT

## 2020-06-02 PROCEDURE — 25010000002 DENOSUMAB 60 MG/ML SOLUTION PREFILLED SYRINGE: Performed by: OBSTETRICS & GYNECOLOGY

## 2020-06-02 RX ADMIN — DENOSUMAB 60 MG: 60 INJECTION SUBCUTANEOUS at 15:41

## 2020-06-03 PROBLEM — Z86.0100 HISTORY OF COLON POLYPS: Status: ACTIVE | Noted: 2020-06-03

## 2020-06-03 PROBLEM — Z86.010 HISTORY OF COLON POLYPS: Status: ACTIVE | Noted: 2020-06-03

## 2020-07-02 ENCOUNTER — TRANSCRIBE ORDERS (OUTPATIENT)
Dept: SLEEP MEDICINE | Facility: HOSPITAL | Age: 80
End: 2020-07-02

## 2020-07-02 DIAGNOSIS — Z01.818 OTHER SPECIFIED PRE-OPERATIVE EXAMINATION: Primary | ICD-10-CM

## 2020-07-07 ENCOUNTER — LAB (OUTPATIENT)
Dept: LAB | Facility: HOSPITAL | Age: 80
End: 2020-07-07

## 2020-07-07 DIAGNOSIS — Z01.818 OTHER SPECIFIED PRE-OPERATIVE EXAMINATION: ICD-10-CM

## 2020-07-07 PROCEDURE — U0004 COV-19 TEST NON-CDC HGH THRU: HCPCS

## 2020-07-07 PROCEDURE — C9803 HOPD COVID-19 SPEC COLLECT: HCPCS

## 2020-07-08 LAB
REF LAB TEST METHOD: NORMAL
SARS-COV-2 RNA RESP QL NAA+PROBE: NOT DETECTED

## 2020-07-09 ENCOUNTER — ANESTHESIA (OUTPATIENT)
Dept: GASTROENTEROLOGY | Facility: HOSPITAL | Age: 80
End: 2020-07-09

## 2020-07-09 ENCOUNTER — ANESTHESIA EVENT (OUTPATIENT)
Dept: GASTROENTEROLOGY | Facility: HOSPITAL | Age: 80
End: 2020-07-09

## 2020-07-09 ENCOUNTER — HOSPITAL ENCOUNTER (OUTPATIENT)
Facility: HOSPITAL | Age: 80
Setting detail: HOSPITAL OUTPATIENT SURGERY
Discharge: HOME | End: 2020-07-09
Attending: SURGERY | Admitting: SURGERY

## 2020-07-09 VITALS
HEART RATE: 59 BPM | WEIGHT: 158 LBS | SYSTOLIC BLOOD PRESSURE: 143 MMHG | TEMPERATURE: 98.4 F | RESPIRATION RATE: 16 BRPM | HEIGHT: 67 IN | DIASTOLIC BLOOD PRESSURE: 73 MMHG | BODY MASS INDEX: 24.8 KG/M2 | OXYGEN SATURATION: 96 %

## 2020-07-09 DIAGNOSIS — Z80.0 FAMILY HISTORY OF COLON CANCER: ICD-10-CM

## 2020-07-09 DIAGNOSIS — Z86.010 HISTORY OF COLON POLYPS: ICD-10-CM

## 2020-07-09 PROCEDURE — S0260 H&P FOR SURGERY: HCPCS | Performed by: SURGERY

## 2020-07-09 PROCEDURE — 88305 TISSUE EXAM BY PATHOLOGIST: CPT | Performed by: SURGERY

## 2020-07-09 PROCEDURE — 45380 COLONOSCOPY AND BIOPSY: CPT | Performed by: SURGERY

## 2020-07-09 PROCEDURE — 25010000002 PROPOFOL 10 MG/ML EMULSION: Performed by: ANESTHESIOLOGY

## 2020-07-09 RX ORDER — LIDOCAINE HYDROCHLORIDE 20 MG/ML
INJECTION, SOLUTION INFILTRATION; PERINEURAL AS NEEDED
Status: DISCONTINUED | OUTPATIENT
Start: 2020-07-09 | End: 2020-07-09 | Stop reason: SURG

## 2020-07-09 RX ORDER — SODIUM CHLORIDE 0.9 % (FLUSH) 0.9 %
10 SYRINGE (ML) INJECTION AS NEEDED
Status: DISCONTINUED | OUTPATIENT
Start: 2020-07-09 | End: 2020-07-09 | Stop reason: HOSPADM

## 2020-07-09 RX ORDER — SODIUM CHLORIDE, SODIUM LACTATE, POTASSIUM CHLORIDE, CALCIUM CHLORIDE 600; 310; 30; 20 MG/100ML; MG/100ML; MG/100ML; MG/100ML
30 INJECTION, SOLUTION INTRAVENOUS CONTINUOUS PRN
Status: DISCONTINUED | OUTPATIENT
Start: 2020-07-09 | End: 2020-07-09 | Stop reason: HOSPADM

## 2020-07-09 RX ORDER — PROPOFOL 10 MG/ML
VIAL (ML) INTRAVENOUS AS NEEDED
Status: DISCONTINUED | OUTPATIENT
Start: 2020-07-09 | End: 2020-07-09 | Stop reason: SURG

## 2020-07-09 RX ORDER — PROPOFOL 10 MG/ML
VIAL (ML) INTRAVENOUS CONTINUOUS PRN
Status: DISCONTINUED | OUTPATIENT
Start: 2020-07-09 | End: 2020-07-09 | Stop reason: SURG

## 2020-07-09 RX ADMIN — PROPOFOL 140 MCG/KG/MIN: 10 INJECTION, EMULSION INTRAVENOUS at 08:07

## 2020-07-09 RX ADMIN — PROPOFOL 20 MG: 10 INJECTION, EMULSION INTRAVENOUS at 08:10

## 2020-07-09 RX ADMIN — PROPOFOL 100 MG: 10 INJECTION, EMULSION INTRAVENOUS at 08:07

## 2020-07-09 RX ADMIN — LIDOCAINE HYDROCHLORIDE 100 MG: 20 INJECTION, SOLUTION INFILTRATION; PERINEURAL at 08:07

## 2020-07-09 RX ADMIN — SODIUM CHLORIDE, POTASSIUM CHLORIDE, SODIUM LACTATE AND CALCIUM CHLORIDE 30 ML/HR: 600; 310; 30; 20 INJECTION, SOLUTION INTRAVENOUS at 07:53

## 2020-07-09 NOTE — DISCHARGE INSTRUCTIONS
For the next 24 hours patient needs to be with a responsible adult.    For 24 hours DO NOT drive, operate machinery, appliances, drink alcohol, make important decisions or sign legal documents.    Start with a light or bland diet if you are feeling sick to your stomach otherwise advance to regular diet as tolerated.    Follow recommendations on procedure report if provided by your doctor.    Call Dr Clark for problems [phone]    Problems may include but not limited to: large amounts of bleeding, trouble breathing, repeated vomiting, severe unrelieved pain, fever or chills.

## 2020-07-09 NOTE — OP NOTE
Operative Note :   MD Marina Tomtamiko ABDI Malinda  1940    Procedure Date: 07/09/20    Pre-op Diagnosis:  · History of diverticulitis  · Family history colon cancer    Post-op Diagnosis:  · Colon polyp    Procedure:   · Colonoscopy to cecum with cold forceps polypectomy    Surgeon: Emanuel Clark MD      Anesthesia: MAC    Indications:  · 79-year-old female with history of complicated diverticulitis with perforation, Velazquez's procedure and subsequent reversal as well as family history of colon cancer in her mother presents for colonoscopy.    Findings:   · Widely patent anastomosis at approximately 10 cm from the anal verge  · No residual diverticula identified  · Benign-appearing polyp at approximately 25 cm    Recommendations:   · Follow-up on pathology  · Repeat colonoscopy can be considered on a surveillance basis in 5 years depending on health status    Description of procedure:    After obtaining informed consent, patient was brought to the endoscopy suite and was sedated.  Digital rectal exam was undertaken and was unremarkable.  The flexible colonoscope was then introduced through the rectum and passed around to the level of the cecum under direct visualization with minimal difficulty.  The appendiceal orifice and ileocecal valve appeared normal.  The colonoscope was then slowly withdrawn, carefully visualizing all mucosal surfaces.  The cecum, ascending colon, hepatic flexure, transverse colon, splenic flexure and descending colon were normal.  The colorectal anastomosis was present at approximately 10 cm from the anal verge.  It was widely patent.  A small blind pouch of the rectal stump was identified at this position as well.  There were no residual diverticula proximal to the anastomosis.  There was a small benign-appearing polyp at approximately 25 cm removed with the polypectomy forceps.  There was no bleeding.  Scope was withdrawn entirely.  Patient tolerated this well.    Emanuel Clark  MD  General and Endoscopic Surgery  [facility]    [address], [address]  [place], [place], [address]  P: [phone]  F: [phone]

## 2020-07-09 NOTE — ANESTHESIA POSTPROCEDURE EVALUATION
"Patient: Moriah Petty    Procedure Summary     Date:  07/09/20 Room / Location:   [facility]    Anesthesia Start:  0800 Anesthesia Stop:  0823    Procedure:  COLONOSCOPY into cecum with cold biopsy polypectomy x1 (N/A ) Diagnosis:       History of colon polyps      Family history of colon cancer      (History of colon polyps [Z86.010])      (Family history of colon cancer [Z80.0])    Surgeon:  Emanuel Clark MD Provider:  Abe Fernández MD    Anesthesia Type:  MAC ASA Status:  2          Anesthesia Type: MAC    Vitals  No vitals data found for the desired time range.          Post Anesthesia Care and Evaluation    Patient location during evaluation: bedside  Patient participation: complete - patient participated  Level of consciousness: awake and alert  Pain score: 0  Pain management: adequate  Airway patency: patent  Anesthetic complications: No anesthetic complications    Cardiovascular status: acceptable  Respiratory status: acceptable  Hydration status: acceptable    Comments: /65 (BP Location: Left arm, Patient Position: Sitting)   Pulse 62   Temp 36.9 °C (98.4 °F) (Oral)   Resp 16   Ht 170.2 cm (67\")   Wt 71.7 kg (158 lb)   SpO2 97%   BMI 24.75 kg/m²       "

## 2020-07-09 NOTE — ANESTHESIA PREPROCEDURE EVALUATION
Anesthesia Evaluation     Patient summary reviewed and Nursing notes reviewed   no history of anesthetic complications:  NPO Solid Status: > 8 hours  NPO Liquid Status: > 2 hours           Airway   Mallampati: II  TM distance: >3 FB  Neck ROM: full  no difficulty expected  Dental - normal exam     Pulmonary - normal exam   (+) pneumonia resolved , a smoker Former, shortness of breath, sleep apnea on CPAP,   (-) COPD, asthma, lung cancer  Cardiovascular - normal exam  Exercise tolerance: good (4-7 METS)    ECG reviewed  Rhythm: regular  Rate: normal    (+) hypertension well controlled 2 medications or greater, hyperlipidemia,  carotid artery disease  (-) valvular problems/murmurs, past MI, CAD, dysrhythmias, angina, CHF, cardiac stents, CABG      Neuro/Psych  (+) headaches, dizziness/light headedness, numbness, psychiatric history Depression,     (-) seizures, TIA, CVA  GI/Hepatic/Renal/Endo    (+)  GERD well controlled, GI bleeding resolved, thyroid problem hypothyroidism  (-) hepatitis, liver disease, no renal disease, diabetes    Musculoskeletal     Abdominal  - normal exam   Substance History - negative use     OB/GYN negative ob/gyn ROS         Other   arthritis,                      Anesthesia Plan    ASA 2     MAC     intravenous induction     Anesthetic plan, all risks, benefits, and alternatives have been provided, discussed and informed consent has been obtained with: patient.

## 2020-07-09 NOTE — H&P
Cc: Complicated diverticulitis     History of presenting illness:   This is a very nice and reasonably healthy 79-year-old lady with a history of complicated diverticulitis who underwent Velazquez's procedure couple of years ago and about a year ago underwent colostomy reversal.  She presents at this time for follow-up surveillance colonoscopy.  Denies any other significant problems at this time.     Past Medical History: Back pain, gastroesophageal reflux disease, hypertension, hypothyroidism, diverticulitis     Past Surgical History:  Exploratory laparotomy with sigmoid colectomy, formation of Miguel's pouch, and colostomy and segmental small bowel resection for deserosalization in December 2018, colostomy reversal May 2019 brain surgical procedure described as microvascular decompression, cardiac catheterization, laparoscopic cholecystectomy 2013 as only abdominal surgery, partial hip replacement, kyphoplasty, she had a colonoscopy done around a year ago     Medications: Noted to include carvedilol, aspirin, levothyroxine, multivitamin     Allergies: Morphine     Social History: She is a former smoker who quit around 30 years ago.  Admits to drinking occasional alcohol but has not done so recently.  Lives independently with her .     Family History: Colon cancer in her mother, ovarian cancer in her daughter     Review of Systems:  Constitutional:  Negative for fever, chills, change in appetite  Neck: no swollen glands or dysphagia or odynophagia  Respiratory: negative for SOB, cough, hemoptysis or wheezing  Cardiovascular: negative for chest pain, palpitations or peripheral edema  Gastrointestinal:  Negative for diarrhea, abdominal pain, vomiting        Physical Exam:   body mass index 24.7  General: alert and oriented, appropriate, no acute distress  Neck: Supple without lymphadenopathy or thyromegaly, trachea is in the midline  Respiratory: Lungs are clear bilaterally without wheezing, no use of  accessory muscles is noted  Cardiovascular: Regular rate and rhythm without murmur, no peripheral edema  Gastrointestinal: Soft, midline incision well-healed.  No hernia is felt.     Laboratory data:  None recently     Imaging data: No recent relevant data        Assessment and plan:   -Complicated diverticulitis status post sigmoid colectomy and diverting colostomy and then subsequently colostomy reversal approximately 1 year ago  -Plan for colonoscopy today on surveillance basis and to evaluate anastomosis.  Risks including bleeding and perforation discussed, patient agreeable to proceed.     Emanuel Clark MD, FACS  General, Minimally Invasive and Endoscopic Surgery  [facility]     [address] [place] [address], [address]  [place], [place], [address]  P: [phone]

## 2020-07-09 NOTE — ADDENDUM NOTE
Addendum  created 07/09/20 0844 by Abe Fernández MD    Intraprocedure Flowsheets edited, Intraprocedure Meds edited

## 2020-07-10 LAB
CYTO UR: NORMAL
LAB AP CASE REPORT: NORMAL
PATH REPORT.FINAL DX SPEC: NORMAL
PATH REPORT.GROSS SPEC: NORMAL

## 2020-07-14 NOTE — PROGRESS NOTES
Please let patient know her polyp is benign and repeat colonoscopy can be considered in 5 years.  Please put her on the recall for that time.

## 2020-08-07 NOTE — PLAN OF CARE
Problem: Patient Care Overview  Goal: Plan of Care Review  Outcome: Ongoing (interventions implemented as appropriate)   06/02/19 0350   Coping/Psychosocial   Plan of Care Reviewed With patient   Plan of Care Review   Progress improving   OTHER   Outcome Summary VSS. Medicated with Lortab for incisional abd pain. LLQ dressing to be changed this AM. Saline locked, tolerating clear liquids. Up with asst to ambulate with walker. Not yet able to pass gas or have BM. Will cont to monitor,      Goal: Individualization and Mutuality  Outcome: Ongoing (interventions implemented as appropriate)    Goal: Discharge Needs Assessment  Outcome: Ongoing (interventions implemented as appropriate)    Goal: Interprofessional Rounds/Family Conf  Outcome: Ongoing (interventions implemented as appropriate)      Problem: Pain, Acute (Adult)  Goal: Acceptable Pain Control/Comfort Level  Outcome: Ongoing (interventions implemented as appropriate)      Problem: Surgery Nonspecified (Adult)  Goal: Signs and Symptoms of Listed Potential Problems Will be Absent, Minimized or Managed (Surgery Nonspecified)  Outcome: Ongoing (interventions implemented as appropriate)         Danger to self

## 2020-08-14 RX ORDER — LEVOTHYROXINE SODIUM 0.03 MG/1
TABLET ORAL
Qty: 90 TABLET | Refills: 1 | Status: SHIPPED | OUTPATIENT
Start: 2020-08-14 | End: 2021-02-16

## 2020-10-12 ENCOUNTER — HOSPITAL ENCOUNTER (EMERGENCY)
Facility: HOSPITAL | Age: 80
Discharge: HOME | End: 2020-10-12
Attending: EMERGENCY MEDICINE | Admitting: EMERGENCY MEDICINE

## 2020-10-12 ENCOUNTER — APPOINTMENT (OUTPATIENT)
Dept: GENERAL RADIOLOGY | Facility: HOSPITAL | Age: 80
End: 2020-10-12

## 2020-10-12 ENCOUNTER — APPOINTMENT (OUTPATIENT)
Dept: CT IMAGING | Facility: HOSPITAL | Age: 80
End: 2020-10-12

## 2020-10-12 ENCOUNTER — TELEPHONE (OUTPATIENT)
Dept: INTERNAL MEDICINE | Facility: CLINIC | Age: 80
End: 2020-10-12

## 2020-10-12 VITALS
BODY MASS INDEX: 25.13 KG/M2 | WEIGHT: 160.1 LBS | OXYGEN SATURATION: 96 % | RESPIRATION RATE: 17 BRPM | HEIGHT: 67 IN | SYSTOLIC BLOOD PRESSURE: 191 MMHG | HEART RATE: 53 BPM | DIASTOLIC BLOOD PRESSURE: 89 MMHG | TEMPERATURE: 98.1 F

## 2020-10-12 DIAGNOSIS — M54.12 CERVICAL RADICULOPATHY: Primary | ICD-10-CM

## 2020-10-12 LAB
ALBUMIN SERPL-MCNC: 4.4 G/DL (ref 3.5–5.2)
ALBUMIN/GLOB SERPL: 1.5 G/DL
ALP SERPL-CCNC: 58 U/L (ref 39–117)
ALT SERPL W P-5'-P-CCNC: 14 U/L (ref 1–33)
ANION GAP SERPL CALCULATED.3IONS-SCNC: 10.3 MMOL/L (ref 5–15)
AST SERPL-CCNC: 19 U/L (ref 1–32)
BASOPHILS # BLD AUTO: 0.06 10*3/MM3 (ref 0–0.2)
BASOPHILS NFR BLD AUTO: 1.1 % (ref 0–1.5)
BILIRUB SERPL-MCNC: 0.6 MG/DL (ref 0–1.2)
BUN SERPL-MCNC: 13 MG/DL (ref 8–23)
BUN/CREAT SERPL: 17.3 (ref 7–25)
CALCIUM SPEC-SCNC: 9.5 MG/DL (ref 8.6–10.5)
CHLORIDE SERPL-SCNC: 101 MMOL/L (ref 98–107)
CO2 SERPL-SCNC: 24.7 MMOL/L (ref 22–29)
CREAT SERPL-MCNC: 0.75 MG/DL (ref 0.57–1)
DEPRECATED RDW RBC AUTO: 42.9 FL (ref 37–54)
EOSINOPHIL # BLD AUTO: 0.14 10*3/MM3 (ref 0–0.4)
EOSINOPHIL NFR BLD AUTO: 2.6 % (ref 0.3–6.2)
ERYTHROCYTE [DISTWIDTH] IN BLOOD BY AUTOMATED COUNT: 13.1 % (ref 12.3–15.4)
GFR SERPL CREATININE-BSD FRML MDRD: 74 ML/MIN/1.73
GLOBULIN UR ELPH-MCNC: 3 GM/DL
GLUCOSE SERPL-MCNC: 98 MG/DL (ref 65–99)
HCT VFR BLD AUTO: 38.7 % (ref 34–46.6)
HGB BLD-MCNC: 13.3 G/DL (ref 12–15.9)
IMM GRANULOCYTES # BLD AUTO: 0.02 10*3/MM3 (ref 0–0.05)
IMM GRANULOCYTES NFR BLD AUTO: 0.4 % (ref 0–0.5)
LYMPHOCYTES # BLD AUTO: 1.58 10*3/MM3 (ref 0.7–3.1)
LYMPHOCYTES NFR BLD AUTO: 29.8 % (ref 19.6–45.3)
MCH RBC QN AUTO: 30.9 PG (ref 26.6–33)
MCHC RBC AUTO-ENTMCNC: 34.4 G/DL (ref 31.5–35.7)
MCV RBC AUTO: 90 FL (ref 79–97)
MONOCYTES # BLD AUTO: 0.49 10*3/MM3 (ref 0.1–0.9)
MONOCYTES NFR BLD AUTO: 9.2 % (ref 5–12)
NEUTROPHILS NFR BLD AUTO: 3.02 10*3/MM3 (ref 1.7–7)
NEUTROPHILS NFR BLD AUTO: 56.9 % (ref 42.7–76)
NRBC BLD AUTO-RTO: 0 /100 WBC (ref 0–0.2)
PLATELET # BLD AUTO: 210 10*3/MM3 (ref 140–450)
PMV BLD AUTO: 10.8 FL (ref 6–12)
POTASSIUM SERPL-SCNC: 4.3 MMOL/L (ref 3.5–5.2)
PROT SERPL-MCNC: 7.4 G/DL (ref 6–8.5)
RBC # BLD AUTO: 4.3 10*6/MM3 (ref 3.77–5.28)
SODIUM SERPL-SCNC: 136 MMOL/L (ref 136–145)
TROPONIN T SERPL-MCNC: <0.01 NG/ML (ref 0–0.03)
WBC # BLD AUTO: 5.31 10*3/MM3 (ref 3.4–10.8)

## 2020-10-12 PROCEDURE — 72050 X-RAY EXAM NECK SPINE 4/5VWS: CPT

## 2020-10-12 PROCEDURE — 70450 CT HEAD/BRAIN W/O DYE: CPT

## 2020-10-12 PROCEDURE — 93010 ELECTROCARDIOGRAM REPORT: CPT | Performed by: INTERNAL MEDICINE

## 2020-10-12 PROCEDURE — 25010000002 KETOROLAC TROMETHAMINE PER 15 MG: Performed by: EMERGENCY MEDICINE

## 2020-10-12 PROCEDURE — 80053 COMPREHEN METABOLIC PANEL: CPT | Performed by: EMERGENCY MEDICINE

## 2020-10-12 PROCEDURE — 85025 COMPLETE CBC W/AUTO DIFF WBC: CPT | Performed by: EMERGENCY MEDICINE

## 2020-10-12 PROCEDURE — 84484 ASSAY OF TROPONIN QUANT: CPT | Performed by: EMERGENCY MEDICINE

## 2020-10-12 PROCEDURE — 99283 EMERGENCY DEPT VISIT LOW MDM: CPT

## 2020-10-12 PROCEDURE — 93005 ELECTROCARDIOGRAM TRACING: CPT | Performed by: EMERGENCY MEDICINE

## 2020-10-12 PROCEDURE — 96374 THER/PROPH/DIAG INJ IV PUSH: CPT

## 2020-10-12 RX ORDER — KETOROLAC TROMETHAMINE 15 MG/ML
15 INJECTION, SOLUTION INTRAMUSCULAR; INTRAVENOUS ONCE
Status: COMPLETED | OUTPATIENT
Start: 2020-10-12 | End: 2020-10-12

## 2020-10-12 RX ORDER — PREDNISONE 20 MG/1
40 TABLET ORAL DAILY
Qty: 8 TABLET | Refills: 0 | Status: SHIPPED | OUTPATIENT
Start: 2020-10-12 | End: 2020-12-03

## 2020-10-12 RX ORDER — SODIUM CHLORIDE 0.9 % (FLUSH) 0.9 %
10 SYRINGE (ML) INJECTION AS NEEDED
Status: DISCONTINUED | OUTPATIENT
Start: 2020-10-12 | End: 2020-10-12 | Stop reason: HOSPADM

## 2020-10-12 RX ADMIN — KETOROLAC TROMETHAMINE 15 MG: 15 INJECTION, SOLUTION INTRAMUSCULAR; INTRAVENOUS at 12:10

## 2020-10-12 NOTE — TELEPHONE ENCOUNTER
PT CALLED WITH COMPLAINTS OF NUMBNESS AND TINGLING IN LEFT ARM FROM SHOULDER DOWN TO HER ELBOW. PT ADVISED TO GO TO ER. PT UNDERSTOOD.

## 2020-10-12 NOTE — ED PROVIDER NOTES
EMERGENCY DEPARTMENT ENCOUNTER    Room Number:  OMER/OMER  PCP: Jarred Carrizales Jr., MD  Historian: Patient  History Limited By: Nothing      HPI  Chief Complaint: Left arm aching  Context: Moriah Petty is a 80 y.o. female who presents to the ED c/o left arm aching.  Patient states she had no symptoms yesterday.  Patient states she woke up about 130 last night and noticed a dull ache in her left arm.  Pain does not seem to get worse with certain movements.  She has no neck pain.  The pain is basically from shoulder to elbow.  She also feels some tingling into her hand.  States the tingling goes into her fifth digit.  She has no weakness.  States she may have some slight aching to the left side of her chest as well.  Patient has had no injury or no change in her activity.  She denies any numbness or tingling in her left face or left leg.  Has no vision issues and no speech issues.      Location: Left arm  Radiation: Left hand  Character: Aching and tingling  Duration: 10 hours  Severity: Mild  Progression: Not improved  Aggravating Factors: Nothing  Alleviating Factors: Nothing        MEDICAL RECORD REVIEW    Patient has history of hypertension          PAST MEDICAL HISTORY  Active Ambulatory Problems     Diagnosis Date Noted   • Abnormal electrocardiogram 03/04/2017   • Non-specific colitis 03/04/2017   • Acute post-traumatic headache 10/09/2013   • Postoperative anemia due to acute blood loss 03/04/2017   • Arthritis 03/04/2017   • Hematochezia 03/04/2017   • Chest pain 03/04/2017   • Compression fracture of lumbar vertebra (CMS/[facility]) 10/09/2013   • Closed wedge compression fracture of second lumbar vertebra (CMS/[facility]) 04/15/2013   • Constipation 03/04/2017   • Dizziness 03/04/2017   • Dyslipidemia 03/04/2017   • Fatigue 03/04/2017   • Ventricular premature beats 03/04/2017   • Gastroesophageal reflux disease without esophagitis 03/04/2017   • Hypertension 03/04/2017   • Hypotension 09/10/2014   • Insomnia 03/04/2017   •  Left lower quadrant pain 03/04/2017   • Low back pain 08/04/2014   • Osteoporosis 03/04/2017   • Premature atrial contraction 03/04/2017   • 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   • Pneumonia 09/03/2014   • Nausea 03/04/2017   • Sensorineural hearing loss 10/08/2014   • Shortness of breath 03/04/2017   • Sinus bradycardia 03/04/2017   • Sleep apnea 03/04/2017   • Disorder of thyroid 03/04/2017   • Trigeminal neuralgia 04/15/2013   • Rectal bleeding 11/06/2017   • Family history of colon cancer 11/06/2017   • Adenomatous polyp of colon 11/06/2017   • Hypothyroidism 01/23/2018   • Osteoporosis, 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   • Diverticulitis of large intestine with abscess without bleeding 12/04/2018   • Mild malnutrition (CMS/[facility]) 12/06/2018   • Pure hypercholesterolemia 04/12/2019   • Colostomy status (CMS/[facility]) 05/06/2019   • History of colon polyps 06/03/2020     Resolved Ambulatory Problems     Diagnosis Date Noted   • Chest pain, atypical 12/05/2018   • Hypokalemia 12/09/2018     Past Medical History:   Diagnosis Date   • Anemia    • Bursitis of elbow    • Carotid stenosis    • Colitis    • Colostomy in place (CMS/[facility])    • Depression    • Diverticulosis    • GERD (gastroesophageal reflux disease)    • History of blood transfusion 11/2013   • History of trigeminal neuralgia    • Hyperlipidemia    • OA (osteoarthritis)    • PVC (premature ventricular contraction)    • PVC's (premature ventricular contractions)          PAST SURGICAL HISTORY  Past Surgical History:   Procedure Laterality Date   • BREAST BIOPSY Left 05/10/2018    Ultrasound-guided mammotome vacuum assisted left breast biopsy with placement of metallic clip (path: fragments of fibroepithelial lesion, consistent with fibroadenoma)-Dr. Sree Glass, [facility]   • BRONCHOSCOPY N/A 08/29/2001    Dr. Christiano Horton   • CARDIAC CATHETERIZATION  03/2002   • CARDIAC CATHETERIZATION Left 11/28/2012    Dr. Anibal Trujillo   • CHOLECYSTECTOMY N/A 11/20/2013    DR. VICTORIA  SUSI   • COLONOSCOPY N/A 02/21/2012    INT/EXT HEMORRHOIDS, DIVERTICULOSIS, 2 HYPERPLASTIC POLYPS, TORT (path: Rectum hyperplastic polyp)-Dr. Morgan Collins   • COLONOSCOPY N/A 4/24/2018    NTEH, Diverticulosis, tortuous colon, IH.  No specimens collected    • COLONOSCOPY N/A 02/27/2015    Non-thrombosed external hemorrhoids found on perianal exam, diverticulosis in the sigmoid and descending colon, tortuous colon, normal ileum, internal hemorrhoids-Dr. Morgan Collins   • COLONOSCOPY N/A 7/9/2020    Procedure: COLONOSCOPY into cecum with cold biopsy polypectomy x1;  Surgeon: Emanuel Clark MD;  Location: [facility] ENDOSCOPY;  Service: General;  Laterality: N/A;  Pre op: Diverticulitis, Family history of colon cancer  Post op: Polyp   • COLONOSCOPY W/ POLYPECTOMY N/A 05/06/2004    Diverticulosis, small recto-sigmoid polyps, otherwise normal (path: Colon at 20 cm hyperplastic polyp) -Dr. Morgan Collins   • COLOSTOMY CLOSURE N/A 5/28/2019    Procedure: COLOSTOMY TAKEDOWN;  Surgeon: Emanuel Clark MD;  Location: [facility] OR;  Service: General   • EXPLORATORY LAPAROTOMY N/A 12/5/2018    Procedure: exploratory laparotomy with sigmoid resection, possible colostomy, APPENDECTOMY;  Surgeon: Emanuel Clark MD;  Location: [facility] OR;  Service: General   • JOINT REPLACEMENT     • KYPHOPLASTY N/A 12/21/2012    COMPRESSION FX OF L2   • TOTAL HIP ARTHROPLASTY Left 03/16/2004    Left bipolar hip replacement-Dr. Chioma Burch   • TOTAL KNEE ARTHROPLASTY Right 11/13/2013    Dr. Reza Longoria   • TRIGEMINAL NERVE DECOMPRESSION  2014    LEFT EAR          FAMILY HISTORY  Family History   Problem Relation Age of Onset   • Ovarian cancer Daughter    • Cancer Daughter    • Colon cancer Mother    • Arthritis Mother    • Stomach cancer Mother    • Cancer Mother    • Stomach cancer Maternal Grandfather    • Cancer Maternal Grandfather    • Glaucoma Father    • Arthritis Father    • Heart disease Father    •  Hypertension Father    • Alcohol abuse Son    • Stroke Paternal Grandmother    • Malig Hyperthermia Neg Hx          SOCIAL HISTORY  Social History     Socioeconomic History   • Marital status:      Spouse name: Not on file   • Number of children: Not on file   • Years of education: Not on file   • Highest education level: Not on file   Tobacco Use   • Smoking status: Former Smoker     Packs/day: 0.50     Years: 30.00     Pack years: 15.00     Types: Cigarettes     Start date: 1957     Quit date: 1989     Years since quittin.4   • Smokeless tobacco: Never Used   Substance and Sexual Activity   • Alcohol use: Yes     Alcohol/week: 7.0 standard drinks     Types: 2 Shots of liquor, 2 Standard drinks or equivalent per week     Comment: 2 DRINKS PER DAY   • Drug use: No   • Sexual activity: Defer     Partners: Male     Birth control/protection: Post-menopausal         ALLERGIES  Morphine and related        REVIEW OF SYSTEMS  Review of Systems   Constitutional: Negative for fever.   HENT: Negative for sore throat.    Eyes: Negative.    Respiratory: Negative for cough and shortness of breath.    Cardiovascular: Positive for chest pain.   Gastrointestinal: Negative for abdominal pain, diarrhea and vomiting.   Genitourinary: Negative for dysuria.   Musculoskeletal: Negative for neck pain.   Skin: Negative for rash.   Allergic/Immunologic: Negative.    Neurological: Positive for numbness. Negative for weakness and headaches.   Hematological: Negative.    Psychiatric/Behavioral: Negative.    All other systems reviewed and are negative.           PHYSICAL EXAM  ED Triage Vitals   Temp Heart Rate Resp BP SpO2   10/12/20 1042 10/12/20 1042 10/12/20 1042 10/12/20 1057 10/12/20 1042   98.1 °F (36.7 °C) 65 16 (!) 195/99 95 %      Temp src Heart Rate Source Patient Position BP Location FiO2 (%)   10/12/20 1042 10/12/20 1042 -- -- --   Tympanic Monitor          Physical Exam   Constitutional: She is oriented to  person, place, and time. No distress.   HENT:   Head: Normocephalic and atraumatic.   Eyes: Pupils are equal, round, and reactive to light. EOM are normal.   Neck: Normal range of motion. Neck supple.   Cardiovascular: Normal rate, regular rhythm and normal heart sounds.   Pulmonary/Chest: Effort normal and breath sounds normal. No respiratory distress.   Abdominal: Soft. There is no abdominal tenderness. There is no rebound and no guarding.   Musculoskeletal: Normal range of motion.         General: No edema.      Comments: No significant tenderness to her left upper extremity   Neurological: She is alert and oriented to person, place, and time. She has normal strength.   Some mild numbness into left fifth digit.  Patient has normal strength in left hand   Skin: Skin is warm and dry. No rash noted.   Psychiatric: Mood and affect normal.   Nursing note and vitals reviewed.    Patient was wearing a face mask when I entered the room and they continued to wear a mask throughout their stay in the ED.  I wore PPE, including gloves, face mask with shield or face mask with goggles whenever I was in the room with patient.      LAB RESULTS  Recent Results (from the past 24 hour(s))   Comprehensive Metabolic Panel    Collection Time: 10/12/20 12:04 PM    Specimen: Blood   Result Value Ref Range    Glucose 98 65 - 99 mg/dL    BUN 13 8 - 23 mg/dL    Creatinine 0.75 0.57 - 1.00 mg/dL    Sodium 136 136 - 145 mmol/L    Potassium 4.3 3.5 - 5.2 mmol/L    Chloride 101 98 - 107 mmol/L    CO2 24.7 22.0 - 29.0 mmol/L    Calcium 9.5 8.6 - 10.5 mg/dL    Total Protein 7.4 6.0 - 8.5 g/dL    Albumin 4.40 3.50 - 5.20 g/dL    ALT (SGPT) 14 1 - 33 U/L    AST (SGOT) 19 1 - 32 U/L    Alkaline Phosphatase 58 39 - 117 U/L    Total Bilirubin 0.6 0.0 - 1.2 mg/dL    eGFR Non African Amer 74 >60 mL/min/1.73    Globulin 3.0 gm/dL    A/G Ratio 1.5 g/dL    BUN/Creatinine Ratio 17.3 7.0 - 25.0    Anion Gap 10.3 5.0 - 15.0 mmol/L   Troponin    Collection  Time: 10/12/20 12:04 PM    Specimen: Blood   Result Value Ref Range    Troponin T <0.010 0.000 - 0.030 ng/mL   CBC Auto Differential    Collection Time: 10/12/20 12:04 PM    Specimen: Blood   Result Value Ref Range    WBC 5.31 3.40 - 10.80 10*3/mm3    RBC 4.30 3.77 - 5.28 10*6/mm3    Hemoglobin 13.3 12.0 - 15.9 g/dL    Hematocrit 38.7 34.0 - 46.6 %    MCV 90.0 79.0 - 97.0 fL    MCH 30.9 26.6 - 33.0 pg    MCHC 34.4 31.5 - 35.7 g/dL    RDW 13.1 12.3 - 15.4 %    RDW-SD 42.9 37.0 - 54.0 fl    MPV 10.8 6.0 - 12.0 fL    Platelets 210 140 - 450 10*3/mm3    Neutrophil % 56.9 42.7 - 76.0 %    Lymphocyte % 29.8 19.6 - 45.3 %    Monocyte % 9.2 5.0 - 12.0 %    Eosinophil % 2.6 0.3 - 6.2 %    Basophil % 1.1 0.0 - 1.5 %    Immature Grans % 0.4 0.0 - 0.5 %    Neutrophils, Absolute 3.02 1.70 - 7.00 10*3/mm3    Lymphocytes, Absolute 1.58 0.70 - 3.10 10*3/mm3    Monocytes, Absolute 0.49 0.10 - 0.90 10*3/mm3    Eosinophils, Absolute 0.14 0.00 - 0.40 10*3/mm3    Basophils, Absolute 0.06 0.00 - 0.20 10*3/mm3    Immature Grans, Absolute 0.02 0.00 - 0.05 10*3/mm3    nRBC 0.0 0.0 - 0.2 /100 WBC       Ordered the above labs and reviewed the results.        RADIOLOGY  XR Spine Cervical Complete 4 or 5 View   Final Result      CT Head Without Contrast   Final Result   Small vessel ischemic disease more prominent as compared to   the prior CT examination of 11/16/2013. There is a new area of increased   attenuation suggesting calcification involving the cerebellopontine   angle on the left measuring approximately 9 mm in maximum transverse   dimension and 5 mm in craniocaudal dimension. The appearance is   nonspecific and this potentially may represent a meningioma. Further   evaluation could be performed with an MRI examination of the brain with   and without contrast to better assess the cerebellopontine angle on the   left. An MRI examination of the cervical spine could be obtained as   indicated.       The above information was called to  and discussed with Dr. Dennis.       Radiation dose reduction techniques were utilized, including automated   exposure control and exposure modulation based on body size.       This report was finalized on 10/12/2020 4:42 PM by Dr. Jamarcus Alcantara M.D.               Ordered the above noted radiological studies. Reviewed by me in PACS.  Discussed with Dr. Alcantara (radiologist) regarding CT/MRI scan results.          PROCEDURES  Procedures      EKG:          EKG time: 1227  Rhythm/Rate: Sinus bradycardia 55  P waves and NJ: Normal P waves  QRS, axis: LVH  ST and T waves: Normal ST T waves    Interpreted Contemporaneously by me, independently viewed  Unchanged compared to prior 12/4/2018        MEDICATIONS GIVEN IN ER  Medications   ketorolac (TORADOL) injection 15 mg (15 mg Intravenous Given 10/12/20 1210)             PROGRESS AND CONSULTS  ED Course as of Oct 12 1729   Mon Oct 12, 2020   1332 13:32 EDT  Patient presents for evaluation of arm pain with some tingling to her fifth digit.  Doubt cardiac as she has not had no symptoms.  She has EKG that is normal.  She has troponin that is normal.  I do not think this is a stroke.  Head CT is negative with the exception of a possible new meningioma.  Not explain her symptoms.  Patient's having aching in her shoulder which would be abnormal and having tingling into a dermatomal pattern which would suggest more of a peripheral nerve.  Patient's C-spine x-ray does show significant DJD.  We will give her steroids.  She will need to follow-up with her primary doctor for MRI both of her neck and her head.  Patient understands that there is diagnostic uncertainty and she is to return for any worsening of symptoms.    [SL]      ED Course User Index  [SL] Lauro Dennis MD           MEDICAL DECISION MAKING      MDM  Number of Diagnoses or Management Options  Cervical radiculopathy:      Amount and/or Complexity of Data Reviewed  Clinical lab tests: reviewed and ordered  (Troponin is normal)  Tests in the radiology section of CPT®: reviewed and ordered (Neck shows DJD.  Head CT shows no acute stroke)  Tests in the medicine section of CPT®: reviewed               DIAGNOSIS  Final diagnoses:   Cervical radiculopathy           DISPOSITION  DISCHARGE    Patient discharged in stable condition.    Reviewed implications of results, diagnosis, meds, responsibility to follow up, warning signs and symptoms of possible worsening, potential complications and reasons to return to ER, including worsening chest pain or weakness.    Patient/Family voiced understanding of above instructions.    Discussed plan for discharge, as there is no emergent indication for admission. Patient referred to primary care provider for BP management due to today's BP. Pt/family is agreeable and understands need for follow up and repeat testing.  Pt is aware that discharge does not mean that nothing is wrong but it indicates no emergency is present that requires admission and they must continue care with follow-up as given below or physician of their choice.     FOLLOW-UP  Jarred Carrizales Jr., MD  [address] [address]  [phone]    Schedule an appointment as soon as possible for a visit            Medication List      New Prescriptions    predniSONE 20 MG tablet  Commonly known as: DELTASONE  Take 2 tablets by mouth Daily.           Where to Get Your Medications      You can get these medications from any pharmacy    Bring a paper prescription for each of these medications  · predniSONE 20 MG tablet             Latest Documented Vital Signs:  As of 17:29 EDT  BP- (!) 191/89 HR- 53 Temp- 98.1 °F (36.7 °C) (Tympanic) O2 sat- 96%                       Lauro Dennis MD  10/12/20 9786

## 2020-10-12 NOTE — ED TRIAGE NOTES
Tingling and numbness in left arm between shoulder and elbow started 0130    Patient was placed in face mask during first look triage.  Patient was wearing a face mask throughout encounter.  I wore personal protective equipment throughout the encounter.  Hand hygiene was performed before and after patient encounter.

## 2020-10-12 NOTE — ED NOTES
Consistent numbness and tingling in back of left upper arm - no other symptoms - no history of symptoms like this -no injury to shoulder or neck     Jeremy Lake, RN  10/12/20 8090

## 2020-10-16 ENCOUNTER — OFFICE VISIT (OUTPATIENT)
Dept: INTERNAL MEDICINE | Facility: CLINIC | Age: 80
End: 2020-10-16

## 2020-10-16 VITALS
SYSTOLIC BLOOD PRESSURE: 148 MMHG | HEART RATE: 58 BPM | OXYGEN SATURATION: 98 % | WEIGHT: 167 LBS | BODY MASS INDEX: 26.21 KG/M2 | DIASTOLIC BLOOD PRESSURE: 98 MMHG | TEMPERATURE: 97.5 F | HEIGHT: 67 IN

## 2020-10-16 DIAGNOSIS — I10 ESSENTIAL HYPERTENSION: ICD-10-CM

## 2020-10-16 DIAGNOSIS — D32.0 MENINGIOMA, CEREBRAL (HCC): ICD-10-CM

## 2020-10-16 DIAGNOSIS — M50.10 CERVICAL DISC DISORDER WITH RADICULOPATHY: Primary | ICD-10-CM

## 2020-10-16 PROBLEM — K57.92 DIVERTICULITIS: Status: ACTIVE | Noted: 2018-11-05

## 2020-10-16 PROCEDURE — 99213 OFFICE O/P EST LOW 20 MIN: CPT | Performed by: FAMILY MEDICINE

## 2020-10-16 RX ORDER — LOSARTAN POTASSIUM 50 MG/1
50 TABLET ORAL 2 TIMES DAILY
Qty: 180 TABLET | Refills: 3 | Status: SHIPPED | OUTPATIENT
Start: 2020-10-16 | End: 2021-01-26 | Stop reason: SDUPTHER

## 2020-10-16 NOTE — PROGRESS NOTES
Answers for HPI/ROS submitted by the patient on 10/14/2020   What is the primary reason for your visit?: Other  Please describe your symptoms.: follow-up of ER visit 10/12/20.  Had numbness and discomfort in left arm primarily between shoulder and elbow, but extending gustavo to little finger of left hand.  Have you had these symptoms before?: No  How long have you been having these symptoms?: 1-4 days  Please list any medications you are currently taking for this condition.: prednisone 20 mg, 2 tablets by mouth for 4 days.  Please describe any probable cause for these symptoms. : ?  Subjective   Moriah Petty is a 80 y.o. female.     Chief Complaint   Patient presents with   • ER follow up         History of Present Illness   Marina is seen emergency room with left arm pain which turned out to be radicular in nature and is resolved with short-term prednisone.  She has evidence of DJD of the cervical spine most likely radicular pain in left arm which is resolved.  MRI of the brain did show a small partially calcified probably meningioma at the lesser cerebellar pontine angle.  We discussed the implications of this at length and she does not wish to go for further MRI of the neck or brain at this time.    We addressed her blood pressure which has not been adequately controlled increase losartan from 50 mg daily to 50 twice daily.  She will follow-up in the spring in April for Medicare wellness.    Prior history includes procedure in 2014 on the left side for trigeminal neuralgia which resolved.      The following portions of the patient's history were reviewed and updated as appropriate: allergies, current medications, past social history and problem list.    Review of Systems   Constitutional: Negative.    HENT: Negative.    Eyes: Negative.    Respiratory: Negative.    Cardiovascular: Negative.    Gastrointestinal: Negative.    Endocrine: Negative.    Genitourinary: Negative.    Musculoskeletal: Positive for arthralgias.    Skin: Negative.    Allergic/Immunologic: Negative.    Neurological: Negative.    Hematological: Negative.    Psychiatric/Behavioral: Negative.        Objective   Vitals:    10/16/20 1259   BP: 148/98   Pulse: 58   Temp: 97.5 °F (36.4 °C)   SpO2: 98%     Physical Exam  Vitals signs reviewed.   Constitutional:       Appearance: She is well-developed.   HENT:      Head: Normocephalic and atraumatic.      Right Ear: Tympanic membrane and external ear normal.      Left Ear: Tympanic membrane and external ear normal.   Eyes:      Conjunctiva/sclera: Conjunctivae normal.      Pupils: Pupils are equal, round, and reactive to light.   Neck:      Musculoskeletal: Neck supple. Decreased range of motion.      Thyroid: No thyromegaly.      Vascular: No JVD.   Cardiovascular:      Rate and Rhythm: Normal rate and regular rhythm.      Heart sounds: Normal heart sounds.   Pulmonary:      Effort: Pulmonary effort is normal.      Breath sounds: Normal breath sounds.   Abdominal:      General: Bowel sounds are normal.      Palpations: Abdomen is soft.   Lymphadenopathy:      Cervical: No cervical adenopathy.   Skin:     General: Skin is warm and dry.      Findings: No rash.   Neurological:      Mental Status: She is alert and oriented to person, place, and time.      Cranial Nerves: No cranial nerve deficit.      Coordination: Coordination normal.      Comments: She reports coordination is worse although not recently.   Psychiatric:         Behavior: Behavior normal.         Thought Content: Thought content normal.         Judgment: Judgment normal.         Assessment/Plan   Problems Addressed this Visit        Cardiovascular and Mediastinum    Hypertension    Relevant Medications    losartan (COZAAR) 50 MG tablet       Nervous and Auditory    Meningioma, cerebral (CMS/HCC)       Musculoskeletal and Integument    Cervical disc disorder with radiculopathy - Primary      Diagnoses       Codes Comments    Cervical disc disorder with  radiculopathy    -  Primary ICD-10-CM: M50.10  ICD-9-CM: 723.4     Meningioma, cerebral (CMS/HCC)     ICD-10-CM: D32.0  ICD-9-CM: 225.2     Essential hypertension     ICD-10-CM: I10  ICD-9-CM: 401.9       Plan: Increase losartan 50 twice daily follow-up in 6 months for Medicare wellness discussion about implications of cervical spine disease with radiculopathy and also partially calcified meningioma which is probably been there for a long time.  She does not wish to go through MRI imaging at this time.

## 2020-11-03 DIAGNOSIS — M81.0 AGE-RELATED OSTEOPOROSIS WITHOUT CURRENT PATHOLOGICAL FRACTURE: Primary | ICD-10-CM

## 2020-11-12 DIAGNOSIS — R00.1 SINUS BRADYCARDIA: ICD-10-CM

## 2020-11-12 DIAGNOSIS — I49.1 PREMATURE ATRIAL CONTRACTION: ICD-10-CM

## 2020-11-12 DIAGNOSIS — I49.3 VENTRICULAR PREMATURE BEATS: Primary | ICD-10-CM

## 2020-11-16 RX ORDER — ROSUVASTATIN CALCIUM 5 MG/1
TABLET, COATED ORAL
Qty: 90 TABLET | Refills: 1 | Status: SHIPPED | OUTPATIENT
Start: 2020-11-16 | End: 2021-06-15

## 2020-12-03 ENCOUNTER — INFUSION (OUTPATIENT)
Dept: ONCOLOGY | Facility: HOSPITAL | Age: 80
End: 2020-12-03

## 2020-12-03 ENCOUNTER — LAB (OUTPATIENT)
Dept: OTHER | Facility: HOSPITAL | Age: 80
End: 2020-12-03

## 2020-12-03 VITALS — RESPIRATION RATE: 18 BRPM | HEIGHT: 67 IN | TEMPERATURE: 97.3 F | WEIGHT: 160 LBS | BODY MASS INDEX: 25.11 KG/M2

## 2020-12-03 DIAGNOSIS — M81.0 AGE-RELATED OSTEOPOROSIS WITHOUT CURRENT PATHOLOGICAL FRACTURE: ICD-10-CM

## 2020-12-03 DIAGNOSIS — M81.0 OSTEOPOROSIS, POST-MENOPAUSAL: Primary | ICD-10-CM

## 2020-12-03 LAB
25(OH)D3 SERPL-MCNC: 37.7 NG/ML (ref 30–100)
ALBUMIN SERPL-MCNC: 4.7 G/DL (ref 3.5–5.2)
ALBUMIN/GLOB SERPL: 1.8 G/DL
ALP SERPL-CCNC: 55 U/L (ref 39–117)
ALT SERPL W P-5'-P-CCNC: 16 U/L (ref 1–33)
ANION GAP SERPL CALCULATED.3IONS-SCNC: 5.5 MMOL/L (ref 5–15)
AST SERPL-CCNC: 20 U/L (ref 1–32)
BILIRUB SERPL-MCNC: 0.5 MG/DL (ref 0–1.2)
BUN SERPL-MCNC: 15 MG/DL (ref 8–23)
BUN/CREAT SERPL: 18.5 (ref 7–25)
CALCIUM SPEC-SCNC: 10.1 MG/DL (ref 8.6–10.5)
CHLORIDE SERPL-SCNC: 101 MMOL/L (ref 98–107)
CO2 SERPL-SCNC: 31.5 MMOL/L (ref 22–29)
CREAT SERPL-MCNC: 0.81 MG/DL (ref 0.57–1)
GFR SERPL CREATININE-BSD FRML MDRD: 68 ML/MIN/1.73
GLOBULIN UR ELPH-MCNC: 2.6 GM/DL
GLUCOSE SERPL-MCNC: 105 MG/DL (ref 65–99)
POTASSIUM SERPL-SCNC: 4.4 MMOL/L (ref 3.5–5.2)
PROT SERPL-MCNC: 7.3 G/DL (ref 6–8.5)
SODIUM SERPL-SCNC: 138 MMOL/L (ref 136–145)

## 2020-12-03 PROCEDURE — 80053 COMPREHEN METABOLIC PANEL: CPT | Performed by: OBSTETRICS & GYNECOLOGY

## 2020-12-03 PROCEDURE — 36415 COLL VENOUS BLD VENIPUNCTURE: CPT

## 2020-12-03 PROCEDURE — 82306 VITAMIN D 25 HYDROXY: CPT | Performed by: OBSTETRICS & GYNECOLOGY

## 2020-12-03 PROCEDURE — 96372 THER/PROPH/DIAG INJ SC/IM: CPT

## 2020-12-03 PROCEDURE — 25010000002 DENOSUMAB 60 MG/ML SOLUTION PREFILLED SYRINGE: Performed by: OBSTETRICS & GYNECOLOGY

## 2020-12-03 RX ADMIN — DENOSUMAB 60 MG: 60 INJECTION SUBCUTANEOUS at 15:29

## 2020-12-03 NOTE — NURSING NOTE
Arrived ambulatory for prolia injection. Indication and side effects reviewed. Denies recent dental work. Labs and medications verified. Prolia administered in arm without incidence. Instructed to call prescribing MD for any concerns or questions and instructed on how to schedule future appts.  Pt vu and discharged ambulatory.    Lab Results   Component Value Date    GLUCOSE 105 (H) 12/03/2020    BUN 15 12/03/2020    CREATININE 0.81 12/03/2020    EGFRIFNONA 68 12/03/2020    EGFRIFAFRI 79 05/05/2020    BCR 18.5 12/03/2020    K 4.4 12/03/2020    CO2 31.5 (H) 12/03/2020    CALCIUM 10.1 12/03/2020    PROTENTOTREF 7.1 05/05/2020    ALBUMIN 4.70 12/03/2020    LABIL2 1.6 05/05/2020    AST 20 12/03/2020    ALT 16 12/03/2020

## 2020-12-21 ENCOUNTER — HOSPITAL ENCOUNTER (OUTPATIENT)
Facility: HOSPITAL | Age: 80
Setting detail: OBSERVATION
LOS: 1 days | Discharge: NURSING, REHAB OR LONG-TERM CARE | End: 2020-12-23
Attending: EMERGENCY MEDICINE | Admitting: INTERNAL MEDICINE

## 2020-12-21 ENCOUNTER — APPOINTMENT (OUTPATIENT)
Dept: GENERAL RADIOLOGY | Facility: HOSPITAL | Age: 80
End: 2020-12-21

## 2020-12-21 ENCOUNTER — APPOINTMENT (OUTPATIENT)
Dept: CT IMAGING | Facility: HOSPITAL | Age: 80
End: 2020-12-21

## 2020-12-21 DIAGNOSIS — S72.142A CLOSED COMMINUTED INTERTROCHANTERIC FRACTURE OF LEFT FEMUR, INITIAL ENCOUNTER (HCC): Primary | ICD-10-CM

## 2020-12-21 PROBLEM — Z78.9 ALCOHOL USE: Status: ACTIVE | Noted: 2020-12-21

## 2020-12-21 PROBLEM — F10.90 ALCOHOL USE: Status: ACTIVE | Noted: 2020-12-21

## 2020-12-21 LAB
ABO GROUP BLD: NORMAL
ALBUMIN SERPL-MCNC: 4.3 G/DL (ref 3.5–5.2)
ALBUMIN/GLOB SERPL: 1.5 G/DL
ALP SERPL-CCNC: 55 U/L (ref 39–117)
ALT SERPL W P-5'-P-CCNC: 16 U/L (ref 1–33)
ANION GAP SERPL CALCULATED.3IONS-SCNC: 9.2 MMOL/L (ref 5–15)
APTT PPP: 29.2 SECONDS (ref 22.7–35.4)
AST SERPL-CCNC: 19 U/L (ref 1–32)
BASOPHILS # BLD AUTO: 0.07 10*3/MM3 (ref 0–0.2)
BASOPHILS NFR BLD AUTO: 1.2 % (ref 0–1.5)
BILIRUB SERPL-MCNC: 0.3 MG/DL (ref 0–1.2)
BLD GP AB SCN SERPL QL: NEGATIVE
BUN SERPL-MCNC: 14 MG/DL (ref 8–23)
BUN/CREAT SERPL: 19.2 (ref 7–25)
CALCIUM SPEC-SCNC: 9 MG/DL (ref 8.6–10.5)
CHLORIDE SERPL-SCNC: 101 MMOL/L (ref 98–107)
CO2 SERPL-SCNC: 24.8 MMOL/L (ref 22–29)
CREAT SERPL-MCNC: 0.73 MG/DL (ref 0.57–1)
DEPRECATED RDW RBC AUTO: 45.2 FL (ref 37–54)
EOSINOPHIL # BLD AUTO: 0.21 10*3/MM3 (ref 0–0.4)
EOSINOPHIL NFR BLD AUTO: 3.6 % (ref 0.3–6.2)
ERYTHROCYTE [DISTWIDTH] IN BLOOD BY AUTOMATED COUNT: 13 % (ref 12.3–15.4)
GFR SERPL CREATININE-BSD FRML MDRD: 77 ML/MIN/1.73
GLOBULIN UR ELPH-MCNC: 2.8 GM/DL
GLUCOSE SERPL-MCNC: 118 MG/DL (ref 65–99)
HCT VFR BLD AUTO: 37.1 % (ref 34–46.6)
HGB BLD-MCNC: 12.4 G/DL (ref 12–15.9)
IMM GRANULOCYTES # BLD AUTO: 0.03 10*3/MM3 (ref 0–0.05)
IMM GRANULOCYTES NFR BLD AUTO: 0.5 % (ref 0–0.5)
INR PPP: 0.97 (ref 0.9–1.1)
LYMPHOCYTES # BLD AUTO: 1.22 10*3/MM3 (ref 0.7–3.1)
LYMPHOCYTES NFR BLD AUTO: 20.9 % (ref 19.6–45.3)
MCH RBC QN AUTO: 31.5 PG (ref 26.6–33)
MCHC RBC AUTO-ENTMCNC: 33.4 G/DL (ref 31.5–35.7)
MCV RBC AUTO: 94.2 FL (ref 79–97)
MONOCYTES # BLD AUTO: 0.46 10*3/MM3 (ref 0.1–0.9)
MONOCYTES NFR BLD AUTO: 7.9 % (ref 5–12)
NEUTROPHILS NFR BLD AUTO: 3.85 10*3/MM3 (ref 1.7–7)
NEUTROPHILS NFR BLD AUTO: 65.9 % (ref 42.7–76)
NRBC BLD AUTO-RTO: 0 /100 WBC (ref 0–0.2)
NT-PROBNP SERPL-MCNC: 252.1 PG/ML (ref 0–1800)
PLATELET # BLD AUTO: 197 10*3/MM3 (ref 140–450)
PMV BLD AUTO: 10.7 FL (ref 6–12)
POTASSIUM SERPL-SCNC: 3.9 MMOL/L (ref 3.5–5.2)
PROT SERPL-MCNC: 7.1 G/DL (ref 6–8.5)
PROTHROMBIN TIME: 12.7 SECONDS (ref 11.7–14.2)
QT INTERVAL: 432 MS
RBC # BLD AUTO: 3.94 10*6/MM3 (ref 3.77–5.28)
RH BLD: POSITIVE
SARS-COV-2 ORF1AB RESP QL NAA+PROBE: NOT DETECTED
SODIUM SERPL-SCNC: 135 MMOL/L (ref 136–145)
T&S EXPIRATION DATE: NORMAL
WBC # BLD AUTO: 5.84 10*3/MM3 (ref 3.4–10.8)

## 2020-12-21 PROCEDURE — 99222 1ST HOSP IP/OBS MODERATE 55: CPT | Performed by: INTERNAL MEDICINE

## 2020-12-21 PROCEDURE — 93010 ELECTROCARDIOGRAM REPORT: CPT | Performed by: INTERNAL MEDICINE

## 2020-12-21 PROCEDURE — 86850 RBC ANTIBODY SCREEN: CPT | Performed by: EMERGENCY MEDICINE

## 2020-12-21 PROCEDURE — 85730 THROMBOPLASTIN TIME PARTIAL: CPT | Performed by: EMERGENCY MEDICINE

## 2020-12-21 PROCEDURE — 73552 X-RAY EXAM OF FEMUR 2/>: CPT

## 2020-12-21 PROCEDURE — U0004 COV-19 TEST NON-CDC HGH THRU: HCPCS | Performed by: EMERGENCY MEDICINE

## 2020-12-21 PROCEDURE — 80053 COMPREHEN METABOLIC PANEL: CPT | Performed by: EMERGENCY MEDICINE

## 2020-12-21 PROCEDURE — 85025 COMPLETE CBC W/AUTO DIFF WBC: CPT | Performed by: EMERGENCY MEDICINE

## 2020-12-21 PROCEDURE — 97162 PT EVAL MOD COMPLEX 30 MIN: CPT

## 2020-12-21 PROCEDURE — C9803 HOPD COVID-19 SPEC COLLECT: HCPCS

## 2020-12-21 PROCEDURE — 73502 X-RAY EXAM HIP UNI 2-3 VIEWS: CPT

## 2020-12-21 PROCEDURE — 97116 GAIT TRAINING THERAPY: CPT

## 2020-12-21 PROCEDURE — 25010000002 DIPHENHYDRAMINE PER 50 MG: Performed by: EMERGENCY MEDICINE

## 2020-12-21 PROCEDURE — 94799 UNLISTED PULMONARY SVC/PX: CPT

## 2020-12-21 PROCEDURE — 96374 THER/PROPH/DIAG INJ IV PUSH: CPT

## 2020-12-21 PROCEDURE — 71045 X-RAY EXAM CHEST 1 VIEW: CPT

## 2020-12-21 PROCEDURE — 86900 BLOOD TYPING SEROLOGIC ABO: CPT | Performed by: EMERGENCY MEDICINE

## 2020-12-21 PROCEDURE — 97530 THERAPEUTIC ACTIVITIES: CPT

## 2020-12-21 PROCEDURE — 83880 ASSAY OF NATRIURETIC PEPTIDE: CPT | Performed by: INTERNAL MEDICINE

## 2020-12-21 PROCEDURE — 85610 PROTHROMBIN TIME: CPT | Performed by: EMERGENCY MEDICINE

## 2020-12-21 PROCEDURE — 93005 ELECTROCARDIOGRAM TRACING: CPT | Performed by: INTERNAL MEDICINE

## 2020-12-21 PROCEDURE — 99284 EMERGENCY DEPT VISIT MOD MDM: CPT

## 2020-12-21 PROCEDURE — 25010000002 HYDROMORPHONE PER 4 MG: Performed by: EMERGENCY MEDICINE

## 2020-12-21 PROCEDURE — 86901 BLOOD TYPING SEROLOGIC RH(D): CPT | Performed by: EMERGENCY MEDICINE

## 2020-12-21 PROCEDURE — 25010000002 ONDANSETRON PER 1 MG: Performed by: EMERGENCY MEDICINE

## 2020-12-21 PROCEDURE — 96361 HYDRATE IV INFUSION ADD-ON: CPT

## 2020-12-21 PROCEDURE — 73700 CT LOWER EXTREMITY W/O DYE: CPT

## 2020-12-21 PROCEDURE — 96375 TX/PRO/DX INJ NEW DRUG ADDON: CPT

## 2020-12-21 PROCEDURE — 96376 TX/PRO/DX INJ SAME DRUG ADON: CPT

## 2020-12-21 RX ORDER — ONDANSETRON 2 MG/ML
4 INJECTION INTRAMUSCULAR; INTRAVENOUS EVERY 6 HOURS PRN
Status: DISCONTINUED | OUTPATIENT
Start: 2020-12-21 | End: 2020-12-23 | Stop reason: HOSPADM

## 2020-12-21 RX ORDER — AMOXICILLIN 250 MG
2 CAPSULE ORAL NIGHTLY PRN
Status: DISCONTINUED | OUTPATIENT
Start: 2020-12-21 | End: 2020-12-23 | Stop reason: HOSPADM

## 2020-12-21 RX ORDER — SODIUM CHLORIDE 9 MG/ML
100 INJECTION, SOLUTION INTRAVENOUS CONTINUOUS
Status: DISCONTINUED | OUTPATIENT
Start: 2020-12-21 | End: 2020-12-22

## 2020-12-21 RX ORDER — HYDROMORPHONE HYDROCHLORIDE 1 MG/ML
0.25 INJECTION, SOLUTION INTRAMUSCULAR; INTRAVENOUS; SUBCUTANEOUS ONCE
Status: COMPLETED | OUTPATIENT
Start: 2020-12-21 | End: 2020-12-21

## 2020-12-21 RX ORDER — CARVEDILOL 12.5 MG/1
12.5 TABLET ORAL 2 TIMES DAILY WITH MEALS
Status: DISCONTINUED | OUTPATIENT
Start: 2020-12-21 | End: 2020-12-23 | Stop reason: HOSPADM

## 2020-12-21 RX ORDER — LEVOTHYROXINE SODIUM 0.03 MG/1
25 TABLET ORAL DAILY
Status: DISCONTINUED | OUTPATIENT
Start: 2020-12-21 | End: 2020-12-23 | Stop reason: HOSPADM

## 2020-12-21 RX ORDER — SODIUM CHLORIDE 0.9 % (FLUSH) 0.9 %
10 SYRINGE (ML) INJECTION AS NEEDED
Status: DISCONTINUED | OUTPATIENT
Start: 2020-12-21 | End: 2020-12-23 | Stop reason: HOSPADM

## 2020-12-21 RX ORDER — HYDROMORPHONE HYDROCHLORIDE 1 MG/ML
0.5 INJECTION, SOLUTION INTRAMUSCULAR; INTRAVENOUS; SUBCUTANEOUS ONCE
Status: COMPLETED | OUTPATIENT
Start: 2020-12-21 | End: 2020-12-21

## 2020-12-21 RX ORDER — ACETAMINOPHEN 325 MG/1
650 TABLET ORAL EVERY 4 HOURS PRN
Status: DISCONTINUED | OUTPATIENT
Start: 2020-12-21 | End: 2020-12-23 | Stop reason: HOSPADM

## 2020-12-21 RX ORDER — ONDANSETRON 2 MG/ML
4 INJECTION INTRAMUSCULAR; INTRAVENOUS ONCE
Status: COMPLETED | OUTPATIENT
Start: 2020-12-21 | End: 2020-12-21

## 2020-12-21 RX ORDER — DIPHENHYDRAMINE HYDROCHLORIDE 50 MG/ML
25 INJECTION INTRAMUSCULAR; INTRAVENOUS ONCE
Status: COMPLETED | OUTPATIENT
Start: 2020-12-21 | End: 2020-12-21

## 2020-12-21 RX ORDER — SODIUM CHLORIDE 0.9 % (FLUSH) 0.9 %
10 SYRINGE (ML) INJECTION EVERY 12 HOURS SCHEDULED
Status: DISCONTINUED | OUTPATIENT
Start: 2020-12-21 | End: 2020-12-23 | Stop reason: HOSPADM

## 2020-12-21 RX ORDER — ROSUVASTATIN CALCIUM 5 MG/1
5 TABLET, COATED ORAL DAILY
Status: DISCONTINUED | OUTPATIENT
Start: 2020-12-21 | End: 2020-12-23 | Stop reason: HOSPADM

## 2020-12-21 RX ORDER — NALOXONE HCL 0.4 MG/ML
0.4 VIAL (ML) INJECTION
Status: DISCONTINUED | OUTPATIENT
Start: 2020-12-21 | End: 2020-12-23 | Stop reason: HOSPADM

## 2020-12-21 RX ORDER — HYDROMORPHONE HYDROCHLORIDE 1 MG/ML
0.5 INJECTION, SOLUTION INTRAMUSCULAR; INTRAVENOUS; SUBCUTANEOUS
Status: DISCONTINUED | OUTPATIENT
Start: 2020-12-21 | End: 2020-12-23 | Stop reason: HOSPADM

## 2020-12-21 RX ORDER — PANTOPRAZOLE SODIUM 40 MG/1
40 TABLET, DELAYED RELEASE ORAL EVERY MORNING
Status: DISCONTINUED | OUTPATIENT
Start: 2020-12-21 | End: 2020-12-23 | Stop reason: HOSPADM

## 2020-12-21 RX ORDER — HYDROCODONE BITARTRATE AND ACETAMINOPHEN 5; 325 MG/1; MG/1
1 TABLET ORAL EVERY 4 HOURS PRN
Status: DISCONTINUED | OUTPATIENT
Start: 2020-12-21 | End: 2020-12-23 | Stop reason: HOSPADM

## 2020-12-21 RX ORDER — BACLOFEN 10 MG/1
10 TABLET ORAL 3 TIMES DAILY PRN
Status: DISCONTINUED | OUTPATIENT
Start: 2020-12-21 | End: 2020-12-23 | Stop reason: HOSPADM

## 2020-12-21 RX ORDER — MULTIPLE VITAMINS W/ MINERALS TAB 9MG-400MCG
1 TAB ORAL DAILY
Status: DISCONTINUED | OUTPATIENT
Start: 2020-12-21 | End: 2020-12-23 | Stop reason: HOSPADM

## 2020-12-21 RX ORDER — ONDANSETRON 2 MG/ML
4 INJECTION INTRAMUSCULAR; INTRAVENOUS EVERY 6 HOURS PRN
Status: DISCONTINUED | OUTPATIENT
Start: 2020-12-21 | End: 2020-12-21

## 2020-12-21 RX ORDER — ONDANSETRON 4 MG/1
4 TABLET, FILM COATED ORAL EVERY 6 HOURS PRN
Status: DISCONTINUED | OUTPATIENT
Start: 2020-12-21 | End: 2020-12-23 | Stop reason: HOSPADM

## 2020-12-21 RX ADMIN — BACLOFEN 10 MG: 10 TABLET ORAL at 21:47

## 2020-12-21 RX ADMIN — CARVEDILOL 12.5 MG: 12.5 TABLET, FILM COATED ORAL at 18:21

## 2020-12-21 RX ADMIN — ONDANSETRON 4 MG: 2 INJECTION INTRAMUSCULAR; INTRAVENOUS at 04:32

## 2020-12-21 RX ADMIN — HYDROMORPHONE HYDROCHLORIDE 0.5 MG: 1 INJECTION, SOLUTION INTRAMUSCULAR; INTRAVENOUS; SUBCUTANEOUS at 06:02

## 2020-12-21 RX ADMIN — HYDROCODONE BITARTRATE AND ACETAMINOPHEN 1 TABLET: 5; 325 TABLET ORAL at 18:21

## 2020-12-21 RX ADMIN — HYDROCODONE BITARTRATE AND ACETAMINOPHEN 1 TABLET: 5; 325 TABLET ORAL at 14:23

## 2020-12-21 RX ADMIN — BACLOFEN 10 MG: 10 TABLET ORAL at 12:25

## 2020-12-21 RX ADMIN — CALCIUM CARBONATE-VITAMIN D TAB 500 MG-200 UNIT 500 MG: 500-200 TAB at 21:48

## 2020-12-21 RX ADMIN — HYDROCODONE BITARTRATE AND ACETAMINOPHEN 1 TABLET: 5; 325 TABLET ORAL at 09:37

## 2020-12-21 RX ADMIN — HYDROMORPHONE HYDROCHLORIDE 0.25 MG: 1 INJECTION, SOLUTION INTRAMUSCULAR; INTRAVENOUS; SUBCUTANEOUS at 04:32

## 2020-12-21 RX ADMIN — DIPHENHYDRAMINE HYDROCHLORIDE 25 MG: 50 INJECTION, SOLUTION INTRAMUSCULAR; INTRAVENOUS at 04:32

## 2020-12-21 RX ADMIN — HYDROCODONE BITARTRATE AND ACETAMINOPHEN 1 TABLET: 5; 325 TABLET ORAL at 22:19

## 2020-12-21 RX ADMIN — CARVEDILOL 12.5 MG: 12.5 TABLET, FILM COATED ORAL at 09:37

## 2020-12-21 RX ADMIN — SODIUM CHLORIDE 100 ML/HR: 9 INJECTION, SOLUTION INTRAVENOUS at 21:47

## 2020-12-21 NOTE — PROGRESS NOTES
Discharge Planning Assessment  [facility]     Patient Name: Moriah Petty  MRN: [id number]  Today's Date: 12/21/2020    Admit Date: 12/21/2020    Discharge Needs Assessment     Row Name 12/21/20 1130       Living Environment    Lives With  spouse    Current Living Arrangements  home/apartment/condo    Primary Care Provided by  self    Provides Primary Care For  no one    Family Caregiver if Needed  spouse    Quality of Family Relationships  helpful;involved;supportive       Resource/Environmental Concerns    Transportation Concerns  car, none       Transition Planning    Patient/Family Anticipates Transition to  home with family;home with help/services;inpatient rehabilitation facility    Patient/Family Anticipated Services at Transition      Transportation Anticipated  family or friend will provide       Discharge Needs Assessment    Readmission Within the Last 30 Days  no previous admission in last 30 days    Equipment Currently Used at Home  none        Discharge Plan     Row Name 12/21/20 1131       Plan    Plan  HH vs SNF    Patient/Family in Agreement with Plan  yes    Plan Comments  Spoke with the patinet, verified current information and CCP role explained. Patient states she has family support at home. She's IADL and has no history with DME/HH. Pt has been to [facility]. Patient plans to d/c home with family support. If patient needs SNF at d/c, she requests [facility]. Orthopedic consult noted. Await Ortho's plan. CCP following.        Continued Care and Services - Admitted Since 12/21/2020    Coordination has not been started for this encounter.         Demographic Summary     Row Name 12/21/20 1130       General Information    Admission Type  inpatient    Reason for Consult  discharge planning    Preferred Language  English     Used During This Interaction  no       Contact Information    Permission Granted to Share Info With  case  manager;family/designee        Functional Status     Row Name 12/21/20 1130       Functional Status    Usual Activity Tolerance  good    Current Activity Tolerance  good       Functional Status, IADL    Medications  independent    Meal Preparation  independent    Housekeeping  independent    Laundry  independent    Shopping  independent       Mental Status Summary    Recent Changes in Mental Status/Cognitive Functioning  no changes        Psychosocial     Row Name 12/21/20 1130       Intellectual Performance WDL    Level of Consciousness  Alert       Coping/Stress    Patient Personal Strengths  able to adapt    Sources of Support  spouse    Reaction to Health Status  accepting    Understanding of Condition and Treatment  adequate understanding of medical condition       Developmental Stage (Eriksson's)    Developmental Stage  Stage 8 (65 years-death/Late Adulthood) Integrity vs. Despair        Abuse/Neglect    No documentation.       Legal    No documentation.       Substance Abuse    No documentation.       Patient Forms    No documentation.           Noa Andrade RN

## 2020-12-21 NOTE — H&P
"    Patient Name:  Moriah Petty  YOB: 1940  MRN:  [id number]  Admit Date:  12/21/2020  Patient Care Team:  Jarred Carrizales Jr., MD as PCP - General (Family Medicine)  Anibal Trujillo Jr., MD (Cardiology)  Gordon Brewster MD as Consulting Physician (Obstetrics and Gynecology)      Subjective   History Present Illness     Chief Complaint   Patient presents with   • Fall   • Hip Pain       Ms. Petty is a 80 y.o. female with a history of HTN, hypothyroidism, HLD, OA, osteoporosis, GERD, anemia, diverticulitis, previous colostomy that presents to [facility] complaining of falling on lt side. Pt reports waking up to use restroom and feeling like her lt foot was numb. When she got OOB, her LLE \"gave out\" and she fell onto her left side resulting in lt hip pain. Denies LOC or head trauma. Denies recent illnesses, fever, dyspnea, chest pain, palpitations, dysuria, dizziness/lightheadedness.     Afebrile, BP stable. Hgb 12.4, WBC 5.84. . Glucose 118, Na 135, remaining chem panel unremarkable. RVP/Covid 19 pending. CXR progressive interstitial disease compared to previous imaging 2018; ? Mild interstitial edema or infiltrates. Lt hip xray nondisplaced periprosthetic fx adjacent to bipolar hip arthroplasty. CT LLE: comminuted nondisplaced intertrochanteric prox femur fx extending to subtrochanteric region posterolaterally; pelvic bones intact. EKG SR, no changes compared to previous     Review of Systems   Constitutional: Negative.    HENT: Negative.    Respiratory: Negative.    Cardiovascular: Negative.    Gastrointestinal: Negative.    Genitourinary: Negative.    Musculoskeletal: Positive for arthralgias and gait problem.   Skin: Negative.    Neurological: Negative for dizziness and light-headedness.   Psychiatric/Behavioral: Negative.         Personal History     Past Medical History:   Diagnosis Date   • Adenomatous polyp of colon 11/6/2017   • Anemia    • Bursitis of elbow     RIGHT " ELBOW   • Carotid stenosis     LESS THAN 50%, CAROTID ULTRASOUND AT [facility]    • Colitis    • Colostomy in place (CMS/HCC)    • Depression    • Diverticulosis    • GERD (gastroesophageal reflux disease)    • History of blood transfusion 11/2013   • History of trigeminal neuralgia    • Hyperlipidemia    • Hypertension    • Hypothyroidism    • Insomnia    • OA (osteoarthritis)    • Osteoporosis    • PVC (premature ventricular contraction)    • PVC's (premature ventricular contractions)    • Sleep apnea     DOES NOT WEAR CPAP     Past Surgical History:   Procedure Laterality Date   • BREAST BIOPSY Left 05/10/2018    Ultrasound-guided mammotome vacuum assisted left breast biopsy with placement of metallic clip (path: fragments of fibroepithelial lesion, consistent with fibroadenoma)-Dr. Sree Glass, [facility]   • BRONCHOSCOPY N/A 08/29/2001    Dr. Christiano Horton   • CARDIAC CATHETERIZATION  03/2002   • CARDIAC CATHETERIZATION Left 11/28/2012    Dr. Anibal Trujillo   • CHOLECYSTECTOMY N/A 11/20/2013    DR. ESME GOLDMAN   • COLONOSCOPY N/A 02/21/2012    INT/EXT HEMORRHOIDS, DIVERTICULOSIS, 2 HYPERPLASTIC POLYPS, TORT (path: Rectum hyperplastic polyp)-Dr. Morgan Collins   • COLONOSCOPY N/A 4/24/2018    NTEH, Diverticulosis, tortuous colon, IH.  No specimens collected    • COLONOSCOPY N/A 02/27/2015    Non-thrombosed external hemorrhoids found on perianal exam, diverticulosis in the sigmoid and descending colon, tortuous colon, normal ileum, internal hemorrhoids-Dr. Morgan Collins   • COLONOSCOPY N/A 7/9/2020    Procedure: COLONOSCOPY into cecum with cold biopsy polypectomy x1;  Surgeon: Emanuel Clark MD;  Location[facility] ENDOSCOPY;  Service: General;  Laterality: N/A;  Pre op: Diverticulitis, Family history of colon cancer  Post op: Polyp   • COLONOSCOPY W/ POLYPECTOMY N/A 05/06/2004    Diverticulosis, small recto-sigmoid polyps, otherwise normal (path: Colon at 20 cm hyperplastic polyp) -Dr. Morgan Collins   •  COLOSTOMY CLOSURE N/A 2019    Procedure: COLOSTOMY TAKEDOWN;  Surgeon: Emanuel Clark MD;  Location: [facility] MAIN OR;  Service: General   • EXPLORATORY LAPAROTOMY N/A 2018    Procedure: exploratory laparotomy with sigmoid resection, possible colostomy, APPENDECTOMY;  Surgeon: Emanuel Clark MD;  Location: [facility] MAIN OR;  Service: General   • JOINT REPLACEMENT     • KYPHOPLASTY N/A 2012    COMPRESSION FX OF L2   • TOTAL HIP ARTHROPLASTY Left 2004    Left bipolar hip replacement-Dr. Chioma Burch   • TOTAL KNEE ARTHROPLASTY Right 2013    Dr. Reza Longoria   • TRIGEMINAL NERVE DECOMPRESSION  2014    LEFT EAR      Family History   Problem Relation Age of Onset   • Ovarian cancer Daughter    • Cancer Daughter    • Colon cancer Mother    • Arthritis Mother    • Stomach cancer Mother    • Cancer Mother    • Stomach cancer Maternal Grandfather    • Cancer Maternal Grandfather    • Glaucoma Father    • Arthritis Father    • Heart disease Father    • Hypertension Father    • Alcohol abuse Son    • Stroke Paternal Grandmother    • Malig Hyperthermia Neg Hx      Social History     Tobacco Use   • Smoking status: Former Smoker     Packs/day: 0.50     Years: 30.00     Pack years: 15.00     Types: Cigarettes     Start date: 1957     Quit date: 1989     Years since quittin.6   • Smokeless tobacco: Never Used   Substance Use Topics   • Alcohol use: Yes     Alcohol/week: 7.0 standard drinks     Types: 2 Shots of liquor, 2 Standard drinks or equivalent per week     Comment: 2 DRINKS PER DAY   • Drug use: No     No current facility-administered medications on file prior to encounter.      Current Outpatient Medications on File Prior to Encounter   Medication Sig Dispense Refill   • Calcium Carb-Cholecalciferol (CALCIUM + D3) 600-200 MG-UNIT tablet Take 1 tablet by mouth 2 (Two) Times a Day.     • carvedilol (COREG) 12.5 MG tablet Take 12.5 mg by mouth 2 (Two) Times a Day With Meals.      • Denosumab (PROLIA SC) Inject  under the skin into the appropriate area as directed Every 6 (Six) Months. Dose unknown, injection every 6 months; DEC 3rd last injection     • desonide (DESOWEN) 0.05 % cream Apply  topically to the appropriate area as directed As Needed for Irritation.     • levothyroxine (SYNTHROID, LEVOTHROID) 25 MCG tablet TAKE 1 TABLET BY MOUTH EVERY DAY 90 tablet 1   • losartan (COZAAR) 50 MG tablet Take 1 tablet by mouth 2 (Two) Times a Day. (Patient taking differently: Take 50 mg by mouth Daily.) 180 tablet 3   • Multiple Vitamins-Minerals (V-C FORTE PO) Take 1 mg by mouth Daily.     • Omega-3 Fatty Acids (FISH OIL) 1200 MG capsule capsule Take 1,200 mg by mouth 2 (Two) Times a Day With Meals.     • omeprazole (priLOSEC) 20 MG capsule Take 20.6 mg by mouth Every Other Day.     • rosuvastatin (CRESTOR) 5 MG tablet TAKE 1 TABLET BY MOUTH EVERY DAY 90 tablet 1   • [DISCONTINUED] Aspirin (ASPIR-81 PO) Take 81 mg by mouth Every Night.     • [DISCONTINUED] denosumab (PROLIA) 60 MG/ML solution syringe Inject 60 mg under the skin into the appropriate area as directed Every 6 (Six) Months.       Allergies   Allergen Reactions   • Morphine And Related Itching and Swelling       Objective    Objective     Vital Signs  Temp:  [96.9 °F (36.1 °C)-98.2 °F (36.8 °C)] 96.9 °F (36.1 °C)  Heart Rate:  [69-77] 69  Resp:  [16-18] 18  BP: (122-183)/(66-98) 147/66  SpO2:  [95 %-100 %] 95 %  on   ;   Device (Oxygen Therapy): room air  Body mass index is 25.95 kg/m².    Physical Exam  Vitals signs and nursing note reviewed.   Constitutional:       General: She is not in acute distress.  HENT:      Head: Normocephalic and atraumatic.   Eyes:      Conjunctiva/sclera: Conjunctivae normal.   Neck:      Musculoskeletal: Normal range of motion and neck supple.   Cardiovascular:      Rate and Rhythm: Normal rate and regular rhythm.   Pulmonary:      Effort: Pulmonary effort is normal. No respiratory distress.      Breath  sounds: Normal breath sounds.   Abdominal:      General: Bowel sounds are normal.      Palpations: Abdomen is soft.   Musculoskeletal:      Right lower leg: No edema.      Left lower leg: No edema.   Skin:     General: Skin is warm and dry.   Neurological:      General: No focal deficit present.      Mental Status: She is alert and oriented to person, place, and time.   Psychiatric:         Mood and Affect: Mood normal.         Results Review:  I reviewed the patient's new clinical results.  I reviewed the patient's new imaging results and agree with the interpretation.  I reviewed the patient's other test results and agree with the interpretation  I personally viewed and interpreted the patient's EKG/Telemetry data    Lab Results (last 24 hours)     Procedure Component Value Units Date/Time    CBC & Differential [[id number]]  (Normal) Collected: 12/21/20 0426    Specimen: Blood Updated: 12/21/20 0443    Narrative:      The following orders were created for panel order CBC & Differential.  Procedure                               Abnormality         Status                     ---------                               -----------         ------                     CBC Auto Differential[[id number]]        Normal              Final result                 Please view results for these tests on the individual orders.    Comprehensive Metabolic Panel [[id number]]  (Abnormal) Collected: 12/21/20 0426    Specimen: Blood Updated: 12/21/20 0500     Glucose 118 mg/dL      BUN 14 mg/dL      Creatinine 0.73 mg/dL      Sodium 135 mmol/L      Potassium 3.9 mmol/L      Chloride 101 mmol/L      CO2 24.8 mmol/L      Calcium 9.0 mg/dL      Total Protein 7.1 g/dL      Albumin 4.30 g/dL      ALT (SGPT) 16 U/L      AST (SGOT) 19 U/L      Alkaline Phosphatase 55 U/L      Total Bilirubin 0.3 mg/dL      eGFR Non African Amer 77 mL/min/1.73      Globulin 2.8 gm/dL      A/G Ratio 1.5 g/dL      BUN/Creatinine Ratio 19.2     Anion Gap 9.2 mmol/L      Narrative:      GFR Normal >60  Chronic Kidney Disease <60  Kidney Failure <15      Protime-INR [[id number]]  (Normal) Collected: 12/21/20 0426    Specimen: Blood Updated: 12/21/20 0454     Protime 12.7 Seconds      INR 0.97    aPTT [[id number]]  (Normal) Collected: 12/21/20 0426    Specimen: Blood Updated: 12/21/20 0454     PTT 29.2 seconds     CBC Auto Differential [[id number]]  (Normal) Collected: 12/21/20 0426    Specimen: Blood Updated: 12/21/20 0443     WBC 5.84 10*3/mm3      RBC 3.94 10*6/mm3      Hemoglobin 12.4 g/dL      Hematocrit 37.1 %      MCV 94.2 fL      MCH 31.5 pg      MCHC 33.4 g/dL      RDW 13.0 %      RDW-SD 45.2 fl      MPV 10.7 fL      Platelets 197 10*3/mm3      Neutrophil % 65.9 %      Lymphocyte % 20.9 %      Monocyte % 7.9 %      Eosinophil % 3.6 %      Basophil % 1.2 %      Immature Grans % 0.5 %      Neutrophils, Absolute 3.85 10*3/mm3      Lymphocytes, Absolute 1.22 10*3/mm3      Monocytes, Absolute 0.46 10*3/mm3      Eosinophils, Absolute 0.21 10*3/mm3      Basophils, Absolute 0.07 10*3/mm3      Immature Grans, Absolute 0.03 10*3/mm3      nRBC 0.0 /100 WBC     BNP [[id number]]  (Normal) Collected: 12/21/20 0426    Specimen: Blood Updated: 12/21/20 0827     proBNP 252.1 pg/mL     Narrative:      Among patients with dyspnea, NT-proBNP is highly sensitive for the detection of acute congestive heart failure. In addition NT-proBNP of <300 pg/ml effectively rules out acute congestive heart failure with 99% negative predictive value.    Results may be falsely decreased if patient taking Biotin.      COVID PRE-OP / PRE-PROCEDURE SCREENING ORDER (NO ISOLATION) - Swab, Nasopharynx [[id number]] Collected: 12/21/20 0602    Specimen: Swab from Nasopharynx Updated: 12/21/20 0626    Narrative:      The following orders were created for panel order COVID PRE-OP / PRE-PROCEDURE SCREENING ORDER (NO ISOLATION) - Swab, Nasopharynx.  Procedure                               Abnormality         Status                      ---------                               -----------         ------                     COVID-19,APTIMA PANTHER,...[[id number]]                      In process                   Please view results for these tests on the individual orders.    COVID-19,APTIMA PANTHER,BILL IN-HOUSE, NP/OP SWAB IN UTM/VTM/SALINE TRANSPORT MEDIA,24 HR TAT - Swab, Nasopharynx [[id number]] Collected: 12/21/20 0602    Specimen: Swab from Nasopharynx Updated: 12/21/20 0626          Imaging Results (Last 24 Hours)     Procedure Component Value Units Date/Time    XR Femur 2 View Left [[id number]] Collected: 12/21/20 0724     Updated: 12/21/20 0935    Narrative:      TWO-VIEW LEFT FEMUR     HISTORY: Fracture of proximal left femur.     FINDINGS: The patient has recent hip x-rays demonstrating a fracture of  the proximal femur adjacent to the calcar of total hip replacement. The  remainder of the femur is intact. No other fracture is seen.     This report was finalized on 12/21/2020 9:32 AM by Dr. Jose Daniel Harris M.D.       CT Lower Extremity Bilateral Without Contrast [[id number]] Collected: 12/21/20 0902     Updated: 12/21/20 0911    Narrative:      LEFT FEMUR CT WITHOUT CONTRAST     HISTORY: 80-year-old woman with previous left hip hemiarthroplasty. Hip  pain after fall. X-rays demonstrated a periprosthetic proximal femur  fracture.     TECHNIQUE: Axial CT of the left femur with multiplanar reformatted  images is correlated with x-rays acquired earlier this morning.     Radiation dose reduction techniques were utilized, including automated  exposure control and exposure modulation based on body size.     FINDINGS: Bones of the pelvis are intact. There is no intrapelvic  hematoma or fluid collection.     There is a cemented, bipolar left hip hemiarthroplasty. A comminuted,  periprosthetic proximal femur fracture is observed extending around the  greater trochanter and across the intertrochanteric region anteriorly.  There is a fracture plane  that extends to the subtrochanteric region  posterolaterally. No fracture is appreciated around the femoral stem  distal to the level of the bottom of the lesser trochanter. The  cement/bone interface appears intact. There is some deep soft tissue  edema around the proximal femur as expected. No hematoma is present.     The middle and distal portions of the femur are intact. There is no  joint effusion at the knee. The proximal tibia and fibula are intact.     Large field-of-view source images showed intact right femur with right  knee arthroplasty hardware. There is no joint effusion at the right  knee.       Impression:      Bipolar left hip hemiarthroplasty hardware with a  comminuted, nondisplaced intertrochanteric proximal femur fracture  extending to the subtrochanteric region posterolaterally. No fracture is  observed around the distal 10 cm of the femoral stem. The bones of the  pelvis are intact.     This report was finalized on 12/21/2020 9:08 AM by Dr. Collin Waggoner M.D.       XR Hip With or Without Pelvis 2 - 3 View Left [[id number]] Collected: 12/21/20 0702     Updated: 12/21/20 0703    Narrative:      CHEST, LEFT HIP     HISTORY: Left hip pain. Injury.     COMPARISON: AP chest 12/04/2018.     SUPINE AP CHEST: There are diffuse increased interstitial markings which  appear progressive when compared to the exam on 12/04/2018. This could  be due to progressive interstitial disease with mild interstitial edema  with interstitial infiltrate. No focal airspace consolidation is  evident. Heart size is mildly enlarged. There is a calcified granuloma  in the right midlung. Cholecystectomy clips are noted. The bones are  osteopenic and there are compression deformities within the thoracic  spine.     CT PELVIS AND AP LATERAL LEFT HIP: There is a bipolar left hip  arthroplasty and there is a periprosthetic fracture extending vertically  from the lateral intertrochanteric region without displacement.        Impression:      1. Nondisplaced left periprosthetic fracture adjacent to bipolar hip  arthroplasty.  2. Progressive interstitial disease when compared to previous chest  x-ray 12/04/2018. This could represent mild interstitial edema or  infiltrates and needs correlation with clinical data.  3. Osteopenia. Multiple compression deformities of the thoracic spine.       XR Chest 1 View [[id number]] Collected: 12/21/20 0702     Updated: 12/21/20 0703    Narrative:      CHEST, LEFT HIP     HISTORY: Left hip pain. Injury.     COMPARISON: AP chest 12/04/2018.     SUPINE AP CHEST: There are diffuse increased interstitial markings which  appear progressive when compared to the exam on 12/04/2018. This could  be due to progressive interstitial disease with mild interstitial edema  with interstitial infiltrate. No focal airspace consolidation is  evident. Heart size is mildly enlarged. There is a calcified granuloma  in the right midlung. Cholecystectomy clips are noted. The bones are  osteopenic and there are compression deformities within the thoracic  spine.     CT PELVIS AND AP LATERAL LEFT HIP: There is a bipolar left hip  arthroplasty and there is a periprosthetic fracture extending vertically  from the lateral intertrochanteric region without displacement.       Impression:      1. Nondisplaced left periprosthetic fracture adjacent to bipolar hip  arthroplasty.  2. Progressive interstitial disease when compared to previous chest  x-ray 12/04/2018. This could represent mild interstitial edema or  infiltrates and needs correlation with clinical data.  3. Osteopenia. Multiple compression deformities of the thoracic spine.             Results for orders placed in visit on 02/27/15   SCANNED - ECHOCARDIOGRAM       ECG 12 Lead   Final Result   HEART RATE= 63  bpm   RR Interval= 948  ms   FL Interval= 184  ms   P Horizontal Axis= 15  deg   P Front Axis= 1  deg   QRSD Interval= 89  ms   QT Interval= 432  ms   QRS Axis= 84  deg    T Wave Axis= 16  deg   - OTHERWISE NORMAL ECG -   Sinus rhythm   Borderline right axis deviation   NO SIGNIFICANT CHANGE FROM PREVIOUS ECG   Electronically Signed By: Juan Houston ([facility]) 21-Dec-2020 09:03:05   Date and Time of Study: 2020-12-21 08:28:58           Assessment/Plan     Active Hospital Problems    Diagnosis  POA   • **Closed comminuted intertrochanteric fracture of proximal end of left femur (CMS/HCC) [S72.142A]  Yes   • Alcohol use [Z72.89]  Yes   • Osteoporosis, 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       Ms. Petty is a 80 y.o. female with a history of HTN, hypothyroidism, HLD, OA, osteoporosis, GERD, anemia, diverticulitis, previous colostomy who is admitted for proximal lt femur fracture    Proximal lt femur fracture/Fall:  -Appreciated Ortho assistance  -CT completed; awaiting further recommendations  -Pain control  -PT eval when appropriate    HTN/PVC's:  -Cardiology consult for clearance  -EKG SR, no changes compared to previous per report  -BP stable; continue Coreg w/parameters; will hold losartan for now to avoid hypotension    Hypothyroidism:  -TSH 4.000, FT4 1.18 5/5/20  -continue levothyroxine    Osteoporosis:  -Receives denosumab q 6 months  -Continue calcium/vit D    Alcohol use:  -Daily alcohol use reported; ~ 2 shots/day  -Denies hx of withdrawal; will monitor    · I discussed the patient's findings and my recommendations with patient and Dr. Hagen.    VTE Prophylaxis - SCDs.  Code Status - Full code.       LYN Plasencia  [facility]  12/21/20  11:05 EST

## 2020-12-21 NOTE — THERAPY EVALUATION
Patient Name: Moriah Petty  : 1940    MRN: [id number]                              Today's Date: 2020       Admit Date: 2020    Visit Dx:     ICD-10-CM ICD-9-CM   1. Closed comminuted intertrochanteric fracture of left femur, initial encounter (CMS/[facility])  S72.142A 820.21     Patient Active Problem List   Diagnosis   • Abnormal electrocardiogram   • Non-specific colitis   • Acute post-traumatic headache   • Postoperative anemia due to acute blood loss   • Arthritis   • Hematochezia   • Chest pain   • Compression fracture of lumbar vertebra (CMS/[facility])   • Closed wedge compression fracture of second lumbar vertebra (CMS/[facility])   • Constipation   • Dizziness   • Dyslipidemia   • Fatigue   • Ventricular premature beats   • Gastroesophageal reflux disease without esophagitis   • Hypertension   • Insomnia   • Left lower quadrant pain   • Low back pain   • Osteoporosis   • Premature atrial contraction   • Peripheral nerve disease   • Pneumonia   • Nausea   • Sensorineural hearing loss   • Shortness of breath   • Sinus bradycardia   • Sleep apnea   • Disorder of thyroid   • Trigeminal neuralgia   • Rectal bleeding   • Family history of colon cancer   • Adenomatous polyp of colon   • Hypothyroidism   • Osteoporosis, post-menopausal   • Diverticulitis of large intestine with abscess without bleeding   • Mild malnutrition (CMS/[facility])   • Pure hypercholesterolemia   • Colostomy status (CMS/[facility])   • History of colon polyps   • Diverticulitis   • Meningioma, cerebral (CMS/[facility])   • Cervical disc disorder with radiculopathy   • Closed comminuted intertrochanteric fracture of proximal end of left femur (CMS/[facility])   • Alcohol use     Past Medical History:   Diagnosis Date   • Adenomatous polyp of colon 2017   • Anemia    • Bursitis of elbow     RIGHT ELBOW   • Carotid stenosis     LESS THAN 50%, CAROTID ULTRASOUND AT [facility]    • Colitis    • Colostomy in place (CMS/[facility])    • Depression    • Diverticulosis    •  GERD (gastroesophageal reflux disease)    • History of blood transfusion 11/2013   • History of trigeminal neuralgia    • Hyperlipidemia    • Hypertension    • Hypothyroidism    • Insomnia    • OA (osteoarthritis)    • Osteoporosis    • PVC (premature ventricular contraction)    • PVC's (premature ventricular contractions)    • Sleep apnea     DOES NOT WEAR CPAP     Past Surgical History:   Procedure Laterality Date   • BREAST BIOPSY Left 05/10/2018    Ultrasound-guided mammotome vacuum assisted left breast biopsy with placement of metallic clip (path: fragments of fibroepithelial lesion, consistent with fibroadenoma)-Dr. Sree Glass, [facility]   • BRONCHOSCOPY N/A 08/29/2001    Dr. Christiano Horton   • CARDIAC CATHETERIZATION  03/2002   • CARDIAC CATHETERIZATION Left 11/28/2012    Dr. Anibal Trujillo   • CHOLECYSTECTOMY N/A 11/20/2013    DR. ESME GOLDMAN   • COLONOSCOPY N/A 02/21/2012    INT/EXT HEMORRHOIDS, DIVERTICULOSIS, 2 HYPERPLASTIC POLYPS, TORT (path: Rectum hyperplastic polyp)-Dr. Morgan Collins   • COLONOSCOPY N/A 4/24/2018    NTEH, Diverticulosis, tortuous colon, IH.  No specimens collected    • COLONOSCOPY N/A 02/27/2015    Non-thrombosed external hemorrhoids found on perianal exam, diverticulosis in the sigmoid and descending colon, tortuous colon, normal ileum, internal hemorrhoids-Dr. Morgan Collins   • COLONOSCOPY N/A 7/9/2020    Procedure: COLONOSCOPY into cecum with cold biopsy polypectomy x1;  Surgeon: Emanuel Clark MD;  Location: [facility] ENDOSCOPY;  Service: General;  Laterality: N/A;  Pre op: Diverticulitis, Family history of colon cancer  Post op: Polyp   • COLONOSCOPY W/ POLYPECTOMY N/A 05/06/2004    Diverticulosis, small recto-sigmoid polyps, otherwise normal (path: Colon at 20 cm hyperplastic polyp) -Dr. Morgan Collins   • COLOSTOMY CLOSURE N/A 5/28/2019    Procedure: COLOSTOMY TAKEDOWN;  Surgeon: Emanuel Clark MD;  Location: [facility] MAIN OR;  Service: General   • EXPLORATORY  LAPAROTOMY N/A 12/5/2018    Procedure: exploratory laparotomy with sigmoid resection, possible colostomy, APPENDECTOMY;  Surgeon: Emanuel Clark MD;  Location: [facility] OR;  Service: General   • JOINT REPLACEMENT     • KYPHOPLASTY N/A 12/21/2012    COMPRESSION FX OF L2   • TOTAL HIP ARTHROPLASTY Left 03/16/2004    Left bipolar hip replacement-Dr. Chioma Burch   • TOTAL KNEE ARTHROPLASTY Right 11/13/2013    Dr. Reza Longoria   • TRIGEMINAL NERVE DECOMPRESSION  2014    LEFT EAR      General Information     Row Name 12/21/20 1556          Physical Therapy Time and Intention    Document Type  evaluation  -     Mode of Treatment  physical therapy;individual therapy  -     Row Name 12/21/20 1556          General Information    Patient Profile Reviewed  yes  -     Prior Level of Function  independent:  -     Existing Precautions/Restrictions  fall;weight bearing;other (see comments) TTWB; no active hip abduction  -     Barriers to Rehab  medically complex  -     Row Name 12/21/20 1556          Living Environment    Lives With  spouse  -     Row Name 12/21/20 1556          Home Main Entrance    Number of Stairs, Main Entrance  other (see comments) 12  -     Row Name 12/21/20 1556          Stairs Within Home, Primary    Number of Stairs, Within Home, Primary  none  -     Row Name 12/21/20 1556          Cognition    Orientation Status (Cognition)  oriented x 3  -     Row Name 12/21/20 1556          Safety Issues, Functional Mobility    Safety Issues Affecting Function (Mobility)  awareness of need for assistance;insight into deficits/self-awareness  -     Impairments Affecting Function (Mobility)  balance;endurance/activity tolerance;strength;pain;range of motion (ROM)  -       User Key  (r) = Recorded By, (t) = Taken By, (c) = Cosigned By    Initials Name Provider Type     Sadia Jo PT Physical Therapist        Mobility     Row Name 12/21/20 1554          Bed Mobility    Bed  Mobility  rolling right;supine-sit  -     Rolling Right [place] (Bed Mobility)  moderate assist (50% patient effort);verbal cues  -     Supine-Sit [place] (Bed Mobility)  minimum assist (75% patient effort);verbal cues  -     Assistive Device (Bed Mobility)  bed rails;head of bed elevated  -     Comment (Bed Mobility)  mutliple attempts for rolling as pt. initially very painful and fearful, once on side pt. able to assist with UE to push up to sitting  -     Row Name 12/21/20 1552          Transfers    Comment (Transfers)  able to stand pivot to chair with min-modA, maintaining NWB on L LE 2' pain/fear  -     Row Name 12/21/20 1558          Bed-Chair Transfer    Bed-Chair [place] (Transfers)  minimum assist (75% patient effort);moderate assist (50% patient effort);verbal cues;nonverbal cues (demo/gesture);1 person assist  -     Assistive Device (Bed-Chair Transfers)  walker, front-wheeled  -     Row Name 12/21/20 1552          Sit-Stand Transfer    Sit-Stand [place] (Transfers)  nonverbal cues (demo/gesture);verbal cues;minimum assist (75% patient effort);1 person assist  -     Assistive Device (Sit-Stand Transfers)  walker, front-wheeled  -     Row Name 12/21/20 1551          Gait/Stairs (Locomotion)    [place] Level (Gait)  unable to assess  -     Row Name 12/21/20 1557          Mobility    Extremity Weight-bearing Status  left lower extremity  -     Left Lower Extremity (Weight-bearing Status)  (S) toe touch weight-bearing (TTWB)  -       User Key  (r) = Recorded By, (t) = Taken By, (c) = Cosigned By    Initials Name Provider Type    Sadia Wright PT Physical Therapist        Obj/Interventions     Row Name 12/21/20 1600          Range of Motion Comprehensive    General Range of Motion  lower extremity range of motion deficits identified  -     Comment, General Range of Motion  no active L hip abduction, B knee WFL  -     Row Name 12/21/20 1600           Strength Comprehensive (MMT)    General Manual Muscle Testing (MMT) Assessment  lower extremity strength deficits identified  -     Comment, General Manual Muscle Testing (MMT) Assessment  generalized weakness  -     Row Name 12/21/20 1600          Balance    Balance Assessment  sitting static balance  -     Static Sitting Balance  WFL;sitting, edge of bed  -     Comment, Balance  offloading L and leaning to R  -     Row Name 12/21/20 1600          Sensory Assessment (Somatosensory)    Sensory Assessment (Somatosensory)  sensation intact  -       User Key  (r) = Recorded By, (t) = Taken By, (c) = Cosigned By    Initials Name Provider Type     Sadia Jo, PT Physical Therapist        Goals/Plan     Row Name 12/21/20 1603          Bed Mobility Goal 1 (PT)    Activity/Assistive Device (Bed Mobility Goal 1, PT)  bed mobility activities, all  -MH     [place] Level/Cues Needed (Bed Mobility Goal 1, PT)  standby assist  -     Time Frame (Bed Mobility Goal 1, PT)  2 weeks  -     Row Name 12/21/20 1603          Transfer Goal 1 (PT)    Activity/Assistive Device (Transfer Goal 1, PT)  transfers, all  -MH     [place] Level/Cues Needed (Transfer Goal 1, PT)  standby assist  -     Time Frame (Transfer Goal 1, PT)  2 weeks  -     Row Name 12/21/20 1603          Gait Training Goal 1 (PT)    Activity/Assistive Device (Gait Training Goal 1, PT)  gait (walking locomotion);walker, rolling  -     [place] Level (Gait Training Goal 1, PT)  contact guard assist  -     Distance (Gait Training Goal 1, PT)  50  -       User Key  (r) = Recorded By, (t) = Taken By, (c) = Cosigned By    Initials Name Provider Type     Sadia Jo, PT Physical Therapist        Clinical Impression     Row Name 12/21/20 1600          Pain    Additional Documentation  Pain Scale: FACES Pre/Post-Treatment (Group)  -     Row Name 12/21/20 1600          Pain Scale: Numbers Pre/Post-Treatment    Pain  Intervention(s)  Repositioned  -     Row Name 12/21/20 1600          Pain Scale: FACES Pre/Post-Treatment    Pain: FACES Scale, Pretreatment  4-->hurts little more  -     Posttreatment Pain Rating  6-->hurts even more  -     Pain Location - Side  Left  -     Pain Location  hip  -     Row Name 12/21/20 1600          Plan of Care Review    Plan of Care Reviewed With  patient  -     Outcome Summary  Pt. is a 79 y/o female admitted after fall from home and sustaining L femur fracture. Pt. has history of partial L hip arthroplasty which appeared stable upon imaging, plan at this time in non-operative management, TTWB, and no active hip abduction. Pt. functioning below baseline with impaired strength, ROM, and mobility. Pt. lives with spouse in single level home with 12 steps to enter. Pt. required min-modA for mobility this date, significant pain with rolling, however, improved once sitting EOB. Able to maintain WB'ing status with stand pivot transfer and rwx. Anticiapte pt. will need rehab placement at D/C based on number of stairs pt. has at home and current need for assistance. Will continue to monitor.  -     Row Name 12/21/20 1600          Therapy Assessment/Plan (PT)    Rehab Potential (PT)  good, to achieve stated therapy goals  -     Criteria for Skilled Interventions Met (PT)  yes  -     Predicted Duration of Therapy Intervention (PT)  2 weeks  -[facility] Name 12/21/20 1600          Positioning and Restraints    Pre-Treatment Position  in bed  -     Post Treatment Position  chair  -     In Chair  notified nsg;reclined;sitting;call light within reach;encouraged to call for assist;exit alarm on  -       User Key  (r) = Recorded By, (t) = Taken By, (c) = Cosigned By    Initials Name Provider Type    Sadia Wright, PT Physical Therapist        Outcome Measures     Row Name 12/21/20 1606          How much help from another person do you currently need...    Turning from your back to your  side while in flat bed without using bedrails?  2  -MH     Moving from lying on back to sitting on the side of a flat bed without bedrails?  2  -MH     Moving to and from a bed to a chair (including a wheelchair)?  2  -MH     Standing up from a chair using your arms (e.g., wheelchair, bedside chair)?  3  -MH     Climbing 3-5 steps with a railing?  1  -MH     To walk in hospital room?  1  -     AM-PAC 6 Clicks Score (PT)  11  -     Row Name 12/21/20 1604          Functional Assessment    Outcome Measure Options  AM-PAC 6 Clicks Basic Mobility (PT)  -       User Key  (r) = Recorded By, (t) = Taken By, (c) = Cosigned By    Initials Name Provider Type     Sadia Jo, FABRIZIO Physical Therapist        Physical Therapy Education                 Title: PT OT SLP Therapies (Done)     Topic: Physical Therapy (Done)     Point: Mobility training (Done)     Learning Progress Summary           Patient Acceptance, E,TB,D, VU,DU,NR by  at 12/21/2020 1604                   Point: Home exercise program (Done)     Learning Progress Summary           Patient Acceptance, E,TB,D, VU,DU,NR by  at 12/21/2020 1604                   Point: Body mechanics (Done)     Learning Progress Summary           Patient Acceptance, E,TB,D, VU,DU,NR by  at 12/21/2020 1604                   Point: Precautions (Done)     Learning Progress Summary           Patient Acceptance, E,TB,D, VU,DU,NR by  at 12/21/2020 1604                               User Key     Initials Effective Dates Name Provider Type Discipline     05/26/20 -  Sadia Jo, FABRIZIO Physical Therapist PT              PT Recommendation and Plan  Planned Therapy Interventions (PT): balance training, bed mobility training, gait training, home exercise program, strengthening, stair training, ROM (range of motion), postural re-education, patient/family education, transfer training  Plan of Care Reviewed With: patient  Outcome Summary: Pt. is a 79 y/o female admitted after fall  from home and sustaining L femur fracture. Pt. has history of partial L hip arthroplasty which appeared stable upon imaging, plan at this time in non-operative management, TTWB, and no active hip abduction. Pt. functioning below baseline with impaired strength, ROM, and mobility. Pt. lives with spouse in single level home with 12 steps to enter. Pt. required min-modA for mobility this date, significant pain with rolling, however, improved once sitting EOB. Able to maintain WB'ing status with stand pivot transfer and rwx. Anticiapte pt. will need rehab placement at D/C based on number of stairs pt. has at home and current need for assistance. Will continue to monitor.     Time Calculation:   PT Charges     Row Name 12/21/20 1605             Time Calculation    Start Time  1450  -      Stop Time  1520  -      Time Calculation (min)  30 min  -      PT Received On  12/21/20  -      PT - Next Appointment  12/22/20  -      PT Goal Re-Cert Due Date  01/04/21  -         Time Calculation- PT    Total Timed Code Minutes- PT  4 minute(s)  -        User Key  (r) = Recorded By, (t) = Taken By, (c) = Cosigned By    Initials Name Provider Type     Sadia Jo, PT Physical Therapist        Therapy Charges for Today     Code Description Service Date Service Provider Modifiers Qty    60651074098 HC PT EVAL MOD COMPLEXITY 2 12/21/2020 Sadia Jo, PT GP 1    89360258548 HC PT THERAPEUTIC ACT EA 15 MIN 12/21/2020 Sadia Jo, PT GP 1    63186791646 HC GAIT TRAINING EA 15 MIN 12/21/2020 Sadia Jo, PT GP 1          PT G-Codes  Outcome Measure Options: AM-PAC 6 Clicks Basic Mobility (PT)  AM-PAC 6 Clicks Score (PT): 11    Sadia Jo PT  12/21/2020

## 2020-12-21 NOTE — PLAN OF CARE
Goal Outcome Evaluation:  Plan of Care Reviewed With: patient  Progress: improving  Outcome Summary: Pt admitted from ER with peroprosthetic hip fracture. No surgery at this time. TTWB. Diet ordered. PO pain medication given per order. Will continue to monitor.

## 2020-12-21 NOTE — PLAN OF CARE
Goal Outcome Evaluation:  Plan of Care Reviewed With: patient  Progress: improving  Outcome Summary: Pt. is a 79 y/o female admitted after fall from home and sustaining L femur fracture. Pt. has history of partial L hip arthroplasty which appeared stable upon imaging, plan at this time in non-operative management, TTWB, and no active hip abduction. Pt. functioning below baseline with impaired strength, ROM, and mobility. Pt. lives with spouse in single level home with 12 steps to enter. Pt. required min-modA for mobility this date, significant pain with rolling, however, improved once sitting EOB. Able to maintain WB'ing status with stand pivot transfer and rwx. Anticiapte pt. will need rehab placement at D/C based on number of stairs pt. has at home and current need for assistance. Will continue to monitor.    Patient was wearing a face mask during this therapy encounter. Therapist used appropriate personal protective equipment including eye protection, mask, and gloves.  Mask used was standard procedure mask. Appropriate PPE was worn during the entire therapy session. Hand hygiene was completed before and after therapy session. Patient is not in enhanced droplet precautions.

## 2020-12-21 NOTE — CONSULTS
Patient Name: Moriah Petty  :1940  80 y.o.    Date of Admission: 2020  Encounter Provider: Mimi Sampson MD  Date of Encounter Visit: 20  Place of Service: [facility] CARDIOLOGY  Referring Provider: Tereso Shin MD  Patient Care Team:  Jarred Carrizales Jr., MD as PCP - General (Family Medicine)  Anibal Trujillo Jr., MD (Cardiology)  Gordon Brewster MD as Consulting Physician (Obstetrics and Gynecology)      Chief complaint: s/p mechanical fall    Reason for Consult: preop    History of Present Illness:       This is a lady followed by Dr. Trujillo with [place] [facility].  She was last seen in 2019.  She had a heart cath in  which showed normal coronaries.  She had an echo in  which showed normal LV function with ejection fraction of 60%, mild to moderate left ventricular hypertrophy.  Normal stress test in .    She came to the hospital after getting up in the middle night.  Her leg was numb and she fell.  She has been diagnosed with a hip fracture.  We are consulted for preoperative evaluation.    She has not been having any chest pain.  She feels like her breathing is generally at baseline.  She has not been having any palpitations.    Past Medical History:   Diagnosis Date   • Adenomatous polyp of colon 2017   • Anemia    • Bursitis of elbow     RIGHT ELBOW   • Carotid stenosis     LESS THAN 50%, CAROTID ULTRASOUND AT [facility]    • Colitis    • Colostomy in place (CMS/[facility])    • Depression    • Diverticulosis    • GERD (gastroesophageal reflux disease)    • History of blood transfusion 2013   • History of trigeminal neuralgia    • Hyperlipidemia    • Hypertension    • Hypothyroidism    • Insomnia    • OA (osteoarthritis)    • Osteoporosis    • PVC (premature ventricular contraction)    • PVC's (premature ventricular contractions)    • Sleep apnea     DOES NOT WEAR CPAP       Past Surgical History:   Procedure Laterality Date   •  BREAST BIOPSY Left 05/10/2018    Ultrasound-guided mammotome vacuum assisted left breast biopsy with placement of metallic clip (path: fragments of fibroepithelial lesion, consistent with fibroadenoma)-Dr. Sree Glass, [facility]   • BRONCHOSCOPY N/A 08/29/2001    Dr. Christiano Horton   • CARDIAC CATHETERIZATION  03/2002   • CARDIAC CATHETERIZATION Left 11/28/2012    Dr. Anibal Trujillo   • CHOLECYSTECTOMY N/A 11/20/2013    DR. ESME GOLDMAN   • COLONOSCOPY N/A 02/21/2012    INT/EXT HEMORRHOIDS, DIVERTICULOSIS, 2 HYPERPLASTIC POLYPS, TORT (path: Rectum hyperplastic polyp)-Dr. Morgan Collins   • COLONOSCOPY N/A 4/24/2018    NTEH, Diverticulosis, tortuous colon, IH.  No specimens collected    • COLONOSCOPY N/A 02/27/2015    Non-thrombosed external hemorrhoids found on perianal exam, diverticulosis in the sigmoid and descending colon, tortuous colon, normal ileum, internal hemorrhoids-Dr. Morgan Collins   • COLONOSCOPY N/A 7/9/2020    Procedure: COLONOSCOPY into cecum with cold biopsy polypectomy x1;  Surgeon: Emanuel Clark MD;  Location: [facility] ENDOSCOPY;  Service: General;  Laterality: N/A;  Pre op: Diverticulitis, Family history of colon cancer  Post op: Polyp   • COLONOSCOPY W/ POLYPECTOMY N/A 05/06/2004    Diverticulosis, small recto-sigmoid polyps, otherwise normal (path: Colon at 20 cm hyperplastic polyp) -Dr. Morgan Collins   • COLOSTOMY CLOSURE N/A 5/28/2019    Procedure: COLOSTOMY TAKEDOWN;  Surgeon: Emanuel Clark MD;  Location: [facility] MAIN OR;  Service: General   • EXPLORATORY LAPAROTOMY N/A 12/5/2018    Procedure: exploratory laparotomy with sigmoid resection, possible colostomy, APPENDECTOMY;  Surgeon: Emanuel Clark MD;  Location: [facility] MAIN OR;  Service: General   • JOINT REPLACEMENT     • KYPHOPLASTY N/A 12/21/2012    COMPRESSION FX OF L2   • TOTAL HIP ARTHROPLASTY Left 03/16/2004    Left bipolar hip replacement-Dr. Chioma Burch   • TOTAL KNEE ARTHROPLASTY Right 11/13/2013    Dr. Cobb  Swati   • TRIGEMINAL NERVE DECOMPRESSION  2014    LEFT EAR          Prior to Admission medications    Medication Sig Start Date End Date Taking? Authorizing Provider   Calcium Carb-Cholecalciferol (CALCIUM + D3) 600-200 MG-UNIT tablet Take 1 tablet by mouth 2 (Two) Times a Day. 4/10/13  Yes Domo Orozco MD   carvedilol (COREG) 12.5 MG tablet Take 12.5 mg by mouth 2 (Two) Times a Day With Meals.   Yes Domo Orozco MD   Denosumab (PROLIA SC) Inject  under the skin into the appropriate area as directed Every 6 (Six) Months. Dose unknown, injection every 6 months; DEC 3rd last injection   Yes Domo Orozco MD   desonide (DESOWEN) 0.05 % cream Apply  topically to the appropriate area as directed As Needed for Irritation.   Yes Domo Orozco MD   levothyroxine (SYNTHROID, LEVOTHROID) 25 MCG tablet TAKE 1 TABLET BY MOUTH EVERY DAY 8/14/20  Yes Jarred Carrizales Jr., MD   losartan (COZAAR) 50 MG tablet Take 1 tablet by mouth 2 (Two) Times a Day.  Patient taking differently: Take 50 mg by mouth Daily. 10/16/20  Yes Jarred Carrizales Jr., MD   Multiple Vitamins-Minerals (V-C FORTE PO) Take 1 mg by mouth Daily.   Yes Domo Orozco MD   Omega-3 Fatty Acids (FISH OIL) 1200 MG capsule capsule Take 1,200 mg by mouth 2 (Two) Times a Day With Meals. 4/10/13  Yes Domo Orozco MD   omeprazole (priLOSEC) 20 MG capsule Take 20.6 mg by mouth Every Other Day. 9/24/14  Yes Domo Orozco MD   rosuvastatin (CRESTOR) 5 MG tablet TAKE 1 TABLET BY MOUTH EVERY DAY 11/16/20  Yes Jarred Carrizales Jr., MD   Aspirin (ASPIR-81 PO) Take 81 mg by mouth Every Night.  12/21/20 Yes Domo Orozco MD   denosumab (PROLIA) 60 MG/ML solution syringe Inject 60 mg under the skin into the appropriate area as directed Every 6 (Six) Months.  12/21/20  Domo Orozco MD       Allergies   Allergen Reactions   • Morphine And Related Itching and Swelling       Social History     Socioeconomic History   •  "Marital status:      Spouse name: Not on file   • Number of children: Not on file   • Years of education: Not on file   • Highest education level: Not on file   Tobacco Use   • Smoking status: Former Smoker     Packs/day: 0.50     Years: 30.00     Pack years: 15.00     Types: Cigarettes     Start date: 1957     Quit date: 1989     Years since quittin.6   • Smokeless tobacco: Never Used   Substance and Sexual Activity   • Alcohol use: Yes     Alcohol/week: 7.0 standard drinks     Types: 2 Shots of liquor, 2 Standard drinks or equivalent per week     Comment: 2 DRINKS PER DAY   • Drug use: No   • Sexual activity: Defer     Partners: Male     Birth control/protection: Post-menopausal       Family History   Problem Relation Age of Onset   • Ovarian cancer Daughter    • Cancer Daughter    • Colon cancer Mother    • Arthritis Mother    • Stomach cancer Mother    • Cancer Mother    • Stomach cancer Maternal Grandfather    • Cancer Maternal Grandfather    • Glaucoma Father    • Arthritis Father    • Heart disease Father    • Hypertension Father    • Alcohol abuse Son    • Stroke Paternal Grandmother    • Malig Hyperthermia Neg Hx        REVIEW OF SYSTEMS:   All other systems reviewed and negative.        Objective:     Vitals:    20 0530 20 0630 20 0706 20 0801   BP: 129/97 130/66 122/66 147/66   BP Location: Right arm Right arm  Right arm   Patient Position:    Lying   Pulse: 70 77  69   Resp: 16 18  18   Temp:    96.9 °F (36.1 °C)   TempSrc:    Skin   SpO2: 96% 96% 100% 95%   Weight:    75.2 kg (165 lb 11.2 oz)   Height:    170.2 cm (67\")     Body mass index is 25.95 kg/m².  No intake or output data in the 24 hours ending 20 0940    Constitutional: She is oriented to person, place, and time. She appears well-developed. She does not appear ill.   HENT:   Head: Normocephalic and atraumatic. Head is without contusion.   Right Ear: Hearing normal. No drainage.   Left Ear: " Hearing normal. No drainage.   Nose: No nasal deformity. No epistaxis.   Eyes: Lids are normal. Right eye exhibits no exudate. Left eye exhibits no exudate.  Neck: No JVD present. Carotid bruit is not present. No tracheal deviation present. No thyroid mass and no thyromegaly present.   Cardiovascular: Normal rate, regular rhythm and normal heart sounds.    Pulses:       Posterior tibial pulses are 2+ on the right side, and 2+ on the left side.   Pulmonary/Chest: Effort normal and breath sounds normal.   Abdominal: Soft. Normal appearance and bowel sounds are normal. There is no tenderness.   Musculoskeletal: No marked joint deformities   Neurological: She is alert and oriented to person, place, and time. She has normal strength.   Skin: Skin is warm, dry and intact. No rash noted.   Psychiatric: She has a normal mood and affect. Her behavior is normal. Thought content normal.   Vitals reviewed      Lab Review:     Results from last 7 days   Lab Units 12/21/20  0426   SODIUM mmol/L 135*   POTASSIUM mmol/L 3.9   CHLORIDE mmol/L 101   CO2 mmol/L 24.8   BUN mg/dL 14   CREATININE mg/dL 0.73   CALCIUM mg/dL 9.0   BILIRUBIN mg/dL 0.3   ALK PHOS U/L 55   ALT (SGPT) U/L 16   AST (SGOT) U/L 19   GLUCOSE mg/dL 118*         Results from last 7 days   Lab Units 12/21/20  0426   WBC 10*3/mm3 5.84   HEMOGLOBIN g/dL 12.4   HEMATOCRIT % 37.1   PLATELETS 10*3/mm3 197     Results from last 7 days   Lab Units 12/21/20  0426   INR  0.97   APTT seconds 29.2               Previous EKG 10/12/20          I reviewed her EKG from today and it is normal.        Assessment and Plan:       1.  Preoperative evaluation.  She is low risk for cardiovascular event with surgery.  Patrick score of 0 conferring 0.4% chance of cardiovascular event with surgery.  I recommend that she proceed to surgery if it is indicated without further testing.  2.  Hypertension.  3.  History of left ventricular hypertrophy    Please call if there are any cardiac  complications after surgery.  It looks like she is scheduled to see Dr. Wallace in January.  I will switch that over and have her see my nurse practitioner Kandy for follow-up in January.  If she wants to push that appointment back because of her hip, that would be okay.    Mimi Sampson MD  12/21/20  09:40 EST

## 2020-12-21 NOTE — PROGRESS NOTES
CT scan and x-rays were reviewed by Dr. Mack.  After further review of the CT scan there does not seem to be any loosening of the component.  The fracture site is nondisplaced.  Therefore, we will proceed with nonoperative management.  However, if the fracture site worsens then he may need to consider surgical intervention in the future.  She will be toe touch weightbearing to the left lower extremity.  No active abduction.

## 2020-12-21 NOTE — ED NOTES
TO ER from home via EMS.   Pt got up to use the restroom.  Left foot was asleep, pt stood and collapsed. Left Hip Pain. Pt states that she is having trouble straighten the hip out.   Pts hip is very tender on palpation. Pain is rated at an 8/10.  2004 partial Left hip replacement due to fracture.     Patient was wearing a face mask throughout encounter.  I wore personal protective equipment throughout the encounter.  Hand hygiene was performed before and after patient encounter.       Roxanna Campos RN  12/21/20 0358

## 2020-12-21 NOTE — CONSULTS
Orthopaedic Consultation      Patient: Moriah Petty    Date of Admission: 12/21/2020  3:58 AM    YOB: 1940    Medical Record Number: [id number]    Attending Physician:  Tereso Shin MD    Consulting Physician:  Jovi Morales PA-C        Chief Complaints: Left hip pain status post mechanical fall    History of Present Illness:    The patient is a pleasant 80-year-old female who presented to [facility] status post mechanical fall.  Patient states that she was getting up in the middle of the night.  She was going to the bathroom.  Her foot was numb.  Her leg gave out on her and she fell.  She denies any loss of consciousness or head trauma.  She fell onto her left side.  She started having some sharp pain within the left hip.  Mainly localized to the lateral side of the hip. she was brought to the emergency room.  Initial x-rays did show fracture of the greater trochanter.  The previous hip replacement did look stable on x-rays.  However for concern and management we did order a CT scan of the left hip to further evaluate this.  Patient does have previous history of left bipolar hemiarthroplasty by Dr. Burch in roughly 2004.  She does have previous history of total knee replacement by Dr. Longoria on the right side.  Orthopedics has been consulted for further evaluation and management of the fracture.    Allergies:   Allergies   Allergen Reactions   • Morphine And Related Itching and Swelling       Medications:   Home Medications:  Medications Prior to Admission   Medication Sig Dispense Refill Last Dose   • Calcium Carb-Cholecalciferol (CALCIUM + D3) 600-200 MG-UNIT tablet Take 1 tablet by mouth 2 (Two) Times a Day.   12/20/2020 at Unknown time   • carvedilol (COREG) 12.5 MG tablet Take 12.5 mg by mouth 2 (Two) Times a Day With Meals.   12/20/2020 at Unknown time   • Denosumab (PROLIA SC) Inject  under the skin into the appropriate area as directed Every 6 (Six) Months.  Dose unknown, injection every 6 months; DEC 3rd last injection   Past Month at Unknown time   • desonide (DESOWEN) 0.05 % cream Apply  topically to the appropriate area as directed As Needed for Irritation.   12/20/2020 at Unknown time   • levothyroxine (SYNTHROID, LEVOTHROID) 25 MCG tablet TAKE 1 TABLET BY MOUTH EVERY DAY 90 tablet 1 12/20/2020 at Unknown time   • losartan (COZAAR) 50 MG tablet Take 1 tablet by mouth 2 (Two) Times a Day. (Patient taking differently: Take 50 mg by mouth Daily.) 180 tablet 3 12/20/2020 at Unknown time   • Multiple Vitamins-Minerals (V-C FORTE PO) Take 1 mg by mouth Daily.   12/20/2020 at Unknown time   • Omega-3 Fatty Acids (FISH OIL) 1200 MG capsule capsule Take 1,200 mg by mouth 2 (Two) Times a Day With Meals.   12/20/2020 at Unknown time   • omeprazole (priLOSEC) 20 MG capsule Take 20.6 mg by mouth Every Other Day.   12/20/2020 at Unknown time   • rosuvastatin (CRESTOR) 5 MG tablet TAKE 1 TABLET BY MOUTH EVERY DAY 90 tablet 1 12/20/2020 at Unknown time       Current Medications:  Scheduled Meds:carvedilol, 12.5 mg, Oral, BID With Meals  sodium chloride, 10 mL, Intravenous, Q12H      Continuous Infusions:sodium chloride, 100 mL/hr      PRN Meds:.•  acetaminophen  •  HYDROcodone-acetaminophen  •  HYDROmorphone **AND** naloxone  •  ondansetron **OR** ondansetron  •  senna-docusate sodium  •  [COMPLETED] Insert peripheral IV **AND** sodium chloride  •  sodium chloride    Past Medical History:   Diagnosis Date   • Adenomatous polyp of colon 11/6/2017   • Anemia    • Bursitis of elbow     RIGHT ELBOW   • Carotid stenosis     LESS THAN 50%, CAROTID ULTRASOUND AT [facility]    • Colitis    • Colostomy in place (CMS/[facility])    • Depression    • Diverticulosis    • GERD (gastroesophageal reflux disease)    • History of blood transfusion 11/2013   • History of trigeminal neuralgia    • Hyperlipidemia    • Hypertension    • Hypothyroidism    • Insomnia    • OA (osteoarthritis)    • Osteoporosis     • PVC (premature ventricular contraction)    • PVC's (premature ventricular contractions)    • Sleep apnea     DOES NOT WEAR CPAP     Past Surgical History:   Procedure Laterality Date   • BREAST BIOPSY Left 05/10/2018    Ultrasound-guided mammotome vacuum assisted left breast biopsy with placement of metallic clip (path: fragments of fibroepithelial lesion, consistent with fibroadenoma)-Dr. Sree Glass, [facility]   • BRONCHOSCOPY N/A 08/29/2001    Dr. Christiano Horton   • CARDIAC CATHETERIZATION  03/2002   • CARDIAC CATHETERIZATION Left 11/28/2012    Dr. Anibal Trujillo   • CHOLECYSTECTOMY N/A 11/20/2013    DR. ESME GOLDMAN   • COLONOSCOPY N/A 02/21/2012    INT/EXT HEMORRHOIDS, DIVERTICULOSIS, 2 HYPERPLASTIC POLYPS, TORT (path: Rectum hyperplastic polyp)-Dr. Morgan Collins   • COLONOSCOPY N/A 4/24/2018    NTEH, Diverticulosis, tortuous colon, IH.  No specimens collected    • COLONOSCOPY N/A 02/27/2015    Non-thrombosed external hemorrhoids found on perianal exam, diverticulosis in the sigmoid and descending colon, tortuous colon, normal ileum, internal hemorrhoids-Dr. Morgan Collins   • COLONOSCOPY N/A 7/9/2020    Procedure: COLONOSCOPY into cecum with cold biopsy polypectomy x1;  Surgeon: Emanuel Clark MD;  Location: [facility] ENDOSCOPY;  Service: General;  Laterality: N/A;  Pre op: Diverticulitis, Family history of colon cancer  Post op: Polyp   • COLONOSCOPY W/ POLYPECTOMY N/A 05/06/2004    Diverticulosis, small recto-sigmoid polyps, otherwise normal (path: Colon at 20 cm hyperplastic polyp) -Dr. Morgan Collins   • COLOSTOMY CLOSURE N/A 5/28/2019    Procedure: COLOSTOMY TAKEDOWN;  Surgeon: Emanuel Clark MD;  Location: [facility] MAIN OR;  Service: General   • EXPLORATORY LAPAROTOMY N/A 12/5/2018    Procedure: exploratory laparotomy with sigmoid resection, possible colostomy, APPENDECTOMY;  Surgeon: Emanuel Clark MD;  Location: [facility] MAIN OR;  Service: General   • JOINT REPLACEMENT     • KYPHOPLASTY  N/A 2012    COMPRESSION FX OF L2   • TOTAL HIP ARTHROPLASTY Left 2004    Left bipolar hip replacement-Dr. Chioma Burch   • TOTAL KNEE ARTHROPLASTY Right 2013    Dr. Reza Longoria   • TRIGEMINAL NERVE DECOMPRESSION  2014    LEFT EAR      Social History     Occupational History   • Not on file   Tobacco Use   • Smoking status: Former Smoker     Packs/day: 0.50     Years: 30.00     Pack years: 15.00     Types: Cigarettes     Start date: 1957     Quit date: 1989     Years since quittin.6   • Smokeless tobacco: Never Used   Substance and Sexual Activity   • Alcohol use: Yes     Alcohol/week: 7.0 standard drinks     Types: 2 Shots of liquor, 2 Standard drinks or equivalent per week     Comment: 2 DRINKS PER DAY   • Drug use: No   • Sexual activity: Defer     Partners: Male     Birth control/protection: Post-menopausal      Social History     Social History Narrative   • Not on file     Family History   Problem Relation Age of Onset   • Ovarian cancer Daughter    • Cancer Daughter    • Colon cancer Mother    • Arthritis Mother    • Stomach cancer Mother    • Cancer Mother    • Stomach cancer Maternal Grandfather    • Cancer Maternal Grandfather    • Glaucoma Father    • Arthritis Father    • Heart disease Father    • Hypertension Father    • Alcohol abuse Son    • Stroke Paternal Grandmother    • Malig Hyperthermia Neg Hx          Review of Systems:   No other pertinent positives or negatives other than what is mentioned in the HPI and below.  Constitutional: Negative for fatigue, fever, or weight loss  HEENT: No active headache.  Pulmonary: Patient denies SOA.  Cardiovascular: Patient denies any chest pain.  Gastrointestinal:  Patient denies active vomiting or diarrhea.  Musculoskeletal: Positive for left hip pain.  Neurological: Patient denies active dizziness or loss of consciousness.  Skin: Patient denies any active bleeding.    Vital signs in last 24 hours:  Temp:  [96.9 °F (36.1  "°C)-98.2 °F (36.8 °C)] 96.9 °F (36.1 °C)  Heart Rate:  [69-77] 69  Resp:  [16-18] 18  BP: (122-183)/(66-98) 147/66  Vitals:    12/21/20 0530 12/21/20 0630 12/21/20 0706 12/21/20 0801   BP: 129/97 130/66 122/66 147/66   BP Location: Right arm Right arm  Right arm   Patient Position:    Lying   Pulse: 70 77  69   Resp: 16 18  18   Temp:    96.9 °F (36.1 °C)   TempSrc:    Skin   SpO2: 96% 96% 100% 95%   Weight:    75.2 kg (165 lb 11.2 oz)   Height:    170.2 cm (67\")          Physical Exam: 80 y.o. female         General Appearance:  Alert, cooperative, in no acute distress    HEENT:    Atraumatic, Pupils are equal   Neck:   Cervical spine midline, no appreciable JVD   Lungs:     Breathing non-labored and chest rise symmetric    Heart:   Abdomen:     Rectal:       Extremities:   Pulses  Neurovascular:   Skin:   Musculoskeletal:      Pulse regular    Soft, Non-tender or distended    Deferred        No clubbing, cyanosis, or edema    Intact    Cranial nerves 2 - 12 grossly intact, sensation intact    No skin lesions  Focused physical examination of the left lower extremity was performed in the hospital today.  No overlying skin changes.  Previous incision over the posterior lateral aspect of the left hip is well-healed.  No erythema.  No induration.  No signs of infection.  She tolerates logroll without pain.  She does have a little bit of discomfort in the hip when doing hip flexion.  No abduction was performed due to the patient's fracture.  No ecchymosis.  She is slightly tender to palpation laterally.  EHL, FHL, TA, and GS are intact.  Compartments are soft.  Neurovascularly intact distally.  Distal pedal pulses are 2+.      Diagnostic Tests:    Results from last 7 days   Lab Units 12/21/20  0426   WBC 10*3/mm3 5.84   HEMOGLOBIN g/dL 12.4   HEMATOCRIT % 37.1   PLATELETS 10*3/mm3 197     Results from last 7 days   Lab Units 12/21/20  0426   SODIUM mmol/L 135*   POTASSIUM mmol/L 3.9   CHLORIDE mmol/L 101   CO2 mmol/L " 24.8   BUN mg/dL 14   CREATININE mg/dL 0.73   GLUCOSE mg/dL 118*   CALCIUM mg/dL 9.0     Results from last 7 days   Lab Units 12/21/20  0426   INR  0.97   APTT seconds 29.2     CHEST, LEFT HIP     HISTORY: Left hip pain. Injury.     COMPARISON: AP chest 12/04/2018.     SUPINE AP CHEST: There are diffuse increased interstitial markings which  appear progressive when compared to the exam on 12/04/2018. This could  be due to progressive interstitial disease with mild interstitial edema  with interstitial infiltrate. No focal airspace consolidation is  evident. Heart size is mildly enlarged. There is a calcified granuloma  in the right midlung. Cholecystectomy clips are noted. The bones are  osteopenic and there are compression deformities within the thoracic  spine.     CT PELVIS AND AP LATERAL LEFT HIP: There is a bipolar left hip  arthroplasty and there is a periprosthetic fracture extending vertically  from the lateral intertrochanteric region without displacement.     IMPRESSION:  1. Nondisplaced left periprosthetic fracture adjacent to bipolar hip  arthroplasty.  2. Progressive interstitial disease when compared to previous chest  x-ray 12/04/2018. This could represent mild interstitial edema or  infiltrates and needs correlation with clinical data.  3. Osteopenia. Multiple compression deformities of the thoracic spine.    TWO-VIEW LEFT FEMUR     HISTORY: Fracture of proximal left femur.     FINDINGS: The patient has recent hip x-rays demonstrating a fracture of  the proximal femur adjacent to the calcar of total hip replacement. The  remainder of the femur is intact. No other fracture is seen.    ECG 12 Lead  Order: [id number]  Status:  Preliminary result   Visible to patient:  No (not released) Next appt:  01/26/2021 at 11:00 AM in Cardiology (Maycol Wallace MD)  Component   Ref Range & Units 12/21/20 0828   QT Interval   ms 432 P       Narrative & Impression    HEART RATE= 63  bpm  RR Interval= 948  ms  NJ  Interval= 184  ms  P Horizontal Axis= 15  deg  P Front Axis= 1  deg  QRSD Interval= 89  ms  QT Interval= 432  ms  QRS Axis= 84  deg  T Wave Axis= 16  deg  - OTHERWISE NORMAL ECG -  Sinus rhythm  Borderline right axis deviation  Electronically Signed By:   Date and Time of Study: 2020-12-21 08:28:58      Specimen Collected: 12/21/20 08:28 Last Resulted: 12/21/20 08:28             Assessment:  Patient Active Problem List   Diagnosis   • Abnormal electrocardiogram   • Non-specific colitis   • Acute post-traumatic headache   • Postoperative anemia due to acute blood loss   • Arthritis   • Hematochezia   • Chest pain   • Compression fracture of lumbar vertebra (CMS/HCC)   • Closed wedge compression fracture of second lumbar vertebra (CMS/HCC)   • Constipation   • Dizziness   • Dyslipidemia   • Fatigue   • Ventricular premature beats   • Gastroesophageal reflux disease without esophagitis   • Hypertension   • Insomnia   • Left lower quadrant pain   • Low back pain   • Osteoporosis   • Premature atrial contraction   • Peripheral nerve disease   • Pneumonia   • Nausea   • Sensorineural hearing loss   • Shortness of breath   • Sinus bradycardia   • Sleep apnea   • Disorder of thyroid   • Trigeminal neuralgia   • Rectal bleeding   • Family history of colon cancer   • Adenomatous polyp of colon   • Hypothyroidism   • Osteoporosis, post-menopausal   • Diverticulitis of large intestine with abscess without bleeding   • Mild malnutrition (CMS/HCC)   • Pure hypercholesterolemia   • Colostomy status (CMS/HCC)   • History of colon polyps   • Diverticulitis   • Meningioma, cerebral (CMS/HCC)   • Cervical disc disorder with radiculopathy   • Closed comminuted intertrochanteric fracture of proximal end of left femur (CMS/HCC)         Plan:      I did have an extensive discussion with the patient in regards to her situation in the hospital today.  Have reviewed the above.  Initial x-rays did show fracture of the greater trochanter.  The  prosthesis does look stable on radiographs.  However for further evaluation we would like to get a CT scan of the left hip.  The CT scan has been ordered.  I will have Dr. Longoria review the CT scan of the left hip for further definitive management of either continuing with nonoperative management versus surgical intervention.  Discussed with the patient that if nonoperative management is performed then she would not be able to do any active abduction for 4 to 6 weeks.  However if surgical intervention is warranted in this would likely require trochanteric plate with cables.  Further recommendations to follow throughout her hospital course.  Until further review of the CT scan.  We will keep the patient n.p.o. at this time.  All questions answered.  Patient is in agreement with the plan.  Thank you very much for this consultation.    Patient was placed in face mask in first look. Patient was wearing facemask when I entered the room and throughout our encounter. I wore full protective equipment throughout this patient encounter including a face mask, eye shield, gown and gloves. Hand hygiene/washing of hands was performed before donning protective equipment and after removal when leaving the room.    Date: 12/21/2020  Jovi Morales PA-C

## 2020-12-21 NOTE — ED PROVIDER NOTES
" EMERGENCY DEPARTMENT ENCOUNTER    Room Number:  09/09  Date of encounter:  12/21/2020  PCP: Jarred Carrizales Jr., MD  Historian: Patient      HPI:  Chief Complaint: Fall  A complete HPI/ROS/PMH/PSH/SH/FH are unobtainable due to: None    Context: Moriah Petty is a 80 y.o. female who presents to the ED c/o severe left hip pain since falling earlier tonight.  The pain is located diffusely in the hip, does not radiate, is very difficult for her to move it due to the pain, and there is no numbness or tingling distally.  Patient claims no other injuries from the fall.    Patient states that she had to go to the bathroom tonight, and when she got up out of bed, she realized her left leg and foot were \"asleep\" and her leg gave out on her causing her to fall.    She has had hip replacement surgery on that side.  She says it was partial hip replacement surgery done by Dr. Burch in roughly 2004.  She has not had any problems with that prosthetic including no dislocations.      PAST MEDICAL HISTORY  Active Ambulatory Problems     Diagnosis Date Noted   • Abnormal electrocardiogram 03/04/2017   • Non-specific colitis 03/04/2017   • Acute post-traumatic headache 10/09/2013   • Postoperative anemia due to acute blood loss 03/04/2017   • Arthritis 03/04/2017   • Hematochezia 03/04/2017   • Chest pain 03/04/2017   • Compression fracture of lumbar vertebra (CMS/[facility]) 10/09/2013   • Closed wedge compression fracture of second lumbar vertebra (CMS/[facility]) 04/15/2013   • Constipation 03/04/2017   • Dizziness 03/04/2017   • Dyslipidemia 03/04/2017   • Fatigue 03/04/2017   • Ventricular premature beats 03/04/2017   • Gastroesophageal reflux disease without esophagitis 03/04/2017   • Hypertension 03/04/2017   • Insomnia 03/04/2017   • Left lower quadrant pain 03/04/2017   • Low back pain 08/04/2014   • Osteoporosis 03/04/2017   • Premature atrial contraction 03/04/2017   • 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   • Pneumonia 09/03/2014   • " Nausea 03/04/2017   • Sensorineural hearing loss 10/08/2014   • Shortness of breath 03/04/2017   • Sinus bradycardia 03/04/2017   • Sleep apnea 03/04/2017   • Disorder of thyroid 03/04/2017   • Trigeminal neuralgia 04/15/2013   • Rectal bleeding 11/06/2017   • Family history of colon cancer 11/06/2017   • Adenomatous polyp of colon 11/06/2017   • Hypothyroidism 01/23/2018   • Osteoporosis, 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   • Diverticulitis of large intestine with abscess without bleeding 12/04/2018   • Mild malnutrition (CMS/[facility]) 12/06/2018   • Pure hypercholesterolemia 04/12/2019   • Colostomy status (CMS/[facility]) 05/06/2019   • History of colon polyps 06/03/2020   • Diverticulitis 11/05/2018   • Meningioma, cerebral (CMS/[facility]) 10/16/2020   • Cervical disc disorder with radiculopathy 10/16/2020     Resolved Ambulatory Problems     Diagnosis Date Noted   • Hypotension 09/10/2014   • Chest pain, atypical 12/05/2018   • Hypokalemia 12/09/2018     Past Medical History:   Diagnosis Date   • Anemia    • Bursitis of elbow    • Carotid stenosis    • Colitis    • Colostomy in place (CMS/[facility])    • Depression    • Diverticulosis    • GERD (gastroesophageal reflux disease)    • History of blood transfusion 11/2013   • History of trigeminal neuralgia    • Hyperlipidemia    • OA (osteoarthritis)    • PVC (premature ventricular contraction)    • PVC's (premature ventricular contractions)          PAST SURGICAL HISTORY  Past Surgical History:   Procedure Laterality Date   • BREAST BIOPSY Left 05/10/2018    Ultrasound-guided mammotome vacuum assisted left breast biopsy with placement of metallic clip (path: fragments of fibroepithelial lesion, consistent with fibroadenoma)-Dr. Sree Glass, [facility]   • BRONCHOSCOPY N/A 08/29/2001    Dr. Christiano Horton   • CARDIAC CATHETERIZATION  03/2002   • CARDIAC CATHETERIZATION Left 11/28/2012    Dr. Anibal Trujillo   • CHOLECYSTECTOMY N/A 11/20/2013    DR. ESME GOLDMAN   • COLONOSCOPY N/A 02/21/2012     INT/EXT HEMORRHOIDS, DIVERTICULOSIS, 2 HYPERPLASTIC POLYPS, TORT (path: Rectum hyperplastic polyp)-Dr. Morgan Collins   • COLONOSCOPY N/A 4/24/2018    NTEH, Diverticulosis, tortuous colon, IH.  No specimens collected    • COLONOSCOPY N/A 02/27/2015    Non-thrombosed external hemorrhoids found on perianal exam, diverticulosis in the sigmoid and descending colon, tortuous colon, normal ileum, internal hemorrhoids-Dr. Morgan Collins   • COLONOSCOPY N/A 7/9/2020    Procedure: COLONOSCOPY into cecum with cold biopsy polypectomy x1;  Surgeon: Emanuel Clark MD;  Location: [facility] ENDOSCOPY;  Service: General;  Laterality: N/A;  Pre op: Diverticulitis, Family history of colon cancer  Post op: Polyp   • COLONOSCOPY W/ POLYPECTOMY N/A 05/06/2004    Diverticulosis, small recto-sigmoid polyps, otherwise normal (path: Colon at 20 cm hyperplastic polyp) -Dr. Morgan Collins   • COLOSTOMY CLOSURE N/A 5/28/2019    Procedure: COLOSTOMY TAKEDOWN;  Surgeon: Emanuel Clark MD;  Location: [facility] OR;  Service: General   • EXPLORATORY LAPAROTOMY N/A 12/5/2018    Procedure: exploratory laparotomy with sigmoid resection, possible colostomy, APPENDECTOMY;  Surgeon: Emanuel Calrk MD;  Location: [facility] OR;  Service: General   • JOINT REPLACEMENT     • KYPHOPLASTY N/A 12/21/2012    COMPRESSION FX OF L2   • TOTAL HIP ARTHROPLASTY Left 03/16/2004    Left bipolar hip replacement-Dr. Chioma Burch   • TOTAL KNEE ARTHROPLASTY Right 11/13/2013    Dr. Reza Longoria   • TRIGEMINAL NERVE DECOMPRESSION  2014    LEFT EAR          FAMILY HISTORY  Family History   Problem Relation Age of Onset   • Ovarian cancer Daughter    • Cancer Daughter    • Colon cancer Mother    • Arthritis Mother    • Stomach cancer Mother    • Cancer Mother    • Stomach cancer Maternal Grandfather    • Cancer Maternal Grandfather    • Glaucoma Father    • Arthritis Father    • Heart disease Father    • Hypertension Father    • Alcohol abuse Son    •  Stroke Paternal Grandmother    • Malig Hyperthermia Neg Hx          SOCIAL HISTORY  Social History     Socioeconomic History   • Marital status:      Spouse name: Not on file   • Number of children: Not on file   • Years of education: Not on file   • Highest education level: Not on file   Tobacco Use   • Smoking status: Former Smoker     Packs/day: 0.50     Years: 30.00     Pack years: 15.00     Types: Cigarettes     Start date: 1957     Quit date: 1989     Years since quittin.6   • Smokeless tobacco: Never Used   Substance and Sexual Activity   • Alcohol use: Yes     Alcohol/week: 7.0 standard drinks     Types: 2 Shots of liquor, 2 Standard drinks or equivalent per week     Comment: 2 DRINKS PER DAY   • Drug use: No   • Sexual activity: Defer     Partners: Male     Birth control/protection: Post-menopausal         ALLERGIES  Morphine and related        REVIEW OF SYSTEMS  Review of Systems     All systems reviewed and negative except for those discussed in HPI.       PHYSICAL EXAM    I have reviewed the triage vital signs and nursing notes.    ED Triage Vitals [20 0354]   Temp Heart Rate Resp BP SpO2   98.2 °F (36.8 °C) 71 16 (!) 183/98 96 %      Temp src Heart Rate Source Patient Position BP Location FiO2 (%)   -- -- -- -- --       Physical Exam  GENERAL: Awake and alert, distressed due to pain  HENT: nares patent  EYES: no scleral icterus  CV: regular rhythm, regular rate  RESPIRATORY: normal effort  ABDOMEN: soft  MUSCULOSKELETAL: no deformity.  There is significant tenderness palpation on the left hip, she is holding it with flexion at the knee for comfort, but it is not rotated or shortened.  Range of motion evaluation is not possible due to the pain.  She is neurovascular intact distally.  NEURO: alert, moves all extremities, follows commands  SKIN: warm, dry        LAB RESULTS  Recent Results (from the past 24 hour(s))   Comprehensive Metabolic Panel    Collection Time: 20   4:26 AM    Specimen: Blood   Result Value Ref Range    Glucose 118 (H) 65 - 99 mg/dL    BUN 14 8 - 23 mg/dL    Creatinine 0.73 0.57 - 1.00 mg/dL    Sodium 135 (L) 136 - 145 mmol/L    Potassium 3.9 3.5 - 5.2 mmol/L    Chloride 101 98 - 107 mmol/L    CO2 24.8 22.0 - 29.0 mmol/L    Calcium 9.0 8.6 - 10.5 mg/dL    Total Protein 7.1 6.0 - 8.5 g/dL    Albumin 4.30 3.50 - 5.20 g/dL    ALT (SGPT) 16 1 - 33 U/L    AST (SGOT) 19 1 - 32 U/L    Alkaline Phosphatase 55 39 - 117 U/L    Total Bilirubin 0.3 0.0 - 1.2 mg/dL    eGFR Non African Amer 77 >60 mL/min/1.73    Globulin 2.8 gm/dL    A/G Ratio 1.5 g/dL    BUN/Creatinine Ratio 19.2 7.0 - 25.0    Anion Gap 9.2 5.0 - 15.0 mmol/L   Protime-INR    Collection Time: 12/21/20  4:26 AM    Specimen: Blood   Result Value Ref Range    Protime 12.7 11.7 - 14.2 Seconds    INR 0.97 0.90 - 1.10   aPTT    Collection Time: 12/21/20  4:26 AM    Specimen: Blood   Result Value Ref Range    PTT 29.2 22.7 - 35.4 seconds   Type & Screen    Collection Time: 12/21/20  4:26 AM    Specimen: Blood   Result Value Ref Range    ABO Type A     RH type Positive     Antibody Screen Negative     T&S Expiration Date 12/24/2020 11:59:59 PM    CBC Auto Differential    Collection Time: 12/21/20  4:26 AM    Specimen: Blood   Result Value Ref Range    WBC 5.84 3.40 - 10.80 10*3/mm3    RBC 3.94 3.77 - 5.28 10*6/mm3    Hemoglobin 12.4 12.0 - 15.9 g/dL    Hematocrit 37.1 34.0 - 46.6 %    MCV 94.2 79.0 - 97.0 fL    MCH 31.5 26.6 - 33.0 pg    MCHC 33.4 31.5 - 35.7 g/dL    RDW 13.0 12.3 - 15.4 %    RDW-SD 45.2 37.0 - 54.0 fl    MPV 10.7 6.0 - 12.0 fL    Platelets 197 140 - 450 10*3/mm3    Neutrophil % 65.9 42.7 - 76.0 %    Lymphocyte % 20.9 19.6 - 45.3 %    Monocyte % 7.9 5.0 - 12.0 %    Eosinophil % 3.6 0.3 - 6.2 %    Basophil % 1.2 0.0 - 1.5 %    Immature Grans % 0.5 0.0 - 0.5 %    Neutrophils, Absolute 3.85 1.70 - 7.00 10*3/mm3    Lymphocytes, Absolute 1.22 0.70 - 3.10 10*3/mm3    Monocytes, Absolute 0.46 0.10 - 0.90  10*3/mm3    Eosinophils, Absolute 0.21 0.00 - 0.40 10*3/mm3    Basophils, Absolute 0.07 0.00 - 0.20 10*3/mm3    Immature Grans, Absolute 0.03 0.00 - 0.05 10*3/mm3    nRBC 0.0 0.0 - 0.2 /100 WBC       Ordered the above labs and independently reviewed the results.        RADIOLOGY  Xr Chest 1 View    Result Date: 12/21/2020  CHEST, LEFT HIP  HISTORY: Left hip pain. Injury.  COMPARISON: AP chest 12/04/2018.  SUPINE AP CHEST: There are diffuse increased interstitial markings which appear progressive when compared to the exam on 12/04/2018. This could be due to progressive interstitial disease with mild interstitial edema with interstitial infiltrate. No focal airspace consolidation is evident. Heart size is mildly enlarged. There is a calcified granuloma in the right midlung. Cholecystectomy clips are noted. The bones are osteopenic and there are compression deformities within the thoracic spine.  CT PELVIS AND AP LATERAL LEFT HIP: There is a bipolar left hip arthroplasty and there is a periprosthetic fracture extending vertically from the lateral intertrochanteric region without displacement.      1. Nondisplaced left periprosthetic fracture adjacent to bipolar hip arthroplasty. 2. Progressive interstitial disease when compared to previous chest x-ray 12/04/2018. This could represent mild interstitial edema or infiltrates and needs correlation with clinical data. 3. Osteopenia. Multiple compression deformities of the thoracic spine.      Xr Hip With Or Without Pelvis 2 - 3 View Left    Result Date: 12/21/2020  CHEST, LEFT HIP  HISTORY: Left hip pain. Injury.  COMPARISON: AP chest 12/04/2018.  SUPINE AP CHEST: There are diffuse increased interstitial markings which appear progressive when compared to the exam on 12/04/2018. This could be due to progressive interstitial disease with mild interstitial edema with interstitial infiltrate. No focal airspace consolidation is evident. Heart size is mildly enlarged. There is a  calcified granuloma in the right midlung. Cholecystectomy clips are noted. The bones are osteopenic and there are compression deformities within the thoracic spine.  CT PELVIS AND AP LATERAL LEFT HIP: There is a bipolar left hip arthroplasty and there is a periprosthetic fracture extending vertically from the lateral intertrochanteric region without displacement.      1. Nondisplaced left periprosthetic fracture adjacent to bipolar hip arthroplasty. 2. Progressive interstitial disease when compared to previous chest x-ray 12/04/2018. This could represent mild interstitial edema or infiltrates and needs correlation with clinical data. 3. Osteopenia. Multiple compression deformities of the thoracic spine.        I ordered the above noted radiological studies. Reviewed by me and discussed with radiologist.  See dictation for official radiology interpretation.      PROCEDURES    Procedures      MEDICATIONS GIVEN IN ER    Medications   sodium chloride 0.9 % flush 10 mL (has no administration in time range)   sodium chloride 0.9 % flush 10 mL (has no administration in time range)   sodium chloride 0.9 % flush 10 mL (has no administration in time range)   sodium chloride 0.9 % infusion (has no administration in time range)   HYDROmorphone (DILAUDID) injection 0.5 mg (has no administration in time range)     And   naloxone (NARCAN) injection 0.4 mg (has no administration in time range)   ondansetron (ZOFRAN) injection 4 mg (has no administration in time range)   HYDROmorphone (DILAUDID) injection 0.25 mg (0.25 mg Intravenous Given 12/21/20 0432)   ondansetron (ZOFRAN) injection 4 mg (4 mg Intravenous Given 12/21/20 0432)   diphenhydrAMINE (BENADRYL) injection 25 mg (25 mg Intravenous Given 12/21/20 0432)   HYDROmorphone (DILAUDID) injection 0.5 mg (0.5 mg Intravenous Given 12/21/20 0602)         PROGRESS, DATA ANALYSIS, CONSULTS, AND MEDICAL DECISION MAKING    All labs have been independently reviewed by me.  All radiology  studies have been reviewed by me and discussed with radiologist dictating the report.   EKG's independently viewed and interpreted by me.  Discussion below represents my analysis of pertinent findings related to patient's condition, differential diagnosis, treatment plan and final disposition.        ED Course as of Dec 21 0720   Mon Dec 21, 2020   0653 Spoke with Dr. Marie from orthopedics who agrees to consult on the patient.  Spoke with the nurse practitioner for [facility] who agrees to admit the patient on behalf of Dr. Shin to a [facility] bed.  Orthopedics requests CT scan of the hip    [DP]   0719 Plain film of the left hip shows no dislocation, but it does show a periprosthetic fracture.  There is no significant deformity or angulation.    [DP]   0720 CBC and chemistry are unremarkable    [DP]   0720 Coags are negative and she is not chronically anticoagulated    [DP]   0720 She is received multiple doses of hydromorphone for pain with some improvement we placed a knee immobilizer on    [DP]      ED Course User Index  [DP] Nickolas Mars MD           PPE: Both the patient and I wore a surgical mask throughout the entire patient encounter. I wore protective goggles.    AS OF 07:20 EST VITALS:    BP - 122/66  HR - 77  TEMP - 98.2 °F (36.8 °C)  O2 SATS - 100%        DIAGNOSIS  Final diagnoses:   Closed comminuted intertrochanteric fracture of left femur, initial encounter (CMS/[facility])         DISPOSITION  Admit to [facility]           Nickolas Mars MD  12/21/20 0720

## 2020-12-22 LAB
ANION GAP SERPL CALCULATED.3IONS-SCNC: 6.8 MMOL/L (ref 5–15)
BASOPHILS # BLD AUTO: 0.04 10*3/MM3 (ref 0–0.2)
BASOPHILS NFR BLD AUTO: 0.6 % (ref 0–1.5)
BUN SERPL-MCNC: 13 MG/DL (ref 8–23)
BUN/CREAT SERPL: 17.3 (ref 7–25)
CALCIUM SPEC-SCNC: 8.2 MG/DL (ref 8.6–10.5)
CHLORIDE SERPL-SCNC: 104 MMOL/L (ref 98–107)
CO2 SERPL-SCNC: 26.2 MMOL/L (ref 22–29)
CREAT SERPL-MCNC: 0.75 MG/DL (ref 0.57–1)
DEPRECATED RDW RBC AUTO: 42.8 FL (ref 37–54)
EOSINOPHIL # BLD AUTO: 0.13 10*3/MM3 (ref 0–0.4)
EOSINOPHIL NFR BLD AUTO: 2 % (ref 0.3–6.2)
ERYTHROCYTE [DISTWIDTH] IN BLOOD BY AUTOMATED COUNT: 12.9 % (ref 12.3–15.4)
GFR SERPL CREATININE-BSD FRML MDRD: 74 ML/MIN/1.73
GLUCOSE SERPL-MCNC: 108 MG/DL (ref 65–99)
HCT VFR BLD AUTO: 34.3 % (ref 34–46.6)
HGB BLD-MCNC: 11.7 G/DL (ref 12–15.9)
IMM GRANULOCYTES # BLD AUTO: 0.03 10*3/MM3 (ref 0–0.05)
IMM GRANULOCYTES NFR BLD AUTO: 0.5 % (ref 0–0.5)
INR PPP: 1.03 (ref 0.9–1.1)
LYMPHOCYTES # BLD AUTO: 1.04 10*3/MM3 (ref 0.7–3.1)
LYMPHOCYTES NFR BLD AUTO: 16.1 % (ref 19.6–45.3)
MCH RBC QN AUTO: 30.9 PG (ref 26.6–33)
MCHC RBC AUTO-ENTMCNC: 34.1 G/DL (ref 31.5–35.7)
MCV RBC AUTO: 90.5 FL (ref 79–97)
MONOCYTES # BLD AUTO: 0.56 10*3/MM3 (ref 0.1–0.9)
MONOCYTES NFR BLD AUTO: 8.7 % (ref 5–12)
NEUTROPHILS NFR BLD AUTO: 4.65 10*3/MM3 (ref 1.7–7)
NEUTROPHILS NFR BLD AUTO: 72.1 % (ref 42.7–76)
NRBC BLD AUTO-RTO: 0 /100 WBC (ref 0–0.2)
PLATELET # BLD AUTO: 179 10*3/MM3 (ref 140–450)
PMV BLD AUTO: 10.9 FL (ref 6–12)
POTASSIUM SERPL-SCNC: 4.1 MMOL/L (ref 3.5–5.2)
PROTHROMBIN TIME: 13.3 SECONDS (ref 11.7–14.2)
RBC # BLD AUTO: 3.79 10*6/MM3 (ref 3.77–5.28)
SODIUM SERPL-SCNC: 137 MMOL/L (ref 136–145)
WBC # BLD AUTO: 6.45 10*3/MM3 (ref 3.4–10.8)

## 2020-12-22 PROCEDURE — G0378 HOSPITAL OBSERVATION PER HR: HCPCS

## 2020-12-22 PROCEDURE — 36415 COLL VENOUS BLD VENIPUNCTURE: CPT | Performed by: NURSE PRACTITIONER

## 2020-12-22 PROCEDURE — 63710000001 DIPHENHYDRAMINE PER 50 MG: Performed by: NURSE PRACTITIONER

## 2020-12-22 PROCEDURE — 97110 THERAPEUTIC EXERCISES: CPT

## 2020-12-22 PROCEDURE — 96361 HYDRATE IV INFUSION ADD-ON: CPT

## 2020-12-22 PROCEDURE — 85025 COMPLETE CBC W/AUTO DIFF WBC: CPT | Performed by: NURSE PRACTITIONER

## 2020-12-22 PROCEDURE — 80048 BASIC METABOLIC PNL TOTAL CA: CPT | Performed by: NURSE PRACTITIONER

## 2020-12-22 PROCEDURE — 85610 PROTHROMBIN TIME: CPT | Performed by: NURSE PRACTITIONER

## 2020-12-22 RX ORDER — DIPHENHYDRAMINE HCL 25 MG
25 CAPSULE ORAL EVERY 6 HOURS PRN
Status: DISCONTINUED | OUTPATIENT
Start: 2020-12-22 | End: 2020-12-23 | Stop reason: HOSPADM

## 2020-12-22 RX ORDER — UREA 10 %
3 LOTION (ML) TOPICAL NIGHTLY
Status: DISCONTINUED | OUTPATIENT
Start: 2020-12-22 | End: 2020-12-23 | Stop reason: HOSPADM

## 2020-12-22 RX ORDER — UREA 10 %
1 LOTION (ML) TOPICAL NIGHTLY
Status: DISCONTINUED | OUTPATIENT
Start: 2020-12-22 | End: 2020-12-22

## 2020-12-22 RX ADMIN — Medication 3 MG: at 21:19

## 2020-12-22 RX ADMIN — SODIUM CHLORIDE 100 ML/HR: 9 INJECTION, SOLUTION INTRAVENOUS at 07:28

## 2020-12-22 RX ADMIN — DIPHENHYDRAMINE HYDROCHLORIDE 25 MG: 25 CAPSULE ORAL at 00:58

## 2020-12-22 RX ADMIN — MULTIPLE VITAMINS W/ MINERALS TAB 1 TABLET: TAB at 07:29

## 2020-12-22 RX ADMIN — CARVEDILOL 12.5 MG: 12.5 TABLET, FILM COATED ORAL at 17:14

## 2020-12-22 RX ADMIN — HYDROCODONE BITARTRATE AND ACETAMINOPHEN 1 TABLET: 5; 325 TABLET ORAL at 11:44

## 2020-12-22 RX ADMIN — PANTOPRAZOLE SODIUM 40 MG: 40 TABLET, DELAYED RELEASE ORAL at 06:07

## 2020-12-22 RX ADMIN — LEVOTHYROXINE SODIUM 25 MCG: 25 TABLET ORAL at 07:29

## 2020-12-22 RX ADMIN — HYDROCODONE BITARTRATE AND ACETAMINOPHEN 1 TABLET: 5; 325 TABLET ORAL at 17:14

## 2020-12-22 RX ADMIN — HYDROCODONE BITARTRATE AND ACETAMINOPHEN 1 TABLET: 5; 325 TABLET ORAL at 03:30

## 2020-12-22 RX ADMIN — DOCUSATE SODIUM 50MG AND SENNOSIDES 8.6MG 2 TABLET: 8.6; 5 TABLET, FILM COATED ORAL at 21:18

## 2020-12-22 RX ADMIN — BACLOFEN 10 MG: 10 TABLET ORAL at 13:57

## 2020-12-22 RX ADMIN — CALCIUM CARBONATE-VITAMIN D TAB 500 MG-200 UNIT 500 MG: 500-200 TAB at 07:29

## 2020-12-22 RX ADMIN — CARVEDILOL 12.5 MG: 12.5 TABLET, FILM COATED ORAL at 07:29

## 2020-12-22 RX ADMIN — SODIUM CHLORIDE, PRESERVATIVE FREE 10 ML: 5 INJECTION INTRAVENOUS at 19:50

## 2020-12-22 RX ADMIN — HYDROCODONE BITARTRATE AND ACETAMINOPHEN 1 TABLET: 5; 325 TABLET ORAL at 21:18

## 2020-12-22 RX ADMIN — HYDROCODONE BITARTRATE AND ACETAMINOPHEN 1 TABLET: 5; 325 TABLET ORAL at 07:28

## 2020-12-22 RX ADMIN — ROSUVASTATIN CALCIUM 5 MG: 5 TABLET, FILM COATED ORAL at 07:29

## 2020-12-22 RX ADMIN — CALCIUM CARBONATE-VITAMIN D TAB 500 MG-200 UNIT 500 MG: 500-200 TAB at 21:19

## 2020-12-22 NOTE — PROGRESS NOTES
LOS: 1 day     Subjective :   Patient seen and examined.  Resting comfortably.  No report of any problems overnight.  She is sore, but her pain is controlled with medication.  No CP, SoA, calf pain / calf swelling.    She is requesting subacute rehab at discharge because she has 12 steps to enter the main floor of her home and feels that she will not be able to do this well given her current weightbearing restrictions.    Objective :    Vital signs in last 24 hours:  Vitals:    12/21/20 1500 12/21/20 1824 12/21/20 2300 12/22/20 0300   BP: 112/68 172/78 130/74 161/73   BP Location: Left arm Left arm Left arm Left arm   Patient Position: Lying Lying Lying Lying   Pulse: 69 69 60 61   Resp: 18 18 18 18   Temp: 97.8 °F (36.6 °C) 97.3 °F (36.3 °C) 97.3 °F (36.3 °C) 97.5 °F (36.4 °C)   TempSrc: Skin Skin Skin Skin   SpO2: 94% 92% 95% 95%   Weight:       Height:           PHYSICAL EXAM:  Patient is calm, in no acute distress, awake and oriented x 3.  Skin of the left hip is clean, dry, and intact.  Ecchymosis is appropriate in amount.  EHL, FHL, TA, GS intact.  Patient is neurovascularly intact distally.    LABS:  Results from last 7 days   Lab Units 12/21/20  0426   WBC 10*3/mm3 5.84   HEMOGLOBIN g/dL 12.4   HEMATOCRIT % 37.1   PLATELETS 10*3/mm3 197     Results from last 7 days   Lab Units 12/21/20  0426   SODIUM mmol/L 135*   POTASSIUM mmol/L 3.9   CHLORIDE mmol/L 101   CO2 mmol/L 24.8   BUN mg/dL 14   CREATININE mg/dL 0.73   GLUCOSE mg/dL 118*   CALCIUM mg/dL 9.0     Results from last 7 days   Lab Units 12/21/20  0426   INR  0.97   APTT seconds 29.2     Study Result    LEFT FEMUR CT WITHOUT CONTRAST     HISTORY: 80-year-old woman with previous left hip hemiarthroplasty. Hip  pain after fall. X-rays demonstrated a periprosthetic proximal femur  fracture.     TECHNIQUE: Axial CT of the left femur with multiplanar reformatted  images is correlated with x-rays acquired earlier this morning.     Radiation dose  reduction techniques were utilized, including automated  exposure control and exposure modulation based on body size.     FINDINGS: Bones of the pelvis are intact. There is no intrapelvic  hematoma or fluid collection.     There is a cemented, bipolar left hip hemiarthroplasty. A comminuted,  periprosthetic proximal femur fracture is observed extending around the  greater trochanter and across the intertrochanteric region anteriorly.  There is a fracture plane that extends to the subtrochanteric region  posterolaterally. No fracture is appreciated around the femoral stem  distal to the level of the bottom of the lesser trochanter. The  cement/bone interface appears intact. There is some deep soft tissue  edema around the proximal femur as expected. No hematoma is present.     The middle and distal portions of the femur are intact. There is no  joint effusion at the knee. The proximal tibia and fibula are intact.     Large field-of-view source images showed intact right femur with right  knee arthroplasty hardware. There is no joint effusion at the right  knee.     IMPRESSION:  Bipolar left hip hemiarthroplasty hardware with a  comminuted, nondisplaced intertrochanteric proximal femur fracture  extending to the subtrochanteric region posterolaterally. No fracture is  observed around the distal 10 cm of the femoral stem. The bones of the  pelvis are intact.     This report was finalized on 12/21/2020 9:08 AM by Dr. Collin Waggoner M.D.         ASSESSMENT:  nondisplaced intertrochanteric proximal femur fracture    Plan:  We discussed extensively the plan for nonoperative management given the apparent fixation of femoral stem.      She will be toe-touch weightbearing to the left lower extremity with no active abduction    Continue Physical Therapy, increase mobility as tolerated.  Continue SCDs, Continue DVT prophylaxis.    Dispo:  [facility] will require rehab placement at discharge, note from case management detailing  available placement    We will plan to follow-up with the patient in our office in 10 to 14 days.  Orthopedics will sign off at this point.  Please do not hesitate to contact with any new questions or concerns.      Agustin Forde, LYN    Date: 12/22/2020  Time: 06:24 EST

## 2020-12-22 NOTE — PROGRESS NOTES
Continued Stay Note  [facility]     Patient Name: Moriah Petty  MRN: [id number]  Today's Date: 12/22/2020    Admit Date: 12/21/2020    Discharge Plan     Row Name 12/22/20 1648       Plan    Plan  [facility] SNF -- Accepted    Patient/Family in Agreement with Plan  yes    Plan Comments  Spoke with Cathleen who has accepted the patient and will have a SNF bed for her tomorrow at their [place] location. Updated the patient who is agreeable. Patient may need a wheelchair van to transport her at d/c. CCP following.    Row Name 12/22/20 1424       Plan    Plan  SNF -- Referrals Pending    Patient/Family in Agreement with Plan  yes    Plan Comments  Spoke with the patient who plans to d/c to SNF. She requests referrals to [facility], and [facility]. Referrals sent in Epic; left a message with Mónica/Lidia and spoke with Cathleen who will look into the patient's case. Await SNF determinations. CCP following.        Discharge Codes    No documentation.             Noa Andrade RN

## 2020-12-22 NOTE — DISCHARGE PLACEMENT REQUEST
"Moriah Elizondo (80 y.o. Female)     Date of Birth Social Security Number Address Home Phone MRN    1940  [address] [place] [address] [phone] [id number]    Yarsanism Marital Status          Yazdanism        Admission Date Admission Type Admitting Provider Attending Provider Department, Room/Bed    12/21/20 Emergency Tereso Shin MD Hayden, Juliana, MD [address], P891/1    Discharge Date Discharge Disposition Discharge Destination                       Attending Provider: Rhonda Foley MD    Allergies: Morphine And Related    Isolation: None   Infection: None   Code Status: CPR    Ht: 170.2 cm (67\")   Wt: 75.2 kg (165 lb 11.2 oz)    Admission Cmt: None   Principal Problem: Closed comminuted intertrochanteric fracture of proximal end of left femur (CMS/[facility]) [S72.142A]                 Active Insurance as of 12/21/2020     Primary Coverage     Payor Plan Insurance Group Employer/Plan Group    MEDICARE MEDICARE A & B      Payor Plan Address Payor Plan Phone Number Payor Plan Fax Number Effective Dates    [address] [phone]  9/1/2004 - None Entered    [facility] [id number]       Subscriber Name Subscriber Birth Date Member ID       MORIAH ELIZONDO 1940 [id number]           Secondary Coverage     Payor Plan Insurance Group Employer/Plan Group    ANTHEM BLUE CROSS ANTHEM BLUE CROSS BLUE SHIELD PPO [id number]     Payor Plan Address Payor Plan Phone Number Payor Plan Fax Number Effective Dates    [address] [phone]  1/1/2020 - None Entered    [facility] [id number]       Subscriber Name Subscriber Birth Date Member ID       [place] ELIZONDO 1940 [id number]                 Emergency Contacts      (Rel.) Home Phone Work Phone Mobile Phone    ElizondoCleveland (Spouse) [phone] -- [phone]    Reza Bolton (Son) [phone]              "

## 2020-12-22 NOTE — PROGRESS NOTES
Name: Moriah Petty ADMIT: 2020   : 1940  PCP: Jarred Carrizales Jr., MD    MRN: [id number] LOS: 1 days   AGE/SEX: 80 y.o. female  ROOM: [facility]     Subjective   Subjective   Pain increased today but has worked with therapy. Not sleeping well. Appetite wnl.     Review of Systems   Constitutional: Negative.    HENT: Negative.    Respiratory: Negative.    Cardiovascular: Negative.    Gastrointestinal: Negative.    Genitourinary: Negative.    Musculoskeletal: Positive for arthralgias and gait problem.   Skin: Negative.    Neurological: Negative for dizziness and light-headedness.   Psychiatric/Behavioral: Positive for sleep disturbance.        Objective   Objective   Vital Signs  Temp:  [97.3 °F (36.3 °C)-97.8 °F (36.6 °C)] 97.7 °F (36.5 °C)  Heart Rate:  [56-69] 56  Resp:  [16-18] 16  BP: (112-172)/(68-80) 117/70  SpO2:  [92 %-96 %] 94 %  on  Flow (L/min):  [2] 2;   Device (Oxygen Therapy): room air  Body mass index is 25.95 kg/m².  Physical Exam  Vitals signs and nursing note reviewed.   Constitutional:       General: She is not in acute distress.  HENT:      Head: Normocephalic.   Eyes:      Conjunctiva/sclera: Conjunctivae normal.   Neck:      Musculoskeletal: Normal range of motion and neck supple.   Cardiovascular:      Rate and Rhythm: Normal rate and regular rhythm.   Pulmonary:      Effort: Pulmonary effort is normal. No respiratory distress.      Breath sounds: Normal breath sounds.   Abdominal:      General: Bowel sounds are normal.      Palpations: Abdomen is soft.   Musculoskeletal:      Right lower leg: No edema.      Left lower leg: No edema.   Skin:     General: Skin is warm and dry.   Neurological:      Mental Status: She is alert and oriented to person, place, and time.   Psychiatric:         Mood and Affect: Mood normal.         Results Review     I reviewed the patient's new clinical results.  Results from last 7 days   Lab Units 20  0603 20  0426   WBC 10*3/mm3 6.45 5.84    HEMOGLOBIN g/dL 11.7* 12.4   PLATELETS 10*3/mm3 179 197     Results from last 7 days   Lab Units 12/22/20  0603 12/21/20  0426   SODIUM mmol/L 137 135*   POTASSIUM mmol/L 4.1 3.9   CHLORIDE mmol/L 104 101   CO2 mmol/L 26.2 24.8   BUN mg/dL 13 14   CREATININE mg/dL 0.75 0.73   GLUCOSE mg/dL 108* 118*   Estimated Creatinine Clearance: 58.3 mL/min (by C-G formula based on SCr of 0.75 mg/dL).  Results from last 7 days   Lab Units 12/21/20  0426   ALBUMIN g/dL 4.30   BILIRUBIN mg/dL 0.3   ALK PHOS U/L 55   AST (SGOT) U/L 19   ALT (SGPT) U/L 16     Results from last 7 days   Lab Units 12/22/20  0603 12/21/20  0426   CALCIUM mg/dL 8.2* 9.0   ALBUMIN g/dL  --  4.30       COVID19   Date Value Ref Range Status   12/21/2020 Not Detected Not Detected - Ref. Range Final   07/07/2020 Not Detected Not Detected - Ref. Range Final     No results found for: HGBA1C, POCGLU    XR Chest 1 View  Narrative: CHEST, LEFT HIP     HISTORY: Left hip pain. Injury.     COMPARISON: AP chest 12/04/2018.     SUPINE AP CHEST: There are diffuse increased interstitial markings which  appear progressive when compared to the exam on 12/04/2018. This could  be due to progressive interstitial disease with mild interstitial edema  with interstitial infiltrate. No focal airspace consolidation is  evident. Heart size is mildly enlarged. There is a calcified granuloma  in the right midlung. Cholecystectomy clips are noted. The bones are  osteopenic and there are compression deformities within the thoracic  spine.     CT PELVIS AND AP LATERAL LEFT HIP: There is a bipolar left hip  arthroplasty and there is a periprosthetic fracture extending vertically  from the lateral intertrochanteric region without displacement.     Impression: 1. Nondisplaced left periprosthetic fracture adjacent to bipolar hip  arthroplasty.  2. Progressive interstitial disease when compared to previous chest  x-ray 12/04/2018. This could represent mild interstitial edema  or  infiltrates and needs correlation with clinical data.  3. Osteopenia. Multiple compression deformities of the thoracic spine.     This report was finalized on 12/22/2020 8:05 AM by Dr. Ricky Machado M.D.     XR Hip With or Without Pelvis 2 - 3 View Left  Narrative: CHEST, LEFT HIP     HISTORY: Left hip pain. Injury.     COMPARISON: AP chest 12/04/2018.     SUPINE AP CHEST: There are diffuse increased interstitial markings which  appear progressive when compared to the exam on 12/04/2018. This could  be due to progressive interstitial disease with mild interstitial edema  with interstitial infiltrate. No focal airspace consolidation is  evident. Heart size is mildly enlarged. There is a calcified granuloma  in the right midlung. Cholecystectomy clips are noted. The bones are  osteopenic and there are compression deformities within the thoracic  spine.     CT PELVIS AND AP LATERAL LEFT HIP: There is a bipolar left hip  arthroplasty and there is a periprosthetic fracture extending vertically  from the lateral intertrochanteric region without displacement.     Impression: 1. Nondisplaced left periprosthetic fracture adjacent to bipolar hip  arthroplasty.  2. Progressive interstitial disease when compared to previous chest  x-ray 12/04/2018. This could represent mild interstitial edema or  infiltrates and needs correlation with clinical data.  3. Osteopenia. Multiple compression deformities of the thoracic spine.     This report was finalized on 12/22/2020 8:05 AM by Dr. Ricky Machado M.D.       Scheduled Medications  calcium-vitamin D, 500 mg, Oral, BID  carvedilol, 12.5 mg, Oral, BID With Meals  levothyroxine, 25 mcg, Oral, Daily  multivitamin with minerals, 1 tablet, Oral, Daily  pantoprazole, 40 mg, Oral, QAM  rosuvastatin, 5 mg, Oral, Daily  sodium chloride, 10 mL, Intravenous, Q12H    Infusions  sodium chloride, 100 mL/hr, Last Rate: 100 mL/hr (12/22/20 0728)    Diet  Diet Regular; Cardiac, Consistent  Carbohydrate       Assessment/Plan     Active Hospital Problems    Diagnosis  POA   • **Closed comminuted intertrochanteric fracture of proximal end of left femur (CMS/HCC) [S72.142A]  Yes   • Alcohol use [Z72.89]  Yes   • Osteoporosis, 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       80 y.o. female admitted with Closed comminuted intertrochanteric fracture of proximal end of left femur (CMS/HCC).    Proximal lt femur fracture/Fall:  -Appreciate Ortho assistance  -CT completed; nonop management at this time  -Pain control  -PT eval; TTWB LLE, no active abduction     HTN/PVC's:  -Appreciate Cardiology consult; Follow up with Dr. Wallace as scheduled in January  -EKG SR, no changes compared to previous per report  -BP stable; continue Coreg w/parameters; will hold losartan for now to avoid hypotension     Hypothyroidism:  -TSH 4.000, FT4 1.18 5/5/20  -continue levothyroxine     Osteoporosis:  -Receives denosumab q 6 months  -Continue calcium/vit D     Alcohol use:  -Daily alcohol use reported; ~ 2 shots/day  -No signs of withdrawal    Add melatonin for sleep    Plan for home with  vs SNF     · SCDs for DVT prophylaxis.  · Full code.  · Discussed with patient.    LYN Plasencia  [facility]  12/22/20  14:17 EST    Patient was wearing facemask when I entered the room and throughout our encounter.  I wore protective equipment throughout this patient encounter including a face mask, gloves and protective eyewear.  Hand hygiene was performed before donning protective equipment and after removal when leaving the room.

## 2020-12-22 NOTE — PLAN OF CARE
Goal Outcome Evaluation:  Plan of Care Reviewed With: patient  Progress: improving  Outcome Summary: Pt admited with L femur fracture. TTWB. Pain controlled with PO pain medication. Voiding function intact. D/C plans pending. Will continue to monitor.

## 2020-12-22 NOTE — PLAN OF CARE
Goal Outcome Evaluation:  Plan of Care Reviewed With: patient  Progress: improving  Outcome Summary: 80/F with left femur fx. TTWB. VSS. 2L nasal cannula. Voiding per purewick. Pain managed with PO meds. Educated on skin injury prevention and BP monitoring. D/C plan pending.

## 2020-12-22 NOTE — PROGRESS NOTES
Continued Stay Note  [facility]     Patient Name: Moriah Petty  MRN: [id number]  Today's Date: 12/22/2020    Admit Date: 12/21/2020    Discharge Plan     Row Name 12/22/20 1424       Plan    Plan  SNF -- Referrals Pending    Patient/Family in Agreement with Plan  yes    Plan Comments  Spoke with the patient who plans to d/c to SNF. [facility] Referrals sent in Epic; left a message with Mónica/Lidia and spoke with Carol/Maude who will look into the patient's case. Await SNF determinations. CCP following.        Discharge Codes    No documentation.             Noa Andrade RN

## 2020-12-22 NOTE — PLAN OF CARE
Goal Outcome Evaluation:  Plan of Care Reviewed With: patient  Progress: improving  Outcome Summary: pt cont to have signficant pain LLE, limitation in functional mobility, would not be able to go home at this level as she lives alone with multiple stairs to enter home    Patient was intermittently wearing a face mask during this therapy encounter. Therapist used appropriate personal protective equipment including eye protection, mask, and gloves.  Mask used was standard procedure mask. Appropriate PPE was worn during the entire therapy session. Hand hygiene was completed before and after therapy session. Patient is not in enhanced droplet precautions.       PT alta Butler was present

## 2020-12-22 NOTE — THERAPY TREATMENT NOTE
Patient Name: Moriah Petty  : 1940    MRN: [id number]                              Today's Date: 2020       Admit Date: 2020    Visit Dx:     ICD-10-CM ICD-9-CM   1. Closed comminuted intertrochanteric fracture of left femur, initial encounter (CMS/[facility])  S72.142A 820.21     Patient Active Problem List   Diagnosis   • Abnormal electrocardiogram   • Non-specific colitis   • Acute post-traumatic headache   • Postoperative anemia due to acute blood loss   • Arthritis   • Hematochezia   • Chest pain   • Compression fracture of lumbar vertebra (CMS/[facility])   • Closed wedge compression fracture of second lumbar vertebra (CMS/[facility])   • Constipation   • Dizziness   • Dyslipidemia   • Fatigue   • Ventricular premature beats   • Gastroesophageal reflux disease without esophagitis   • Hypertension   • Insomnia   • Left lower quadrant pain   • Low back pain   • Osteoporosis   • Premature atrial contraction   • Peripheral nerve disease   • Pneumonia   • Nausea   • Sensorineural hearing loss   • Shortness of breath   • Sinus bradycardia   • Sleep apnea   • Disorder of thyroid   • Trigeminal neuralgia   • Rectal bleeding   • Family history of colon cancer   • Adenomatous polyp of colon   • Hypothyroidism   • Osteoporosis, post-menopausal   • Diverticulitis of large intestine with abscess without bleeding   • Mild malnutrition (CMS/[facility])   • Pure hypercholesterolemia   • Colostomy status (CMS/[facility])   • History of colon polyps   • Diverticulitis   • Meningioma, cerebral (CMS/[facility])   • Cervical disc disorder with radiculopathy   • Closed comminuted intertrochanteric fracture of proximal end of left femur (CMS/[facility])   • Alcohol use     Past Medical History:   Diagnosis Date   • Adenomatous polyp of colon 2017   • Anemia    • Bursitis of elbow     RIGHT ELBOW   • Carotid stenosis     LESS THAN 50%, CAROTID ULTRASOUND AT [facility]    • Colitis    • Colostomy in place (CMS/[facility])    • Depression    • Diverticulosis    •  GERD (gastroesophageal reflux disease)    • History of blood transfusion 11/2013   • History of trigeminal neuralgia    • Hyperlipidemia    • Hypertension    • Hypothyroidism    • Insomnia    • OA (osteoarthritis)    • Osteoporosis    • PVC (premature ventricular contraction)    • PVC's (premature ventricular contractions)    • Sleep apnea     DOES NOT WEAR CPAP     Past Surgical History:   Procedure Laterality Date   • BREAST BIOPSY Left 05/10/2018    Ultrasound-guided mammotome vacuum assisted left breast biopsy with placement of metallic clip (path: fragments of fibroepithelial lesion, consistent with fibroadenoma)-Dr. Sree Glass, [facility]   • BRONCHOSCOPY N/A 08/29/2001    Dr. Christiano Horton   • CARDIAC CATHETERIZATION  03/2002   • CARDIAC CATHETERIZATION Left 11/28/2012    Dr. Anibal Trujillo   • CHOLECYSTECTOMY N/A 11/20/2013    DR. ESME GOLDMAN   • COLONOSCOPY N/A 02/21/2012    INT/EXT HEMORRHOIDS, DIVERTICULOSIS, 2 HYPERPLASTIC POLYPS, TORT (path: Rectum hyperplastic polyp)-Dr. Morgan Collins   • COLONOSCOPY N/A 4/24/2018    NTEH, Diverticulosis, tortuous colon, IH.  No specimens collected    • COLONOSCOPY N/A 02/27/2015    Non-thrombosed external hemorrhoids found on perianal exam, diverticulosis in the sigmoid and descending colon, tortuous colon, normal ileum, internal hemorrhoids-Dr. Morgan Collins   • COLONOSCOPY N/A 7/9/2020    Procedure: COLONOSCOPY into cecum with cold biopsy polypectomy x1;  Surgeon: Emanuel Clark MD;  Location: [facility] ENDOSCOPY;  Service: General;  Laterality: N/A;  Pre op: Diverticulitis, Family history of colon cancer  Post op: Polyp   • COLONOSCOPY W/ POLYPECTOMY N/A 05/06/2004    Diverticulosis, small recto-sigmoid polyps, otherwise normal (path: Colon at 20 cm hyperplastic polyp) -Dr. Morgan Collins   • COLOSTOMY CLOSURE N/A 5/28/2019    Procedure: COLOSTOMY TAKEDOWN;  Surgeon: Emanuel Clark MD;  Location: [facility] MAIN OR;  Service: General   • EXPLORATORY  LAPAROTOMY N/A 12/5/2018    Procedure: exploratory laparotomy with sigmoid resection, possible colostomy, APPENDECTOMY;  Surgeon: Emanuel Clark MD;  Location: [facility] OR;  Service: General   • JOINT REPLACEMENT     • KYPHOPLASTY N/A 12/21/2012    COMPRESSION FX OF L2   • TOTAL HIP ARTHROPLASTY Left 03/16/2004    Left bipolar hip replacement-Dr. Chioma Burch   • TOTAL KNEE ARTHROPLASTY Right 11/13/2013    Dr. Reza Longoria   • TRIGEMINAL NERVE DECOMPRESSION  2014    LEFT EAR      General Information     Row Name 12/22/20 1440          Physical Therapy Time and Intention    Document Type  therapy note (daily note)  -PC     Mode of Treatment  physical therapy  -PC     Row Name 12/22/20 1440          General Information    Patient Profile Reviewed  yes  -PC     Existing Precautions/Restrictions  fall;weight bearing TTWB and no active hip abduction  -PC     Row Name 12/22/20 1440          Living Environment    Lives With  spouse  -PC     Row Name 12/22/20 1440          Cognition    Orientation Status (Cognition)  oriented x 4  -PC       User Key  (r) = Recorded By, (t) = Taken By, (c) = Cosigned By    Initials Name Provider Type    PC Suri Steele, PT Physical Therapist        Mobility     Row Name 12/22/20 1441          Bed Mobility    Bed Mobility  sit-supine  -PC     Rolling Right Travis (Bed Mobility)  minimum assist (75% patient effort)  -PC     Supine-Sit Travis (Bed Mobility)  minimum assist (75% patient effort)  -PC     Sit-Supine Travis (Bed Mobility)  moderate assist (50% patient effort)  -PC     Assistive Device (Bed Mobility)  bed rails;head of bed elevated  -PC     Comment (Bed Mobility)  pt only needed min assist for supine to sit, but needed mod assist for supine to sit to help avoid active hip abduction  -PC     Row Name 12/22/20 1441          Sit-Stand Transfer    Sit-Stand Travis (Transfers)  minimum assist (75% patient effort)  -PC     Assistive Device (Sit-Stand  Transfers)  walker, front-wheeled  -PC     Row Name 12/22/20 1441          Gait/Stairs (Locomotion)    Assistive Device (Gait)  walker, front-wheeled  -PC     Distance in Feet (Gait)  pt scooted R LE along floor, maintaining TTWB status on LLE to take sidesteps to head of bed  -PC     Row Name 12/22/20 1441          Mobility    Extremity Weight-bearing Status  left lower extremity  -PC     Left Lower Extremity (Weight-bearing Status)  (S) toe touch weight-bearing (TTWB)  -PC       User Key  (r) = Recorded By, (t) = Taken By, (c) = Cosigned By    Initials Name Provider Type    PC Suri Steele, PT Physical Therapist        Obj/Interventions     Row Name 12/22/20 1446          Motor Skills    Therapeutic Exercise  -- pt perf AP, QS, GS only, orders for NO active hip abduction  -PC       User Key  (r) = Recorded By, (t) = Taken By, (c) = Cosigned By    Initials Name Provider Type    PC Suri Steele, PT Physical Therapist        Goals/Plan    No documentation.       Clinical Impression     Row Name 12/22/20 1449          Pain    Additional Documentation  Pain Scale: Numbers Pre/Post-Treatment (Group)  -PC     Row Name 12/22/20 1444          Pain Scale: Numbers Pre/Post-Treatment    Pretreatment Pain Rating  5/10  -PC     Posttreatment Pain Rating  5/10  -PC     Pain Location - Side  Left  -PC     Pain Location  hip  -PC     Pain Intervention(s)  Medication (See MAR);Repositioned  -PC     Row Name 12/22/20 1443          Plan of Care Review    Plan of Care Reviewed With  patient  -PC     Outcome Summary  pt cont to have signficant pain LLE, limitation in functional mobility, would not be able to go home at this level as she lives alone with multiple stairs to enter home  -PC     Row Name 12/22/20 1447          Positioning and Restraints    Pre-Treatment Position  in bed  -PC     Post Treatment Position  bed  -PC     In Bed  supine;call light within reach;encouraged to call for assist;exit alarm on  -PC       User Key   (r) = Recorded By, (t) = Taken By, (c) = Cosigned By    Initials Name Provider Type    PC Suri Steele, PT Physical Therapist        Outcome Measures     Row Name 12/22/20 1446          How much help from another person do you currently need...    Turning from your back to your side while in flat bed without using bedrails?  2  -PC     Moving from lying on back to sitting on the side of a flat bed without bedrails?  2  -PC     Moving to and from a bed to a chair (including a wheelchair)?  2  -PC     Standing up from a chair using your arms (e.g., wheelchair, bedside chair)?  3  -PC     Climbing 3-5 steps with a railing?  1  -PC     To walk in hospital room?  2  -PC     AM-PAC 6 Clicks Score (PT)  12  -PC       User Key  (r) = Recorded By, (t) = Taken By, (c) = Cosigned By    Initials Name Provider Type    PC Suri Steele, PT Physical Therapist        Physical Therapy Education                 Title: PT OT SLP Therapies (Done)     Topic: Physical Therapy (Done)     Point: Mobility training (Done)     Learning Progress Summary           Patient Acceptance, E,D, DU by  at 12/22/2020 1447    Acceptance, E,TB,D, VU,DU,NR by  at 12/21/2020 1604                   Point: Home exercise program (Done)     Learning Progress Summary           Patient Acceptance, E,D, DU by  at 12/22/2020 1447    Acceptance, E,TB,D, VU,DU,NR by  at 12/21/2020 1604                   Point: Body mechanics (Done)     Learning Progress Summary           Patient Acceptance, E,D, DU by  at 12/22/2020 1447    Acceptance, E,TB,D, VU,DU,NR by  at 12/21/2020 1604                   Point: Precautions (Done)     Learning Progress Summary           Patient Acceptance, E,D, DU by  at 12/22/2020 1447    Acceptance, E,TB,D, VU,DU,NR by  at 12/21/2020 1604                               User Key     Initials Effective Dates Name Provider Type Discipline     04/03/18 -  Suri Steele, PT Physical Therapist PT     05/26/20 -  Sandro  Sadia PT Physical Therapist PT              PT Recommendation and Plan     Plan of Care Reviewed With: patient  Outcome Summary: pt cont to have signficant pain LLE, limitation in functional mobility, would not be able to go home at this level as she lives alone with multiple stairs to enter home     Time Calculation:   PT Charges     Row Name 12/22/20 1448             Time Calculation    Start Time  1343  -PC      Stop Time  1406  -PC      Time Calculation (min)  23 min  -PC      PT Received On  12/22/20  -PC      PT - Next Appointment  12/23/20  -PC        User Key  (r) = Recorded By, (t) = Taken By, (c) = Cosigned By    Initials Name Provider Type    PC Suri Steele, PT Physical Therapist        Therapy Charges for Today     Code Description Service Date Service Provider Modifiers Qty    35639758035 HC PT THER PROC EA 15 MIN 12/22/2020 Suri Steele, PT GP 2    94782382661 HC PT THER SUPP EA 15 MIN 12/22/2020 Suri Steele, PT GP 2          PT G-Codes  Outcome Measure Options: AM-PAC 6 Clicks Basic Mobility (PT)  AM-PAC 6 Clicks Score (PT): 12    Suri Steele PT  12/22/2020

## 2020-12-23 VITALS
DIASTOLIC BLOOD PRESSURE: 76 MMHG | HEIGHT: 67 IN | HEART RATE: 66 BPM | WEIGHT: 165.7 LBS | RESPIRATION RATE: 16 BRPM | SYSTOLIC BLOOD PRESSURE: 148 MMHG | TEMPERATURE: 98.2 F | BODY MASS INDEX: 26.01 KG/M2 | OXYGEN SATURATION: 92 %

## 2020-12-23 PROCEDURE — G0378 HOSPITAL OBSERVATION PER HR: HCPCS

## 2020-12-23 PROCEDURE — 97110 THERAPEUTIC EXERCISES: CPT

## 2020-12-23 RX ORDER — AMOXICILLIN 250 MG
2 CAPSULE ORAL NIGHTLY PRN
Start: 2020-12-23 | End: 2021-05-25

## 2020-12-23 RX ORDER — ACETAMINOPHEN 325 MG/1
650 TABLET ORAL EVERY 4 HOURS PRN
Start: 2020-12-23 | End: 2021-05-25

## 2020-12-23 RX ORDER — HYDROCODONE BITARTRATE AND ACETAMINOPHEN 5; 325 MG/1; MG/1
1 TABLET ORAL EVERY 4 HOURS PRN
Qty: 12 TABLET | Refills: 0 | Status: SHIPPED | OUTPATIENT
Start: 2020-12-23 | End: 2021-01-26

## 2020-12-23 RX ORDER — DIPHENHYDRAMINE HCL 25 MG
25 CAPSULE ORAL EVERY 6 HOURS PRN
Start: 2020-12-23 | End: 2021-01-26 | Stop reason: ALTCHOICE

## 2020-12-23 RX ORDER — BACLOFEN 10 MG/1
10 TABLET ORAL 3 TIMES DAILY PRN
Start: 2020-12-23 | End: 2021-01-26 | Stop reason: ALTCHOICE

## 2020-12-23 RX ORDER — POLYETHYLENE GLYCOL 3350 17 G/17G
17 POWDER, FOR SOLUTION ORAL DAILY PRN
Start: 2020-12-23 | End: 2021-01-26 | Stop reason: ALTCHOICE

## 2020-12-23 RX ORDER — LANOLIN ALCOHOL/MO/W.PET/CERES
3 CREAM (GRAM) TOPICAL NIGHTLY
Qty: 30 TABLET
Start: 2020-12-23 | End: 2021-01-26 | Stop reason: ALTCHOICE

## 2020-12-23 RX ADMIN — PANTOPRAZOLE SODIUM 40 MG: 40 TABLET, DELAYED RELEASE ORAL at 06:29

## 2020-12-23 RX ADMIN — ACETAMINOPHEN 650 MG: 325 TABLET, FILM COATED ORAL at 14:47

## 2020-12-23 RX ADMIN — CARVEDILOL 12.5 MG: 12.5 TABLET, FILM COATED ORAL at 08:31

## 2020-12-23 RX ADMIN — ACETAMINOPHEN 650 MG: 325 TABLET, FILM COATED ORAL at 04:31

## 2020-12-23 RX ADMIN — SODIUM CHLORIDE, PRESERVATIVE FREE 10 ML: 5 INJECTION INTRAVENOUS at 08:31

## 2020-12-23 RX ADMIN — ACETAMINOPHEN 650 MG: 325 TABLET, FILM COATED ORAL at 08:45

## 2020-12-23 RX ADMIN — CALCIUM CARBONATE-VITAMIN D TAB 500 MG-200 UNIT 500 MG: 500-200 TAB at 08:31

## 2020-12-23 RX ADMIN — LEVOTHYROXINE SODIUM 25 MCG: 25 TABLET ORAL at 08:31

## 2020-12-23 RX ADMIN — MULTIPLE VITAMINS W/ MINERALS TAB 1 TABLET: TAB at 08:31

## 2020-12-23 RX ADMIN — BACLOFEN 10 MG: 10 TABLET ORAL at 15:14

## 2020-12-23 RX ADMIN — ROSUVASTATIN CALCIUM 5 MG: 5 TABLET, FILM COATED ORAL at 08:31

## 2020-12-23 NOTE — PLAN OF CARE
Goal Outcome Evaluation:  Plan of Care Reviewed With: patient  Progress: improving  Outcome Summary: Patient is ambulating short distances with walker and x1 assist while being able to maintain TTWB status. VSS and voiding function is intact. Pain is managed with po meds. Patient educated on bp monitoring and med management. Patient is prepared and ready for d/c to snf via w/c van.

## 2020-12-23 NOTE — DISCHARGE SUMMARY
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        Hospital Course     Ms. Petty is a 80 y.o. female with a history of  HTN, hypothyroidism, HLD, OA, osteoporosis, GERD, anemia, diverticulitis, previous colostomy  who presented to [facility] initially complaining of fall resulting in lt hip pain.  Please see the admitting history and physical for further details.  She was found to have nondisplaced proximal femur fx lt and was admitted to the hospital for further evaluation and treatment.  Ortho was consulted. CT was performed which did not show any loosening of prior hip replacement component and the fracture is nondisplaced. Surgical intervention was not warranted at this time but could be considered should fracture site worsen. She is TTWB LLE, no active abduction. Pain is controlled on po meds. She has had bowel movement and is urinating without difficulty. She was evaluated by PT with recommendation for SNF prior to returning home. Medically she is stable for discharge today.     Day of Discharge     Subjective:  Up in chair. Pain better today, rates 4/10. +BM. Denies fever, dyspnea, chest pain, dysuria.    Review of Systems   Constitutional: Negative.    HENT: Negative.    Respiratory: Negative.    Cardiovascular: Negative.    Gastrointestinal: Negative.    Genitourinary: Negative.    Musculoskeletal:  Positive for arthralgias and gait problem.   Skin: Negative.    Neurological: Negative for weakness.   Psychiatric/Behavioral: Negative.        Physical Exam:  Temp:  [97.5 °F (36.4 °C)-98.6 °F (37 °C)] 97.8 °F (36.6 °C)  Heart Rate:  [61-73] 66  Resp:  [16] 16  BP: (124-155)/(69-96) 155/96  Body mass index is 25.95 kg/m².  Physical Exam  Vitals signs and nursing note reviewed.   Constitutional:       General: She is not in acute distress.  HENT:      Head: Normocephalic.   Eyes:      Conjunctiva/sclera: Conjunctivae normal.   Neck:      Musculoskeletal: Normal range of motion and neck supple.   Cardiovascular:      Rate and Rhythm: Normal rate and regular rhythm.   Pulmonary:      Effort: Pulmonary effort is normal. No respiratory distress.      Breath sounds: Normal breath sounds.   Abdominal:      General: Bowel sounds are normal.      Palpations: Abdomen is soft.   Musculoskeletal:      Right lower leg: No edema.      Left lower leg: No edema.   Skin:     General: Skin is warm and dry.   Neurological:      General: No focal deficit present.      Mental Status: She is alert and oriented to person, place, and time.   Psychiatric:         Mood and Affect: Mood normal.         Consultants     Consult Orders (all) (From admission, onward)     Start     Ordered    12/21/20 0809  Inpatient Cardiology Consult  Once     Specialty:  Cardiology  Provider:  Maycol Wallace MD    12/21/20 0808    12/21/20 0547  [facility] (on-call MD unless specified) Details  Once     Specialty:  Hospitalist  Provider:  (Not yet assigned)    12/21/20 0546 12/21/20 0547  Ortho (on-call MD unless specified)  Once     Specialty:  Orthopedic Surgery  Provider:  Reza Marie MD    12/21/20 0546              Procedures     Imaging Results (All)     Procedure Component Value Units Date/Time    XR Chest 1 View [[id number]] Collected: 12/21/20 0702     Updated: 12/22/20 0808    Narrative:      CHEST, LEFT HIP     HISTORY: Left hip pain. Injury.      COMPARISON: AP chest 12/04/2018.     SUPINE AP CHEST: There are diffuse increased interstitial markings which  appear progressive when compared to the exam on 12/04/2018. This could  be due to progressive interstitial disease with mild interstitial edema  with interstitial infiltrate. No focal airspace consolidation is  evident. Heart size is mildly enlarged. There is a calcified granuloma  in the right midlung. Cholecystectomy clips are noted. The bones are  osteopenic and there are compression deformities within the thoracic  spine.     CT PELVIS AND AP LATERAL LEFT HIP: There is a bipolar left hip  arthroplasty and there is a periprosthetic fracture extending vertically  from the lateral intertrochanteric region without displacement.       Impression:      1. Nondisplaced left periprosthetic fracture adjacent to bipolar hip  arthroplasty.  2. Progressive interstitial disease when compared to previous chest  x-ray 12/04/2018. This could represent mild interstitial edema or  infiltrates and needs correlation with clinical data.  3. Osteopenia. Multiple compression deformities of the thoracic spine.     This report was finalized on 12/22/2020 8:05 AM by Dr. Ricky Machado M.D.       XR Hip With or Without Pelvis 2 - 3 View Left [[id number]] Collected: 12/21/20 0702     Updated: 12/22/20 0808    Narrative:      CHEST, LEFT HIP     HISTORY: Left hip pain. Injury.     COMPARISON: AP chest 12/04/2018.     SUPINE AP CHEST: There are diffuse increased interstitial markings which  appear progressive when compared to the exam on 12/04/2018. This could  be due to progressive interstitial disease with mild interstitial edema  with interstitial infiltrate. No focal airspace consolidation is  evident. Heart size is mildly enlarged. There is a calcified granuloma  in the right midlung. Cholecystectomy clips are noted. The bones are  osteopenic and there are compression deformities within the thoracic  spine.     CT PELVIS  AND AP LATERAL LEFT HIP: There is a bipolar left hip  arthroplasty and there is a periprosthetic fracture extending vertically  from the lateral intertrochanteric region without displacement.       Impression:      1. Nondisplaced left periprosthetic fracture adjacent to bipolar hip  arthroplasty.  2. Progressive interstitial disease when compared to previous chest  x-ray 12/04/2018. This could represent mild interstitial edema or  infiltrates and needs correlation with clinical data.  3. Osteopenia. Multiple compression deformities of the thoracic spine.     This report was finalized on 12/22/2020 8:05 AM by Dr. Ricky Machado M.D.       XR Femur 2 View Left [[id number]] Collected: 12/21/20 0724     Updated: 12/21/20 0935    Narrative:      TWO-VIEW LEFT FEMUR     HISTORY: Fracture of proximal left femur.     FINDINGS: The patient has recent hip x-rays demonstrating a fracture of  the proximal femur adjacent to the calcar of total hip replacement. The  remainder of the femur is intact. No other fracture is seen.     This report was finalized on 12/21/2020 9:32 AM by Dr. Jose Daniel Harris M.D.       CT Lower Extremity Bilateral Without Contrast [[id number]] Collected: 12/21/20 0902     Updated: 12/21/20 0911    Narrative:      LEFT FEMUR CT WITHOUT CONTRAST     HISTORY: 80-year-old woman with previous left hip hemiarthroplasty. Hip  pain after fall. X-rays demonstrated a periprosthetic proximal femur  fracture.     TECHNIQUE: Axial CT of the left femur with multiplanar reformatted  images is correlated with x-rays acquired earlier this morning.     Radiation dose reduction techniques were utilized, including automated  exposure control and exposure modulation based on body size.     FINDINGS: Bones of the pelvis are intact. There is no intrapelvic  hematoma or fluid collection.     There is a cemented, bipolar left hip hemiarthroplasty. A comminuted,  periprosthetic proximal femur fracture is observed extending  around the  greater trochanter and across the intertrochanteric region anteriorly.  There is a fracture plane that extends to the subtrochanteric region  posterolaterally. No fracture is appreciated around the femoral stem  distal to the level of the bottom of the lesser trochanter. The  cement/bone interface appears intact. There is some deep soft tissue  edema around the proximal femur as expected. No hematoma is present.     The middle and distal portions of the femur are intact. There is no  joint effusion at the knee. The proximal tibia and fibula are intact.     Large field-of-view source images showed intact right femur with right  knee arthroplasty hardware. There is no joint effusion at the right  knee.       Impression:      Bipolar left hip hemiarthroplasty hardware with a  comminuted, nondisplaced intertrochanteric proximal femur fracture  extending to the subtrochanteric region posterolaterally. No fracture is  observed around the distal 10 cm of the femoral stem. The bones of the  pelvis are intact.     This report was finalized on 12/21/2020 9:08 AM by Dr. Collin Waggoner M.D.             Pertinent Labs     Results from last 7 days   Lab Units 12/22/20  0603 12/21/20  0426   WBC 10*3/mm3 6.45 5.84   HEMOGLOBIN g/dL 11.7* 12.4   PLATELETS 10*3/mm3 179 197     Results from last 7 days   Lab Units 12/22/20  0603 12/21/20  0426   SODIUM mmol/L 137 135*   POTASSIUM mmol/L 4.1 3.9   CHLORIDE mmol/L 104 101   CO2 mmol/L 26.2 24.8   BUN mg/dL 13 14   CREATININE mg/dL 0.75 0.73   GLUCOSE mg/dL 108* 118*   Estimated Creatinine Clearance: 58.3 mL/min (by C-G formula based on SCr of 0.75 mg/dL).  Results from last 7 days   Lab Units 12/21/20  0426   ALBUMIN g/dL 4.30   BILIRUBIN mg/dL 0.3   ALK PHOS U/L 55   AST (SGOT) U/L 19   ALT (SGPT) U/L 16     Results from last 7 days   Lab Units 12/22/20  0603 12/21/20  0426   CALCIUM mg/dL 8.2* 9.0   ALBUMIN g/dL  --  4.30       Results from last 7 days   Lab Units  12/21/20  0426   PROBNP pg/mL 252.1           Invalid input(s): LDLCALC        Test Results Pending at Discharge       Discharge Details        Discharge Medications      New Medications      Instructions Start Date   acetaminophen 325 MG tablet  Commonly known as: TYLENOL   650 mg, Oral, Every 4 Hours PRN      baclofen 10 MG tablet  Commonly known as: LIORESAL   10 mg, Oral, 3 Times Daily PRN      diphenhydrAMINE 25 mg capsule  Commonly known as: BENADRYL   25 mg, Oral, Every 6 Hours PRN      HYDROcodone-acetaminophen 5-325 MG per tablet  Commonly known as: NORCO   1 tablet, Oral, Every 4 Hours PRN      melatonin 3 MG tablet   3 mg, Oral, Nightly      polyethylene glycol 17 g packet  Commonly known as: MIRALAX   17 g, Oral, Daily PRN      sennosides-docusate 8.6-50 MG per tablet  Commonly known as: PERICOLACE   2 tablets, Oral, Nightly PRN         Changes to Medications      Instructions Start Date   losartan 50 MG tablet  Commonly known as: COZAAR  What changed: when to take this   50 mg, Oral, 2 Times Daily         Continue These Medications      Instructions Start Date   Calcium + D3 600-200 MG-UNIT tablet   1 tablet, Oral, 2 Times Daily      carvedilol 12.5 MG tablet  Commonly known as: COREG   12.5 mg, Oral, 2 Times Daily With Meals      desonide 0.05 % cream  Commonly known as: DESOWEN   Topical, As Needed      fish oil 1200 MG capsule capsule   1,200 mg, Oral, 2 Times Daily With Meals      levothyroxine 25 MCG tablet  Commonly known as: SYNTHROID, LEVOTHROID   TAKE 1 TABLET BY MOUTH EVERY DAY      omeprazole 20 MG capsule  Commonly known as: priLOSEC   20.6 mg, Oral, Every Other Day      PROLIA SC   Subcutaneous, Every 6 Months, Dose unknown, injection every 6 months; DEC 3rd last injection       rosuvastatin 5 MG tablet  Commonly known as: CRESTOR   TAKE 1 TABLET BY MOUTH EVERY DAY      V-C FORTE PO   1 mg, Oral, Daily             Allergies   Allergen Reactions   • Morphine And Related Itching and Swelling          Discharge Disposition:  Skilled Nursing Facility (DC - External)    Discharge Diet:  Diet Order   Procedures   • Diet Regular; Cardiac, Consistent Carbohydrate       Discharge Activity:       CODE STATUS:    Code Status and Medical Interventions:   Ordered at: 12/21/20 0604     Code Status:    CPR     Medical Interventions (Level of Support Prior to Arrest):    Full       Future Appointments   Date Time Provider Department Center   12/23/2020  2:00 PM EMS MED 2 BH BILL EMS S BILL   1/26/2021 11:30 AM Kandy Trevino APRN [facility] None   5/6/2021  1:00 PM Jarred Carrizales Jr., MD [facility]      Contact information for follow-up providers     Reza Marie MD Follow up in 2 week(s).    Specialty: Orthopedic Surgery  Why: Please call the office to make a follow up appointment for 10-14 days from your hospital discharge  Contact information:  [address] [facility] [id number] [phone]             Jarred Carrizales Jr., MD Follow up in 2 week(s).    Specialty: Family Medicine  Contact information:  [address] [address]  [phone]                   Contact information for after-discharge care     Destination     [facility] .    Service: Skilled Nursing  Contact information:  [facility] [address]  [phone]                             Time Spent on Discharge:  Greater than 30 minutes      LYN Plasencia  [facility]  12/23/20  11:41 EST

## 2020-12-23 NOTE — PROGRESS NOTES
Continued Stay Note  [facility]     Patient Name: Moriah Petty  MRN: [id number]  Today's Date: 12/23/2020    Admit Date: 12/21/2020    Discharge Plan     Row Name 12/23/20 1145       Plan    Plan  [facility] -- Accepted    Patient/Family in Agreement with Plan  yes    Plan Comments  Discharge orders noted Spoke with Cathleen who has a SNF bed available for the patient today at their [place] location. Scheduled a [facility] [facility] (per the patient's request) to transport the patient today at approx 1400 (requested  call [phone] upon arrival to [facility]). Updated the patient, her nurse/JOSE Toscano and her /Sammy who are all agreeable. No other needs identified. CCP following.    Final Discharge Disposition Code  03 - skilled nursing facility (SNF)    Final Note  Pt to d/c to [facility]. [facility] to transport.    Row Name 12/23/20 1045       Plan    Plan  [facility] -- Accepted    Patient/Family in Agreement with Plan  yes    Plan Comments  Spoke with Cathleen who has accepted the pt and has a SNF bed for her today at their [place] location.  Patient will need ambulance to transport. Updated JOSE Diaz/ANTOINETTE Duran who hao disuss with ANTOINETTE SEAMAN. Await their determination. CCP following.        Discharge Codes    No documentation.       Expected Discharge Date and Time     Expected Discharge Date Expected Discharge Time    Dec 23, 2020             Noa Andrade RN

## 2020-12-23 NOTE — THERAPY TREATMENT NOTE
Patient Name: Moriah Petty  : 1940    MRN: [id number]                              Today's Date: 2020       Admit Date: 2020    Visit Dx:     ICD-10-CM ICD-9-CM   1. Closed comminuted intertrochanteric fracture of left femur, initial encounter (CMS/[facility])  S72.142A 820.21     Patient Active Problem List   Diagnosis   • Abnormal electrocardiogram   • Non-specific colitis   • Acute post-traumatic headache   • Postoperative anemia due to acute blood loss   • Arthritis   • Hematochezia   • Chest pain   • Compression fracture of lumbar vertebra (CMS/[facility])   • Closed wedge compression fracture of second lumbar vertebra (CMS/[facility])   • Constipation   • Dizziness   • Dyslipidemia   • Fatigue   • Ventricular premature beats   • Gastroesophageal reflux disease without esophagitis   • Hypertension   • Insomnia   • Left lower quadrant pain   • Low back pain   • Osteoporosis   • Premature atrial contraction   • Peripheral nerve disease   • Pneumonia   • Nausea   • Sensorineural hearing loss   • Shortness of breath   • Sinus bradycardia   • Sleep apnea   • Disorder of thyroid   • Trigeminal neuralgia   • Rectal bleeding   • Family history of colon cancer   • Adenomatous polyp of colon   • Hypothyroidism   • Osteoporosis, post-menopausal   • Diverticulitis of large intestine with abscess without bleeding   • Mild malnutrition (CMS/[facility])   • Pure hypercholesterolemia   • Colostomy status (CMS/[facility])   • History of colon polyps   • Diverticulitis   • Meningioma, cerebral (CMS/[facility])   • Cervical disc disorder with radiculopathy   • Closed comminuted intertrochanteric fracture of proximal end of left femur (CMS/[facility])   • Alcohol use     Past Medical History:   Diagnosis Date   • Adenomatous polyp of colon 2017   • Anemia    • Bursitis of elbow     RIGHT ELBOW   • Carotid stenosis     LESS THAN 50%, CAROTID ULTRASOUND AT [facility]    • Colitis    • Colostomy in place (CMS/[facility])    • Depression    • Diverticulosis    •  GERD (gastroesophageal reflux disease)    • History of blood transfusion 11/2013   • History of trigeminal neuralgia    • Hyperlipidemia    • Hypertension    • Hypothyroidism    • Insomnia    • OA (osteoarthritis)    • Osteoporosis    • PVC (premature ventricular contraction)    • PVC's (premature ventricular contractions)    • Sleep apnea     DOES NOT WEAR CPAP     Past Surgical History:   Procedure Laterality Date   • BREAST BIOPSY Left 05/10/2018    Ultrasound-guided mammotome vacuum assisted left breast biopsy with placement of metallic clip (path: fragments of fibroepithelial lesion, consistent with fibroadenoma)-Dr. Sree Glass, [facility]   • BRONCHOSCOPY N/A 08/29/2001    Dr. Christiano Horton   • CARDIAC CATHETERIZATION  03/2002   • CARDIAC CATHETERIZATION Left 11/28/2012    Dr. Anibal Trujillo   • CHOLECYSTECTOMY N/A 11/20/2013    DR. ESME GOLDMAN   • COLONOSCOPY N/A 02/21/2012    INT/EXT HEMORRHOIDS, DIVERTICULOSIS, 2 HYPERPLASTIC POLYPS, TORT (path: Rectum hyperplastic polyp)-Dr. Morgan Collins   • COLONOSCOPY N/A 4/24/2018    NTEH, Diverticulosis, tortuous colon, IH.  No specimens collected    • COLONOSCOPY N/A 02/27/2015    Non-thrombosed external hemorrhoids found on perianal exam, diverticulosis in the sigmoid and descending colon, tortuous colon, normal ileum, internal hemorrhoids-Dr. Morgan Collins   • COLONOSCOPY N/A 7/9/2020    Procedure: COLONOSCOPY into cecum with cold biopsy polypectomy x1;  Surgeon: Emanuel Clark MD;  Location: [facility] ENDOSCOPY;  Service: General;  Laterality: N/A;  Pre op: Diverticulitis, Family history of colon cancer  Post op: Polyp   • COLONOSCOPY W/ POLYPECTOMY N/A 05/06/2004    Diverticulosis, small recto-sigmoid polyps, otherwise normal (path: Colon at 20 cm hyperplastic polyp) -Dr. Morgan Collins   • COLOSTOMY CLOSURE N/A 5/28/2019    Procedure: COLOSTOMY TAKEDOWN;  Surgeon: Emanuel Clark MD;  Location: [facility] MAIN OR;  Service: General   • EXPLORATORY  LAPAROTOMY N/A 12/5/2018    Procedure: exploratory laparotomy with sigmoid resection, possible colostomy, APPENDECTOMY;  Surgeon: Emanuel Clark MD;  Location: [facility] OR;  Service: General   • JOINT REPLACEMENT     • KYPHOPLASTY N/A 12/21/2012    COMPRESSION FX OF L2   • TOTAL HIP ARTHROPLASTY Left 03/16/2004    Left bipolar hip replacement-Dr. Chioma Burch   • TOTAL KNEE ARTHROPLASTY Right 11/13/2013    Dr. Reza Longoria   • TRIGEMINAL NERVE DECOMPRESSION  2014    LEFT EAR      General Information     Row Name 12/23/20 1140          Physical Therapy Time and Intention    Document Type  therapy note (daily note)  -PC     Mode of Treatment  physical therapy  -PC     Row Name 12/23/20 1140          General Information    Existing Precautions/Restrictions  fall;weight bearing TTWB  -PC     Row Name 12/23/20 1140          Cognition    Orientation Status (Cognition)  oriented x 4  -PC     Row Name 12/23/20 1140          Safety Issues, Functional Mobility    Impairments Affecting Function (Mobility)  pain;strength;endurance/activity tolerance  -PC       User Key  (r) = Recorded By, (t) = Taken By, (c) = Cosigned By    Initials Name Provider Type    PC Suri Steele PT Physical Therapist        Mobility     Row Name 12/23/20 1141          Bed Mobility    Supine-Sit [place] (Bed Mobility)  minimum assist (75% patient effort)  -PC     Assistive Device (Bed Mobility)  bed rails;head of bed elevated  -PC     Comment (Bed Mobility)  needed min assist to assist with bringing LLE to side of bed  -PC     Row Name 12/23/20 1141          Sit-Stand Transfer    Sit-Stand [place] (Transfers)  minimum assist (75% patient effort)  -PC     Assistive Device (Sit-Stand Transfers)  walker, front-wheeled  -PC     Row Name 12/23/20 1141          Gait/Stairs (Locomotion)    [place] Level (Gait)  minimum assist (75% patient effort)  -PC     Assistive Device (Gait)  walker, front-wheeled  -PC     Distance in Feet  (Gait)  7 ft to chair, able to advance LLE and maintain TTWB status when stepping with RLE  -PC     Row Name 12/23/20 1141          Mobility    Left Lower Extremity (Weight-bearing Status)  (S) toe touch weight-bearing (TTWB)  -PC       User Key  (r) = Recorded By, (t) = Taken By, (c) = Cosigned By    Initials Name Provider Type    PC Suri Steele, PT Physical Therapist        Obj/Interventions     Row Name 12/23/20 1143          Motor Skills    Therapeutic Exercise  -- not allowed to actively abduct L hip, pt perf AP, QS, GS  -PC       User Key  (r) = Recorded By, (t) = Taken By, (c) = Cosigned By    Initials Name Provider Type    PC Suri Steele, PT Physical Therapist        Goals/Plan    No documentation.       Clinical Impression     Row Name 12/23/20 1143          Pain Scale: Numbers Pre/Post-Treatment    Pretreatment Pain Rating  8/10  -PC     Posttreatment Pain Rating  8/10  -PC     Pain Location - Side  Left  -PC     Pain Location  hip  -PC     Pain Intervention(s)  Medication (See MAR);Repositioned  -PC     Row Name 12/23/20 1143          Plan of Care Review    Plan of Care Reviewed With  patient  -PC     Progress  improving  -PC     Outcome Summary  pt continues to have a difficult time with mobility, significant pain with any movement, needs assist for bed mobility, and to walk 7 ft to chair, pt was able to walk short distance and use rwx to maintain TTWB LLE, probable d/c to rehab today  -PC       User Key  (r) = Recorded By, (t) = Taken By, (c) = Cosigned By    Initials Name Provider Type    PC Suri Steele, PT Physical Therapist        Outcome Measures     Row Name 12/23/20 1140          How much help from another person do you currently need...    Turning from your back to your side while in flat bed without using bedrails?  3  -PC     Moving from lying on back to sitting on the side of a flat bed without bedrails?  3  -PC     Moving to and from a bed to a chair (including a wheelchair)?  3   -PC     Standing up from a chair using your arms (e.g., wheelchair, bedside chair)?  3  -PC     Climbing 3-5 steps with a railing?  1  -PC     To walk in hospital room?  3  -PC     AM-PAC 6 Clicks Score (PT)  16  -PC       User Key  (r) = Recorded By, (t) = Taken By, (c) = Cosigned By    Initials Name Provider Type    PC Suri Steele, PT Physical Therapist        Physical Therapy Education                 Title: PT OT SLP Therapies (Done)     Topic: Physical Therapy (Done)     Point: Mobility training (Done)     Learning Progress Summary           Patient Acceptance, E,D, DU by  at 12/23/2020 1145    Acceptance, E,D, DU by  at 12/22/2020 1447    Acceptance, E,TB,D, VU,DU,NR by  at 12/21/2020 1604                   Point: Home exercise program (Done)     Learning Progress Summary           Patient Acceptance, E,D, DU by  at 12/23/2020 1145    Acceptance, E,D, DU by  at 12/22/2020 1447    Acceptance, E,TB,D, VU,DU,NR by  at 12/21/2020 1604                   Point: Body mechanics (Done)     Learning Progress Summary           Patient Acceptance, E,D, DU by  at 12/23/2020 1145    Acceptance, E,D, DU by  at 12/22/2020 1447    Acceptance, E,TB,D, VU,DU,NR by  at 12/21/2020 1604                   Point: Precautions (Done)     Learning Progress Summary           Patient Acceptance, E,D, DU by  at 12/23/2020 1145    Acceptance, E,D, DU by  at 12/22/2020 1447    Acceptance, E,TB,D, VU,DU,NR by  at 12/21/2020 1604                               User Key     Initials Effective Dates Name Provider Type Discipline     04/03/18 -  Suri Steele PT Physical Therapist PT     05/26/20 -  Sadia Jo PT Physical Therapist PT              PT Recommendation and Plan     Plan of Care Reviewed With: patient  Progress: improving  Outcome Summary: pt continues to have a difficult time with mobility, significant pain with any movement, needs assist for bed mobility, and to walk 7 ft to chair, pt was able  to walk short distance and use rwx to maintain TTWB LLE, probable d/c to rehab today     Time Calculation:   PT Charges     Row Name 12/23/20 1146             Time Calculation    Start Time  1028  -PC      Stop Time  1056  -PC      Time Calculation (min)  28 min  -PC      PT Received On  12/23/20  -PC      PT - Next Appointment  12/24/20  -PC        User Key  (r) = Recorded By, (t) = Taken By, (c) = Cosigned By    Initials Name Provider Type    PC Suri Steele PT Physical Therapist        Therapy Charges for Today     Code Description Service Date Service Provider Modifiers Qty    38581319859  PT THER PROC EA 15 MIN 12/22/2020 Suri Steele, PT GP 2    14603457387 HC PT THER SUPP EA 15 MIN 12/22/2020 Suri Steele, PT GP 2    [id number]  PT THER PROC EA 15 MIN 12/23/2020 Suri Steele, PT GP 2          PT G-Codes  Outcome Measure Options: AM-PAC 6 Clicks Basic Mobility (PT)  AM-PAC 6 Clicks Score (PT): 16    Suri Steele PT  12/23/2020

## 2020-12-23 NOTE — PLAN OF CARE
Goal Outcome Evaluation:  Plan of Care Reviewed With: patient  Progress: improving  Outcome Summary: pt continues to have a difficult time with mobility, significant pain with any movement, needs assist for bed mobility, and to walk 7 ft to chair, pt was able to walk short distance and use rwx to maintain TTWB LLE, probable d/c to rehab today    Patient was intermittently wearing a face mask during this therapy encounter. Therapist used appropriate personal protective equipment including eye protection, mask, and gloves.  Mask used was standard procedure mask. Appropriate PPE was worn during the entire therapy session. Hand hygiene was completed before and after therapy session. Patient is not in enhanced droplet precautions.

## 2020-12-23 NOTE — PROGRESS NOTES
Continued Stay Note  [facility]     Patient Name: Moriah Petty  MRN: [id number]  Today's Date: 12/23/2020    Admit Date: 12/21/2020    Discharge Plan     Row Name 12/23/20 1045       Plan    Plan  [facility] -- Accepted    Patient/Family in Agreement with Plan  yes    Plan Comments  Spoke with [facility] who has accepted the pt and has a SNF bed for her today at their [place] location.  Patient will need ambulance to transport. Updated JOSE Diaz/LHKVNG Duran who hao disuss with [facility] MD. Await their determination. CCP following.        Discharge Codes    No documentation.             Noa Andrade RN

## 2020-12-23 NOTE — PLAN OF CARE
Goal Outcome Evaluation:  Plan of Care Reviewed With: patient  Progress: improving  Outcome Summary: Pt here for a closed fracture of the left proximal femur. No surgery at this time. TTWB. VSS. Room air. Pain managed with PO meds. PO stool softeners given this shift. Voiding per asaf. Educated on BP monitoring. Plan is for rehab today when medically stable.

## 2020-12-31 ENCOUNTER — TELEPHONE (OUTPATIENT)
Dept: INTERNAL MEDICINE | Facility: CLINIC | Age: 80
End: 2020-12-31

## 2020-12-31 RX ORDER — CARVEDILOL 12.5 MG/1
12.5 TABLET ORAL 2 TIMES DAILY WITH MEALS
Qty: 180 TABLET | Refills: 1 | Status: SHIPPED | OUTPATIENT
Start: 2020-12-31 | End: 2021-06-24

## 2020-12-31 NOTE — TELEPHONE ENCOUNTER
Caller: Moriah Petty    Relationship: Self    Best call back number: [phone]       Medication needed:   Requested Prescriptions      No prescriptions requested or ordered in this encounter       When do you need the refill by: ASAP     What details did the patient provide when requesting the medication: patient has a new appointment with cardiologist end of January     Does the patient have less than a 3 day supply:  [x] Yes  [] No    What is the patient's preferred pharmacy:  [facility]/pharmacy #[id number] - [place], [place] - [address] AT [facility] [phone] [facility] [phone]   [phone]

## 2021-01-26 ENCOUNTER — OFFICE VISIT (OUTPATIENT)
Dept: CARDIOLOGY | Facility: CLINIC | Age: 81
End: 2021-01-26

## 2021-01-26 VITALS
HEIGHT: 67 IN | WEIGHT: 159 LBS | HEART RATE: 60 BPM | BODY MASS INDEX: 24.96 KG/M2 | SYSTOLIC BLOOD PRESSURE: 138 MMHG | DIASTOLIC BLOOD PRESSURE: 80 MMHG

## 2021-01-26 DIAGNOSIS — I49.1 PREMATURE ATRIAL CONTRACTION: ICD-10-CM

## 2021-01-26 DIAGNOSIS — I49.3 VENTRICULAR PREMATURE BEATS: ICD-10-CM

## 2021-01-26 DIAGNOSIS — I10 ESSENTIAL HYPERTENSION: Primary | ICD-10-CM

## 2021-01-26 PROCEDURE — 99214 OFFICE O/P EST MOD 30 MIN: CPT | Performed by: NURSE PRACTITIONER

## 2021-01-26 PROCEDURE — 93000 ELECTROCARDIOGRAM COMPLETE: CPT | Performed by: NURSE PRACTITIONER

## 2021-01-26 RX ORDER — LOSARTAN POTASSIUM 50 MG/1
50 TABLET ORAL 2 TIMES DAILY
Qty: 180 TABLET | Refills: 3
Start: 2021-01-26 | End: 2022-01-10

## 2021-01-26 NOTE — PROGRESS NOTES
Date of Office Visit: 21  Encounter Provider: LYN Gipson  Place of Service: [facility] CARDIOLOGY  Patient Name: Moriah Petty  :1940    Chief Complaint   Patient presents with   • HTN (hypertension), benign   • Follow-up   :     HPI: Moriah Petty is a 80 y.o. female  with history of premature ventricular contraction, obstructive sleep apnea, hypertension, osteoporosis, GERD left ventricular hypertrophy, anemia, and diverticulitis.      She was previously followed by Dr. Anibal Trujillo. She is now followed by Dr. Sampson.  I will visit with her for the first time today and have reviewed her medical record including last hospitalization and inpatient consult note.  She had an echocardiogram in 2014 which showed normal left ventricular systolic function, mild left ventricular hypertrophy, grade 1 diastolic dysfunction, patent foramen ovale was noted, trace to mild tricuspid regurgitation was noted as well.  We were consulted inpatient 2020 for preoperative evaluation.  She was low risk for cardiovascular event for orthopedic surgery.  She had fracture her hip secondary to mechanical fall.  Surgical intervention was not warranted.    She presents today for reassessment.  She is slowly getting stronger working with physical therapy at her home.  She is ambulating with a walker.  She has rare palpitations lasting less than 20 seconds which are chronic and unchanged.  She has a little fatigue.  She denies shortness of breath, edema, lightheadedness, chest pain tightness pressure.  She reports history of sleep apnea but not being able to tolerate the mask.  She try to treat that over 10 years ago and gave up on it.  She checks her blood pressure at home which ranges 120-133/60-80.      Allergies   Allergen Reactions   • Morphine And Related Itching and Swelling           Family and social history reviewed.     Review of Systems   Constitution: Positive for  "malaise/fatigue.     All other systems were reviewed and are negative          Objective:     Vitals:    01/26/21 1139   BP: 138/80   BP Location: Left arm   Patient Position: Sitting   Pulse: 60   Weight: 72.1 kg (159 lb)   Height: 170.2 cm (67\")     Body mass index is 24.9 kg/m².    PHYSICAL EXAM:  Constitutional:       General: Not in acute distress.     Appearance: Well-developed. Not diaphoretic.   HENT:      Head: Normocephalic.   Pulmonary:      Effort: Pulmonary effort is normal. No respiratory distress.      Breath sounds: Normal breath sounds. No wheezing. No rhonchi. No rales.   Cardiovascular:      Normal rate. Regular rhythm.   Pulses:     Radial: 2+ bilaterally.  Skin:     General: Skin is warm and dry. There is no cyanosis.      Findings: No rash.   Neurological:      Mental Status: Alert and oriented to person, place, and time.   Psychiatric:         Behavior: Behavior normal.         Thought Content: Thought content normal.         Judgment: Judgment normal.           ECG 12 Lead    Date/Time: 1/26/2021 11:57 AM  Performed by: Kandy Trevino APRN  Authorized by: Kandy Trevino APRN   Comparison: compared with previous ECG   Similar to previous ECG  Rhythm: sinus rhythm  ST Segments: ST segments normal  T Waves: T waves normal                Current Outpatient Medications   Medication Sig Dispense Refill   • Calcium Carb-Cholecalciferol (CALCIUM + D3) 600-200 MG-UNIT tablet Take 1 tablet by mouth 2 (Two) Times a Day.     • carvedilol (COREG) 12.5 MG tablet Take 1 tablet by mouth 2 (Two) Times a Day With Meals. 180 tablet 1   • Denosumab (PROLIA SC) Inject  under the skin into the appropriate area as directed Every 6 (Six) Months. Dose unknown, injection every 6 months; DEC 3rd last injection     • desonide (DESOWEN) 0.05 % cream Apply  topically to the appropriate area as directed As Needed for Irritation.     • levothyroxine (SYNTHROID, LEVOTHROID) 25 MCG tablet TAKE 1 TABLET BY MOUTH EVERY DAY 90 " tablet 1   • losartan (COZAAR) 50 MG tablet Take 1 tablet by mouth 2 (Two) Times a Day. 180 tablet 3   • Multiple Vitamins-Minerals (V-C FORTE PO) Take 1 mg by mouth Daily.     • Omega-3 Fatty Acids (FISH OIL) 1200 MG capsule capsule Take 1,200 mg by mouth 2 (Two) Times a Day With Meals.     • omeprazole (priLOSEC) 20 MG capsule Take 20.6 mg by mouth Every Other Day.     • rosuvastatin (CRESTOR) 5 MG tablet TAKE 1 TABLET BY MOUTH EVERY DAY 90 tablet 1   • sennosides-docusate (PERICOLACE) 8.6-50 MG per tablet Take 2 tablets by mouth At Night As Needed for Constipation.     • acetaminophen (TYLENOL) 325 MG tablet Take 2 tablets by mouth Every 4 (Four) Hours As Needed for Mild Pain .       No current facility-administered medications for this visit.      Assessment:       Diagnosis Plan   1. Essential hypertension  ECG 12 Lead        Orders Placed This Encounter   Procedures   • ECG 12 Lead     This order was created via procedure documentation         Plan:   1.  80-year-old female with history of hypertension BP appears at goal and she follows this at home.  She is to follow-up in 6 months.  We will be happy to take over her carvedilol and losartan refills  2.  Premature ventricular contractions- rare palpitaitons  3.  Obstructive sleep apnea- she did not tolerate mask in the past. That was over 10 years ago  4.  Hyperlipidemia  5.  GERD  6.  Diverticulitis  7.  Proximal femur fracture secondary to a fall December 2000 20th surgical intervention was not warranted-she is an outpatient physical therapy at her home.  8. Hypothyroidism on replacement therapy  9.  Osteoporosis on Prolia    Follow-up in 6 months with Dr. Sampson call with questions or concerns.            It has been a pleasure to participate in this patient's care.      Thank you,  LYN Gipson      **I used Dragon to dictate this note:**

## 2021-02-16 RX ORDER — LEVOTHYROXINE SODIUM 0.03 MG/1
TABLET ORAL
Qty: 90 TABLET | Refills: 1 | Status: SHIPPED | OUTPATIENT
Start: 2021-02-16 | End: 2021-09-10

## 2021-05-14 ENCOUNTER — PROCEDURE VISIT (OUTPATIENT)
Dept: OBSTETRICS AND GYNECOLOGY | Facility: CLINIC | Age: 81
End: 2021-05-14

## 2021-05-14 ENCOUNTER — APPOINTMENT (OUTPATIENT)
Dept: WOMENS IMAGING | Facility: HOSPITAL | Age: 81
End: 2021-05-14

## 2021-05-14 DIAGNOSIS — M81.0 AGE-RELATED OSTEOPOROSIS WITHOUT CURRENT PATHOLOGICAL FRACTURE: Primary | ICD-10-CM

## 2021-05-14 DIAGNOSIS — Z12.31 VISIT FOR SCREENING MAMMOGRAM: Primary | ICD-10-CM

## 2021-05-14 PROCEDURE — 77063 BREAST TOMOSYNTHESIS BI: CPT | Performed by: RADIOLOGY

## 2021-05-14 PROCEDURE — 77067 SCR MAMMO BI INCL CAD: CPT | Performed by: OBSTETRICS & GYNECOLOGY

## 2021-05-14 PROCEDURE — 77067 SCR MAMMO BI INCL CAD: CPT | Performed by: RADIOLOGY

## 2021-05-14 PROCEDURE — 77063 BREAST TOMOSYNTHESIS BI: CPT | Performed by: OBSTETRICS & GYNECOLOGY

## 2021-05-19 DIAGNOSIS — M81.0 OSTEOPOROSIS, POST-MENOPAUSAL: Primary | ICD-10-CM

## 2021-05-25 ENCOUNTER — OFFICE VISIT (OUTPATIENT)
Dept: INTERNAL MEDICINE | Facility: CLINIC | Age: 81
End: 2021-05-25

## 2021-05-25 VITALS
WEIGHT: 165 LBS | BODY MASS INDEX: 25.9 KG/M2 | OXYGEN SATURATION: 94 % | HEART RATE: 59 BPM | HEIGHT: 67 IN | DIASTOLIC BLOOD PRESSURE: 98 MMHG | SYSTOLIC BLOOD PRESSURE: 144 MMHG | TEMPERATURE: 98.3 F

## 2021-05-25 DIAGNOSIS — G62.9 PERIPHERAL NERVE DISEASE: ICD-10-CM

## 2021-05-25 DIAGNOSIS — G50.0 TRIGEMINAL NEURALGIA: ICD-10-CM

## 2021-05-25 DIAGNOSIS — Z00.00 MEDICARE ANNUAL WELLNESS VISIT, SUBSEQUENT: ICD-10-CM

## 2021-05-25 DIAGNOSIS — S72.112D: Primary | ICD-10-CM

## 2021-05-25 DIAGNOSIS — F32.9 REACTIVE DEPRESSION: ICD-10-CM

## 2021-05-25 DIAGNOSIS — R29.818 IMPAIRED PROPRIOCEPTION: ICD-10-CM

## 2021-05-25 DIAGNOSIS — E78.5 DYSLIPIDEMIA: ICD-10-CM

## 2021-05-25 DIAGNOSIS — E55.9 VITAMIN D DEFICIENCY: ICD-10-CM

## 2021-05-25 DIAGNOSIS — I10 ESSENTIAL HYPERTENSION: ICD-10-CM

## 2021-05-25 PROCEDURE — G0439 PPPS, SUBSEQ VISIT: HCPCS | Performed by: FAMILY MEDICINE

## 2021-05-25 PROCEDURE — 99214 OFFICE O/P EST MOD 30 MIN: CPT | Performed by: FAMILY MEDICINE

## 2021-05-25 RX ORDER — DULOXETIN HYDROCHLORIDE 20 MG/1
20 CAPSULE, DELAYED RELEASE ORAL DAILY
Qty: 90 CAPSULE | Refills: 1 | Status: SHIPPED | OUTPATIENT
Start: 2021-05-25 | End: 2021-08-23 | Stop reason: SINTOL

## 2021-05-25 RX ORDER — FOLIC ACID 1 MG/1
1 TABLET ORAL DAILY
Qty: 90 TABLET | Refills: 3 | Status: SHIPPED | OUTPATIENT
Start: 2021-05-25 | End: 2022-05-23

## 2021-05-25 NOTE — PROGRESS NOTES
"Chief Complaint  Medicare Wellness-subsequent    Subjective          Moriah Petty presents to [facility] PRIMARY [facility]  Delightful lady unfortunately fell down December 21 with the peritrochanteric fracture over the previously replaced hip.  This is a closed fracture which required no intervention surgically.  We discussed proprioception and balance issues and she is gone through rehab.  We will check B12 and folic acid and folic acid 1 mg daily.    Otherwise treatment history of trigeminal neuralgia reviewed.    We discussed reactive depression as far as the accident and will try Cymbalta 20 mg daily.  See her back in 3 months.    Active management of hypertension hyperlipidemia reviewed.  Labs are pending.      Objective   Vital Signs:   /98   Pulse 59   Temp 98.3 °F (36.8 °C)   Ht 170.2 cm (67\")   Wt 74.8 kg (165 lb)   SpO2 94%   BMI 25.84 kg/m²     Physical Exam  Vitals reviewed.   Constitutional:       Appearance: She is well-developed.   HENT:      Head: Normocephalic and atraumatic.      Right Ear: Tympanic membrane and external ear normal.      Left Ear: Tympanic membrane and external ear normal.   Eyes:      Conjunctiva/sclera: Conjunctivae normal.      Pupils: Pupils are equal, round, and reactive to light.   Neck:      Thyroid: No thyromegaly.      Vascular: No JVD.   Cardiovascular:      Rate and Rhythm: Normal rate and regular rhythm.      Heart sounds: Normal heart sounds.   Pulmonary:      Effort: Pulmonary effort is normal.      Breath sounds: Normal breath sounds.   Abdominal:      General: Bowel sounds are normal.      Palpations: Abdomen is soft.   Musculoskeletal:      Cervical back: Normal range of motion and neck supple.      Left hip: Decreased range of motion.   Lymphadenopathy:      Cervical: No cervical adenopathy.   Skin:     General: Skin is warm and dry.      Findings: No rash.   Neurological:      Mental Status: She is alert and oriented to person, place, " and time.      Cranial Nerves: No cranial nerve deficit.      Coordination: Coordination normal.   Psychiatric:         Attention and Perception: Attention normal.         Mood and Affect: Mood is depressed.         Speech: Speech normal.         Behavior: Behavior normal.         Thought Content: Thought content normal.         Cognition and Memory: Cognition normal.         Judgment: Judgment normal.        Result Review :       Data reviewed: Recent hospitalization notes [facility] health          Assessment and Plan    Diagnoses and all orders for this visit:    1. Closed avulsion fracture of greater trochanter of femur with routine healing, left (Primary)    2. Impaired proprioception  -     CBC & Differential  -     Comprehensive Metabolic Panel  -     Lipid Panel With / Chol / HDL Ratio  -     TSH  -     T4, Free  -     T3, Free  -     Vitamin B12  -     Folate  -     Urinalysis With Microscopic If Indicated (No Culture) - Urine, Clean Catch  -     Vitamin D 25 Hydroxy    3. Essential hypertension  -     CBC & Differential  -     Comprehensive Metabolic Panel  -     Lipid Panel With / Chol / HDL Ratio  -     TSH  -     T4, Free  -     T3, Free  -     Vitamin B12  -     Folate  -     Urinalysis With Microscopic If Indicated (No Culture) - Urine, Clean Catch  -     Vitamin D 25 Hydroxy    4. Dyslipidemia  -     CBC & Differential  -     Comprehensive Metabolic Panel  -     Lipid Panel With / Chol / HDL Ratio  -     TSH  -     T4, Free  -     T3, Free  -     Vitamin B12  -     Folate  -     Urinalysis With Microscopic If Indicated (No Culture) - Urine, Clean Catch  -     Vitamin D 25 Hydroxy    5. Trigeminal neuralgia  -     CBC & Differential  -     Comprehensive Metabolic Panel  -     Lipid Panel With / Chol / HDL Ratio  -     TSH  -     T4, Free  -     T3, Free  -     Vitamin B12  -     Folate  -     Urinalysis With Microscopic If Indicated (No Culture) - Urine, Clean Catch  -     Vitamin D 25 Hydroxy    6.  Peripheral nerve disease  -     CBC & Differential  -     Comprehensive Metabolic Panel  -     Lipid Panel With / Chol / HDL Ratio  -     TSH  -     T4, Free  -     T3, Free  -     Vitamin B12  -     Folate  -     Urinalysis With Microscopic If Indicated (No Culture) - Urine, Clean Catch  -     Vitamin D 25 Hydroxy    7. Medicare annual wellness visit, subsequent    8. Vitamin D deficiency   -     Vitamin D 25 Hydroxy    9. Reactive depression    Other orders  -     folic acid (FOLVITE) 1 MG tablet; Take 1 tablet by mouth Daily.  Dispense: 90 tablet; Refill: 3  -     DULoxetine (Cymbalta) 20 MG capsule; Take 1 capsule by mouth Daily.  Dispense: 90 capsule; Refill: 1        Follow Up   Return in about 3 months (around 8/25/2021) for Recheck.  Patient was given instructions and counseling regarding her condition or for health maintenance advice. Please see specific information pulled into the AVS if appropriate.

## 2021-05-25 NOTE — PROGRESS NOTES
The ABCs of the Annual Wellness Visit  Subsequent Medicare Wellness Visit    Chief Complaint   Patient presents with   • Medicare Wellness-subsequent       Subjective   History of Present Illness:  Moriah Petty is a 80 y.o. female who presents for a Subsequent Medicare Wellness Visit.    HEALTH RISK ASSESSMENT    Recent Hospitalizations:  Recently treated at the following:  [facility]    Current Medical Providers:  Patient Care Team:  Jarred Carrizales Jr., MD as PCP - General (Family Medicine)  Anibal Trujillo Jr., MD (Cardiology)  Gordon Brewster MD as Consulting Physician (Obstetrics and Gynecology)    Smoking Status:  Social History     Tobacco Use   Smoking Status Former Smoker   • Packs/day: 0.50   • Years: 30.00   • Pack years: 15.00   • Types: Cigarettes   • Start date: 1957   • Quit date: 1989   • Years since quittin.0   Smokeless Tobacco Never Used   Tobacco Comment    2 cups coffee daily       Alcohol Consumption:  Social History     Substance and Sexual Activity   Alcohol Use Yes   • Alcohol/week: 7.0 standard drinks   • Types: 2 Shots of liquor, 2 Standard drinks or equivalent per week    Comment: 2 DRINKS PER DAY       Depression Screen:   PHQ-2/PHQ-9 Depression Screening 2021   Little interest or pleasure in doing things 0   Feeling down, depressed, or hopeless 1   Trouble falling or staying asleep, or sleeping too much 1   Feeling tired or having little energy 1   Poor appetite or overeating 0   Feeling bad about yourself - or that you are a failure or have let yourself or your family down 0   Trouble concentrating on things, such as reading the newspaper or watching television 1   Moving or speaking so slowly that other people could have noticed. Or the opposite - being so fidgety or restless that you have been moving around a lot more than usual 0   Thoughts that you would be better off dead, or of hurting yourself in some way 0   Total Score 4   If you checked off any  problems, how difficult have these problems made it for you to do your work, take care of things at home, or get along with other people? Not difficult at all       Fall Risk Screen:  YELITZA Fall Risk Assessment was completed, and patient is at HIGH risk for falls. Assessment completed on:5/25/2021    Health Habits and Functional and Cognitive Screening:  Functional & Cognitive Status 5/25/2021   Do you have difficulty preparing food and eating? No   Do you have difficulty bathing yourself, getting dressed or grooming yourself? No   Do you have difficulty using the toilet? No   Do you have difficulty moving around from place to place? No   Do you have trouble with steps or getting out of a bed or a chair? No   Current Diet Well Balanced Diet   Dental Exam Up to date   Eye Exam Not up to date   Exercise (times per week) 0 times per week   Current Exercise Activities Include -   Do you need help using the phone?  No   Are you deaf or do you have serious difficulty hearing?  No   Do you need help with transportation? No   Do you need help shopping? No   Do you need help preparing meals?  No   Do you need help with housework?  No   Do you need help with laundry? No   Do you need help taking your medications? No   Do you need help managing money? No   Do you ever drive or ride in a car without wearing a seat belt? No   Have you felt unusual stress, anger or loneliness in the last month? No   Who do you live with? Spouse   If you need help, do you have trouble finding someone available to you? No   Have you been bothered in the last four weeks by sexual problems? No   Do you have difficulty concentrating, remembering or making decisions? No         Does the patient have evidence of cognitive impairment? No    Asprin use counseling:Does not need ASA (and currently is not on it)    Age-appropriate Screening Schedule:  Refer to the list below for future screening recommendations based on patient's age, sex and/or medical  conditions. Orders for these recommended tests are listed in the plan section. The patient has been provided with a written plan.    Health Maintenance   Topic Date Due   • TDAP/TD VACCINES (1 - Tdap) Never done   • ZOSTER VACCINE (1 of 2) Never done   • LIPID PANEL  05/05/2021   • INFLUENZA VACCINE  08/01/2021   • DXA SCAN  05/26/2022          The following portions of the patient's history were reviewed and updated as appropriate: allergies, current medications, past family history, past medical history, past social history, past surgical history and problem list.    Outpatient Medications Prior to Visit   Medication Sig Dispense Refill   • Calcium Carb-Cholecalciferol (CALCIUM + D3) 600-200 MG-UNIT tablet Take 1 tablet by mouth 2 (Two) Times a Day.     • carvedilol (COREG) 12.5 MG tablet Take 1 tablet by mouth 2 (Two) Times a Day With Meals. 180 tablet 1   • Denosumab (PROLIA SC) Inject  under the skin into the appropriate area as directed Every 6 (Six) Months. Dose unknown, injection every 6 months; DEC 3rd last injection     • desonide (DESOWEN) 0.05 % cream Apply  topically to the appropriate area as directed As Needed for Irritation.     • levothyroxine (SYNTHROID, LEVOTHROID) 25 MCG tablet TAKE 1 TABLET BY MOUTH EVERY DAY 90 tablet 1   • losartan (COZAAR) 50 MG tablet Take 1 tablet by mouth 2 (Two) Times a Day. 180 tablet 3   • Multiple Vitamins-Minerals (V-C FORTE PO) Take 1 mg by mouth Daily.     • Omega-3 Fatty Acids (FISH OIL) 1200 MG capsule capsule Take 1,200 mg by mouth 2 (Two) Times a Day With Meals.     • omeprazole (priLOSEC) 20 MG capsule Take 20.6 mg by mouth Every Other Day.     • rosuvastatin (CRESTOR) 5 MG tablet TAKE 1 TABLET BY MOUTH EVERY DAY 90 tablet 1   • acetaminophen (TYLENOL) 325 MG tablet Take 2 tablets by mouth Every 4 (Four) Hours As Needed for Mild Pain .     • sennosides-docusate (PERICOLACE) 8.6-50 MG per tablet Take 2 tablets by mouth At Night As Needed for Constipation.        No facility-administered medications prior to visit.       Patient Active Problem List   Diagnosis   • Abnormal electrocardiogram   • Non-specific colitis   • Acute post-traumatic headache   • Postoperative anemia due to acute blood loss   • Arthritis   • Hematochezia   • Chest pain   • Compression fracture of lumbar vertebra (CMS/HCC)   • Closed wedge compression fracture of second lumbar vertebra (CMS/HCC)   • Constipation   • Dizziness   • Dyslipidemia   • Fatigue   • Ventricular premature beats   • Gastroesophageal reflux disease without esophagitis   • Hypertension   • Insomnia   • Left lower quadrant pain   • Low back pain   • Osteoporosis   • Premature atrial contraction   • Peripheral nerve disease   • Pneumonia   • Nausea   • Sensorineural hearing loss   • Shortness of breath   • Sinus bradycardia   • Sleep apnea   • Disorder of thyroid   • Trigeminal neuralgia   • Rectal bleeding   • Family history of colon cancer   • Adenomatous polyp of colon   • Hypothyroidism   • Osteoporosis, post-menopausal   • Diverticulitis of large intestine with abscess without bleeding   • Mild malnutrition (CMS/HCC)   • Pure hypercholesterolemia   • Colostomy status (CMS/HCC)   • History of colon polyps   • Diverticulitis   • Meningioma, cerebral (CMS/HCC)   • Cervical disc disorder with radiculopathy   • Closed comminuted intertrochanteric fracture of proximal end of left femur (CMS/HCC)   • Alcohol use       Advanced Care Planning:  ACP discussion was held with the patient during this visit. Patient has an advance directive in EMR which is still valid.     Review of Systems    Compared to one year ago, the patient feels her physical health is worse.  Compared to one year ago, the patient feels her mental health is worse.    Reviewed chart for potential of high risk medication in the elderly: yes  Reviewed chart for potential of harmful drug interactions in the elderly:yes    Objective         Vitals:    05/25/21 1443  "  BP: 144/98   Pulse: 59   Temp: 98.3 °F (36.8 °C)   SpO2: 94%   Weight: 74.8 kg (165 lb)   Height: 170.2 cm (67\")   PainSc: 0-No pain       Body mass index is 25.84 kg/m².  Discussed the patient's BMI with her. The BMI is in the acceptable range.    Physical Exam          Assessment/Plan   Medicare Risks and Personalized Health Plan  CMS Preventative Services Quick Reference  Breast Cancer/Mammogram Screening  Fall Risk  Cardiovascular risk  The above risks/problems have been discussed with the patient.  Pertinent information has been shared with the patient in the After Visit Summary.  Follow up plans and orders are seen below in the Assessment/Plan Section.    Diagnoses and all orders for this visit:    1. Closed avulsion fracture of greater trochanter of femur with routine healing, left (Primary)    2. Impaired proprioception    3. Essential hypertension    4. Dyslipidemia      Follow Up:  No follow-ups on file.     An After Visit Summary and PPPS were given to the patient.               "

## 2021-05-26 LAB
25(OH)D3+25(OH)D2 SERPL-MCNC: 47.8 NG/ML (ref 30–100)
ALBUMIN SERPL-MCNC: 4.7 G/DL (ref 3.5–5.2)
ALBUMIN/GLOB SERPL: 2 G/DL
ALP SERPL-CCNC: 59 U/L (ref 39–117)
ALT SERPL-CCNC: 13 U/L (ref 1–33)
APPEARANCE UR: CLEAR
AST SERPL-CCNC: 18 U/L (ref 1–32)
BACTERIA #/AREA URNS HPF: ABNORMAL /HPF
BASOPHILS # BLD AUTO: 0.06 10*3/MM3 (ref 0–0.2)
BASOPHILS NFR BLD AUTO: 1 % (ref 0–1.5)
BILIRUB SERPL-MCNC: 0.5 MG/DL (ref 0–1.2)
BILIRUB UR QL STRIP: NEGATIVE
BUN SERPL-MCNC: 14 MG/DL (ref 8–23)
BUN/CREAT SERPL: 19.2 (ref 7–25)
CALCIUM SERPL-MCNC: 10.4 MG/DL (ref 8.6–10.5)
CASTS URNS MICRO: ABNORMAL
CHLORIDE SERPL-SCNC: 98 MMOL/L (ref 98–107)
CHOLEST SERPL-MCNC: 179 MG/DL (ref 0–200)
CHOLEST/HDLC SERPL: 1.88 {RATIO}
CO2 SERPL-SCNC: 26.5 MMOL/L (ref 22–29)
COLOR UR: YELLOW
CREAT SERPL-MCNC: 0.73 MG/DL (ref 0.57–1)
EOSINOPHIL # BLD AUTO: 0.14 10*3/MM3 (ref 0–0.4)
EOSINOPHIL NFR BLD AUTO: 2.3 % (ref 0.3–6.2)
EPI CELLS #/AREA URNS HPF: ABNORMAL /HPF
ERYTHROCYTE [DISTWIDTH] IN BLOOD BY AUTOMATED COUNT: 13.3 % (ref 12.3–15.4)
FOLATE SERPL-MCNC: >20 NG/ML (ref 4.78–24.2)
GLOBULIN SER CALC-MCNC: 2.3 GM/DL
GLUCOSE SERPL-MCNC: 93 MG/DL (ref 65–99)
GLUCOSE UR QL: NEGATIVE
HCT VFR BLD AUTO: 40.4 % (ref 34–46.6)
HDLC SERPL-MCNC: 95 MG/DL (ref 40–60)
HGB BLD-MCNC: 13.8 G/DL (ref 12–15.9)
HGB UR QL STRIP: NEGATIVE
IMM GRANULOCYTES # BLD AUTO: 0.02 10*3/MM3 (ref 0–0.05)
IMM GRANULOCYTES NFR BLD AUTO: 0.3 % (ref 0–0.5)
KETONES UR QL STRIP: NEGATIVE
LDLC SERPL CALC-MCNC: 64 MG/DL (ref 0–100)
LEUKOCYTE ESTERASE UR QL STRIP: ABNORMAL
LYMPHOCYTES # BLD AUTO: 1.71 10*3/MM3 (ref 0.7–3.1)
LYMPHOCYTES NFR BLD AUTO: 28.3 % (ref 19.6–45.3)
MCH RBC QN AUTO: 31.3 PG (ref 26.6–33)
MCHC RBC AUTO-ENTMCNC: 34.2 G/DL (ref 31.5–35.7)
MCV RBC AUTO: 91.6 FL (ref 79–97)
MONOCYTES # BLD AUTO: 0.68 10*3/MM3 (ref 0.1–0.9)
MONOCYTES NFR BLD AUTO: 11.2 % (ref 5–12)
NEUTROPHILS # BLD AUTO: 3.44 10*3/MM3 (ref 1.7–7)
NEUTROPHILS NFR BLD AUTO: 56.9 % (ref 42.7–76)
NITRITE UR QL STRIP: NEGATIVE
NRBC BLD AUTO-RTO: 0 /100 WBC (ref 0–0.2)
PH UR STRIP: 6.5 [PH] (ref 5–8)
PLATELET # BLD AUTO: 236 10*3/MM3 (ref 140–450)
POTASSIUM SERPL-SCNC: 4.9 MMOL/L (ref 3.5–5.2)
PROT SERPL-MCNC: 7 G/DL (ref 6–8.5)
PROT UR QL STRIP: ABNORMAL
RBC # BLD AUTO: 4.41 10*6/MM3 (ref 3.77–5.28)
RBC #/AREA URNS HPF: ABNORMAL /HPF
SODIUM SERPL-SCNC: 136 MMOL/L (ref 136–145)
SP GR UR: 1.01 (ref 1–1.03)
T3FREE SERPL-MCNC: 2.7 PG/ML (ref 2–4.4)
T4 FREE SERPL-MCNC: 1.08 NG/DL (ref 0.93–1.7)
TRIGL SERPL-MCNC: 119 MG/DL (ref 0–150)
TSH SERPL DL<=0.005 MIU/L-ACNC: 2.33 UIU/ML (ref 0.27–4.2)
UROBILINOGEN UR STRIP-MCNC: ABNORMAL MG/DL
VIT B12 SERPL-MCNC: 543 PG/ML (ref 211–946)
VLDLC SERPL CALC-MCNC: 20 MG/DL (ref 5–40)
WBC # BLD AUTO: 6.05 10*3/MM3 (ref 3.4–10.8)
WBC #/AREA URNS HPF: ABNORMAL /HPF

## 2021-06-03 ENCOUNTER — PATIENT MESSAGE (OUTPATIENT)
Dept: INTERNAL MEDICINE | Facility: CLINIC | Age: 81
End: 2021-06-03

## 2021-06-03 NOTE — TELEPHONE ENCOUNTER
From: Moriah Petty  Sent: 6/3/2021 9:52 AM EDT  To: Cathy Bergman [facility] Clinical Pool  Subject: RE: Test Results Question    I will. Thank you, Kandis.

## 2021-06-07 ENCOUNTER — TELEPHONE (OUTPATIENT)
Dept: ONCOLOGY | Facility: HOSPITAL | Age: 81
End: 2021-06-07

## 2021-06-07 NOTE — TELEPHONE ENCOUNTER
Spoke with Claribel at 's office. Ok to use CMP from 5/21 and no additional labs needed prior to prolia on 6/8.  Labs cancelled.

## 2021-06-08 ENCOUNTER — APPOINTMENT (OUTPATIENT)
Dept: ONCOLOGY | Facility: HOSPITAL | Age: 81
End: 2021-06-08

## 2021-06-15 ENCOUNTER — INFUSION (OUTPATIENT)
Dept: ONCOLOGY | Facility: HOSPITAL | Age: 81
End: 2021-06-15

## 2021-06-15 ENCOUNTER — TELEPHONE (OUTPATIENT)
Dept: ONCOLOGY | Facility: HOSPITAL | Age: 81
End: 2021-06-15

## 2021-06-15 VITALS — BODY MASS INDEX: 28.32 KG/M2 | TEMPERATURE: 97.1 F | RESPIRATION RATE: 18 BRPM | HEIGHT: 64 IN

## 2021-06-15 DIAGNOSIS — M81.0 OSTEOPOROSIS, POST-MENOPAUSAL: Primary | ICD-10-CM

## 2021-06-15 PROCEDURE — 25010000002 DENOSUMAB 60 MG/ML SOLUTION PREFILLED SYRINGE: Performed by: OBSTETRICS & GYNECOLOGY

## 2021-06-15 PROCEDURE — 96372 THER/PROPH/DIAG INJ SC/IM: CPT

## 2021-06-15 RX ORDER — ROSUVASTATIN CALCIUM 5 MG/1
TABLET, COATED ORAL
Qty: 90 TABLET | Refills: 1 | Status: SHIPPED | OUTPATIENT
Start: 2021-06-15 | End: 2022-01-10

## 2021-06-15 RX ADMIN — DENOSUMAB 60 MG: 60 INJECTION SUBCUTANEOUS at 14:01

## 2021-06-15 NOTE — NURSING NOTE
Arrived  for prolia injection. Indication and side effects reviewed. Denies recent dental work. Labs and medications verified. Prolia administered in  arm without incidence. Instructed to call prescribing MD for any concerns or questions and instructed on how to schedule future appts.  Pt vu and discharged ambulatory.

## 2021-06-24 RX ORDER — CARVEDILOL 12.5 MG/1
TABLET ORAL
Qty: 180 TABLET | Refills: 1 | Status: SHIPPED | OUTPATIENT
Start: 2021-06-24 | End: 2022-02-07

## 2021-08-23 ENCOUNTER — OFFICE VISIT (OUTPATIENT)
Dept: INTERNAL MEDICINE | Facility: CLINIC | Age: 81
End: 2021-08-23

## 2021-08-23 VITALS
OXYGEN SATURATION: 96 % | DIASTOLIC BLOOD PRESSURE: 68 MMHG | SYSTOLIC BLOOD PRESSURE: 142 MMHG | BODY MASS INDEX: 27.64 KG/M2 | HEART RATE: 70 BPM | TEMPERATURE: 97.3 F | WEIGHT: 161 LBS

## 2021-08-23 DIAGNOSIS — I10 ESSENTIAL HYPERTENSION: Primary | ICD-10-CM

## 2021-08-23 DIAGNOSIS — K21.9 GASTROESOPHAGEAL REFLUX DISEASE WITHOUT ESOPHAGITIS: ICD-10-CM

## 2021-08-23 DIAGNOSIS — E03.9 ACQUIRED HYPOTHYROIDISM: ICD-10-CM

## 2021-08-23 DIAGNOSIS — E78.5 DYSLIPIDEMIA: ICD-10-CM

## 2021-08-23 PROCEDURE — 99214 OFFICE O/P EST MOD 30 MIN: CPT | Performed by: FAMILY MEDICINE

## 2021-08-23 RX ORDER — GABAPENTIN 100 MG/1
100 CAPSULE ORAL 3 TIMES DAILY
COMMUNITY
Start: 2021-07-29 | End: 2021-10-01

## 2021-08-23 RX ORDER — MOMETASONE FUROATE 1 MG/G
CREAM TOPICAL
COMMUNITY
Start: 2021-06-18 | End: 2022-05-23

## 2021-08-23 NOTE — PROGRESS NOTES
Chief Complaint  Hyperlipidemia, Hypertension, Hypothyroidism, and Osteoarthritis    Subjective          Moriah Petty presents to [facility]  Patient has follow-up with Dr. Burk tomorrow orthopedics based knee pain in a replaced right knee which turned out to be radicular pain from the back with DJD scoliosis and some radiculopathy.  She is on gabapentin 100 3 times daily and does have leg cramps at night.  She will talk to orthopedics about maybe increasing the gabapentin to see if that helps the leg cramps at night.  Otherwise Dr. Myles GYN is to get phosphorus magnesium and a CMP to recheck calcium magnesium and potassium prior to her Prolia injection.    Cymbalta made her feel horrible and she stopped it she is doing well as far as any depressive symptoms.    Prior labs reviewed with her and look very stable.      Objective   Vital Signs:   /68 (BP Location: Left arm, Patient Position: Sitting, Cuff Size: Adult)   Pulse 70   Temp 97.3 °F (36.3 °C) (Tympanic)   Wt 73 kg (161 lb)   SpO2 96%   BMI 27.64 kg/m²     Physical Exam  Vitals reviewed.   Constitutional:       Appearance: She is well-developed.   HENT:      Head: Normocephalic and atraumatic.      Right Ear: Tympanic membrane and external ear normal.      Left Ear: Tympanic membrane and external ear normal.      Nose: Nose normal.   Eyes:      Conjunctiva/sclera: Conjunctivae normal.      Pupils: Pupils are equal, round, and reactive to light.   Neck:      Thyroid: No thyromegaly.      Vascular: No JVD.   Cardiovascular:      Rate and Rhythm: Normal rate and regular rhythm.      Heart sounds: Normal heart sounds.   Pulmonary:      Effort: Pulmonary effort is normal.      Breath sounds: Normal breath sounds.   Abdominal:      General: Bowel sounds are normal.      Palpations: Abdomen is soft.   Musculoskeletal:      Cervical back: Normal range of motion and neck supple.      Lumbar back: Decreased range of  motion.      Right knee: Decreased range of motion.   Lymphadenopathy:      Cervical: No cervical adenopathy.   Skin:     General: Skin is warm and dry.      Findings: No rash.   Neurological:      Mental Status: She is alert and oriented to person, place, and time.      Cranial Nerves: No cranial nerve deficit.      Coordination: Coordination normal.      Gait: Gait abnormal.   Psychiatric:         Behavior: Behavior normal.         Thought Content: Thought content normal.         Judgment: Judgment normal.        Result Review :   The following data was reviewed by: Jarred Carrizales Jr., MD on 08/23/2021:  Common labs    Common Labsle 12/21/20 12/21/20 12/22/20 12/22/20 5/25/21 5/25/21 5/25/21    0426 0426 0603 0603 1606 1606 1606   Glucose  118 (A)  108 (A)      Glucose      93    BUN  14  13  14    Creatinine  0.73  0.75  0.73    eGFR Non  Am  77  74  77    eGFR African Am      93    Sodium  135 (A)  137  136    Potassium  3.9  4.1  4.9    Chloride  101  104  98    Calcium  9.0  8.2 (A)  10.4    Total Protein      7.0    Albumin  4.30    4.70    Total Bilirubin  0.3    0.5    Alkaline Phosphatase  55    59    AST (SGOT)  19    18    ALT (SGPT)  16    13    WBC 5.84  6.45  6.05     Hemoglobin 12.4  11.7 (A)  13.8     Hematocrit 37.1  34.3  40.4     Platelets 197  179  236     Total Cholesterol       179   Triglycerides       119   HDL Cholesterol       95 (A)   LDL Cholesterol        64   (A) Abnormal value       Comments are available for some flowsheets but are not being displayed.                     Assessment and Plan    Diagnoses and all orders for this visit:    1. Essential hypertension (Primary)  Comments:  Carvedilol 12.5 twice daily losartan 50 mg daily    2. Dyslipidemia  Comments:  Rosuvastatin 5 mg daily    3. Acquired hypothyroidism  Comments:  Levothyroxine 25 mcg    4. Gastroesophageal reflux disease without esophagitis  Comments:  Continue omeprazole 20 mg daily        Follow Up   Return in  about 9 months (around 5/26/2022).  Patient was given instructions and counseling regarding her condition or for health maintenance advice. Please see specific information pulled into the AVS if appropriate.

## 2021-09-10 RX ORDER — LEVOTHYROXINE SODIUM 0.03 MG/1
TABLET ORAL
Qty: 90 TABLET | Refills: 1 | Status: SHIPPED | OUTPATIENT
Start: 2021-09-10 | End: 2022-04-04

## 2021-10-01 ENCOUNTER — OFFICE VISIT (OUTPATIENT)
Dept: CARDIOLOGY | Facility: CLINIC | Age: 81
End: 2021-10-01

## 2021-10-01 VITALS
BODY MASS INDEX: 27.66 KG/M2 | HEIGHT: 64 IN | WEIGHT: 162 LBS | SYSTOLIC BLOOD PRESSURE: 150 MMHG | DIASTOLIC BLOOD PRESSURE: 78 MMHG | HEART RATE: 53 BPM

## 2021-10-01 DIAGNOSIS — I10 ESSENTIAL HYPERTENSION: Primary | ICD-10-CM

## 2021-10-01 DIAGNOSIS — I49.3 VENTRICULAR PREMATURE BEATS: ICD-10-CM

## 2021-10-01 PROCEDURE — 99214 OFFICE O/P EST MOD 30 MIN: CPT | Performed by: NURSE PRACTITIONER

## 2021-10-01 PROCEDURE — 93000 ELECTROCARDIOGRAM COMPLETE: CPT | Performed by: NURSE PRACTITIONER

## 2021-10-01 NOTE — PROGRESS NOTES
"Date of Office Visit: 10/01/21  Encounter Provider: LYN Gipson  Place of Service: [facility] CARDIOLOGY  Patient Name: Moriah Petty  :1940    Chief Complaint   Patient presents with   • Essential hypertension   • Fatigue   • Follow-up   :     HPI: Moriah Petty is a 81 y.o. female  with history of premature ventricular contraction, obstructive sleep apnea, hypertension, osteoporosis, GERD left ventricular hypertrophy, anemia, and diverticulitis.        She was previously followed by Dr. Anibal Trujillo. She is now followed by Dr. Sampson.  I will visit with her in follow up today and have reviewed her medical record.     She had an echocardiogram in 2014 which showed normal left ventricular systolic function, mild left ventricular hypertrophy, grade 1 diastolic dysfunction, patent foramen ovale was noted, trace to mild tricuspid regurgitation was noted as well.  We were consulted inpatient 2020 for preoperative evaluation.  She was low risk for cardiovascular event for orthopedic surgery.  She had fracture her hip secondary to mechanical fall.  Surgical intervention was not warranted.     She presents today for reassessment.  She has not taken any blood pressure medication today but the last time she checked at home her systolic reading was 140.  She does not remember diastolic reading.  She ambulates with a cane.  She has back pain issues secondary to degenerative scoliosis.  Spinal stenosis.  She denies chest pain times pressure, shortness of breath, edema, lightheadedness, near-syncope or syncope.    Allergies   Allergen Reactions   • Morphine And Related Itching and Swelling           Family and social history reviewed.     ROS  All other systems were reviewed and are negative          Objective:     Vitals:    10/01/21 0919   BP: 150/78   BP Location: Left arm   Patient Position: Sitting   Pulse: 53   Weight: 73.5 kg (162 lb)   Height: 162.6 cm (64\") "     Body mass index is 27.81 kg/m².    PHYSICAL EXAM:  Constitutional:       General: Not in acute distress.     Appearance: Well-developed. Not diaphoretic.   HENT:      Head: Normocephalic.   Pulmonary:      Effort: Pulmonary effort is normal. No respiratory distress.      Breath sounds: Normal breath sounds. No wheezing. No rhonchi. No rales.   Cardiovascular:      Normal rate. Regular rhythm.   Pulses:     Radial: 2+ bilaterally.  Skin:     General: Skin is warm and dry. There is no cyanosis.      Findings: No rash.   Neurological:      Mental Status: Alert and oriented to person, place, and time.   Psychiatric:         Behavior: Behavior normal.         Thought Content: Thought content normal.         Judgment: Judgment normal.           ECG 12 Lead    Date/Time: 10/1/2021 9:57 AM  Performed by: Kandy Trevino APRN  Authorized by: Kandy Trevino APRN   Comparison: compared with previous ECG   Similar to previous ECG  Rhythm: sinus rhythm    Clinical impression: abnormal EKG              Current Outpatient Medications   Medication Sig Dispense Refill   • Calcium Carb-Cholecalciferol (CALCIUM + D3) 600-200 MG-UNIT tablet Take 1 tablet by mouth 2 (Two) Times a Day.     • carvedilol (COREG) 12.5 MG tablet TAKE 1 TABLET BY MOUTH TWICE A DAY WITH MEALS 180 tablet 1   • Denosumab (PROLIA SC) Inject  under the skin into the appropriate area as directed Every 6 (Six) Months. Dose unknown, injection every 6 months; DEC 3rd last injection     • desonide (DESOWEN) 0.05 % cream Apply  topically to the appropriate area as directed As Needed for Irritation.     • folic acid (FOLVITE) 1 MG tablet Take 1 tablet by mouth Daily. 90 tablet 3   • levothyroxine (SYNTHROID, LEVOTHROID) 25 MCG tablet TAKE 1 TABLET BY MOUTH EVERY DAY 90 tablet 1   • losartan (COZAAR) 50 MG tablet Take 1 tablet by mouth 2 (Two) Times a Day. 180 tablet 3   • mometasone (ELOCON) 0.1 % cream APPLY SPARINGLY TWO TIMES PER DAY TO THE AFFECTED AREAS     •  Multiple Vitamins-Minerals (V-C FORTE PO) Take 1 mg by mouth Daily.     • Omega-3 Fatty Acids (FISH OIL) 1200 MG capsule capsule Take 1,200 mg by mouth 2 (Two) Times a Day With Meals.     • omeprazole (priLOSEC) 20 MG capsule Take 20.6 mg by mouth Every Other Day.     • rosuvastatin (CRESTOR) 5 MG tablet TAKE 1 TABLET BY MOUTH EVERY DAY 90 tablet 1     No current facility-administered medications for this visit.     Assessment:      No diagnosis found.     Orders Placed This Encounter   Procedures   • ECG 12 Lead     This order was created via procedure documentation     Order Specific Question:   Release to patient     Answer:   Immediate         Plan:       1.  81-year-old female with history of hypertension blood pressure today above where she normally runs at home.  She is to start checking more closely at home and contact us if she sustains greater than 150 systolic too often.  2.  Premature ventricular contractions- rare palpitaitons but stable  3.  Obstructive sleep apnea- she did not tolerate mask in the past. That was over 10 years ago  4.  Hyperlipidemia  5.  GERD  6.  Diverticulitis  7.  Proximal femur fracture secondary to a fall December 2020 surgical intervention was not warranted-she has now recovered well.  8. Hypothyroidism on replacement therapy  9.  Osteoporosis on Prolia    Follow-up in 1 year call questions or concerns            It has been a pleasure to participate in this patient's care.      Thank you,  LYN Gipson      **I used Dragon to dictate this note:**

## 2021-10-02 ENCOUNTER — IMMUNIZATION (OUTPATIENT)
Dept: VACCINE CLINIC | Facility: HOSPITAL | Age: 81
End: 2021-10-02

## 2021-10-02 PROCEDURE — 0001A: CPT | Performed by: INTERNAL MEDICINE

## 2021-10-02 PROCEDURE — 0004A ADM SARSCOV2 30MCG/0.3ML BOOSTER: CPT | Performed by: INTERNAL MEDICINE

## 2021-10-02 PROCEDURE — 91300 HC SARSCOV02 VAC 30MCG/0.3ML IM: CPT | Performed by: INTERNAL MEDICINE

## 2021-11-01 ENCOUNTER — TELEPHONE (OUTPATIENT)
Dept: CARDIOLOGY | Facility: CLINIC | Age: 81
End: 2021-11-01

## 2021-11-01 NOTE — TELEPHONE ENCOUNTER
Called to review home BP/ pulse log. Her  stated he will give her the message that I have called and she is to leave her readings on the voicemail.

## 2021-11-05 RX ORDER — OMEPRAZOLE 20 MG/1
20.6 CAPSULE, DELAYED RELEASE ORAL DAILY
Qty: 90 CAPSULE | Refills: 3 | Status: SHIPPED | OUTPATIENT
Start: 2021-11-05 | End: 2022-12-21

## 2021-12-17 ENCOUNTER — LAB (OUTPATIENT)
Dept: OTHER | Facility: HOSPITAL | Age: 81
End: 2021-12-17

## 2021-12-17 ENCOUNTER — INFUSION (OUTPATIENT)
Dept: ONCOLOGY | Facility: HOSPITAL | Age: 81
End: 2021-12-17

## 2021-12-17 VITALS — HEIGHT: 65 IN | BODY MASS INDEX: 26.96 KG/M2 | RESPIRATION RATE: 18 BRPM | TEMPERATURE: 98.3 F

## 2021-12-17 DIAGNOSIS — M81.0 OSTEOPOROSIS, POST-MENOPAUSAL: Primary | ICD-10-CM

## 2021-12-17 PROCEDURE — 25010000002 DENOSUMAB 60 MG/ML SOLUTION PREFILLED SYRINGE: Performed by: OBSTETRICS & GYNECOLOGY

## 2021-12-17 PROCEDURE — 83735 ASSAY OF MAGNESIUM: CPT | Performed by: OBSTETRICS & GYNECOLOGY

## 2021-12-17 PROCEDURE — 96372 THER/PROPH/DIAG INJ SC/IM: CPT

## 2021-12-17 PROCEDURE — 84100 ASSAY OF PHOSPHORUS: CPT | Performed by: OBSTETRICS & GYNECOLOGY

## 2021-12-17 PROCEDURE — 80053 COMPREHEN METABOLIC PANEL: CPT | Performed by: OBSTETRICS & GYNECOLOGY

## 2021-12-17 RX ADMIN — DENOSUMAB 60 MG: 60 INJECTION SUBCUTANEOUS at 14:30

## 2022-01-10 RX ORDER — LOSARTAN POTASSIUM 50 MG/1
50 TABLET ORAL 2 TIMES DAILY
Qty: 180 TABLET | Refills: 0 | Status: SHIPPED | OUTPATIENT
Start: 2022-01-10 | End: 2023-01-17

## 2022-01-10 RX ORDER — ROSUVASTATIN CALCIUM 5 MG/1
5 TABLET, COATED ORAL DAILY
Qty: 90 TABLET | Refills: 0 | Status: SHIPPED | OUTPATIENT
Start: 2022-01-10 | End: 2022-10-07

## 2022-01-10 RX ORDER — ROSUVASTATIN CALCIUM 5 MG/1
TABLET, COATED ORAL
Qty: 90 TABLET | Refills: 1 | Status: SHIPPED | OUTPATIENT
Start: 2022-01-10 | End: 2022-01-10 | Stop reason: SDUPTHER

## 2022-01-10 RX ORDER — LOSARTAN POTASSIUM 50 MG/1
TABLET ORAL
Qty: 180 TABLET | Refills: 3 | Status: SHIPPED | OUTPATIENT
Start: 2022-01-10 | End: 2022-01-10 | Stop reason: SDUPTHER

## 2022-01-10 NOTE — TELEPHONE ENCOUNTER
----- Message from Moriah Petty sent at 1/10/2022 10:17 AM EST -----  Regarding: Losartin potassium 50 mg tab & rosuvastatin 5 mg  Would you please renew the prescriptions for these 2 meds?   Thank you.

## 2022-02-07 RX ORDER — CARVEDILOL 12.5 MG/1
TABLET ORAL
Qty: 180 TABLET | Refills: 1 | Status: SHIPPED | OUTPATIENT
Start: 2022-02-07 | End: 2022-08-09

## 2022-02-11 ENCOUNTER — LAB (OUTPATIENT)
Dept: LAB | Facility: HOSPITAL | Age: 82
End: 2022-02-11

## 2022-02-11 ENCOUNTER — HOSPITAL ENCOUNTER (OUTPATIENT)
Dept: CT IMAGING | Facility: HOSPITAL | Age: 82
Discharge: HOME | End: 2022-02-11

## 2022-02-11 ENCOUNTER — OFFICE VISIT (OUTPATIENT)
Dept: INTERNAL MEDICINE | Facility: CLINIC | Age: 82
End: 2022-02-11

## 2022-02-11 VITALS
BODY MASS INDEX: 27.66 KG/M2 | WEIGHT: 166 LBS | DIASTOLIC BLOOD PRESSURE: 80 MMHG | HEIGHT: 65 IN | HEART RATE: 60 BPM | TEMPERATURE: 98.1 F | OXYGEN SATURATION: 94 % | SYSTOLIC BLOOD PRESSURE: 132 MMHG

## 2022-02-11 DIAGNOSIS — Z87.19 HISTORY OF DIVERTICULITIS: ICD-10-CM

## 2022-02-11 DIAGNOSIS — S32.020S CLOSED WEDGE COMPRESSION FRACTURE OF L2 VERTEBRA, SEQUELA: ICD-10-CM

## 2022-02-11 DIAGNOSIS — R10.32 LLQ ABDOMINAL PAIN: ICD-10-CM

## 2022-02-11 DIAGNOSIS — R10.32 LLQ ABDOMINAL PAIN: Primary | ICD-10-CM

## 2022-02-11 LAB
BASOPHILS # BLD AUTO: 0.09 10*3/MM3 (ref 0–0.2)
BASOPHILS NFR BLD AUTO: 1.5 % (ref 0–1.5)
CREAT BLDA-MCNC: 0.7 MG/DL (ref 0.6–1.3)
DEPRECATED RDW RBC AUTO: 43.3 FL (ref 37–54)
EOSINOPHIL # BLD AUTO: 0.09 10*3/MM3 (ref 0–0.4)
EOSINOPHIL NFR BLD AUTO: 1.5 % (ref 0.3–6.2)
ERYTHROCYTE [DISTWIDTH] IN BLOOD BY AUTOMATED COUNT: 12.9 % (ref 12.3–15.4)
HCT VFR BLD AUTO: 39.8 % (ref 34–46.6)
HGB BLD-MCNC: 13.5 G/DL (ref 12–15.9)
IMM GRANULOCYTES # BLD AUTO: 0.02 10*3/MM3 (ref 0–0.05)
IMM GRANULOCYTES NFR BLD AUTO: 0.3 % (ref 0–0.5)
LYMPHOCYTES # BLD AUTO: 1.43 10*3/MM3 (ref 0.7–3.1)
LYMPHOCYTES NFR BLD AUTO: 24.5 % (ref 19.6–45.3)
MCH RBC QN AUTO: 31.6 PG (ref 26.6–33)
MCHC RBC AUTO-ENTMCNC: 33.9 G/DL (ref 31.5–35.7)
MCV RBC AUTO: 93.2 FL (ref 79–97)
MONOCYTES # BLD AUTO: 0.57 10*3/MM3 (ref 0.1–0.9)
MONOCYTES NFR BLD AUTO: 9.8 % (ref 5–12)
NEUTROPHILS NFR BLD AUTO: 3.63 10*3/MM3 (ref 1.7–7)
NEUTROPHILS NFR BLD AUTO: 62.4 % (ref 42.7–76)
NRBC BLD AUTO-RTO: 0 /100 WBC (ref 0–0.2)
PLATELET # BLD AUTO: 243 10*3/MM3 (ref 140–450)
PMV BLD AUTO: 11.5 FL (ref 6–12)
RBC # BLD AUTO: 4.27 10*6/MM3 (ref 3.77–5.28)
WBC NRBC COR # BLD: 5.83 10*3/MM3 (ref 3.4–10.8)

## 2022-02-11 PROCEDURE — 74177 CT ABD & PELVIS W/CONTRAST: CPT

## 2022-02-11 PROCEDURE — 81001 URINALYSIS AUTO W/SCOPE: CPT | Performed by: FAMILY MEDICINE

## 2022-02-11 PROCEDURE — 85025 COMPLETE CBC W/AUTO DIFF WBC: CPT | Performed by: FAMILY MEDICINE

## 2022-02-11 PROCEDURE — 80053 COMPREHEN METABOLIC PANEL: CPT | Performed by: FAMILY MEDICINE

## 2022-02-11 PROCEDURE — 99214 OFFICE O/P EST MOD 30 MIN: CPT | Performed by: FAMILY MEDICINE

## 2022-02-11 PROCEDURE — 36415 COLL VENOUS BLD VENIPUNCTURE: CPT | Performed by: FAMILY MEDICINE

## 2022-02-11 PROCEDURE — 82565 ASSAY OF CREATININE: CPT

## 2022-02-11 PROCEDURE — 86140 C-REACTIVE PROTEIN: CPT | Performed by: FAMILY MEDICINE

## 2022-02-11 PROCEDURE — 25010000002 IOPAMIDOL 61 % SOLUTION: Performed by: FAMILY MEDICINE

## 2022-02-11 RX ADMIN — IOPAMIDOL 85 ML: 612 INJECTION, SOLUTION INTRAVENOUS at 12:39

## 2022-02-11 NOTE — PROGRESS NOTES
I talked to her about the CT result which looks stable.  She will follow-up with urology concerning the renal cyst.  She will take Tylenol as needed for the pain.  There is no evidence of internal progression of anything as far as thickened colon wall or colitis or diverticulitis.

## 2022-02-11 NOTE — PROGRESS NOTES
"Chief Complaint  Abdominal Pain (concerns due to history, no diarrhea or constipation) and Back Pain    Subjective          Moriah Petty presents to [facility]  Pleasant lady with 3 days onset left lower quadrant pain originally thought to be back pain.  She has had prior follow-up with urology for some renal cysts CT reviewed from last fall.  2 years ago she had diverticulitis requiring left colon resection and colostomy for 6 months with reversal.  She did require wound VAC after the reversal.    She has had no systemic symptoms otherwise no fever chills etc.  On examination there is no guarding or rebound.  I cannot detect the onset of a hernia.  We will get stat CT abdomen and pelvis with IV contrast and also labs.    Abdominal Pain  This is a new problem. The current episode started in the past 7 days. The onset quality is undetermined. The problem occurs intermittently. The pain is located in the LLQ. The quality of the pain is aching.   Back Pain  Associated symptoms include abdominal pain.       Objective   Vital Signs:   /80   Pulse 60   Temp 98.1 °F (36.7 °C)   Ht 165.1 cm (65\")   Wt 75.3 kg (166 lb)   SpO2 94%   BMI 27.62 kg/m²     Physical Exam  Vitals reviewed.   Constitutional:       Appearance: She is well-developed.   HENT:      Head: Normocephalic and atraumatic.      Right Ear: Tympanic membrane and external ear normal.      Left Ear: Tympanic membrane and external ear normal.   Eyes:      Conjunctiva/sclera: Conjunctivae normal.      Pupils: Pupils are equal, round, and reactive to light.   Neck:      Thyroid: No thyromegaly.      Vascular: No JVD.   Cardiovascular:      Rate and Rhythm: Normal rate and regular rhythm.      Heart sounds: Normal heart sounds.   Pulmonary:      Effort: Pulmonary effort is normal.      Breath sounds: Normal breath sounds.   Abdominal:      General: Bowel sounds are normal.      Palpations: Abdomen is soft.      Tenderness: " There is abdominal tenderness in the left lower quadrant. There is no guarding or rebound.   Musculoskeletal:      Cervical back: Normal range of motion and neck supple.      Lumbar back: Decreased range of motion.   Lymphadenopathy:      Cervical: No cervical adenopathy.   Skin:     General: Skin is warm and dry.      Findings: No rash.   Neurological:      Mental Status: She is alert and oriented to person, place, and time.      Cranial Nerves: No cranial nerve deficit.      Coordination: Coordination normal.   Psychiatric:         Behavior: Behavior normal.         Thought Content: Thought content normal.         Judgment: Judgment normal.        Result Review :   The following data was reviewed by: Jarred Carrizales MD on 02/11/2022:  Common labs    Common Labsle 5/25/21 5/25/21 5/25/21 12/17/21    1606 1606 1606    Glucose  93  112 (A)   BUN  14  15   Creatinine  0.73  0.75   eGFR Non  Am  77  74   eGFR African Am  93     Sodium  136  136   Potassium  4.9  4.7   Chloride  98  98   Calcium  10.4  9.8   Total Protein  7.0     Albumin  4.70  4.40   Total Bilirubin  0.5  0.4   Alkaline Phosphatase  59  51   AST (SGOT)  18  16   ALT (SGPT)  13  14   WBC 6.05      Hemoglobin 13.8      Hematocrit 40.4      Platelets 236      Total Cholesterol   179    Triglycerides   119    HDL Cholesterol   95 (A)    LDL Cholesterol    64    (A) Abnormal value       Comments are available for some flowsheets but are not being displayed.           Data reviewed: Radiologic studies CT from urology reviewed from last fall as well as prior MRI from lumbar spine orthopedic surgery Dr. Burk.          Assessment and Plan    Diagnoses and all orders for this visit:    1. LLQ abdominal pain (Primary)  Comments:  CMP CBC CRP stat CT abdomen pelvis IV contrast also get a urinalysis  Orders:  -     CT Abdomen Pelvis With Contrast; Future  -     C-reactive Protein  -     Comprehensive Metabolic Panel  -     CBC & Differential  -      Urinalysis With Culture If Indicated - Urine, Clean Catch    2. History of diverticulitis  Comments:  CMP CBC CRP urinalysis stat CT abdomen pelvis IV contrast  Orders:  -     CT Abdomen Pelvis With Contrast; Future  -     C-reactive Protein  -     Comprehensive Metabolic Panel  -     CBC & Differential  -     Urinalysis With Culture If Indicated - Urine, Clean Catch    3. Closed wedge compression fracture of L2 vertebra, sequela  Comments:  Prior x-ray from Dr. Burk reviewed        Follow Up   No follow-ups on file.  Patient was given instructions and counseling regarding her condition or for health maintenance advice. Please see specific information pulled into the AVS if appropriate.

## 2022-02-12 LAB
ALBUMIN SERPL-MCNC: 4.6 G/DL (ref 3.5–5.2)
ALBUMIN/GLOB SERPL: 1.8 G/DL
ALP SERPL-CCNC: 57 U/L (ref 39–117)
ALT SERPL W P-5'-P-CCNC: 16 U/L (ref 1–33)
ANION GAP SERPL CALCULATED.3IONS-SCNC: 13 MMOL/L (ref 5–15)
APPEARANCE UR: CLEAR
AST SERPL-CCNC: 19 U/L (ref 1–32)
BACTERIA #/AREA URNS HPF: NORMAL /[HPF]
BILIRUB SERPL-MCNC: 0.4 MG/DL (ref 0–1.2)
BILIRUB UR QL STRIP: NEGATIVE
BUN SERPL-MCNC: 13 MG/DL (ref 8–23)
BUN/CREAT SERPL: 16.3 (ref 7–25)
CALCIUM SPEC-SCNC: 10 MG/DL (ref 8.6–10.5)
CASTS URNS QL MICRO: NORMAL /LPF
CHLORIDE SERPL-SCNC: 99 MMOL/L (ref 98–107)
CO2 SERPL-SCNC: 25 MMOL/L (ref 22–29)
COLOR UR: YELLOW
CREAT SERPL-MCNC: 0.8 MG/DL (ref 0.57–1)
CRP SERPL-MCNC: 0.35 MG/DL (ref 0–0.5)
EPI CELLS #/AREA URNS HPF: NORMAL /HPF (ref 0–10)
GFR SERPL CREATININE-BSD FRML MDRD: 69 ML/MIN/1.73
GLOBULIN UR ELPH-MCNC: 2.6 GM/DL
GLUCOSE SERPL-MCNC: 99 MG/DL (ref 65–99)
GLUCOSE UR QL STRIP: NEGATIVE
HGB UR QL STRIP: NEGATIVE
KETONES UR QL STRIP: NEGATIVE
LEUKOCYTE ESTERASE UR QL STRIP: NEGATIVE
MICRO URNS: NORMAL
MICRO URNS: NORMAL
NITRITE UR QL STRIP: NEGATIVE
PH UR STRIP: 7.5 [PH] (ref 5–7.5)
POTASSIUM SERPL-SCNC: 4.6 MMOL/L (ref 3.5–5.2)
PROT SERPL-MCNC: 7.2 G/DL (ref 6–8.5)
PROT UR QL STRIP: NORMAL
RBC #/AREA URNS HPF: NORMAL /HPF (ref 0–2)
SODIUM SERPL-SCNC: 137 MMOL/L (ref 136–145)
SP GR UR STRIP: 1.02 (ref 1–1.03)
URINALYSIS REFLEX: NORMAL
UROBILINOGEN UR STRIP-MCNC: 0.2 MG/DL (ref 0.2–1)
WBC #/AREA URNS HPF: NORMAL /HPF (ref 0–5)

## 2022-04-04 RX ORDER — LEVOTHYROXINE SODIUM 0.03 MG/1
TABLET ORAL
Qty: 90 TABLET | Refills: 1 | Status: SHIPPED | OUTPATIENT
Start: 2022-04-04 | End: 2022-10-03

## 2022-05-04 DIAGNOSIS — M81.0 AGE-RELATED OSTEOPOROSIS WITHOUT CURRENT PATHOLOGICAL FRACTURE: Primary | ICD-10-CM

## 2022-05-11 DIAGNOSIS — M81.0 OSTEOPOROSIS, POST-MENOPAUSAL: Primary | ICD-10-CM

## 2022-05-16 ENCOUNTER — PROCEDURE VISIT (OUTPATIENT)
Dept: OBSTETRICS AND GYNECOLOGY | Facility: CLINIC | Age: 82
End: 2022-05-16

## 2022-05-16 ENCOUNTER — APPOINTMENT (OUTPATIENT)
Dept: WOMENS IMAGING | Facility: HOSPITAL | Age: 82
End: 2022-05-16

## 2022-05-16 DIAGNOSIS — Z12.31 VISIT FOR SCREENING MAMMOGRAM: Primary | ICD-10-CM

## 2022-05-16 PROCEDURE — 77063 BREAST TOMOSYNTHESIS BI: CPT | Performed by: OBSTETRICS & GYNECOLOGY

## 2022-05-16 PROCEDURE — 77067 SCR MAMMO BI INCL CAD: CPT | Performed by: OBSTETRICS & GYNECOLOGY

## 2022-05-16 PROCEDURE — 77067 SCR MAMMO BI INCL CAD: CPT | Performed by: RADIOLOGY

## 2022-05-16 PROCEDURE — 77063 BREAST TOMOSYNTHESIS BI: CPT | Performed by: RADIOLOGY

## 2022-05-23 ENCOUNTER — OFFICE VISIT (OUTPATIENT)
Dept: INTERNAL MEDICINE | Facility: CLINIC | Age: 82
End: 2022-05-23

## 2022-05-23 VITALS
TEMPERATURE: 97.8 F | BODY MASS INDEX: 27.12 KG/M2 | SYSTOLIC BLOOD PRESSURE: 170 MMHG | DIASTOLIC BLOOD PRESSURE: 98 MMHG | WEIGHT: 163 LBS | OXYGEN SATURATION: 92 % | HEART RATE: 58 BPM

## 2022-05-23 DIAGNOSIS — E78.5 DYSLIPIDEMIA: ICD-10-CM

## 2022-05-23 DIAGNOSIS — E03.9 ACQUIRED HYPOTHYROIDISM: ICD-10-CM

## 2022-05-23 DIAGNOSIS — R53.83 FATIGUE, UNSPECIFIED TYPE: Primary | ICD-10-CM

## 2022-05-23 DIAGNOSIS — R73.09 ELEVATED GLUCOSE: ICD-10-CM

## 2022-05-23 DIAGNOSIS — E78.00 PURE HYPERCHOLESTEROLEMIA: ICD-10-CM

## 2022-05-23 DIAGNOSIS — I10 PRIMARY HYPERTENSION: ICD-10-CM

## 2022-05-23 DIAGNOSIS — G89.29 CHRONIC BILATERAL LOW BACK PAIN, UNSPECIFIED WHETHER SCIATICA PRESENT: ICD-10-CM

## 2022-05-23 DIAGNOSIS — M54.50 CHRONIC BILATERAL LOW BACK PAIN, UNSPECIFIED WHETHER SCIATICA PRESENT: ICD-10-CM

## 2022-05-23 DIAGNOSIS — E55.9 VITAMIN D DEFICIENCY: ICD-10-CM

## 2022-05-23 DIAGNOSIS — E78.00 ELEVATED CHOLESTEROL: ICD-10-CM

## 2022-05-23 DIAGNOSIS — I10 ESSENTIAL HYPERTENSION: ICD-10-CM

## 2022-05-23 PROCEDURE — 99214 OFFICE O/P EST MOD 30 MIN: CPT | Performed by: FAMILY MEDICINE

## 2022-05-23 NOTE — PROGRESS NOTES
Chief Complaint  Hypertension and Hypothyroidism    Subjective          Moriah Petty presents to [facility]  Very pleasant lady who is bothered by chronic back pain and fatigue with continued imbalance relating to her back and legs.  We discussed fall risk and the fatigue and will get labs in regards to fatigue work-up.  Otherwise continue monitoring treatment of hypertension and hyperlipidemia and also continue levothyroxine 25 mcg daily with a thyroid panel pending.      Objective   Vital Signs:  /98 (BP Location: Left arm, Patient Position: Sitting, Cuff Size: Adult)   Pulse 58   Temp 97.8 °F (36.6 °C) (Tympanic)   Wt 73.9 kg (163 lb)   SpO2 92%   BMI 27.12 kg/m²         Physical Exam  Vitals reviewed.   Constitutional:       Appearance: She is well-developed.   HENT:      Head: Normocephalic and atraumatic.      Right Ear: Tympanic membrane and external ear normal.      Left Ear: Tympanic membrane and external ear normal.   Eyes:      Conjunctiva/sclera: Conjunctivae normal.      Pupils: Pupils are equal, round, and reactive to light.   Neck:      Thyroid: No thyromegaly.      Vascular: No JVD.   Cardiovascular:      Rate and Rhythm: Normal rate and regular rhythm.      Heart sounds: Normal heart sounds.   Pulmonary:      Effort: Pulmonary effort is normal.      Breath sounds: Normal breath sounds.   Abdominal:      General: Bowel sounds are normal.      Palpations: Abdomen is soft.   Musculoskeletal:      Cervical back: Normal range of motion and neck supple.      Lumbar back: Decreased range of motion.   Lymphadenopathy:      Cervical: No cervical adenopathy.   Skin:     General: Skin is warm and dry.      Findings: No rash.   Neurological:      Mental Status: She is alert and oriented to person, place, and time.      Cranial Nerves: No cranial nerve deficit.      Coordination: Coordination normal.   Psychiatric:         Behavior: Behavior normal.         Thought  Content: Thought content normal.         Judgment: Judgment normal.        Result Review :                 Assessment and Plan    Diagnoses and all orders for this visit:    1. Fatigue, unspecified type (Primary)  Assessment & Plan:  Labs and thyroid panel in regards to work-up of fatigue.    Orders:  -     Urinalysis With Microscopic If Indicated (No Culture) - Urine, Clean Catch  -     TSH  -     T4, Free  -     T3, Free  -     Lipid Panel With / Chol / HDL Ratio  -     CBC & Differential  -     Comprehensive Metabolic Panel  -     Hemoglobin A1c  -     Vitamin B12  -     Folate    2. Essential hypertension  Assessment & Plan:  Blood pressure is elevated in the office.  We discussed increasing medication but given the low heart rate and fatigue will have her monitor her blood pressure at home.      3. Dyslipidemia    4. Acquired hypothyroidism  Assessment & Plan:  Thyroid panel and continue levothyroxine 25 mcg daily.      5. Vitamin D deficiency  Assessment & Plan:  Check vitamin D level      6. Elevated cholesterol   Assessment & Plan:  Lipid abnormalities are improving with treatment.  Pharmacotherapy as ordered.  Lipids will be reassessed in 3 months.    Orders:  -     Lipid Panel With / Chol / HDL Ratio    7. Elevated glucose   -     Hemoglobin A1c    8. Pure hypercholesterolemia  Assessment & Plan:  Lipid abnormalities are improving with treatment.  Pharmacotherapy as ordered.  Lipids will be reassessed in 3 months.      9. Primary hypertension  Assessment & Plan:  Blood pressure is elevated in the office.  We discussed increasing medication but given the low heart rate and fatigue will have her monitor her blood pressure at home.      10. Chronic bilateral low back pain, unspecified whether sciatica present  Assessment & Plan:  This is chronic and a daily problem which appears to be making her fatigue worse.             Follow Up   Return in about 3 months (around 8/23/2022) for Recheck.  Patient was given  instructions and counseling regarding her condition or for health maintenance advice. Please see specific information pulled into the AVS if appropriate.       Answers for HPI/ROS submitted by the patient on 5/20/2022  Please describe your symptoms.: wellness visit  Have you had these symptoms before?: Yes  How long have you been having these symptoms?: Greater than 2 weeks  Please list any medications you are currently taking for this condition.: none  What is the primary reason for your visit?: Other

## 2022-05-23 NOTE — ASSESSMENT & PLAN NOTE
Blood pressure is elevated in the office.  We discussed increasing medication but given the low heart rate and fatigue will have her monitor her blood pressure at home.

## 2022-05-24 LAB
ALBUMIN SERPL-MCNC: 4.8 G/DL (ref 3.6–4.6)
ALBUMIN/GLOB SERPL: 2 {RATIO} (ref 1.2–2.2)
ALP SERPL-CCNC: 53 IU/L (ref 44–121)
ALT SERPL-CCNC: 13 IU/L (ref 0–32)
APPEARANCE UR: CLEAR
AST SERPL-CCNC: 15 IU/L (ref 0–40)
BASOPHILS # BLD AUTO: 0.1 X10E3/UL (ref 0–0.2)
BASOPHILS NFR BLD AUTO: 1 %
BILIRUB SERPL-MCNC: 0.6 MG/DL (ref 0–1.2)
BILIRUB UR QL STRIP: NEGATIVE
BUN SERPL-MCNC: 16 MG/DL (ref 8–27)
BUN/CREAT SERPL: 22 (ref 12–28)
CALCIUM SERPL-MCNC: 10.2 MG/DL (ref 8.7–10.3)
CHLORIDE SERPL-SCNC: 99 MMOL/L (ref 96–106)
CHOLEST SERPL-MCNC: 202 MG/DL (ref 100–199)
CHOLEST/HDLC SERPL: 2.1 RATIO (ref 0–4.4)
CO2 SERPL-SCNC: 23 MMOL/L (ref 20–29)
COLOR UR: YELLOW
CREAT SERPL-MCNC: 0.72 MG/DL (ref 0.57–1)
EGFRCR SERPLBLD CKD-EPI 2021: 83 ML/MIN/1.73
EOSINOPHIL # BLD AUTO: 0.1 X10E3/UL (ref 0–0.4)
EOSINOPHIL NFR BLD AUTO: 2 %
ERYTHROCYTE [DISTWIDTH] IN BLOOD BY AUTOMATED COUNT: 12.6 % (ref 11.7–15.4)
FOLATE SERPL-MCNC: >20 NG/ML
GLOBULIN SER CALC-MCNC: 2.4 G/DL (ref 1.5–4.5)
GLUCOSE SERPL-MCNC: 97 MG/DL (ref 65–99)
GLUCOSE UR QL STRIP: NEGATIVE
HBA1C MFR BLD: 5.8 % (ref 4.8–5.6)
HCT VFR BLD AUTO: 42.1 % (ref 34–46.6)
HDLC SERPL-MCNC: 98 MG/DL
HGB BLD-MCNC: 13.8 G/DL (ref 11.1–15.9)
HGB UR QL STRIP: NEGATIVE
IMM GRANULOCYTES # BLD AUTO: 0 X10E3/UL (ref 0–0.1)
IMM GRANULOCYTES NFR BLD AUTO: 0 %
KETONES UR QL STRIP: NEGATIVE
LDLC SERPL CALC-MCNC: 92 MG/DL (ref 0–99)
LEUKOCYTE ESTERASE UR QL STRIP: NEGATIVE
LYMPHOCYTES # BLD AUTO: 1.3 X10E3/UL (ref 0.7–3.1)
LYMPHOCYTES NFR BLD AUTO: 24 %
MCH RBC QN AUTO: 31 PG (ref 26.6–33)
MCHC RBC AUTO-ENTMCNC: 32.8 G/DL (ref 31.5–35.7)
MCV RBC AUTO: 95 FL (ref 79–97)
MICRO URNS: NORMAL
MONOCYTES # BLD AUTO: 0.5 X10E3/UL (ref 0.1–0.9)
MONOCYTES NFR BLD AUTO: 10 %
NEUTROPHILS # BLD AUTO: 3.4 X10E3/UL (ref 1.4–7)
NEUTROPHILS NFR BLD AUTO: 63 %
NITRITE UR QL STRIP: NEGATIVE
PH UR STRIP: 7.5 [PH] (ref 5–7.5)
PLATELET # BLD AUTO: 246 X10E3/UL (ref 150–450)
POTASSIUM SERPL-SCNC: 5.1 MMOL/L (ref 3.5–5.2)
PROT SERPL-MCNC: 7.2 G/DL (ref 6–8.5)
PROT UR QL STRIP: NORMAL
RBC # BLD AUTO: 4.45 X10E6/UL (ref 3.77–5.28)
SODIUM SERPL-SCNC: 136 MMOL/L (ref 134–144)
SP GR UR STRIP: 1.02 (ref 1–1.03)
T3FREE SERPL-MCNC: 2.8 PG/ML (ref 2–4.4)
T4 FREE SERPL-MCNC: 1.29 NG/DL (ref 0.82–1.77)
TRIGL SERPL-MCNC: 66 MG/DL (ref 0–149)
TSH SERPL DL<=0.005 MIU/L-ACNC: 2.31 UIU/ML (ref 0.45–4.5)
UROBILINOGEN UR STRIP-MCNC: 0.2 MG/DL (ref 0.2–1)
VIT B12 SERPL-MCNC: 478 PG/ML (ref 232–1245)
VLDLC SERPL CALC-MCNC: 12 MG/DL (ref 5–40)
WBC # BLD AUTO: 5.4 X10E3/UL (ref 3.4–10.8)

## 2022-06-20 ENCOUNTER — APPOINTMENT (OUTPATIENT)
Dept: ONCOLOGY | Facility: HOSPITAL | Age: 82
End: 2022-06-20

## 2022-06-21 ENCOUNTER — TELEPHONE (OUTPATIENT)
Dept: INTERNAL MEDICINE | Facility: CLINIC | Age: 82
End: 2022-06-21

## 2022-06-21 NOTE — TELEPHONE ENCOUNTER
Caller: DR. HAZEL VO    Relationship: Other    Best call back number: [phone] DR VO'S CELL    What is the best time to reach you: ANY TIME    Who are you requesting to speak with (clinical staff, provider,  specific staff member): DR. SALAZAR    What was the call regarding: PATIENT IS HAVING ORAL SURGERY TOMORROW MORNING AT 10:30 AND SURGEON IS WANTING TO PRESCRIBE HALCION 0.25 MG FOR ONE AT BEDTIME TONIGHT AND ONE AN HOUR BEFORE SURGERY. HE WANTS TO MAKE SURE THIS IS OKAY WITH DR. SALAZAR AND THERE ARE NO INTERACTIONS WITH HER OTHER MEDICATIONS.    PLEASE ADVISE    Do you require a callback: YES

## 2022-07-28 ENCOUNTER — TELEPHONE (OUTPATIENT)
Dept: OBSTETRICS AND GYNECOLOGY | Facility: CLINIC | Age: 82
End: 2022-07-28

## 2022-07-28 NOTE — TELEPHONE ENCOUNTER
Spoke with pt about Prolia,  I will call mid August to remind pt to schedule injection at [place] late August, early September.kali

## 2022-08-09 DIAGNOSIS — I10 ESSENTIAL HYPERTENSION: Primary | ICD-10-CM

## 2022-08-09 RX ORDER — CARVEDILOL 12.5 MG/1
TABLET ORAL
Qty: 180 TABLET | Refills: 1 | Status: SHIPPED | OUTPATIENT
Start: 2022-08-09 | End: 2023-02-09

## 2022-08-22 ENCOUNTER — OFFICE VISIT (OUTPATIENT)
Dept: INTERNAL MEDICINE | Facility: CLINIC | Age: 82
End: 2022-08-22

## 2022-08-22 VITALS
BODY MASS INDEX: 26.79 KG/M2 | HEART RATE: 59 BPM | SYSTOLIC BLOOD PRESSURE: 178 MMHG | TEMPERATURE: 97.3 F | DIASTOLIC BLOOD PRESSURE: 88 MMHG | OXYGEN SATURATION: 96 % | WEIGHT: 161 LBS

## 2022-08-22 DIAGNOSIS — I10 ESSENTIAL HYPERTENSION: ICD-10-CM

## 2022-08-22 DIAGNOSIS — K59.01 SLOW TRANSIT CONSTIPATION: Primary | ICD-10-CM

## 2022-08-22 DIAGNOSIS — E78.00 ELEVATED CHOLESTEROL: ICD-10-CM

## 2022-08-22 DIAGNOSIS — F51.02 ADJUSTMENT INSOMNIA: ICD-10-CM

## 2022-08-22 PROBLEM — Z93.3 COLOSTOMY STATUS: Status: RESOLVED | Noted: 2019-05-06 | Resolved: 2022-08-22

## 2022-08-22 PROCEDURE — 99214 OFFICE O/P EST MOD 30 MIN: CPT | Performed by: FAMILY MEDICINE

## 2022-08-22 RX ORDER — POLYETHYLENE GLYCOL 3350 17 G/17G
17 POWDER, FOR SOLUTION ORAL DAILY
Qty: 289 G | Refills: 5 | Status: SHIPPED | OUTPATIENT
Start: 2022-08-22

## 2022-08-22 NOTE — PROGRESS NOTES
"Chief Complaint  Hyperlipidemia, Hypertension, Hypothyroidism, and Heartburn    Subjective        Moriah Petty presents to [facility]  Delightful lady who is currently by her  with recheck of hypertension treatment hyperlipidemia noting to have some degree of slow transit constipation with prior colon resection for diverticulitis.  She Has Been Using Benefiber.  We Discussed a Trial of MiraLAX Half to a Whole capful daily with water.    Otherwise she awakens 2-3 times at night and has difficulty sleeping because of worrying.  She is coached on breathing practices and techniques to help get back to sleep and refocus on sleeping without medication.  She does have some generic Halcion listed on her medication sheet but this is for only a dental procedure.    Otherwise a handicap placard application is signed by myself.        Objective   Vital Signs:  /88 (BP Location: Left arm, Patient Position: Sitting, Cuff Size: Adult)   Pulse 59   Temp 97.3 °F (36.3 °C) (Tympanic)   Wt 73 kg (161 lb)   SpO2 96%   BMI 26.79 kg/m²   Estimated body mass index is 26.79 kg/m² as calculated from the following:    Height as of 2/11/22: 165.1 cm (65\").    Weight as of this encounter: 73 kg (161 lb).          Physical Exam  Vitals reviewed.   Constitutional:       Appearance: She is well-developed.   HENT:      Head: Normocephalic and atraumatic.      Right Ear: Tympanic membrane and external ear normal.      Left Ear: Tympanic membrane and external ear normal.      Nose: Nose normal.   Eyes:      Conjunctiva/sclera: Conjunctivae normal.      Pupils: Pupils are equal, round, and reactive to light.   Neck:      Thyroid: No thyromegaly.      Vascular: No JVD.   Cardiovascular:      Rate and Rhythm: Normal rate and regular rhythm.      Heart sounds: Normal heart sounds.   Pulmonary:      Effort: Pulmonary effort is normal.      Breath sounds: Normal breath sounds.   Abdominal:      General: " Bowel sounds are normal.      Palpations: Abdomen is soft.   Musculoskeletal:         General: Normal range of motion.      Cervical back: Normal range of motion and neck supple.   Lymphadenopathy:      Cervical: No cervical adenopathy.   Skin:     General: Skin is warm and dry.      Findings: No rash.   Neurological:      Mental Status: She is alert and oriented to person, place, and time.      Cranial Nerves: No cranial nerve deficit.      Motor: Weakness present.      Coordination: Coordination normal.      Gait: Gait is intact.   Psychiatric:         Behavior: Behavior normal.         Thought Content: Thought content normal.         Judgment: Judgment normal.        Result Review :  The following data was reviewed by: Jarred Carrizales MD on 08/22/2022:  Common labs    Common Labsle 12/17/21 2/11/22 2/11/22 2/11/22 5/23/22 5/23/22 5/23/22 5/23/22     1228 1228 1235 1323 1323 1323 1323   Glucose 112 (A)  99    97    BUN 15  13    16    Creatinine 0.75  0.80 0.70   0.72    eGFR Non  Am 74  69        Sodium 136  137    136    Potassium 4.7  4.6    5.1    Chloride 98  99    99    Calcium 9.8  10.0    10.2    Total Protein       7.2    Albumin 4.40  4.60    4.8 (A)    Total Bilirubin 0.4  0.4    0.6    Alkaline Phosphatase 51  57    53    AST (SGOT) 16  19    15    ALT (SGPT) 14  16    13    WBC  5.83    5.4     Hemoglobin  13.5    13.8     Hematocrit  39.8    42.1     Platelets  243    246     Total Cholesterol     202 (A)      Triglycerides     66      HDL Cholesterol     98      LDL Cholesterol      92      Hemoglobin A1C        5.8 (A)   (A) Abnormal value       Comments are available for some flowsheets but are not being displayed.                     Assessment and Plan   Diagnoses and all orders for this visit:    1. Slow transit constipation (Primary)  Comments:  Trial of OTC MiraLAX half cap up to whole tab daily as needed    2. Adjustment insomnia  Comments:  She has a lot of worrying in the night when  she wakes up 2 or 3 times..  Counseling on breathing practices and the night for relaxation and refocusing.    3. Essential hypertension  Comments:  Carvedilol 12.5 mg twice daily losartan 50 mg daily    4. Elevated cholesterol  Comments:  Rosuvastatin 5 mg daily    Other orders  -     polyethylene glycol (MIRALAX) 17 GM/SCOOP powder; Take 17 g by mouth Daily.  Dispense: 289 g; Refill: 5             Follow Up   Return in about 6 months (around 2/22/2023) for Recheck.  Patient was given instructions and counseling regarding her condition or for health maintenance advice. Please see specific information pulled into the AVS if appropriate.       Answers for HPI/ROS submitted by the patient on 8/15/2022  Please describe your symptoms.: this is a wellness visit.  Have you had these symptoms before?: Yes  How long have you been having these symptoms?: Greater than 2 weeks  Please list any medications you are currently taking for this condition.: none  What is the primary reason for your visit?: Other

## 2022-08-24 ENCOUNTER — TELEPHONE (OUTPATIENT)
Dept: OBSTETRICS AND GYNECOLOGY | Facility: CLINIC | Age: 82
End: 2022-08-24

## 2022-08-24 NOTE — TELEPHONE ENCOUNTER
Called pt about Prolia, pt states she believes she is going to be having more major dental work, she will discuss with her dentist and let me know if she wants to continue with Prolia.kali

## 2022-10-03 RX ORDER — LEVOTHYROXINE SODIUM 0.03 MG/1
TABLET ORAL
Qty: 90 TABLET | Refills: 1 | Status: SHIPPED | OUTPATIENT
Start: 2022-10-03 | End: 2023-03-20

## 2022-10-07 RX ORDER — ROSUVASTATIN CALCIUM 5 MG/1
TABLET, COATED ORAL
Qty: 90 TABLET | Refills: 0 | Status: SHIPPED | OUTPATIENT
Start: 2022-10-07 | End: 2022-12-21

## 2022-10-14 ENCOUNTER — OFFICE VISIT (OUTPATIENT)
Dept: CARDIOLOGY | Facility: CLINIC | Age: 82
End: 2022-10-14

## 2022-10-14 VITALS
WEIGHT: 160.6 LBS | BODY MASS INDEX: 26.76 KG/M2 | SYSTOLIC BLOOD PRESSURE: 132 MMHG | DIASTOLIC BLOOD PRESSURE: 70 MMHG | HEIGHT: 65 IN | HEART RATE: 58 BPM

## 2022-10-14 DIAGNOSIS — E78.5 DYSLIPIDEMIA: ICD-10-CM

## 2022-10-14 DIAGNOSIS — I49.3 VENTRICULAR PREMATURE BEATS: ICD-10-CM

## 2022-10-14 DIAGNOSIS — I10 ESSENTIAL HYPERTENSION: Primary | ICD-10-CM

## 2022-10-14 DIAGNOSIS — I49.1 PREMATURE ATRIAL CONTRACTION: ICD-10-CM

## 2022-10-14 PROCEDURE — 99214 OFFICE O/P EST MOD 30 MIN: CPT | Performed by: NURSE PRACTITIONER

## 2022-10-14 PROCEDURE — 93000 ELECTROCARDIOGRAM COMPLETE: CPT | Performed by: NURSE PRACTITIONER

## 2022-10-14 NOTE — PROGRESS NOTES
"Date of Office Visit: 10/14/22  Encounter Provider: LYN Gipson  Place of Service: [facility]  Patient Name: Moriah Petty  :1940    Chief Complaint   Patient presents with   • Hypertension   • Hyperlipidemia   • sinus bradycardia   :     HPI: Moriah Petty is a 82 y.o. female  with premature ventricular contraction, obstructive sleep apnea, hypertension, osteoporosis, GERD left ventricular hypertrophy, anemia, and diverticulitis.        She was previously followed by Dr. Anibal Trujillo. She is now followed by Dr. Sampson.  I will visit with her in follow up today and have reviewed her medical record.      She had an echocardiogram in 2014 which showed normal left ventricular systolic function, mild left ventricular hypertrophy, grade 1 diastolic dysfunction, patent foramen ovale was noted, trace to mild tricuspid regurgitation was noted as well.  We were consulted inpatient 2020 for preoperative evaluation.  She was low risk for cardiovascular event for orthopedic surgery.  She had fracture her hip secondary to mechanical fall.  Surgical intervention was not warranted.      She presents today for reassessment.  She has done well over the past year and has no new diagnoses.  She denies chest pain and shortness of breath.  Her mental she has back pain.  She does not exercise.  No palpitations, near-syncope or syncope.        Allergies   Allergen Reactions   • Morphine And Related Itching and Swelling           Family and social history reviewed.     ROS  All other systems were reviewed and are negative          Objective:     Vitals:    10/14/22 1305   BP: 132/70   BP Location: Right arm   Patient Position: Sitting   Cuff Size: Adult   Pulse: 58   Weight: 72.8 kg (160 lb 9.6 oz)   Height: 165.1 cm (65\")     Body mass index is 26.73 kg/m².    PHYSICAL EXAM:  Pulmonary:      Effort: Pulmonary effort is normal.      Breath sounds: Normal breath sounds. "   Cardiovascular:      Normal rate. Regular rhythm.           ECG 12 Lead    Date/Time: 10/14/2022 6:13 PM  Performed by: Kandy Trevino APRN  Authorized by: Kandy Trevino APRN   Comparison: compared with previous ECG   Similar to previous ECG  Rhythm: sinus rhythm  Rate: normal  T flattening: III  Comments: No change              Current Outpatient Medications   Medication Sig Dispense Refill   • Calcium Carb-Cholecalciferol (CALCIUM + D3) 600-200 MG-UNIT tablet Take 1 tablet by mouth 2 (Two) Times a Day.     • carvedilol (COREG) 12.5 MG tablet TAKE 1 TABLET BY MOUTH TWICE A DAY WITH MEALS 180 tablet 1   • levothyroxine (SYNTHROID, LEVOTHROID) 25 MCG tablet TAKE 1 TABLET BY MOUTH EVERY DAY 90 tablet 1   • losartan (COZAAR) 50 MG tablet Take 1 tablet by mouth 2 (Two) Times a Day. 180 tablet 0   • Omega-3 Fatty Acids (FISH OIL) 1200 MG capsule capsule Take 1,200 mg by mouth 2 (Two) Times a Day With Meals.     • omeprazole (priLOSEC) 20 MG capsule Take 1 capsule by mouth Daily. 90 capsule 3   • polyethylene glycol (MIRALAX) 17 GM/SCOOP powder Take 17 g by mouth Daily. 289 g 5   • rosuvastatin (CRESTOR) 5 MG tablet TAKE 1 TABLET BY MOUTH EVERY DAY 90 tablet 0   • triazolam (HALCION) 0.25 MG tablet TAKE 1 TABLET BY MOUTH 1 HOUR BEFORE DENTAL PROCEDURE     • Denosumab (PROLIA SC) Inject  under the skin into the appropriate area as directed Every 6 (Six) Months. Dose unknown, injection every 6 months; DEC 3rd last injection       No current facility-administered medications for this visit.     Assessment:       Diagnosis Plan   1. Essential hypertension        2. Premature atrial contraction             Orders Placed This Encounter   Procedures   • ECG 12 Lead     This order was created via procedure documentation     Order Specific Question:   Release to patient     Answer:   Routine Release         Plan:       1.  81-year-old female with history of hypertension -blood pressure appears adequately controlled continue  current dose carvedilol and losartan 2.  Premature ventricular contractions- rare palpitaitons but stable  3.  Obstructive sleep apnea- she did not tolerate mask in the past. That was over 10 years ago  4.  Hyperlipidemia continue statin  5.  GERD  6.  Diverticulitis  7.  Osteoporosis on Prolia   8. Hypothyroidism on replacement therapy  Follow-up in 1 year            It has been a pleasure to participate in this patient's care.      Thank you,  LYN Gipson      **I used Dragon to dictate this note:**

## 2022-10-17 ENCOUNTER — CLINICAL SUPPORT (OUTPATIENT)
Dept: INTERNAL MEDICINE | Facility: CLINIC | Age: 82
End: 2022-10-17

## 2022-10-17 ENCOUNTER — TELEPHONE (OUTPATIENT)
Dept: OBSTETRICS AND GYNECOLOGY | Facility: CLINIC | Age: 82
End: 2022-10-17

## 2022-10-17 DIAGNOSIS — Z23 NEED FOR INFLUENZA VACCINATION: Primary | ICD-10-CM

## 2022-10-17 PROCEDURE — 90662 IIV NO PRSV INCREASED AG IM: CPT | Performed by: FAMILY MEDICINE

## 2022-10-17 PROCEDURE — G0008 ADMIN INFLUENZA VIRUS VAC: HCPCS | Performed by: FAMILY MEDICINE

## 2022-10-17 NOTE — TELEPHONE ENCOUNTER
Left message to call office about Prolia. The last we spoke, the patient was expecting to have more dental work and wanted to wait till after speaking with dentist. Need to see If pt wants to continue with Prolia.kali

## 2022-11-15 ENCOUNTER — HOSPITAL ENCOUNTER (OUTPATIENT)
Dept: GENERAL RADIOLOGY | Facility: HOSPITAL | Age: 82
Discharge: HOME | End: 2022-11-15

## 2022-11-15 ENCOUNTER — OFFICE VISIT (OUTPATIENT)
Dept: INTERNAL MEDICINE | Facility: CLINIC | Age: 82
End: 2022-11-15

## 2022-11-15 VITALS
WEIGHT: 160 LBS | BODY MASS INDEX: 26.63 KG/M2 | SYSTOLIC BLOOD PRESSURE: 212 MMHG | OXYGEN SATURATION: 94 % | HEART RATE: 71 BPM | DIASTOLIC BLOOD PRESSURE: 108 MMHG | TEMPERATURE: 97.8 F

## 2022-11-15 DIAGNOSIS — M48.54XA COMPRESSION FRACTURE OF THORACIC SPINE, NON-TRAUMATIC, INITIAL ENCOUNTER: ICD-10-CM

## 2022-11-15 DIAGNOSIS — M48.54XA COMPRESSION FRACTURE OF THORACIC SPINE, NON-TRAUMATIC, INITIAL ENCOUNTER: Primary | ICD-10-CM

## 2022-11-15 DIAGNOSIS — I15.8 OTHER SECONDARY HYPERTENSION: ICD-10-CM

## 2022-11-15 PROCEDURE — 72072 X-RAY EXAM THORAC SPINE 3VWS: CPT

## 2022-11-15 PROCEDURE — 72100 X-RAY EXAM L-S SPINE 2/3 VWS: CPT

## 2022-11-15 PROCEDURE — 99214 OFFICE O/P EST MOD 30 MIN: CPT | Performed by: FAMILY MEDICINE

## 2022-11-15 RX ORDER — CLONIDINE HYDROCHLORIDE 0.1 MG/1
0.1 TABLET ORAL 2 TIMES DAILY PRN
Qty: 60 TABLET | Refills: 2 | Status: SHIPPED | OUTPATIENT
Start: 2022-11-15 | End: 2023-02-10

## 2022-11-15 RX ORDER — TIZANIDINE 2 MG/1
2 TABLET ORAL EVERY 8 HOURS PRN
Qty: 30 TABLET | Refills: 1 | Status: SHIPPED | OUTPATIENT
Start: 2022-11-15

## 2022-11-15 RX ORDER — CALCIUM CARBONATE 160(400)MG
1 TABLET,CHEWABLE ORAL AS NEEDED
Qty: 1 EACH | Refills: 1 | Status: SHIPPED | OUTPATIENT
Start: 2022-11-15

## 2022-11-15 RX ORDER — HYDROCODONE BITARTRATE AND ACETAMINOPHEN 5; 325 MG/1; MG/1
1 TABLET ORAL EVERY 6 HOURS PRN
Qty: 16 TABLET | Refills: 0 | Status: SHIPPED | OUTPATIENT
Start: 2022-11-15 | End: 2022-11-18 | Stop reason: DRUGHIGH

## 2022-11-17 ENCOUNTER — DOCUMENTATION (OUTPATIENT)
Dept: INTERNAL MEDICINE | Facility: CLINIC | Age: 82
End: 2022-11-17

## 2022-11-17 DIAGNOSIS — M48.54XA COMPRESSION FRACTURE OF THORACIC SPINE, NON-TRAUMATIC, INITIAL ENCOUNTER: Primary | ICD-10-CM

## 2022-11-17 NOTE — PROGRESS NOTES
Marina has evidence of thoracic spine fracture probably acute T8 and T9 with a lot of pain.  Blood pressure is improved with clonidine 0.1 twice daily in addition to her other medications.  Recent blood pressure was 179/77 heart rate of 55.  We will continue clonidine 0.1 twice daily in addition to the other medicines.  We will place a stat referral to neurosurgery for the thoracic spine fractures.

## 2022-11-18 ENCOUNTER — TELEPHONE (OUTPATIENT)
Dept: INTERNAL MEDICINE | Facility: CLINIC | Age: 82
End: 2022-11-18

## 2022-11-18 DIAGNOSIS — S22.060D COMPRESSION FRACTURE OF T8 VERTEBRA WITH ROUTINE HEALING, SUBSEQUENT ENCOUNTER: Primary | ICD-10-CM

## 2022-11-18 RX ORDER — HYDROCODONE BITARTRATE AND ACETAMINOPHEN 7.5; 325 MG/1; MG/1
1 TABLET ORAL EVERY 6 HOURS PRN
Qty: 24 TABLET | Refills: 0 | Status: SHIPPED | OUTPATIENT
Start: 2022-11-18 | End: 2022-11-28 | Stop reason: SDUPTHER

## 2022-11-18 NOTE — TELEPHONE ENCOUNTER
Dr. Carrizales sent over a referral that is in review. She doesn't have a lot of medicine left. She needs a refill until she is able to see a doctor.

## 2022-11-25 DIAGNOSIS — S22.060D COMPRESSION FRACTURE OF T8 VERTEBRA WITH ROUTINE HEALING, SUBSEQUENT ENCOUNTER: ICD-10-CM

## 2022-11-25 RX ORDER — HYDROCODONE BITARTRATE AND ACETAMINOPHEN 7.5; 325 MG/1; MG/1
1 TABLET ORAL EVERY 6 HOURS PRN
Qty: 24 TABLET | Refills: 0 | Status: CANCELLED | OUTPATIENT
Start: 2022-11-25

## 2022-11-28 DIAGNOSIS — S22.060D COMPRESSION FRACTURE OF T8 VERTEBRA WITH ROUTINE HEALING, SUBSEQUENT ENCOUNTER: ICD-10-CM

## 2022-11-28 RX ORDER — HYDROCODONE BITARTRATE AND ACETAMINOPHEN 7.5; 325 MG/1; MG/1
1 TABLET ORAL EVERY 6 HOURS PRN
Qty: 24 TABLET | Refills: 0 | Status: SHIPPED | OUTPATIENT
Start: 2022-11-28 | End: 2022-12-08 | Stop reason: ALTCHOICE

## 2022-11-28 NOTE — PROGRESS NOTES
Subjective   Patient ID: Moriah Petty is a 82 y.o. female is being seen for consultation today at the request of Jarred Carrizales MD.  Patient comes in today with compression fx.  XR at .     History of Present Illness     Ms. Petty is being seen in the office today for thoracic pain and recent discovery of compression fractures on x-ray.  She was referred to our office by her primary care provider Dr. Salvatore Carrizales. Notes from today's visit will be forwarded upon completion.     Ms. Petty states that she awoke on the morning of November 14 with severe thoracic back pain.  It was so severe she was unable to raise her arms up to remove her pajama top to get dressed.  The pain is in the mid thoracic region and radiates around the left ribs more so than the right.  Deep inspiration, coughing, sneezing and position changes worsen her symptoms. The pain symptoms did not subside despite taking medications over-the-counter such as Tylenol.      She contacted her primary care office and saw Dr. Carrizales the next day.  Thoracic and lumbar X-rays were performed at [facility].  The x-rays revealed new compression deformities at T8 and T9.  The patient had had previous kyphoplasty 2012 at L2 by Dr. Cardoza.  He states that she has been on Prolia for osteoporosis however she was taken off the Prolia in order to have tooth extraction back in June.  She has not since resumed the Prolia and states that she needs to see her gynecologist, Dr. Brewster re: resumption of the Prolia.     The following portions of the patient's history were reviewed and updated as appropriate: allergies, current medications, past family history, past medical history, past social history, past surgical history and problem list.    Review of Systems   Respiratory: Negative for cough, choking, chest tightness, shortness of breath and wheezing.    Cardiovascular: Positive for palpitations. Negative for chest pain and leg swelling.   Gastrointestinal: Positive  "for constipation. Negative for abdominal pain, diarrhea, nausea and vomiting.   Musculoskeletal: Positive for back pain. Negative for neck pain and neck stiffness.   Neurological: Negative for dizziness, tremors, weakness, light-headedness and headaches.   All other systems reviewed and are negative.      Objective   Vitals:    11/29/22 1049   BP: 172/88   Pulse: 69   SpO2: 96%   Weight: 71.5 kg (157 lb 9.6 oz)   Height: 165.1 cm (65\")     Body mass index is 26.23 kg/m².    Tobacco Use: Medium Risk   • Smoking Tobacco Use: Former   • Smokeless Tobacco Use: Never   • Passive Exposure: Not on file        Physical Exam  Vitals reviewed.   Constitutional:       General: She is not in acute distress.     Appearance: She is well-developed. She is not diaphoretic.   HENT:      Head: Normocephalic and atraumatic.   Eyes:      General:         Right eye: No discharge.         Left eye: No discharge.      Conjunctiva/sclera: Conjunctivae normal.   Neck:      Trachea: No tracheal deviation.   Cardiovascular:      Rate and Rhythm: Normal rate.   Pulmonary:      Effort: Pulmonary effort is normal. No respiratory distress.   Abdominal:      General: There is no distension.      Palpations: Abdomen is soft.      Tenderness: There is no abdominal tenderness.   Musculoskeletal:         General: Tenderness present. Normal range of motion.      Cervical back: Normal range of motion and neck supple.      Right lower leg: No edema.      Left lower leg: No edema.      Comments: Tender to palpation in the mid thoracic region at or around T8 and T9.   Skin:     General: Skin is warm and dry.      Findings: No erythema.   Neurological:      Mental Status: She is alert and oriented to person, place, and time.      GCS: GCS eye subscore is 4. GCS verbal subscore is 5. GCS motor subscore is 6.      Sensory: No sensory deficit.      Motor: No weakness or abnormal muscle tone.      Coordination: Coordination normal.      Gait: Gait normal.      " Deep Tendon Reflexes: Reflexes are normal and symmetric. Reflexes normal.      Comments: No motor or sensory deficits. DTR's normal. Negative Sanchez's; negative clonus.    Psychiatric:         Behavior: Behavior is cooperative.         Thought Content: Thought content normal.       Assessment & Plan     Independent Review of Radiographic Studies:      I personally reviewed the images from the following studies.    Thoracolumbar x-rays were reviewed in the office today.  The x-rays show chronic T7 and T10 compression fractures as well as kyphoplasty cement from a previously treated L2 compression fracture.  There are also compression deformities noted at T8 and T9 which are new when compared to previous imaging.    Medical Decision Making:      This is a very pleasant 82-year-old female with a known history of osteoporosis and previously treated compression fracture with kyphoplasty at L2.  She has been off of the Prolia since late March early April in order to have a tooth extraction performed.  She was supposed to wait approximately 3 months after the tooth extraction to resume the Prolia which requires a visit with her gynecologist.  She has not followed through with that yet.    Given that she is still having rather significant discomfort and given the tenderness to palpation at T8 and T9, I recommended MRI imaging of the thoracic spine to determine acuteness of these fractures.  She will follow-up with Dr. Pastor Doss after the MRI is complete for further discussion of those results and possible kyphoplasty should an acute fracture be identified.      Diagnoses and all orders for this visit:    1. Vertebral fracture, osteoporotic, initial encounter (HCC) (Primary)  -     MRI Thoracic Spine Without Contrast; Future    2. Acute left-sided thoracic back pain  -     MRI Thoracic Spine Without Contrast; Future      Return for after radiographic imaging, Dr. Doss.

## 2022-11-29 ENCOUNTER — OFFICE VISIT (OUTPATIENT)
Dept: NEUROSURGERY | Facility: CLINIC | Age: 82
End: 2022-11-29

## 2022-11-29 VITALS
SYSTOLIC BLOOD PRESSURE: 172 MMHG | HEIGHT: 65 IN | HEART RATE: 69 BPM | OXYGEN SATURATION: 96 % | WEIGHT: 157.6 LBS | BODY MASS INDEX: 26.26 KG/M2 | DIASTOLIC BLOOD PRESSURE: 88 MMHG

## 2022-11-29 DIAGNOSIS — M80.08XA VERTEBRAL FRACTURE, OSTEOPOROTIC, INITIAL ENCOUNTER: Primary | ICD-10-CM

## 2022-11-29 DIAGNOSIS — M54.6 ACUTE LEFT-SIDED THORACIC BACK PAIN: ICD-10-CM

## 2022-11-29 PROCEDURE — 99214 OFFICE O/P EST MOD 30 MIN: CPT | Performed by: NURSE PRACTITIONER

## 2022-11-30 ENCOUNTER — TELEPHONE (OUTPATIENT)
Dept: NEUROSURGERY | Facility: CLINIC | Age: 82
End: 2022-11-30

## 2022-12-02 ENCOUNTER — PATIENT ROUNDING (BHMG ONLY) (OUTPATIENT)
Dept: NEUROSURGERY | Facility: CLINIC | Age: 82
End: 2022-12-02

## 2022-12-02 DIAGNOSIS — S22.060D COMPRESSION FRACTURE OF T8 VERTEBRA WITH ROUTINE HEALING, SUBSEQUENT ENCOUNTER: Primary | ICD-10-CM

## 2022-12-02 RX ORDER — OXYCODONE AND ACETAMINOPHEN 7.5; 325 MG/1; MG/1
1 TABLET ORAL EVERY 6 HOURS PRN
Qty: 30 TABLET | Refills: 0 | Status: SHIPPED | OUTPATIENT
Start: 2022-12-02 | End: 2022-12-08 | Stop reason: SDUPTHER

## 2022-12-03 PROBLEM — M48.54XA COMPRESSION FRACTURE OF THORACIC SPINE, NON-TRAUMATIC, INITIAL ENCOUNTER (HCC): Status: ACTIVE | Noted: 2022-12-03

## 2022-12-05 DIAGNOSIS — M54.6 ACUTE LEFT-SIDED THORACIC BACK PAIN: ICD-10-CM

## 2022-12-05 DIAGNOSIS — M80.08XA VERTEBRAL FRACTURE, OSTEOPOROTIC, INITIAL ENCOUNTER: ICD-10-CM

## 2022-12-06 NOTE — PROGRESS NOTES
Subjective   Patient ID: Moriah Petty is a 82 y.o. female is here today for follow-up of T8 VCF.  MRI thoracic done on 12/3/22 @ [facility]  Pt is c/o back pain that affects her gait and sleep.  History of Present Illness  82-year-old female with history of hypertension and hyperlipidemia who has underlying osteoporosis and awoke on the morning of November 14 with severe thoracic back pain.  It was so severe she was unable to raise her arms up to remove her pajama top to get dressed.  The pain is in the mid thoracic region and radiates around the left ribs more so than the right.  Deep inspiration, coughing, sneezing and position changes worsen her symptoms. The pain symptoms did not subside despite taking medications over-the-counter such as Tylenol.       She contacted her primary care office and saw Dr. Carrizales the next day.  Thoracic and lumbar X-rays were performed at [facility].  The x-rays revealed new compression deformities at T8 and T9.  The patient had had previous kyphoplasty 2012 at L2 by Dr. Cardoza.  She states that she has been on Prolia for osteoporosis however she was taken off the Prolia in order to have tooth extraction back in June.  She has not since resumed the Prolia.  She is a former smoker having quit 1989.      The following portions of the patient's history were reviewed and updated as appropriate:   She  has a past medical history of Adenomatous polyp of colon (11/6/2017), Anemia, Bursitis of elbow, Carotid stenosis, Cataract (2016), Cholelithiasis, Colitis, Colostomy in place ([facility]), Depression, Diverticulosis, GERD (gastroesophageal reflux disease), History of blood transfusion (11/2013), History of trigeminal neuralgia, Hyperlipidemia, Hypertension, Hypothyroidism, Insomnia, Neuromuscular disorder ([facility]), OA (osteoarthritis), Osteopenia, Osteoporosis, Pneumonia, PVC (premature ventricular contraction), PVC's (premature ventricular contractions), and Sleep apnea.  She does not  have any pertinent problems on file.  She  has a past surgical history that includes Colonoscopy (N/A, 02/21/2012); Trigeminal nerve decompression (2014); Cholecystectomy (N/A, 11/20/2013); Kyphoplasty (N/A, 12/21/2012); Cardiac catheterization (03/2002); Colonoscopy (N/A, 4/24/2018); Exploratory Laparotomy (N/A, 12/5/2018); Total knee arthroplasty (Right, 11/13/2013); Colonoscopy w/ polypectomy (N/A, 05/06/2004); Total hip arthroplasty (Left, 03/16/2004); Bronchoscopy (N/A, 08/29/2001); Colonoscopy (N/A, 02/27/2015); Cardiac catheterization (Left, 11/28/2012); Breast biopsy (Left, 05/10/2018); Colostomy closure (N/A, 5/28/2019); Joint replacement; Colonoscopy (N/A, 7/9/2020); Brain surgery (08/2014); Colon surgery (12/5/2018, 6/1/2019); Eye surgery (5/2016); and Fracture surgery (2/14/2004).  Her family history includes Alcohol abuse in her son; Arthritis in her father and mother; Cancer in her daughter, maternal grandfather, and mother; Colon cancer in her mother; Glaucoma in her father; Heart disease in her father; Hyperlipidemia in her father; Hypertension in her father; Ovarian cancer in her daughter; Stomach cancer in her maternal grandfather and mother; Stroke in her father and paternal grandmother.  She  reports that she quit smoking about 33 years ago. Her smoking use included cigarettes. She started smoking about 65 years ago. She has a 15.00 pack-year smoking history. She has never used smokeless tobacco. She reports current alcohol use of about 4.0 standard drinks per week. She reports that she does not use drugs.  Current Outpatient Medications   Medication Sig Dispense Refill   • Calcium Carb-Cholecalciferol (CALCIUM + D3) 600-200 MG-UNIT tablet Take 1 tablet by mouth 2 (Two) Times a Day.     • carvedilol (COREG) 12.5 MG tablet TAKE 1 TABLET BY MOUTH TWICE A DAY WITH MEALS 180 tablet 1   • cloNIDine (Catapres) 0.1 MG tablet Take 1 tablet by mouth 2 (Two) Times a Day As Needed for High Blood Pressure  (systolic greater than 160 or diastolic greater than 100). 60 tablet 2   • levothyroxine (SYNTHROID, LEVOTHROID) 25 MCG tablet TAKE 1 TABLET BY MOUTH EVERY DAY 90 tablet 1   • losartan (COZAAR) 50 MG tablet Take 1 tablet by mouth 2 (Two) Times a Day. 180 tablet 0   • Misc. Devices (Rollator Ultra-Light) misc 1 each As Needed (walking). 1 each 1   • Omega-3 Fatty Acids (FISH OIL) 1200 MG capsule capsule Take 1,200 mg by mouth 2 (Two) Times a Day With Meals.     • omeprazole (priLOSEC) 20 MG capsule Take 1 capsule by mouth Daily. 90 capsule 3   • oxyCODONE-acetaminophen (PERCOCET) 7.5-325 MG per tablet Take 1 tablet by mouth Every 6 (Six) Hours As Needed.     • polyethylene glycol (MIRALAX) 17 GM/SCOOP powder Take 17 g by mouth Daily. 289 g 5   • rosuvastatin (CRESTOR) 5 MG tablet TAKE 1 TABLET BY MOUTH EVERY DAY 90 tablet 0   • triazolam (HALCION) 0.25 MG tablet TAKE 1 TABLET BY MOUTH 1 HOUR BEFORE DENTAL PROCEDURE     • Denosumab (PROLIA SC) Inject  under the skin into the appropriate area as directed Every 6 (Six) Months. Dose unknown, injection every 6 months; DEC 3rd last injection     • tiZANidine (ZANAFLEX) 2 MG tablet Take 1 tablet by mouth Every 8 (Eight) Hours As Needed for Muscle Spasms. 30 tablet 1     No current facility-administered medications for this visit.     Current Outpatient Medications on File Prior to Visit   Medication Sig   • Calcium Carb-Cholecalciferol (CALCIUM + D3) 600-200 MG-UNIT tablet Take 1 tablet by mouth 2 (Two) Times a Day.   • carvedilol (COREG) 12.5 MG tablet TAKE 1 TABLET BY MOUTH TWICE A DAY WITH MEALS   • cloNIDine (Catapres) 0.1 MG tablet Take 1 tablet by mouth 2 (Two) Times a Day As Needed for High Blood Pressure (systolic greater than 160 or diastolic greater than 100).   • levothyroxine (SYNTHROID, LEVOTHROID) 25 MCG tablet TAKE 1 TABLET BY MOUTH EVERY DAY   • losartan (COZAAR) 50 MG tablet Take 1 tablet by mouth 2 (Two) Times a Day.   • Misc. Devices (Rollator  Ultra-Light) misc 1 each As Needed (walking).   • Omega-3 Fatty Acids (FISH OIL) 1200 MG capsule capsule Take 1,200 mg by mouth 2 (Two) Times a Day With Meals.   • omeprazole (priLOSEC) 20 MG capsule Take 1 capsule by mouth Daily.   • oxyCODONE-acetaminophen (PERCOCET) 7.5-325 MG per tablet Take 1 tablet by mouth Every 6 (Six) Hours As Needed.   • polyethylene glycol (MIRALAX) 17 GM/SCOOP powder Take 17 g by mouth Daily.   • rosuvastatin (CRESTOR) 5 MG tablet TAKE 1 TABLET BY MOUTH EVERY DAY   • triazolam (HALCION) 0.25 MG tablet TAKE 1 TABLET BY MOUTH 1 HOUR BEFORE DENTAL PROCEDURE   • Denosumab (PROLIA SC) Inject  under the skin into the appropriate area as directed Every 6 (Six) Months. Dose unknown, injection every 6 months; DEC 3rd last injection   • tiZANidine (ZANAFLEX) 2 MG tablet Take 1 tablet by mouth Every 8 (Eight) Hours As Needed for Muscle Spasms.   • [DISCONTINUED] HYDROcodone-acetaminophen (Norco) 7.5-325 MG per tablet Take 1 tablet by mouth Every 6 (Six) Hours As Needed for Moderate Pain or Severe Pain.   • [DISCONTINUED] oxyCODONE-acetaminophen (Percocet) 7.5-325 MG per tablet Take 1 tablet by mouth Every 6 (Six) Hours As Needed for Moderate Pain or Severe Pain.     No current facility-administered medications on file prior to visit.     She is allergic to morphine and related..    Review of Systems   Gastrointestinal:        No b/b incontinence   Genitourinary: Negative for difficulty urinating and enuresis.   Musculoskeletal: Positive for back pain and gait problem.   Neurological: Negative for weakness and numbness.   Psychiatric/Behavioral: Positive for sleep disturbance.       Objective    Vitals:    12/08/22 1000   BP: 128/84   Pulse: 60   Resp: 16   Temp: 98 °F (36.7 °C)     Body mass index is 26.29 kg/m².    Physical Exam  Vitals and nursing note reviewed.   Constitutional:       General: She is in acute distress.      Appearance: She is obese.   HENT:      Head: Normocephalic.      Nose:  Nose normal.      Mouth/Throat:      Mouth: Mucous membranes are moist.   Eyes:      Extraocular Movements: Extraocular movements intact.      Conjunctiva/sclera: Conjunctivae normal.      Pupils: Pupils are equal, round, and reactive to light.   Cardiovascular:      Rate and Rhythm: Normal rate.   Pulmonary:      Effort: Pulmonary effort is normal.   Musculoskeletal:      Cervical back: Normal range of motion.      Thoracic back: Tenderness and bony tenderness present.        Back:    Skin:     General: Skin is warm and dry.   Neurological:      General: No focal deficit present.      Mental Status: She is alert and oriented to person, place, and time.      Cranial Nerves: No cranial nerve deficit.      Sensory: No sensory deficit.      Motor: No weakness.      Coordination: Coordination normal.      Gait: Gait normal.   Psychiatric:         Mood and Affect: Mood normal.         Behavior: Behavior normal.         Thought Content: Thought content normal.         Judgment: Judgment normal.       Neurologic Exam     Mental Status   Oriented to person, place, and time.     Cranial Nerves     CN III, IV, VI   Pupils are equal, round, and reactive to light.      Assessment & Plan   Independent Review of Radiographic Studies:    The MRI of the thoracic spine dated 12/3/2022 from [facility] demonstrates a compression deformity of T8 with marrow edema.  Medical Decision Makin-year-old female with history of osteoporosis on Prolia who developed a T8 vertebral compression fracture and is now a candidate for a kyphoplasty.  The risks, alternatives and procedure were explained with the patient wishing to proceed.  Patient is a former smoker, but quit in .  Her hypertension and hyperlipidemia are medically managed.  Diagnoses and all orders for this visit:    1. Osteoporosis with current pathological fracture, unspecified osteoporosis type, initial encounter (Primary)  -     IR Kyphoplasty Thoracic; Future  -     Basic  Metabolic Panel; Future  -     CBC & Differential; Future    2. Compression fracture of T8 vertebra with delayed healing  -     IR Kyphoplasty Thoracic; Future  -     Basic Metabolic Panel; Future  -     CBC & Differential; Future    3. Essential hypertension  -     IR Kyphoplasty Thoracic; Future  -     Basic Metabolic Panel; Future  -     CBC & Differential; Future    4. Dyslipidemia  -     IR Kyphoplasty Thoracic; Future  -     Basic Metabolic Panel; Future  -     CBC & Differential; Future    5. Age-related osteoporosis with current pathological fracture, vertebra(e), initial encounter for fracture (HCC)  -     IR Kyphoplasty Thoracic; Future      Return in about 2 weeks (around 12/22/2022) for Kyphoplasty.

## 2022-12-06 NOTE — H&P (VIEW-ONLY)
Subjective   Patient ID: Moriah Petty is a 82 y.o. female is here today for follow-up of T8 VCF.  MRI thoracic done on 12/3/22 @ [facility]  Pt is c/o back pain that affects her gait and sleep.  History of Present Illness  82-year-old female with history of hypertension and hyperlipidemia who has underlying osteoporosis and awoke on the morning of November 14 with severe thoracic back pain.  It was so severe she was unable to raise her arms up to remove her pajama top to get dressed.  The pain is in the mid thoracic region and radiates around the left ribs more so than the right.  Deep inspiration, coughing, sneezing and position changes worsen her symptoms. The pain symptoms did not subside despite taking medications over-the-counter such as Tylenol.       She contacted her primary care office and saw Dr. Carrizales the next day.  Thoracic and lumbar X-rays were performed at [facility].  The x-rays revealed new compression deformities at T8 and T9.  The patient had had previous kyphoplasty 2012 at L2 by Dr. Cardoza.  She states that she has been on Prolia for osteoporosis however she was taken off the Prolia in order to have tooth extraction back in June.  She has not since resumed the Prolia.  She is a former smoker having quit 1989.      The following portions of the patient's history were reviewed and updated as appropriate:   She  has a past medical history of Adenomatous polyp of colon (11/6/2017), Anemia, Bursitis of elbow, Carotid stenosis, Cataract (2016), Cholelithiasis, Colitis, Colostomy in place ([facility]), Depression, Diverticulosis, GERD (gastroesophageal reflux disease), History of blood transfusion (11/2013), History of trigeminal neuralgia, Hyperlipidemia, Hypertension, Hypothyroidism, Insomnia, Neuromuscular disorder ([facility]), OA (osteoarthritis), Osteopenia, Osteoporosis, Pneumonia, PVC (premature ventricular contraction), PVC's (premature ventricular contractions), and Sleep apnea.  She does not  have any pertinent problems on file.  She  has a past surgical history that includes Colonoscopy (N/A, 02/21/2012); Trigeminal nerve decompression (2014); Cholecystectomy (N/A, 11/20/2013); Kyphoplasty (N/A, 12/21/2012); Cardiac catheterization (03/2002); Colonoscopy (N/A, 4/24/2018); Exploratory Laparotomy (N/A, 12/5/2018); Total knee arthroplasty (Right, 11/13/2013); Colonoscopy w/ polypectomy (N/A, 05/06/2004); Total hip arthroplasty (Left, 03/16/2004); Bronchoscopy (N/A, 08/29/2001); Colonoscopy (N/A, 02/27/2015); Cardiac catheterization (Left, 11/28/2012); Breast biopsy (Left, 05/10/2018); Colostomy closure (N/A, 5/28/2019); Joint replacement; Colonoscopy (N/A, 7/9/2020); Brain surgery (08/2014); Colon surgery (12/5/2018, 6/1/2019); Eye surgery (5/2016); and Fracture surgery (2/14/2004).  Her family history includes Alcohol abuse in her son; Arthritis in her father and mother; Cancer in her daughter, maternal grandfather, and mother; Colon cancer in her mother; Glaucoma in her father; Heart disease in her father; Hyperlipidemia in her father; Hypertension in her father; Ovarian cancer in her daughter; Stomach cancer in her maternal grandfather and mother; Stroke in her father and paternal grandmother.  She  reports that she quit smoking about 33 years ago. Her smoking use included cigarettes. She started smoking about 65 years ago. She has a 15.00 pack-year smoking history. She has never used smokeless tobacco. She reports current alcohol use of about 4.0 standard drinks per week. She reports that she does not use drugs.  Current Outpatient Medications   Medication Sig Dispense Refill   • Calcium Carb-Cholecalciferol (CALCIUM + D3) 600-200 MG-UNIT tablet Take 1 tablet by mouth 2 (Two) Times a Day.     • carvedilol (COREG) 12.5 MG tablet TAKE 1 TABLET BY MOUTH TWICE A DAY WITH MEALS 180 tablet 1   • cloNIDine (Catapres) 0.1 MG tablet Take 1 tablet by mouth 2 (Two) Times a Day As Needed for High Blood Pressure  (systolic greater than 160 or diastolic greater than 100). 60 tablet 2   • levothyroxine (SYNTHROID, LEVOTHROID) 25 MCG tablet TAKE 1 TABLET BY MOUTH EVERY DAY 90 tablet 1   • losartan (COZAAR) 50 MG tablet Take 1 tablet by mouth 2 (Two) Times a Day. 180 tablet 0   • Misc. Devices (Rollator Ultra-Light) misc 1 each As Needed (walking). 1 each 1   • Omega-3 Fatty Acids (FISH OIL) 1200 MG capsule capsule Take 1,200 mg by mouth 2 (Two) Times a Day With Meals.     • omeprazole (priLOSEC) 20 MG capsule Take 1 capsule by mouth Daily. 90 capsule 3   • oxyCODONE-acetaminophen (PERCOCET) 7.5-325 MG per tablet Take 1 tablet by mouth Every 6 (Six) Hours As Needed.     • polyethylene glycol (MIRALAX) 17 GM/SCOOP powder Take 17 g by mouth Daily. 289 g 5   • rosuvastatin (CRESTOR) 5 MG tablet TAKE 1 TABLET BY MOUTH EVERY DAY 90 tablet 0   • triazolam (HALCION) 0.25 MG tablet TAKE 1 TABLET BY MOUTH 1 HOUR BEFORE DENTAL PROCEDURE     • Denosumab (PROLIA SC) Inject  under the skin into the appropriate area as directed Every 6 (Six) Months. Dose unknown, injection every 6 months; DEC 3rd last injection     • tiZANidine (ZANAFLEX) 2 MG tablet Take 1 tablet by mouth Every 8 (Eight) Hours As Needed for Muscle Spasms. 30 tablet 1     No current facility-administered medications for this visit.     Current Outpatient Medications on File Prior to Visit   Medication Sig   • Calcium Carb-Cholecalciferol (CALCIUM + D3) 600-200 MG-UNIT tablet Take 1 tablet by mouth 2 (Two) Times a Day.   • carvedilol (COREG) 12.5 MG tablet TAKE 1 TABLET BY MOUTH TWICE A DAY WITH MEALS   • cloNIDine (Catapres) 0.1 MG tablet Take 1 tablet by mouth 2 (Two) Times a Day As Needed for High Blood Pressure (systolic greater than 160 or diastolic greater than 100).   • levothyroxine (SYNTHROID, LEVOTHROID) 25 MCG tablet TAKE 1 TABLET BY MOUTH EVERY DAY   • losartan (COZAAR) 50 MG tablet Take 1 tablet by mouth 2 (Two) Times a Day.   • Misc. Devices (Rollator  Ultra-Light) misc 1 each As Needed (walking).   • Omega-3 Fatty Acids (FISH OIL) 1200 MG capsule capsule Take 1,200 mg by mouth 2 (Two) Times a Day With Meals.   • omeprazole (priLOSEC) 20 MG capsule Take 1 capsule by mouth Daily.   • oxyCODONE-acetaminophen (PERCOCET) 7.5-325 MG per tablet Take 1 tablet by mouth Every 6 (Six) Hours As Needed.   • polyethylene glycol (MIRALAX) 17 GM/SCOOP powder Take 17 g by mouth Daily.   • rosuvastatin (CRESTOR) 5 MG tablet TAKE 1 TABLET BY MOUTH EVERY DAY   • triazolam (HALCION) 0.25 MG tablet TAKE 1 TABLET BY MOUTH 1 HOUR BEFORE DENTAL PROCEDURE   • Denosumab (PROLIA SC) Inject  under the skin into the appropriate area as directed Every 6 (Six) Months. Dose unknown, injection every 6 months; DEC 3rd last injection   • tiZANidine (ZANAFLEX) 2 MG tablet Take 1 tablet by mouth Every 8 (Eight) Hours As Needed for Muscle Spasms.   • [DISCONTINUED] HYDROcodone-acetaminophen (Norco) 7.5-325 MG per tablet Take 1 tablet by mouth Every 6 (Six) Hours As Needed for Moderate Pain or Severe Pain.   • [DISCONTINUED] oxyCODONE-acetaminophen (Percocet) 7.5-325 MG per tablet Take 1 tablet by mouth Every 6 (Six) Hours As Needed for Moderate Pain or Severe Pain.     No current facility-administered medications on file prior to visit.     She is allergic to morphine and related..    Review of Systems   Gastrointestinal:        No b/b incontinence   Genitourinary: Negative for difficulty urinating and enuresis.   Musculoskeletal: Positive for back pain and gait problem.   Neurological: Negative for weakness and numbness.   Psychiatric/Behavioral: Positive for sleep disturbance.       Objective    Vitals:    12/08/22 1000   BP: 128/84   Pulse: 60   Resp: 16   Temp: 98 °F (36.7 °C)     Body mass index is 26.29 kg/m².    Physical Exam  Vitals and nursing note reviewed.   Constitutional:       General: She is in acute distress.      Appearance: She is obese.   HENT:      Head: Normocephalic.      Nose:  Nose normal.      Mouth/Throat:      Mouth: Mucous membranes are moist.   Eyes:      Extraocular Movements: Extraocular movements intact.      Conjunctiva/sclera: Conjunctivae normal.      Pupils: Pupils are equal, round, and reactive to light.   Cardiovascular:      Rate and Rhythm: Normal rate.   Pulmonary:      Effort: Pulmonary effort is normal.   Musculoskeletal:      Cervical back: Normal range of motion.      Thoracic back: Tenderness and bony tenderness present.        Back:    Skin:     General: Skin is warm and dry.   Neurological:      General: No focal deficit present.      Mental Status: She is alert and oriented to person, place, and time.      Cranial Nerves: No cranial nerve deficit.      Sensory: No sensory deficit.      Motor: No weakness.      Coordination: Coordination normal.      Gait: Gait normal.   Psychiatric:         Mood and Affect: Mood normal.         Behavior: Behavior normal.         Thought Content: Thought content normal.         Judgment: Judgment normal.       Neurologic Exam     Mental Status   Oriented to person, place, and time.     Cranial Nerves     CN III, IV, VI   Pupils are equal, round, and reactive to light.      Assessment & Plan   Independent Review of Radiographic Studies:    The MRI of the thoracic spine dated 12/3/2022 from [facility] demonstrates a compression deformity of T8 with marrow edema.  Medical Decision Makin-year-old female with history of osteoporosis on Prolia who developed a T8 vertebral compression fracture and is now a candidate for a kyphoplasty.  The risks, alternatives and procedure were explained with the patient wishing to proceed.  Patient is a former smoker, but quit in .  Her hypertension and hyperlipidemia are medically managed.  Diagnoses and all orders for this visit:    1. Osteoporosis with current pathological fracture, unspecified osteoporosis type, initial encounter (Primary)  -     IR Kyphoplasty Thoracic; Future  -     Basic  Metabolic Panel; Future  -     CBC & Differential; Future    2. Compression fracture of T8 vertebra with delayed healing  -     IR Kyphoplasty Thoracic; Future  -     Basic Metabolic Panel; Future  -     CBC & Differential; Future    3. Essential hypertension  -     IR Kyphoplasty Thoracic; Future  -     Basic Metabolic Panel; Future  -     CBC & Differential; Future    4. Dyslipidemia  -     IR Kyphoplasty Thoracic; Future  -     Basic Metabolic Panel; Future  -     CBC & Differential; Future    5. Age-related osteoporosis with current pathological fracture, vertebra(e), initial encounter for fracture (HCC)  -     IR Kyphoplasty Thoracic; Future      Return in about 2 weeks (around 12/22/2022) for Kyphoplasty.

## 2022-12-08 ENCOUNTER — OFFICE VISIT (OUTPATIENT)
Dept: NEUROSURGERY | Facility: CLINIC | Age: 82
End: 2022-12-08

## 2022-12-08 VITALS
DIASTOLIC BLOOD PRESSURE: 84 MMHG | HEART RATE: 60 BPM | BODY MASS INDEX: 26.33 KG/M2 | HEIGHT: 65 IN | WEIGHT: 158 LBS | TEMPERATURE: 98 F | RESPIRATION RATE: 16 BRPM | SYSTOLIC BLOOD PRESSURE: 128 MMHG

## 2022-12-08 DIAGNOSIS — I10 ESSENTIAL HYPERTENSION: ICD-10-CM

## 2022-12-08 DIAGNOSIS — M80.00XA OSTEOPOROSIS WITH CURRENT PATHOLOGICAL FRACTURE, UNSPECIFIED OSTEOPOROSIS TYPE, INITIAL ENCOUNTER: Primary | ICD-10-CM

## 2022-12-08 DIAGNOSIS — S22.060G COMPRESSION FRACTURE OF T8 VERTEBRA WITH DELAYED HEALING: ICD-10-CM

## 2022-12-08 DIAGNOSIS — E78.5 DYSLIPIDEMIA: ICD-10-CM

## 2022-12-08 DIAGNOSIS — M80.08XA AGE-RELATED OSTEOPOROSIS WITH CURRENT PATHOLOGICAL FRACTURE, VERTEBRA(E), INITIAL ENCOUNTER FOR FRACTURE: ICD-10-CM

## 2022-12-08 PROCEDURE — 99214 OFFICE O/P EST MOD 30 MIN: CPT | Performed by: RADIOLOGY

## 2022-12-08 RX ORDER — OXYCODONE AND ACETAMINOPHEN 7.5; 325 MG/1; MG/1
1 TABLET ORAL EVERY 6 HOURS PRN
COMMUNITY
End: 2022-12-12 | Stop reason: SDUPTHER

## 2022-12-12 ENCOUNTER — LAB (OUTPATIENT)
Dept: LAB | Facility: HOSPITAL | Age: 82
End: 2022-12-12

## 2022-12-12 DIAGNOSIS — E78.5 DYSLIPIDEMIA: ICD-10-CM

## 2022-12-12 DIAGNOSIS — S22.060D COMPRESSION FRACTURE OF T8 VERTEBRA WITH ROUTINE HEALING, SUBSEQUENT ENCOUNTER: Primary | ICD-10-CM

## 2022-12-12 DIAGNOSIS — M80.00XA OSTEOPOROSIS WITH CURRENT PATHOLOGICAL FRACTURE, UNSPECIFIED OSTEOPOROSIS TYPE, INITIAL ENCOUNTER: ICD-10-CM

## 2022-12-12 DIAGNOSIS — S22.060G COMPRESSION FRACTURE OF T8 VERTEBRA WITH DELAYED HEALING: ICD-10-CM

## 2022-12-12 DIAGNOSIS — I10 ESSENTIAL HYPERTENSION: ICD-10-CM

## 2022-12-12 LAB
ANION GAP SERPL CALCULATED.3IONS-SCNC: 9.6 MMOL/L (ref 5–15)
BASOPHILS # BLD AUTO: 0.05 10*3/MM3 (ref 0–0.2)
BASOPHILS NFR BLD AUTO: 1 % (ref 0–1.5)
BUN SERPL-MCNC: 13 MG/DL (ref 8–23)
BUN/CREAT SERPL: 14.3 (ref 7–25)
CALCIUM SPEC-SCNC: 10.9 MG/DL (ref 8.6–10.5)
CHLORIDE SERPL-SCNC: 98 MMOL/L (ref 98–107)
CO2 SERPL-SCNC: 28.4 MMOL/L (ref 22–29)
CREAT SERPL-MCNC: 0.91 MG/DL (ref 0.57–1)
DEPRECATED RDW RBC AUTO: 40.8 FL (ref 37–54)
EGFRCR SERPLBLD CKD-EPI 2021: 62.7 ML/MIN/1.73
EOSINOPHIL # BLD AUTO: 0.09 10*3/MM3 (ref 0–0.4)
EOSINOPHIL NFR BLD AUTO: 1.9 % (ref 0.3–6.2)
ERYTHROCYTE [DISTWIDTH] IN BLOOD BY AUTOMATED COUNT: 12.1 % (ref 12.3–15.4)
GLUCOSE SERPL-MCNC: 109 MG/DL (ref 65–99)
HCT VFR BLD AUTO: 38.8 % (ref 34–46.6)
HGB BLD-MCNC: 12.8 G/DL (ref 12–15.9)
IMM GRANULOCYTES # BLD AUTO: 0.01 10*3/MM3 (ref 0–0.05)
IMM GRANULOCYTES NFR BLD AUTO: 0.2 % (ref 0–0.5)
LYMPHOCYTES # BLD AUTO: 1.76 10*3/MM3 (ref 0.7–3.1)
LYMPHOCYTES NFR BLD AUTO: 36.4 % (ref 19.6–45.3)
MCH RBC QN AUTO: 30.4 PG (ref 26.6–33)
MCHC RBC AUTO-ENTMCNC: 33 G/DL (ref 31.5–35.7)
MCV RBC AUTO: 92.2 FL (ref 79–97)
MONOCYTES # BLD AUTO: 0.5 10*3/MM3 (ref 0.1–0.9)
MONOCYTES NFR BLD AUTO: 10.4 % (ref 5–12)
NEUTROPHILS NFR BLD AUTO: 2.42 10*3/MM3 (ref 1.7–7)
NEUTROPHILS NFR BLD AUTO: 50.1 % (ref 42.7–76)
NRBC BLD AUTO-RTO: 0 /100 WBC (ref 0–0.2)
PLATELET # BLD AUTO: 269 10*3/MM3 (ref 140–450)
PMV BLD AUTO: 11.3 FL (ref 6–12)
POTASSIUM SERPL-SCNC: 4.9 MMOL/L (ref 3.5–5.2)
RBC # BLD AUTO: 4.21 10*6/MM3 (ref 3.77–5.28)
SODIUM SERPL-SCNC: 136 MMOL/L (ref 136–145)
WBC NRBC COR # BLD: 4.83 10*3/MM3 (ref 3.4–10.8)

## 2022-12-12 PROCEDURE — 85025 COMPLETE CBC W/AUTO DIFF WBC: CPT

## 2022-12-12 PROCEDURE — 36415 COLL VENOUS BLD VENIPUNCTURE: CPT

## 2022-12-12 PROCEDURE — 80048 BASIC METABOLIC PNL TOTAL CA: CPT

## 2022-12-12 RX ORDER — OXYCODONE AND ACETAMINOPHEN 7.5; 325 MG/1; MG/1
1 TABLET ORAL EVERY 6 HOURS PRN
Qty: 28 TABLET | Refills: 0 | Status: SHIPPED | OUTPATIENT
Start: 2022-12-12 | End: 2022-12-27

## 2022-12-16 ENCOUNTER — TELEPHONE (OUTPATIENT)
Dept: INTERVENTIONAL RADIOLOGY/VASCULAR | Facility: HOSPITAL | Age: 82
End: 2022-12-16

## 2022-12-19 ENCOUNTER — ANESTHESIA EVENT (OUTPATIENT)
Dept: INTERVENTIONAL RADIOLOGY/VASCULAR | Facility: HOSPITAL | Age: 82
End: 2022-12-19

## 2022-12-19 ENCOUNTER — APPOINTMENT (OUTPATIENT)
Dept: OTHER | Facility: HOSPITAL | Age: 82
End: 2022-12-19

## 2022-12-19 ENCOUNTER — ANESTHESIA (OUTPATIENT)
Dept: INTERVENTIONAL RADIOLOGY/VASCULAR | Facility: HOSPITAL | Age: 82
End: 2022-12-19

## 2022-12-19 ENCOUNTER — HOSPITAL ENCOUNTER (OUTPATIENT)
Dept: INTERVENTIONAL RADIOLOGY/VASCULAR | Facility: HOSPITAL | Age: 82
Discharge: HOME | End: 2022-12-19

## 2022-12-19 VITALS
OXYGEN SATURATION: 95 % | RESPIRATION RATE: 14 BRPM | BODY MASS INDEX: 25.39 KG/M2 | HEART RATE: 66 BPM | HEIGHT: 66 IN | DIASTOLIC BLOOD PRESSURE: 87 MMHG | TEMPERATURE: 99.1 F | SYSTOLIC BLOOD PRESSURE: 173 MMHG | WEIGHT: 158 LBS

## 2022-12-19 DIAGNOSIS — S22.060G COMPRESSION FRACTURE OF T8 VERTEBRA WITH DELAYED HEALING: ICD-10-CM

## 2022-12-19 DIAGNOSIS — E78.5 DYSLIPIDEMIA: ICD-10-CM

## 2022-12-19 DIAGNOSIS — M80.08XA AGE-RELATED OSTEOPOROSIS WITH CURRENT PATHOLOGICAL FRACTURE, VERTEBRA(E), INITIAL ENCOUNTER FOR FRACTURE: ICD-10-CM

## 2022-12-19 DIAGNOSIS — Z09 FOLLOW-UP EXAM: ICD-10-CM

## 2022-12-19 DIAGNOSIS — I10 ESSENTIAL HYPERTENSION: ICD-10-CM

## 2022-12-19 DIAGNOSIS — M80.00XA OSTEOPOROSIS WITH CURRENT PATHOLOGICAL FRACTURE, UNSPECIFIED OSTEOPOROSIS TYPE, INITIAL ENCOUNTER: ICD-10-CM

## 2022-12-19 PROCEDURE — 22513 PERQ VERTEBRAL AUGMENTATION: CPT | Performed by: RADIOLOGY

## 2022-12-19 PROCEDURE — 25010000002 CEFAZOLIN IN DEXTROSE 2-4 GM/100ML-% SOLUTION: Performed by: RADIOLOGY

## 2022-12-19 PROCEDURE — 25010000002 MIDAZOLAM PER 1 MG: Performed by: ANESTHESIOLOGY

## 2022-12-19 PROCEDURE — 25010000002 PROPOFOL 10 MG/ML EMULSION: Performed by: ANESTHESIOLOGY

## 2022-12-19 PROCEDURE — 22513 PERQ VERTEBRAL AUGMENTATION: CPT

## 2022-12-19 PROCEDURE — C1713 ANCHOR/SCREW BN/BN,TIS/BN: HCPCS

## 2022-12-19 PROCEDURE — 88307 TISSUE EXAM BY PATHOLOGIST: CPT | Performed by: RADIOLOGY

## 2022-12-19 PROCEDURE — 88342 IMHCHEM/IMCYTCHM 1ST ANTB: CPT | Performed by: RADIOLOGY

## 2022-12-19 PROCEDURE — 25010000002 FENTANYL CITRATE (PF) 50 MCG/ML SOLUTION: Performed by: ANESTHESIOLOGY

## 2022-12-19 PROCEDURE — 25010000002 ONDANSETRON PER 1 MG: Performed by: ANESTHESIOLOGY

## 2022-12-19 PROCEDURE — 25010000002 DEXAMETHASONE PER 1 MG: Performed by: ANESTHESIOLOGY

## 2022-12-19 RX ORDER — SODIUM CHLORIDE 0.9 % (FLUSH) 0.9 %
3 SYRINGE (ML) INJECTION EVERY 12 HOURS SCHEDULED
Status: DISCONTINUED | OUTPATIENT
Start: 2022-12-19 | End: 2022-12-20 | Stop reason: HOSPADM

## 2022-12-19 RX ORDER — LIDOCAINE HYDROCHLORIDE 10 MG/ML
0.5 INJECTION, SOLUTION EPIDURAL; INFILTRATION; INTRACAUDAL; PERINEURAL ONCE AS NEEDED
Status: DISCONTINUED | OUTPATIENT
Start: 2022-12-19 | End: 2022-12-20 | Stop reason: HOSPADM

## 2022-12-19 RX ORDER — LABETALOL HYDROCHLORIDE 5 MG/ML
5 INJECTION, SOLUTION INTRAVENOUS
Status: CANCELLED | OUTPATIENT
Start: 2022-12-19

## 2022-12-19 RX ORDER — ONDANSETRON 2 MG/ML
4 INJECTION INTRAMUSCULAR; INTRAVENOUS EVERY 6 HOURS PRN
Status: CANCELLED | OUTPATIENT
Start: 2022-12-19

## 2022-12-19 RX ORDER — CEFAZOLIN SODIUM 2 G/100ML
2 INJECTION, SOLUTION INTRAVENOUS ONCE
Status: COMPLETED | OUTPATIENT
Start: 2022-12-19 | End: 2022-12-19

## 2022-12-19 RX ORDER — DEXAMETHASONE SODIUM PHOSPHATE 4 MG/ML
INJECTION, SOLUTION INTRA-ARTICULAR; INTRALESIONAL; INTRAMUSCULAR; INTRAVENOUS; SOFT TISSUE AS NEEDED
Status: DISCONTINUED | OUTPATIENT
Start: 2022-12-19 | End: 2022-12-19 | Stop reason: SURG

## 2022-12-19 RX ORDER — MIDAZOLAM HYDROCHLORIDE 1 MG/ML
0.5 INJECTION INTRAMUSCULAR; INTRAVENOUS
Status: DISCONTINUED | OUTPATIENT
Start: 2022-12-19 | End: 2022-12-20 | Stop reason: HOSPADM

## 2022-12-19 RX ORDER — ACETAMINOPHEN 160 MG/5ML
650 SOLUTION ORAL EVERY 4 HOURS PRN
Status: CANCELLED | OUTPATIENT
Start: 2022-12-19

## 2022-12-19 RX ORDER — FENTANYL CITRATE 50 UG/ML
INJECTION, SOLUTION INTRAMUSCULAR; INTRAVENOUS AS NEEDED
Status: DISCONTINUED | OUTPATIENT
Start: 2022-12-19 | End: 2022-12-19 | Stop reason: SURG

## 2022-12-19 RX ORDER — SODIUM CHLORIDE 0.9 % (FLUSH) 0.9 %
10 SYRINGE (ML) INJECTION AS NEEDED
Status: CANCELLED | OUTPATIENT
Start: 2022-12-19

## 2022-12-19 RX ORDER — FAMOTIDINE 10 MG/ML
20 INJECTION, SOLUTION INTRAVENOUS ONCE
Status: COMPLETED | OUTPATIENT
Start: 2022-12-19 | End: 2022-12-19

## 2022-12-19 RX ORDER — SODIUM CHLORIDE, SODIUM LACTATE, POTASSIUM CHLORIDE, CALCIUM CHLORIDE 600; 310; 30; 20 MG/100ML; MG/100ML; MG/100ML; MG/100ML
9 INJECTION, SOLUTION INTRAVENOUS CONTINUOUS
Status: DISCONTINUED | OUTPATIENT
Start: 2022-12-19 | End: 2022-12-20 | Stop reason: HOSPADM

## 2022-12-19 RX ORDER — DIPHENHYDRAMINE HYDROCHLORIDE 50 MG/ML
12.5 INJECTION INTRAMUSCULAR; INTRAVENOUS
Status: CANCELLED | OUTPATIENT
Start: 2022-12-19

## 2022-12-19 RX ORDER — EPHEDRINE SULFATE 50 MG/ML
5 INJECTION, SOLUTION INTRAVENOUS ONCE AS NEEDED
Status: CANCELLED | OUTPATIENT
Start: 2022-12-19

## 2022-12-19 RX ORDER — EPHEDRINE SULFATE 50 MG/ML
INJECTION, SOLUTION INTRAVENOUS AS NEEDED
Status: DISCONTINUED | OUTPATIENT
Start: 2022-12-19 | End: 2022-12-19 | Stop reason: SURG

## 2022-12-19 RX ORDER — DIPHENHYDRAMINE HCL 25 MG
25 CAPSULE ORAL
Status: CANCELLED | OUTPATIENT
Start: 2022-12-19

## 2022-12-19 RX ORDER — FENTANYL CITRATE 50 UG/ML
50 INJECTION, SOLUTION INTRAMUSCULAR; INTRAVENOUS
Status: CANCELLED | OUTPATIENT
Start: 2022-12-19

## 2022-12-19 RX ORDER — SODIUM CHLORIDE 0.9 % (FLUSH) 0.9 %
10 SYRINGE (ML) INJECTION EVERY 12 HOURS SCHEDULED
Status: CANCELLED | OUTPATIENT
Start: 2022-12-19

## 2022-12-19 RX ORDER — PROPOFOL 10 MG/ML
VIAL (ML) INTRAVENOUS AS NEEDED
Status: DISCONTINUED | OUTPATIENT
Start: 2022-12-19 | End: 2022-12-19 | Stop reason: SURG

## 2022-12-19 RX ORDER — SODIUM CHLORIDE 0.9 % (FLUSH) 0.9 %
3-10 SYRINGE (ML) INJECTION AS NEEDED
Status: DISCONTINUED | OUTPATIENT
Start: 2022-12-19 | End: 2022-12-20 | Stop reason: HOSPADM

## 2022-12-19 RX ORDER — ONDANSETRON 2 MG/ML
4 INJECTION INTRAMUSCULAR; INTRAVENOUS ONCE AS NEEDED
Status: CANCELLED | OUTPATIENT
Start: 2022-12-19

## 2022-12-19 RX ORDER — SODIUM CHLORIDE 9 MG/ML
40 INJECTION, SOLUTION INTRAVENOUS AS NEEDED
Status: CANCELLED | OUTPATIENT
Start: 2022-12-19

## 2022-12-19 RX ORDER — ACETAMINOPHEN 325 MG/1
650 TABLET ORAL EVERY 4 HOURS PRN
Status: CANCELLED | OUTPATIENT
Start: 2022-12-19

## 2022-12-19 RX ORDER — ONDANSETRON 2 MG/ML
INJECTION INTRAMUSCULAR; INTRAVENOUS AS NEEDED
Status: DISCONTINUED | OUTPATIENT
Start: 2022-12-19 | End: 2022-12-19 | Stop reason: SURG

## 2022-12-19 RX ORDER — FLUMAZENIL 0.1 MG/ML
0.2 INJECTION INTRAVENOUS AS NEEDED
Status: CANCELLED | OUTPATIENT
Start: 2022-12-19

## 2022-12-19 RX ORDER — ROCURONIUM BROMIDE 10 MG/ML
INJECTION, SOLUTION INTRAVENOUS AS NEEDED
Status: DISCONTINUED | OUTPATIENT
Start: 2022-12-19 | End: 2022-12-19 | Stop reason: SURG

## 2022-12-19 RX ORDER — LIDOCAINE HYDROCHLORIDE 40 MG/ML
SOLUTION TOPICAL AS NEEDED
Status: DISCONTINUED | OUTPATIENT
Start: 2022-12-19 | End: 2022-12-19 | Stop reason: SURG

## 2022-12-19 RX ORDER — LIDOCAINE HYDROCHLORIDE 10 MG/ML
INJECTION, SOLUTION EPIDURAL; INFILTRATION; INTRACAUDAL; PERINEURAL AS NEEDED
Status: COMPLETED | OUTPATIENT
Start: 2022-12-19 | End: 2022-12-19

## 2022-12-19 RX ORDER — GLYCOPYRROLATE 0.2 MG/ML
INJECTION INTRAMUSCULAR; INTRAVENOUS AS NEEDED
Status: DISCONTINUED | OUTPATIENT
Start: 2022-12-19 | End: 2022-12-19 | Stop reason: SURG

## 2022-12-19 RX ORDER — HYDRALAZINE HYDROCHLORIDE 20 MG/ML
5 INJECTION INTRAMUSCULAR; INTRAVENOUS
Status: CANCELLED | OUTPATIENT
Start: 2022-12-19

## 2022-12-19 RX ORDER — PROMETHAZINE HYDROCHLORIDE 25 MG/1
25 TABLET ORAL ONCE AS NEEDED
Status: CANCELLED | OUTPATIENT
Start: 2022-12-19

## 2022-12-19 RX ORDER — NALOXONE HCL 0.4 MG/ML
0.2 VIAL (ML) INJECTION AS NEEDED
Status: CANCELLED | OUTPATIENT
Start: 2022-12-19

## 2022-12-19 RX ORDER — FENTANYL CITRATE 50 UG/ML
50 INJECTION, SOLUTION INTRAMUSCULAR; INTRAVENOUS
Status: DISCONTINUED | OUTPATIENT
Start: 2022-12-19 | End: 2022-12-20 | Stop reason: HOSPADM

## 2022-12-19 RX ORDER — LIDOCAINE HYDROCHLORIDE 20 MG/ML
INJECTION, SOLUTION INFILTRATION; PERINEURAL AS NEEDED
Status: DISCONTINUED | OUTPATIENT
Start: 2022-12-19 | End: 2022-12-19 | Stop reason: SURG

## 2022-12-19 RX ORDER — PROMETHAZINE HYDROCHLORIDE 25 MG/1
25 SUPPOSITORY RECTAL ONCE AS NEEDED
Status: CANCELLED | OUTPATIENT
Start: 2022-12-19

## 2022-12-19 RX ADMIN — EPHEDRINE SULFATE 20 MG: 50 INJECTION INTRAVENOUS at 11:22

## 2022-12-19 RX ADMIN — CEFAZOLIN SODIUM 2 G: 2 INJECTION, SOLUTION INTRAVENOUS at 11:20

## 2022-12-19 RX ADMIN — LIDOCAINE HYDROCHLORIDE 1 EACH: 40 SOLUTION TOPICAL at 11:13

## 2022-12-19 RX ADMIN — PROPOFOL 120 MG: 10 INJECTION, EMULSION INTRAVENOUS at 11:04

## 2022-12-19 RX ADMIN — LIDOCAINE HYDROCHLORIDE 60 MG: 20 INJECTION, SOLUTION INFILTRATION; PERINEURAL at 11:04

## 2022-12-19 RX ADMIN — SUGAMMADEX 200 MG: 100 INJECTION, SOLUTION INTRAVENOUS at 11:49

## 2022-12-19 RX ADMIN — MIDAZOLAM 0.5 MG: 1 INJECTION INTRAMUSCULAR; INTRAVENOUS at 10:47

## 2022-12-19 RX ADMIN — FENTANYL CITRATE 50 MCG: 0.05 INJECTION, SOLUTION INTRAMUSCULAR; INTRAVENOUS at 11:04

## 2022-12-19 RX ADMIN — LIDOCAINE HYDROCHLORIDE 15 ML: 10 INJECTION, SOLUTION EPIDURAL; INFILTRATION; INTRACAUDAL; PERINEURAL at 11:30

## 2022-12-19 RX ADMIN — SODIUM CHLORIDE, POTASSIUM CHLORIDE, SODIUM LACTATE AND CALCIUM CHLORIDE 9 ML/HR: 600; 310; 30; 20 INJECTION, SOLUTION INTRAVENOUS at 10:46

## 2022-12-19 RX ADMIN — EPHEDRINE SULFATE 20 MG: 50 INJECTION INTRAVENOUS at 11:50

## 2022-12-19 RX ADMIN — ONDANSETRON 4 MG: 2 INJECTION INTRAMUSCULAR; INTRAVENOUS at 11:50

## 2022-12-19 RX ADMIN — ROCURONIUM BROMIDE 40 MG: 50 INJECTION INTRAVENOUS at 11:04

## 2022-12-19 RX ADMIN — DEXAMETHASONE SODIUM PHOSPHATE 8 MG: 4 INJECTION, SOLUTION INTRA-ARTICULAR; INTRALESIONAL; INTRAMUSCULAR; INTRAVENOUS; SOFT TISSUE at 11:26

## 2022-12-19 RX ADMIN — FAMOTIDINE 20 MG: 10 INJECTION, SOLUTION INTRAVENOUS at 10:45

## 2022-12-19 RX ADMIN — PROPOFOL 80 MG: 10 INJECTION, EMULSION INTRAVENOUS at 11:12

## 2022-12-19 RX ADMIN — GLYCOPYRROLATE 0.2 MG: 0.2 INJECTION INTRAMUSCULAR; INTRAVENOUS at 11:43

## 2022-12-19 NOTE — POST-PROCEDURE NOTE
"Pre-Op Dx: Thoracic 8 VCF    Post-Op Dx:  Same    Procedure: Thoracic 8 Kyphoplasty and Biopsy    Surgeon: Pastor Doss MD    General ETT Anesthesia     EBL: 5 cc    Findings: Bilateral T8 balloon kyphoplasty with good \"cement\" deposition and no significant extravasation. Biopsy obtained just prior. Official report to follow.    Complication: None encountered.  "

## 2022-12-19 NOTE — ANESTHESIA PROCEDURE NOTES
Airway  Urgency: elective    Date/Time: 12/19/2022 11:13 AM  Airway not difficult    General Information and Staff    Patient location during procedure: OR  Anesthesiologist: Jac Bean MD    Indications and Patient Condition  Indications for airway management: airway protection    Preoxygenated: yes  MILS maintained throughout  Mask difficulty assessment: 1 - vent by mask    Final Airway Details  Final airway type: endotracheal airway      Successful airway: ETT  Cuffed: yes   Successful intubation technique: video laryngoscopy  Facilitating devices/methods: Bougie and intubating stylet  Endotracheal tube insertion site: oral  Blade: Jack  Blade size: D  ETT size (mm): 7.0  Cormack-Lehane Classification: grade I - full view of glottis  Placement verified by: chest auscultation and capnometry   Measured from: lips  ETT/EBT  to lips (cm): 21  Number of attempts at approach: 3 or more  Assessment: lips, teeth, and gum same as pre-op and atraumatic intubation    Additional Comments  Tried DL with MAC 3 first, could not see cords, so tried Eschmann under epiglottis without success, then switched to CMAC D and could see cords easily but right lower mandibular teeth prevented the stylet from reaching cords so an Eschmann stylet was used to finally intubate successfully. The pt was masked between attempts and the oxygen saturation was always stable, also more Propofol was given before final intubation.

## 2022-12-19 NOTE — ANESTHESIA PREPROCEDURE EVALUATION
Anesthesia Evaluation     Patient summary reviewed and Nursing notes reviewed   NPO Solid Status: > 8 hours  NPO Liquid Status: > 6 hours           Airway   Mallampati: III  TM distance: >3 FB  Neck ROM: full  Possible difficult intubation  Comment: Good ROM neck  Dental - normal exam     Pulmonary - normal exam   (+) a smoker Former, shortness of breath, sleep apnea,     ROS comment: No CPAP  Cardiovascular - normal exam    ECG reviewed  Rhythm: regular  Rate: normal    (+) hypertension 2 medications or greater, dysrhythmias PVC, Bradycardia, hyperlipidemia,  carotid artery disease    ROS comment: Normal LV fxn by ECHO 2014    Neuro/Psych  (+) headaches, dizziness/light headedness, numbness, psychiatric history Depression,      ROS Comment: Trigeminal neuralgia/meningioma  GI/Hepatic/Renal/Endo    (+)  GERD, GI bleeding , thyroid problem hypothyroidism    ROS Comment: Hx colon resection for diverticulitis in 2018, s/p colostomy takedown in 2019    Musculoskeletal     (+) back pain,       ROS comment: Hx T8 compression fx/L2 comp fx/cervical DDD  Abdominal  - normal exam   Substance History   (+) alcohol use,      OB/GYN          Other   arthritis,                    Anesthesia Plan    ASA 3     general     (GETA/CMAC available if needed for intubation)  intravenous induction     Anesthetic plan, risks, benefits, and alternatives have been provided, discussed and informed consent has been obtained with: patient.        CODE STATUS:

## 2022-12-19 NOTE — ANESTHESIA POSTPROCEDURE EVALUATION
"Patient: Moriah Petty    Procedure Summary     Date: 12/19/22 Room / Location: [facility] INTERVENTIONAL    Anesthesia Start: 1100 Anesthesia Stop: 1205    Procedure: IR KYPHOPLASTY THORACIC Diagnosis:       Osteoporosis with current pathological fracture, unspecified osteoporosis type, initial encounter      Compression fracture of T8 vertebra with delayed healing      Essential hypertension      Dyslipidemia      Age-related osteoporosis with current pathological fracture, vertebra(e), initial encounter for fracture (HCC)      (T8 vertebral compression fracture)    Scheduled Providers:  Provider: Jac Bean MD    Anesthesia Type: general ASA Status: 3          Anesthesia Type: general    Vitals  Vitals Value Taken Time   /87 12/19/22 1400   Temp     Pulse 0 12/19/22 1641   Resp 14 12/19/22 1400   SpO2 0 % 12/19/22 1641   Vitals shown include unvalidated device data.        Post Anesthesia Care and Evaluation    Patient location during evaluation: bedside  Patient participation: complete - patient participated  Level of consciousness: awake and alert  Pain management: adequate    Airway patency: patent  Anesthetic complications: No anesthetic complications    Cardiovascular status: acceptable  Respiratory status: acceptable  Hydration status: acceptable    Comments: /87 (BP Location: Left arm, Patient Position: Lying)   Pulse 66   Temp 37.3 °C (99.1 °F) (Temporal)   Resp 14   Ht 167.6 cm (66\")   Wt 71.7 kg (158 lb)   SpO2 95%   BMI 25.50 kg/m²       "

## 2022-12-20 ENCOUNTER — APPOINTMENT (OUTPATIENT)
Dept: MRI IMAGING | Facility: HOSPITAL | Age: 82
End: 2022-12-20

## 2022-12-20 ENCOUNTER — TELEPHONE (OUTPATIENT)
Dept: OBSTETRICS AND GYNECOLOGY | Facility: CLINIC | Age: 82
End: 2022-12-20

## 2022-12-20 DIAGNOSIS — M81.0 AGE-RELATED OSTEOPOROSIS WITHOUT CURRENT PATHOLOGICAL FRACTURE: Primary | ICD-10-CM

## 2022-12-20 LAB
LAB AP CASE REPORT: NORMAL
LAB AP DIAGNOSIS COMMENT: NORMAL
PATH REPORT.FINAL DX SPEC: NORMAL
PATH REPORT.GROSS SPEC: NORMAL

## 2022-12-21 RX ORDER — ROSUVASTATIN CALCIUM 5 MG/1
TABLET, COATED ORAL
Qty: 90 TABLET | Refills: 1 | Status: SHIPPED | OUTPATIENT
Start: 2022-12-21

## 2022-12-21 RX ORDER — OMEPRAZOLE 20 MG/1
CAPSULE, DELAYED RELEASE ORAL
Qty: 90 CAPSULE | Refills: 3 | Status: SHIPPED | OUTPATIENT
Start: 2022-12-21

## 2022-12-22 DIAGNOSIS — S22.060G COMPRESSION FRACTURE OF T8 VERTEBRA WITH DELAYED HEALING: ICD-10-CM

## 2022-12-22 DIAGNOSIS — M54.50 CHRONIC BILATERAL LOW BACK PAIN, UNSPECIFIED WHETHER SCIATICA PRESENT: Primary | ICD-10-CM

## 2022-12-22 DIAGNOSIS — M80.00XA OSTEOPOROSIS WITH CURRENT PATHOLOGICAL FRACTURE, UNSPECIFIED OSTEOPOROSIS TYPE, INITIAL ENCOUNTER: ICD-10-CM

## 2022-12-22 DIAGNOSIS — G89.29 CHRONIC BILATERAL LOW BACK PAIN, UNSPECIFIED WHETHER SCIATICA PRESENT: Primary | ICD-10-CM

## 2022-12-22 RX ORDER — HYDROCODONE BITARTRATE AND ACETAMINOPHEN 5; 325 MG/1; MG/1
1 TABLET ORAL EVERY 6 HOURS PRN
Qty: 20 TABLET | Refills: 0 | Status: SHIPPED | OUTPATIENT
Start: 2022-12-22 | End: 2022-12-27 | Stop reason: SDUPTHER

## 2022-12-27 ENCOUNTER — OFFICE VISIT (OUTPATIENT)
Dept: NEUROSURGERY | Facility: CLINIC | Age: 82
End: 2022-12-27

## 2022-12-27 VITALS
BODY MASS INDEX: 25.39 KG/M2 | HEIGHT: 66 IN | WEIGHT: 158 LBS | HEART RATE: 64 BPM | DIASTOLIC BLOOD PRESSURE: 82 MMHG | SYSTOLIC BLOOD PRESSURE: 140 MMHG | TEMPERATURE: 97.7 F | RESPIRATION RATE: 16 BRPM

## 2022-12-27 DIAGNOSIS — M80.00XA OSTEOPOROSIS WITH CURRENT PATHOLOGICAL FRACTURE, UNSPECIFIED OSTEOPOROSIS TYPE, INITIAL ENCOUNTER: ICD-10-CM

## 2022-12-27 DIAGNOSIS — M54.50 CHRONIC BILATERAL LOW BACK PAIN, UNSPECIFIED WHETHER SCIATICA PRESENT: ICD-10-CM

## 2022-12-27 DIAGNOSIS — S22.060G COMPRESSION FRACTURE OF T8 VERTEBRA WITH DELAYED HEALING: ICD-10-CM

## 2022-12-27 DIAGNOSIS — M54.9 MID BACK PAIN ON RIGHT SIDE: Primary | ICD-10-CM

## 2022-12-27 DIAGNOSIS — G89.29 CHRONIC BILATERAL LOW BACK PAIN, UNSPECIFIED WHETHER SCIATICA PRESENT: ICD-10-CM

## 2022-12-27 PROCEDURE — 99024 POSTOP FOLLOW-UP VISIT: CPT | Performed by: RADIOLOGY

## 2022-12-27 RX ORDER — HYDROCODONE BITARTRATE AND ACETAMINOPHEN 5; 325 MG/1; MG/1
1 TABLET ORAL EVERY 4 HOURS PRN
Qty: 42 TABLET | Refills: 0 | Status: SHIPPED | OUTPATIENT
Start: 2022-12-27 | End: 2023-01-03

## 2022-12-27 NOTE — PROGRESS NOTES
Subjective   Patient ID: Moriah Petty is a 82 y.o. female is here today for follow-up of T8 kyphoplasty on 12/19/22. Pt having extreme pain across mid back to Left side and rib cage. Her sleep is also affected.    History of Present Illness  82-year-old female with history of hypertension and hyperlipidemia who has underlying osteoporosis and awoke on the morning of November 14 with severe thoracic back pain.  It was so severe she was unable to raise her arms up to remove her pajama top to get dressed.  The pain is in the mid thoracic region and radiates around the left ribs more so than the right.  Deep inspiration, coughing, sneezing and position changes worsen her symptoms. The pain symptoms did not subside despite taking medications over-the-counter such as Tylenol.       She contacted her primary care office and saw Dr. Carrizales the next day.  Thoracic and lumbar X-rays were performed at [facility].  The x-rays revealed new compression deformities at T8 and T9.  The patient had had previous kyphoplasty 2012 at L2 by Dr. Cardoza.  She states that she has been on Prolia for osteoporosis however she was taken off the Prolia in order to have tooth extraction back in June.  She has not since resumed the Prolia.  She is a former smoker having quit 1989.  She subsequently underwent a successful T8 kyphoplasty on 12/19/2022 with a good radiographic result.  Patient's pain however has not improved with worsening right posterior lateral pain present.      The following portions of the patient's history were reviewed and updated as appropriate:   She  has a past medical history of Adenomatous polyp of colon (11/6/2017), Anemia, Bursitis of elbow, Carotid stenosis, Cataract (2016), Cholelithiasis, Colitis, Colostomy in place (HCC), Depression, Diverticulosis, GERD (gastroesophageal reflux disease), History of blood transfusion (11/2013), History of trigeminal neuralgia, Hyperlipidemia, Hypertension,  Hypothyroidism, Insomnia, Neuromuscular disorder (HCC), OA (osteoarthritis), Osteopenia, Osteoporosis, Pneumonia, PVC (premature ventricular contraction), PVC's (premature ventricular contractions), and Sleep apnea.  She does not have any pertinent problems on file.  She  has a past surgical history that includes Colonoscopy (N/A, 02/21/2012); Trigeminal nerve decompression (2014); Cholecystectomy (N/A, 11/20/2013); Kyphoplasty (N/A, 12/21/2012); Cardiac catheterization (03/2002); Colonoscopy (N/A, 4/24/2018); Exploratory Laparotomy (N/A, 12/5/2018); Total knee arthroplasty (Right, 11/13/2013); Colonoscopy w/ polypectomy (N/A, 05/06/2004); Total hip arthroplasty (Left, 03/16/2004); Bronchoscopy (N/A, 08/29/2001); Colonoscopy (N/A, 02/27/2015); Cardiac catheterization (Left, 11/28/2012); Breast biopsy (Left, 05/10/2018); Colostomy closure (N/A, 5/28/2019); Joint replacement; Colonoscopy (N/A, 7/9/2020); Brain surgery (08/2014); Colon surgery (12/5/2018, 6/1/2019); Eye surgery (5/2016); and Fracture surgery (2/14/2004).  Her family history includes Alcohol abuse in her son; Arthritis in her father and mother; Cancer in her daughter, maternal grandfather, and mother; Colon cancer in her mother; Glaucoma in her father; Heart disease in her father; Hyperlipidemia in her father; Hypertension in her father; Ovarian cancer in her daughter; Stomach cancer in her maternal grandfather and mother; Stroke in her father and paternal grandmother.  She  reports that she quit smoking about 33 years ago. Her smoking use included cigarettes. She started smoking about 65 years ago. She has a 15.00 pack-year smoking history. She has never used smokeless tobacco. She reports current alcohol use of about 4.0 standard drinks per week. She reports that she does not use drugs.  Current Outpatient Medications   Medication Sig Dispense Refill   • Calcium Carb-Cholecalciferol (CALCIUM + D3) 600-200 MG-UNIT tablet Take 1 tablet by mouth 2 (Two)  Times a Day.     • carvedilol (COREG) 12.5 MG tablet TAKE 1 TABLET BY MOUTH TWICE A DAY WITH MEALS 180 tablet 1   • cloNIDine (Catapres) 0.1 MG tablet Take 1 tablet by mouth 2 (Two) Times a Day As Needed for High Blood Pressure (systolic greater than 160 or diastolic greater than 100). 60 tablet 2   • Denosumab (PROLIA SC) Inject  under the skin into the appropriate area as directed Every 6 (Six) Months. Dose unknown, injection every 6 months; DEC 3rd last injection     • HYDROcodone-acetaminophen (NORCO) 5-325 MG per tablet Take 1 tablet by mouth Every 4 (Four) Hours As Needed for Severe Pain for up to 7 days. 42 tablet 0   • levothyroxine (SYNTHROID, LEVOTHROID) 25 MCG tablet TAKE 1 TABLET BY MOUTH EVERY DAY 90 tablet 1   • losartan (COZAAR) 50 MG tablet Take 1 tablet by mouth 2 (Two) Times a Day. 180 tablet 0   • Misc. Devices (Rollator Ultra-Light) misc 1 each As Needed (walking). 1 each 1   • Omega-3 Fatty Acids (FISH OIL) 1200 MG capsule capsule Take 1,200 mg by mouth 2 (Two) Times a Day With Meals.     • omeprazole (priLOSEC) 20 MG capsule TAKE 1 CAPSULE BY MOUTH EVERY DAY 90 capsule 3   • polyethylene glycol (MIRALAX) 17 GM/SCOOP powder Take 17 g by mouth Daily. 289 g 5   • rosuvastatin (CRESTOR) 5 MG tablet TAKE 1 TABLET BY MOUTH EVERY DAY 90 tablet 1   • triazolam (HALCION) 0.25 MG tablet TAKE 1 TABLET BY MOUTH 1 HOUR BEFORE DENTAL PROCEDURE     • tiZANidine (ZANAFLEX) 2 MG tablet Take 1 tablet by mouth Every 8 (Eight) Hours As Needed for Muscle Spasms. 30 tablet 1     No current facility-administered medications for this visit.     Current Outpatient Medications on File Prior to Visit   Medication Sig   • Calcium Carb-Cholecalciferol (CALCIUM + D3) 600-200 MG-UNIT tablet Take 1 tablet by mouth 2 (Two) Times a Day.   • carvedilol (COREG) 12.5 MG tablet TAKE 1 TABLET BY MOUTH TWICE A DAY WITH MEALS   • cloNIDine (Catapres) 0.1 MG tablet Take 1 tablet by mouth 2 (Two) Times a Day As Needed for High Blood  Pressure (systolic greater than 160 or diastolic greater than 100).   • Denosumab (PROLIA SC) Inject  under the skin into the appropriate area as directed Every 6 (Six) Months. Dose unknown, injection every 6 months; DEC 3rd last injection   • levothyroxine (SYNTHROID, LEVOTHROID) 25 MCG tablet TAKE 1 TABLET BY MOUTH EVERY DAY   • losartan (COZAAR) 50 MG tablet Take 1 tablet by mouth 2 (Two) Times a Day.   • Misc. Devices (Rollator Ultra-Light) misc 1 each As Needed (walking).   • Omega-3 Fatty Acids (FISH OIL) 1200 MG capsule capsule Take 1,200 mg by mouth 2 (Two) Times a Day With Meals.   • omeprazole (priLOSEC) 20 MG capsule TAKE 1 CAPSULE BY MOUTH EVERY DAY   • polyethylene glycol (MIRALAX) 17 GM/SCOOP powder Take 17 g by mouth Daily.   • rosuvastatin (CRESTOR) 5 MG tablet TAKE 1 TABLET BY MOUTH EVERY DAY   • triazolam (HALCION) 0.25 MG tablet TAKE 1 TABLET BY MOUTH 1 HOUR BEFORE DENTAL PROCEDURE   • [DISCONTINUED] HYDROcodone-acetaminophen (NORCO) 5-325 MG per tablet Take 1 tablet by mouth Every 6 (Six) Hours As Needed for Severe Pain for up to 5 days.   • tiZANidine (ZANAFLEX) 2 MG tablet Take 1 tablet by mouth Every 8 (Eight) Hours As Needed for Muscle Spasms.   • [DISCONTINUED] oxyCODONE-acetaminophen (PERCOCET) 7.5-325 MG per tablet Take 1 tablet by mouth Every 6 (Six) Hours As Needed for Severe Pain.     No current facility-administered medications on file prior to visit.     She is allergic to morphine and related..    Review of Systems   Constitutional: Negative for chills and fever.   Gastrointestinal: Negative for abdominal distention.        No b/b incontinence   Genitourinary: Negative for difficulty urinating and enuresis.   Musculoskeletal: Positive for back pain (radiates to L side and rib cage). Negative for gait problem.   Skin: Negative for wound.   Neurological: Negative for weakness and numbness.   Psychiatric/Behavioral: Positive for sleep disturbance (sometimes).       Objective    Vitals:  "   22 1424   BP: 140/82   Pulse: 64   Resp: 16   Temp: 97.7 °F (36.5 °C)     Body mass index is 25.5 kg/m².    Physical Exam  Vitals and nursing note reviewed.   Constitutional:       General: She is in acute distress.   HENT:      Mouth/Throat:      Mouth: Mucous membranes are moist.   Eyes:      Extraocular Movements: Extraocular movements intact.      Conjunctiva/sclera: Conjunctivae normal.      Pupils: Pupils are equal, round, and reactive to light.   Cardiovascular:      Rate and Rhythm: Normal rate.   Pulmonary:      Effort: Pulmonary effort is normal.   Musculoskeletal:      Cervical back: Normal range of motion.      Thoracic back: Tenderness and bony tenderness present.        Back:    Neurological:      Mental Status: She is alert.       Neurologic Exam     Cranial Nerves     CN III, IV, VI   Pupils are equal, round, and reactive to light.      Assessment & Plan   Independent Review of Radiographic Studies:    The kyphoplasty procedure at T8 dated 2022 was reviewed with the patient and shows good \"cement\" deposition with no significant extravasation.  Medical Decision Makin-year-old female with osteoporosis and a thoracic vertebral compression fracture at T8 now status post kyphoplasty on 2022.  Patient continues to need pain medication and has not had a favorable response to \"cement\" placement.  Patient may have a rib fracture given the posterior right-sided point tenderness.  A new vertebral compression fracture may also be present and repeat imaging is recommended.  Diagnoses and all orders for this visit:    1. Mid back pain on right side (Primary)  -     MRI Thoracic Spine Without Contrast; Future    2. Compression fracture of T8 vertebra status post kyphoplasty  -     HYDROcodone-acetaminophen (NORCO) 5-325 MG per tablet; Take 1 tablet by mouth Every 4 (Four) Hours As Needed for Severe Pain for up to 7 days.  Dispense: 42 tablet; Refill: 0    3. Osteoporosis age-related  -  "    HYDROcodone-acetaminophen (NORCO) 5-325 MG per tablet; Take 1 tablet by mouth Every 4 (Four) Hours As Needed for Severe Pain for up to 7 days.  Dispense: 42 tablet; Refill: 0  -     MRI Thoracic Spine Without Contrast; Future    4. Chronic bilateral low back pain, unspecified whether sciatica present  -     HYDROcodone-acetaminophen (NORCO) 5-325 MG per tablet; Take 1 tablet by mouth Every 4 (Four) Hours As Needed for Severe Pain for up to 7 days.  Dispense: 42 tablet; Refill: 0      Return in about 1 week (around 1/3/2023) for Recheck MRI of the thoracic spine.  Imaging of the ribs will be included.

## 2022-12-28 ENCOUNTER — TELEPHONE (OUTPATIENT)
Dept: NEUROSURGERY | Facility: CLINIC | Age: 82
End: 2022-12-28

## 2022-12-28 NOTE — TELEPHONE ENCOUNTER
Caller: Moriah Petty    Relationship to patient: Self    Best call back number: [phone]    Patient is needing: PATIENT CALLED, REQUESTED TO SPEAK WITH FERNIE OR NEDRA. PATIENT STATES MRI HAS BEEN SCHEDULED @[facility] FOR 12/30/22 @10AM.    PLEASE CALL THE PATIENT TO ADVISE.

## 2022-12-30 ENCOUNTER — PATIENT ROUNDING (BHMG ONLY) (OUTPATIENT)
Dept: NEUROSURGERY | Facility: CLINIC | Age: 82
End: 2022-12-30

## 2022-12-30 NOTE — PROGRESS NOTES
This was my second visit. Salabdor Holly, and Vesna were very nice, kind, and professional. I like Dr Doss very much.

## 2023-01-04 DIAGNOSIS — M80.00XA OSTEOPOROSIS WITH CURRENT PATHOLOGICAL FRACTURE, UNSPECIFIED OSTEOPOROSIS TYPE, INITIAL ENCOUNTER: ICD-10-CM

## 2023-01-04 DIAGNOSIS — M54.9 MID BACK PAIN ON RIGHT SIDE: ICD-10-CM

## 2023-01-04 NOTE — PROGRESS NOTES
Subjective   Patient ID: Moriah Petty is a 82 y.o. female is here today for follow-up of an MRI thoracic done on 12/30/22 at [email or web address].  Pt reports back pain that radiates to ribcage is still present.    History of Present Illness  82-year-old female with history of hypertension and hyperlipidemia who has underlying osteoporosis and awoke on the morning of November 14 with severe thoracic back pain.  It was so severe she was unable to raise her arms up to remove her pajama top to get dressed.  The pain is in the mid thoracic region and radiates around the left ribs more so than the right.  Deep inspiration, coughing, sneezing and position changes worsen her symptoms. The pain symptoms did not subside despite taking medications over-the-counter such as Tylenol.       She contacted her primary care office and saw Dr. Carrizales the next day.  Thoracic and lumbar X-rays were performed at [facility].  The x-rays revealed new compression deformities at T8 and T9.  The patient had had previous kyphoplasty 2012 at L2 by Dr. Cardoza.  She states that she has been on Prolia for osteoporosis however she was taken off the Prolia in order to have tooth extraction back in June.  She has not since resumed the Prolia.  She is a former smoker having quit 1989.  She subsequently underwent a successful T8 kyphoplasty on 12/19/2022 with a good radiographic result.  Patient's pain however has not improved with worsening right posterior lateral pain present.  She subsequently had repeat MRI imaging with a new thoracic spine scan obtained on 12/30/2022.  She presents today to discuss the findings from this exam.      The following portions of the patient's history were reviewed and updated as appropriate:   She  has a past medical history of Adenomatous polyp of colon (11/6/2017), Anemia, Bursitis of elbow, Carotid stenosis, Cataract (2016), Cholelithiasis, Colitis, Colostomy in place (HCC), Depression, Diverticulosis, GERD  (gastroesophageal reflux disease), History of blood transfusion (11/2013), History of trigeminal neuralgia, Hyperlipidemia, Hypertension, Hypothyroidism, Insomnia, Neuromuscular disorder (HCC), OA (osteoarthritis), Osteopenia, Osteoporosis, Pneumonia, PVC (premature ventricular contraction), PVC's (premature ventricular contractions), and Sleep apnea.  She does not have any pertinent problems on file.  She  has a past surgical history that includes Colonoscopy (N/A, 02/21/2012); Trigeminal nerve decompression (2014); Cholecystectomy (N/A, 11/20/2013); Kyphoplasty (N/A, 12/21/2012); Cardiac catheterization (03/2002); Colonoscopy (N/A, 4/24/2018); Exploratory Laparotomy (N/A, 12/5/2018); Total knee arthroplasty (Right, 11/13/2013); Colonoscopy w/ polypectomy (N/A, 05/06/2004); Total hip arthroplasty (Left, 03/16/2004); Bronchoscopy (N/A, 08/29/2001); Colonoscopy (N/A, 02/27/2015); Cardiac catheterization (Left, 11/28/2012); Breast biopsy (Left, 05/10/2018); Colostomy closure (N/A, 5/28/2019); Joint replacement; Colonoscopy (N/A, 7/9/2020); Brain surgery (08/2014); Colon surgery (12/5/2018, 6/1/2019); Eye surgery (5/2016); and Fracture surgery (2/14/2004).  Her family history includes Alcohol abuse in her son; Arthritis in her father and mother; Cancer in her daughter, maternal grandfather, and mother; Colon cancer in her mother; Glaucoma in her father; Heart disease in her father; Hyperlipidemia in her father; Hypertension in her father; Ovarian cancer in her daughter; Stomach cancer in her maternal grandfather and mother; Stroke in her father and paternal grandmother.  She  reports that she quit smoking about 33 years ago. Her smoking use included cigarettes. She started smoking about 65 years ago. She has a 15.00 pack-year smoking history. She has never used smokeless tobacco. She reports current alcohol use of about 4.0 standard drinks per week. She reports that she does not use drugs.  Current Outpatient  Medications   Medication Sig Dispense Refill   • Calcium Carb-Cholecalciferol (CALCIUM + D3) 600-200 MG-UNIT tablet Take 1 tablet by mouth 2 (Two) Times a Day.     • carvedilol (COREG) 12.5 MG tablet TAKE 1 TABLET BY MOUTH TWICE A DAY WITH MEALS 180 tablet 1   • cloNIDine (Catapres) 0.1 MG tablet Take 1 tablet by mouth 2 (Two) Times a Day As Needed for High Blood Pressure (systolic greater than 160 or diastolic greater than 100). 60 tablet 2   • Denosumab (PROLIA SC) Inject  under the skin into the appropriate area as directed Every 6 (Six) Months. Dose unknown, injection every 6 months; DEC 3rd last injection     • levothyroxine (SYNTHROID, LEVOTHROID) 25 MCG tablet TAKE 1 TABLET BY MOUTH EVERY DAY 90 tablet 1   • losartan (COZAAR) 50 MG tablet Take 1 tablet by mouth 2 (Two) Times a Day. 180 tablet 0   • Misc. Devices (Rollator Ultra-Light) misc 1 each As Needed (walking). 1 each 1   • Omega-3 Fatty Acids (FISH OIL) 1200 MG capsule capsule Take 1,200 mg by mouth 2 (Two) Times a Day With Meals.     • omeprazole (priLOSEC) 20 MG capsule TAKE 1 CAPSULE BY MOUTH EVERY DAY 90 capsule 3   • polyethylene glycol (MIRALAX) 17 GM/SCOOP powder Take 17 g by mouth Daily. 289 g 5   • rosuvastatin (CRESTOR) 5 MG tablet TAKE 1 TABLET BY MOUTH EVERY DAY 90 tablet 1   • tiZANidine (ZANAFLEX) 2 MG tablet Take 1 tablet by mouth Every 8 (Eight) Hours As Needed for Muscle Spasms. 30 tablet 1   • triazolam (HALCION) 0.25 MG tablet TAKE 1 TABLET BY MOUTH 1 HOUR BEFORE DENTAL PROCEDURE       No current facility-administered medications for this visit.     Current Outpatient Medications on File Prior to Visit   Medication Sig   • Calcium Carb-Cholecalciferol (CALCIUM + D3) 600-200 MG-UNIT tablet Take 1 tablet by mouth 2 (Two) Times a Day.   • carvedilol (COREG) 12.5 MG tablet TAKE 1 TABLET BY MOUTH TWICE A DAY WITH MEALS   • cloNIDine (Catapres) 0.1 MG tablet Take 1 tablet by mouth 2 (Two) Times a Day As Needed for High Blood Pressure  (systolic greater than 160 or diastolic greater than 100).   • Denosumab (PROLIA SC) Inject  under the skin into the appropriate area as directed Every 6 (Six) Months. Dose unknown, injection every 6 months; DEC 3rd last injection   • levothyroxine (SYNTHROID, LEVOTHROID) 25 MCG tablet TAKE 1 TABLET BY MOUTH EVERY DAY   • losartan (COZAAR) 50 MG tablet Take 1 tablet by mouth 2 (Two) Times a Day.   • Misc. Devices (Rollator Ultra-Light) misc 1 each As Needed (walking).   • Omega-3 Fatty Acids (FISH OIL) 1200 MG capsule capsule Take 1,200 mg by mouth 2 (Two) Times a Day With Meals.   • omeprazole (priLOSEC) 20 MG capsule TAKE 1 CAPSULE BY MOUTH EVERY DAY   • polyethylene glycol (MIRALAX) 17 GM/SCOOP powder Take 17 g by mouth Daily.   • rosuvastatin (CRESTOR) 5 MG tablet TAKE 1 TABLET BY MOUTH EVERY DAY   • tiZANidine (ZANAFLEX) 2 MG tablet Take 1 tablet by mouth Every 8 (Eight) Hours As Needed for Muscle Spasms.   • triazolam (HALCION) 0.25 MG tablet TAKE 1 TABLET BY MOUTH 1 HOUR BEFORE DENTAL PROCEDURE     No current facility-administered medications on file prior to visit.     She is allergic to morphine and related..    Review of Systems   Musculoskeletal: Positive for back pain (mid back pain to the R side to the ribcage).   Psychiatric/Behavioral: Positive for sleep disturbance.       Objective    Vitals:    01/05/23 0954   BP: 150/92   Pulse: 64   Resp: 16   Temp: 97.3 °F (36.3 °C)     Body mass index is 25.5 kg/m².    Physical Exam  Vitals and nursing note reviewed.   Constitutional:       General: She is not in acute distress.     Appearance: Normal appearance.   Cardiovascular:      Rate and Rhythm: Normal rate.   Pulmonary:      Effort: Pulmonary effort is normal.   Musculoskeletal:      Cervical back: Normal range of motion.      Thoracic back: Spasms, tenderness and bony tenderness present. Scoliosis present.        Back:    Neurological:      Mental Status: She is alert.       Neurologic  Exam    Assessment & Plan   Independent Review of Radiographic Studies:    The MRI of the thoracic spine dated 2022 from [facility] was reviewed and compared to the prior study of 12/3/2022 and also correlated with the kyphoplasty procedure dated 2022.  No new vertebral compression fracture is seen.  The T8 vertebral body kyphoplasty changes are now evident on the new MRI.  Edema does persist within the vertebral body, but no further height loss or new fracture is seen.  Medical Decision Makin-year-old female with osteoporosis and multiple old vertebral compression fractures as well as 2 prior kyphoplasty's who still has mid thoracic back pain that is nonradiating.  She underwent a repeat MRI which shows no new vertebral compression fractures and post kyphoplasty changes at T8 and L2.  A scoliosis is present which may be a source of back pain itself.  Diagnoses and all orders for this visit:    1. Bilateral persistent back pain without sciatica (Primary)  -     Cancel: XR Ribs Bilateral 3 View; Future    2. Compression fracture of T8 and L3 s/p Kphoplasty  -     Cancel: XR Ribs Bilateral 3 View; Future    3. Osteoporosis  -     Cancel: XR Ribs Bilateral 3 View; Future      Return if symptoms worsen or fail to improve.  Bilateral rib x-ray series.  Back brace and pain management referral.

## 2023-01-05 ENCOUNTER — HOSPITAL ENCOUNTER (OUTPATIENT)
Dept: GENERAL RADIOLOGY | Facility: HOSPITAL | Age: 83
Discharge: HOME | End: 2023-01-05
Admitting: RADIOLOGY
Payer: MEDICARE

## 2023-01-05 ENCOUNTER — OFFICE VISIT (OUTPATIENT)
Dept: NEUROSURGERY | Facility: CLINIC | Age: 83
End: 2023-01-05
Payer: MEDICARE

## 2023-01-05 VITALS
HEIGHT: 66 IN | DIASTOLIC BLOOD PRESSURE: 92 MMHG | BODY MASS INDEX: 25.39 KG/M2 | TEMPERATURE: 97.3 F | SYSTOLIC BLOOD PRESSURE: 150 MMHG | RESPIRATION RATE: 16 BRPM | WEIGHT: 158 LBS | HEART RATE: 64 BPM

## 2023-01-05 DIAGNOSIS — M80.00XA OSTEOPOROSIS WITH CURRENT PATHOLOGICAL FRACTURE, UNSPECIFIED OSTEOPOROSIS TYPE, INITIAL ENCOUNTER: ICD-10-CM

## 2023-01-05 DIAGNOSIS — S22.060G COMPRESSION FRACTURE OF T8 VERTEBRA WITH DELAYED HEALING: ICD-10-CM

## 2023-01-05 DIAGNOSIS — M54.50 BILATERAL LOW BACK PAIN WITHOUT SCIATICA, UNSPECIFIED CHRONICITY: Primary | ICD-10-CM

## 2023-01-05 DIAGNOSIS — M54.50 BILATERAL LOW BACK PAIN WITHOUT SCIATICA, UNSPECIFIED CHRONICITY: ICD-10-CM

## 2023-01-05 PROCEDURE — 71111 X-RAY EXAM RIBS/CHEST4/> VWS: CPT

## 2023-01-05 PROCEDURE — 99214 OFFICE O/P EST MOD 30 MIN: CPT | Performed by: RADIOLOGY

## 2023-01-06 ENCOUNTER — TELEPHONE (OUTPATIENT)
Dept: NEUROSURGERY | Facility: CLINIC | Age: 83
End: 2023-01-06
Payer: MEDICARE

## 2023-01-06 NOTE — TELEPHONE ENCOUNTER
She is already on Zanaflex which is a muscle relaxer so no Flexeril. I will renew her Hydrocodone when she has an appt with pain management made and it will be the last time.

## 2023-01-06 NOTE — TELEPHONE ENCOUNTER
DELETE AFTER REVIEWING: Send the encounter HIGH priority, If patient has less than a 3 day supply. If the patient will run out of medication over the weekend add that information to the additional details line. Send this encounter to the clinical pool.    Caller: Moriah Petty    Relationship: Self    Best call back number: [phone]    Requested Prescriptions:   Requested Prescriptions      No prescriptions requested or ordered in this encounter    HYDROCODONE AND FLEXERIL    Pharmacy where request should be sent:  [facility]    Additional details provided by patient: PATIENT STATES DR. OAKLEY WAS GOING TO EXTEND HYDROCODONE AND ORDER FLEXERIL FOR THE FIRST TIME     Does the patient have less than a 3 day supply:  [] Yes  [x] No    Would you like a call back once the refill request has been completed: [x] Yes [] No    If the office needs to give you a call back, can they leave a voicemail: [x] Yes [] No    Tyrel Dejesus Rep   01/06/23 10:38 EST       PATIENT STATES DR. OAKLEY WAS GOING TO EXTEND HYDROCODONE AND PROVIDE HER WITH FLEXERIL FOR THE FIRST TIME.  PLEASE CALL PATIENT WHEN ORDERS HAVE BEEN SENT.  THANK YOU!

## 2023-01-06 NOTE — TELEPHONE ENCOUNTER
Pt called and I gave her the msg from Dr Doss.  She will call pain mgt and get on their schedule, then call back so she can get the Hydrocodone.

## 2023-01-11 ENCOUNTER — APPOINTMENT (OUTPATIENT)
Dept: ONCOLOGY | Facility: HOSPITAL | Age: 83
End: 2023-01-11
Payer: MEDICARE

## 2023-01-17 RX ORDER — LOSARTAN POTASSIUM 50 MG/1
TABLET ORAL
Qty: 180 TABLET | Refills: 3 | Status: SHIPPED | OUTPATIENT
Start: 2023-01-17

## 2023-01-17 NOTE — PROGRESS NOTES
Phone call.  The patient's complaints of metabolic panel had a critical potassium value of 6.1.  I discussed this with the patient and counseled her regarding the significance of this finding.  She sees Dr. Carrizales for internal medicine.  He will be able to review this result in Epic.  The patient will contact Dr. Carrizales this morning for further instructions.  If she cannot reach him, she will go to the emergency room.  We will delay Prolia for now.   Statement Selected

## 2023-01-19 ENCOUNTER — OFFICE VISIT (OUTPATIENT)
Dept: INTERNAL MEDICINE | Facility: CLINIC | Age: 83
End: 2023-01-19
Payer: MEDICARE

## 2023-01-19 VITALS
DIASTOLIC BLOOD PRESSURE: 80 MMHG | OXYGEN SATURATION: 99 % | BODY MASS INDEX: 23.91 KG/M2 | TEMPERATURE: 98 F | SYSTOLIC BLOOD PRESSURE: 180 MMHG | HEART RATE: 64 BPM | WEIGHT: 148.8 LBS | HEIGHT: 66 IN

## 2023-01-19 DIAGNOSIS — M54.6 ACUTE THORACIC BACK PAIN, UNSPECIFIED BACK PAIN LATERALITY: Primary | ICD-10-CM

## 2023-01-19 DIAGNOSIS — M62.838 MUSCLE SPASM: ICD-10-CM

## 2023-01-19 PROCEDURE — 99214 OFFICE O/P EST MOD 30 MIN: CPT

## 2023-01-19 NOTE — PATIENT INSTRUCTIONS
Take tizanidine as prescribed for muscle spasm. Take extra strength Tylenol for pain. Apply heat as needed. Stay hydrated with water. Labs today. Follow-up with Dr. Carrizales in February.

## 2023-01-19 NOTE — PROGRESS NOTES
"Chief Complaint  Back Pain, Abdominal Pain, and Chest Pain    Subjective        Moriah Petty presents to [facility]  History of Present Illness  82-year-old female presenting with back pain and stomach pain.  Patient Dr. Carrizales.  Right upper quadrant pain stated to be aching, not sharp.  Sometimes causes her to double over.  Repositioning tends to help.  May be referring pain from the back.  Treating with extra Tylenol.  Denies blood in her stool, nausea, dysuria, chest pain, shortness of breath, fever.    Back Pain  The current episode started 1 to 4 weeks ago. The problem occurs constantly. The pain is present in the lumbar spine. The quality of the pain is described as aching. The pain does not radiate. The pain is at a severity of 8/10. The pain is the same all the time. The symptoms are aggravated by sitting and standing. Stiffness is present all day. Pertinent negatives include no abdominal pain, bladder incontinence, bowel incontinence, chest pain, dysuria, fever, headaches, leg pain, numbness, paresis, paresthesias, pelvic pain, perianal numbness, tingling, weakness or weight loss. Risk factors include history of osteoporosis and sedentary lifestyle.       Objective   Vital Signs:  /80 (BP Location: Left arm, Patient Position: Sitting, Cuff Size: Adult)   Pulse 64   Temp 98 °F (36.7 °C) (Infrared)   Ht 167.6 cm (66\")   Wt 67.5 kg (148 lb 12.8 oz)   SpO2 99%   BMI 24.02 kg/m²   Estimated body mass index is 24.02 kg/m² as calculated from the following:    Height as of this encounter: 167.6 cm (66\").    Weight as of this encounter: 67.5 kg (148 lb 12.8 oz).       BMI is within normal parameters. No other follow-up for BMI required.      Physical Exam  Vitals reviewed.   Constitutional:       Appearance: Normal appearance.   HENT:      Head: Normocephalic.   Musculoskeletal:         General: Normal range of motion.      Cervical back: Normal range of motion.   Skin:     " General: Skin is warm and dry.      Capillary Refill: Capillary refill takes less than 2 seconds.   Neurological:      General: No focal deficit present.      Mental Status: She is alert and oriented to person, place, and time.   Psychiatric:         Mood and Affect: Mood normal.         Behavior: Behavior normal.         Thought Content: Thought content normal.         Judgment: Judgment normal.        Result Review :    Common labs    Common Labs 5/23/22 5/23/22 5/23/22 5/23/22 12/12/22 12/12/22 1/19/23 1/19/23    1323 1323 1323 1323 1451 1451 1511 1511   Glucose   97   109 (A) 120 (A)    BUN   16   13 14    Creatinine   0.72   0.91 0.72    Sodium   136   136 131 (A)    Potassium   5.1   4.9 4.9    Chloride   99   98 96 (A)    Calcium   10.2   10.9 (A) 10.1    Total Protein   7.2    6.6    Albumin   4.8 (A)    4.4    Total Bilirubin   0.6    0.5    Alkaline Phosphatase   53    71    AST (SGOT)   15    15    ALT (SGPT)   13    10    WBC  5.4   4.83   5.75   Hemoglobin  13.8   12.8   11.7 (A)   Hematocrit  42.1   38.8   35.4   Platelets  246   269   242   Total Cholesterol 202 (A)          Triglycerides 66          HDL Cholesterol 98          LDL Cholesterol  92          Hemoglobin A1C    5.8 (A)       (A) Abnormal value       Comments are available for some flowsheets but are not being displayed.           Current Outpatient Medications on File Prior to Visit   Medication Sig Dispense Refill   • Calcium Carb-Cholecalciferol (CALCIUM + D3) 600-200 MG-UNIT tablet Take 1 tablet by mouth 2 (Two) Times a Day.     • carvedilol (COREG) 12.5 MG tablet TAKE 1 TABLET BY MOUTH TWICE A DAY WITH MEALS 180 tablet 1   • cloNIDine (Catapres) 0.1 MG tablet Take 1 tablet by mouth 2 (Two) Times a Day As Needed for High Blood Pressure (systolic greater than 160 or diastolic greater than 100). 60 tablet 2   • Denosumab (PROLIA SC) Inject  under the skin into the appropriate area as directed Every 6 (Six) Months. Dose unknown, injection  every 6 months; DEC 3rd last injection     • levothyroxine (SYNTHROID, LEVOTHROID) 25 MCG tablet TAKE 1 TABLET BY MOUTH EVERY DAY 90 tablet 1   • losartan (COZAAR) 50 MG tablet TAKE 1 TABLET BY MOUTH TWICE A  tablet 3   • Misc. Devices (Rollator Ultra-Light) misc 1 each As Needed (walking). 1 each 1   • Omega-3 Fatty Acids (FISH OIL) 1200 MG capsule capsule Take 1,200 mg by mouth 2 (Two) Times a Day With Meals.     • omeprazole (priLOSEC) 20 MG capsule TAKE 1 CAPSULE BY MOUTH EVERY DAY 90 capsule 3   • polyethylene glycol (MIRALAX) 17 GM/SCOOP powder Take 17 g by mouth Daily. 289 g 5   • rosuvastatin (CRESTOR) 5 MG tablet TAKE 1 TABLET BY MOUTH EVERY DAY 90 tablet 1   • tiZANidine (ZANAFLEX) 2 MG tablet Take 1 tablet by mouth Every 8 (Eight) Hours As Needed for Muscle Spasms. 30 tablet 1   • triazolam (HALCION) 0.25 MG tablet TAKE 1 TABLET BY MOUTH 1 HOUR BEFORE DENTAL PROCEDURE       No current facility-administered medications on file prior to visit.                    Assessment and Plan   Diagnoses and all orders for this visit:    1. Acute thoracic back pain, unspecified back pain laterality (Primary)  -     Comprehensive Metabolic Panel  -     CBC & Differential  -     Urinalysis With Microscopic If Indicated (No Culture) - Urine, Clean Catch    Other orders  -     Microscopic Examination -      Patient Instructions   Take tizanidine as prescribed for muscle spasm. Take extra strength Tylenol for pain. Apply heat as needed. Stay hydrated with water. Labs today. Follow-up with Dr. Carrizales in February.           Follow Up   Return for Next scheduled follow up.  Patient was given instructions and counseling regarding her condition or for health maintenance advice. Please see specific information pulled into the AVS if appropriate.

## 2023-01-20 LAB
ALBUMIN SERPL-MCNC: 4.4 G/DL (ref 3.5–5.2)
ALBUMIN/GLOB SERPL: 2 G/DL
ALP SERPL-CCNC: 71 U/L (ref 39–117)
ALT SERPL-CCNC: 10 U/L (ref 1–33)
APPEARANCE UR: CLEAR
AST SERPL-CCNC: 15 U/L (ref 1–32)
BACTERIA #/AREA URNS HPF: NORMAL /HPF
BASOPHILS # BLD AUTO: 0.06 10*3/MM3 (ref 0–0.2)
BASOPHILS NFR BLD AUTO: 1 % (ref 0–1.5)
BILIRUB SERPL-MCNC: 0.5 MG/DL (ref 0–1.2)
BILIRUB UR QL STRIP: NEGATIVE
BUN SERPL-MCNC: 14 MG/DL (ref 8–23)
BUN/CREAT SERPL: 19.4 (ref 7–25)
CALCIUM SERPL-MCNC: 10.1 MG/DL (ref 8.6–10.5)
CASTS URNS MICRO: NORMAL
CHLORIDE SERPL-SCNC: 96 MMOL/L (ref 98–107)
CO2 SERPL-SCNC: 25.6 MMOL/L (ref 22–29)
COLOR UR: YELLOW
CREAT SERPL-MCNC: 0.72 MG/DL (ref 0.57–1)
EGFRCR SERPLBLD CKD-EPI 2021: 83.1 ML/MIN/1.73
EOSINOPHIL # BLD AUTO: 0.11 10*3/MM3 (ref 0–0.4)
EOSINOPHIL NFR BLD AUTO: 1.9 % (ref 0.3–6.2)
EPI CELLS #/AREA URNS HPF: NORMAL /HPF
ERYTHROCYTE [DISTWIDTH] IN BLOOD BY AUTOMATED COUNT: 12.7 % (ref 12.3–15.4)
GLOBULIN SER CALC-MCNC: 2.2 GM/DL
GLUCOSE SERPL-MCNC: 120 MG/DL (ref 65–99)
GLUCOSE UR QL STRIP: NEGATIVE
HCT VFR BLD AUTO: 35.4 % (ref 34–46.6)
HGB BLD-MCNC: 11.7 G/DL (ref 12–15.9)
HGB UR QL STRIP: NEGATIVE
IMM GRANULOCYTES # BLD AUTO: 0.01 10*3/MM3 (ref 0–0.05)
IMM GRANULOCYTES NFR BLD AUTO: 0.2 % (ref 0–0.5)
KETONES UR QL STRIP: NEGATIVE
LEUKOCYTE ESTERASE UR QL STRIP: ABNORMAL
LYMPHOCYTES # BLD AUTO: 1.63 10*3/MM3 (ref 0.7–3.1)
LYMPHOCYTES NFR BLD AUTO: 28.3 % (ref 19.6–45.3)
MCH RBC QN AUTO: 30.1 PG (ref 26.6–33)
MCHC RBC AUTO-ENTMCNC: 33.1 G/DL (ref 31.5–35.7)
MCV RBC AUTO: 91 FL (ref 79–97)
MONOCYTES # BLD AUTO: 0.54 10*3/MM3 (ref 0.1–0.9)
MONOCYTES NFR BLD AUTO: 9.4 % (ref 5–12)
NEUTROPHILS # BLD AUTO: 3.4 10*3/MM3 (ref 1.7–7)
NEUTROPHILS NFR BLD AUTO: 59.2 % (ref 42.7–76)
NITRITE UR QL STRIP: NEGATIVE
NRBC BLD AUTO-RTO: 0 /100 WBC (ref 0–0.2)
PH UR STRIP: 7 [PH] (ref 5–8)
PLATELET # BLD AUTO: 242 10*3/MM3 (ref 140–450)
POTASSIUM SERPL-SCNC: 4.9 MMOL/L (ref 3.5–5.2)
PROT SERPL-MCNC: 6.6 G/DL (ref 6–8.5)
PROT UR QL STRIP: NEGATIVE
RBC # BLD AUTO: 3.89 10*6/MM3 (ref 3.77–5.28)
RBC #/AREA URNS HPF: NORMAL /HPF
SODIUM SERPL-SCNC: 131 MMOL/L (ref 136–145)
SP GR UR STRIP: 1.01 (ref 1–1.03)
UROBILINOGEN UR STRIP-MCNC: ABNORMAL MG/DL
WBC # BLD AUTO: 5.75 10*3/MM3 (ref 3.4–10.8)
WBC #/AREA URNS HPF: NORMAL /HPF

## 2023-01-23 ENCOUNTER — PATIENT MESSAGE (OUTPATIENT)
Dept: INTERNAL MEDICINE | Facility: CLINIC | Age: 83
End: 2023-01-23
Payer: MEDICARE

## 2023-01-25 ENCOUNTER — TELEPHONE (OUTPATIENT)
Dept: INTERNAL MEDICINE | Facility: CLINIC | Age: 83
End: 2023-01-25
Payer: MEDICARE

## 2023-01-25 DIAGNOSIS — M54.6 ACUTE THORACIC BACK PAIN, UNSPECIFIED BACK PAIN LATERALITY: Primary | ICD-10-CM

## 2023-01-30 ENCOUNTER — HOSPITAL ENCOUNTER (OUTPATIENT)
Dept: GENERAL RADIOLOGY | Facility: HOSPITAL | Age: 83
Discharge: HOME | End: 2023-01-30
Admitting: FAMILY MEDICINE
Payer: MEDICARE

## 2023-01-30 DIAGNOSIS — M62.838 MUSCLE SPASM: Primary | ICD-10-CM

## 2023-01-30 DIAGNOSIS — S22.060D COMPRESSION FRACTURE OF T8 VERTEBRA WITH ROUTINE HEALING, SUBSEQUENT ENCOUNTER: ICD-10-CM

## 2023-01-30 DIAGNOSIS — M62.838 MUSCLE SPASM: ICD-10-CM

## 2023-01-30 PROCEDURE — 72072 X-RAY EXAM THORAC SPINE 3VWS: CPT

## 2023-01-31 NOTE — PROGRESS NOTES
Metabolic panel shows elevated blood sugar and decreased sodium.     CBC shows stable hemoglobin.  No signs of infection or anemia.    No bacteria seen in urine.    Continue making healthy diet choices and staying active as tolerated.  Stay hydrated with water.  We will recheck labs as needed in the future.

## 2023-01-31 NOTE — TELEPHONE ENCOUNTER
From: Moriah Petty  To: Reza Dennis  Sent: 1/23/2023 10:45 AM EST  Subject: Test Results    Received copies of blood/urine tests. What do the results mean? Thank you.

## 2023-02-06 NOTE — TELEPHONE ENCOUNTER
Let her know that Dr. Doss is okay with physical therapy for her back and core strength. Please ask her where she wants to do it and order. Thanks!    I spoke with the pt and she would like to go to [facility] on [address]     Referral placed

## 2023-02-09 DIAGNOSIS — I10 ESSENTIAL HYPERTENSION: ICD-10-CM

## 2023-02-09 RX ORDER — CARVEDILOL 12.5 MG/1
TABLET ORAL
Qty: 180 TABLET | Refills: 1 | Status: SHIPPED | OUTPATIENT
Start: 2023-02-09 | End: 2023-02-23

## 2023-02-10 RX ORDER — CLONIDINE HYDROCHLORIDE 0.1 MG/1
TABLET ORAL
Qty: 180 TABLET | Refills: 0 | Status: SHIPPED | OUTPATIENT
Start: 2023-02-10 | End: 2023-02-23

## 2023-02-23 ENCOUNTER — OFFICE VISIT (OUTPATIENT)
Dept: INTERNAL MEDICINE | Facility: CLINIC | Age: 83
End: 2023-02-23
Payer: MEDICARE

## 2023-02-23 VITALS
OXYGEN SATURATION: 98 % | SYSTOLIC BLOOD PRESSURE: 182 MMHG | WEIGHT: 149 LBS | DIASTOLIC BLOOD PRESSURE: 90 MMHG | TEMPERATURE: 97.8 F | BODY MASS INDEX: 24.05 KG/M2 | HEART RATE: 65 BPM

## 2023-02-23 DIAGNOSIS — I10 ESSENTIAL HYPERTENSION: ICD-10-CM

## 2023-02-23 DIAGNOSIS — S22.060G COMPRESSION FRACTURE OF T8 VERTEBRA WITH DELAYED HEALING: Primary | ICD-10-CM

## 2023-02-23 PROCEDURE — 99214 OFFICE O/P EST MOD 30 MIN: CPT | Performed by: FAMILY MEDICINE

## 2023-02-23 RX ORDER — AMLODIPINE BESYLATE 2.5 MG/1
2.5 TABLET ORAL DAILY
Qty: 90 TABLET | Refills: 1 | Status: SHIPPED | OUTPATIENT
Start: 2023-02-23

## 2023-02-23 RX ORDER — CARVEDILOL 12.5 MG/1
12.5 TABLET ORAL 2 TIMES DAILY WITH MEALS
Qty: 180 TABLET | Refills: 1 | Status: SHIPPED | OUTPATIENT
Start: 2023-02-23

## 2023-02-23 NOTE — PROGRESS NOTES
"Chief Complaint  Hyperlipidemia, Hypertension, Hypothyroidism, and Vitamin D Deficiency    Subjective        Moriah Petty presents to [facility]  History of Present Illness  Moriah is a delightful lady unfortunately had a thoracic spine fracture which is very painful for quite a while.  Subsequently she underwent kyphoplasty which is helped.  She has pain upon prolonged standing which appears to be a core muscle problem and also the healing of the previous fracture.  An x-ray done in January did not reveal any new problems or fractures.  She is going through physical therapy at this time.    Blood pressure is not adequately controlled on carvedilol 12.5 twice daily plus losartan 50 twice daily.  Options are discussed and will get rid of clonidine and start 1 time daily amlodipine 2.5 mg daily.  Hyperlipidemia  Exacerbating diseases include hypothyroidism.   Hypertension    Hypothyroidism        Objective   Vital Signs:  BP (!) 182/90 (BP Location: Left arm, Patient Position: Sitting, Cuff Size: Adult)   Pulse 65   Temp 97.8 °F (36.6 °C) (Tympanic)   Wt 67.6 kg (149 lb)   SpO2 98%   BMI 24.05 kg/m²   Estimated body mass index is 24.05 kg/m² as calculated from the following:    Height as of 1/19/23: 167.6 cm (66\").    Weight as of this encounter: 67.6 kg (149 lb).       BMI is within normal parameters. No other follow-up for BMI required.      Physical Exam  Vitals reviewed.   Constitutional:       Appearance: She is well-developed.   HENT:      Head: Normocephalic and atraumatic.      Right Ear: Tympanic membrane and external ear normal.      Left Ear: Tympanic membrane and external ear normal.      Nose: Nose normal.   Eyes:      Conjunctiva/sclera: Conjunctivae normal.      Pupils: Pupils are equal, round, and reactive to light.   Neck:      Thyroid: No thyromegaly.      Vascular: No JVD.   Cardiovascular:      Rate and Rhythm: Normal rate and regular rhythm.      Heart sounds: " Normal heart sounds.   Pulmonary:      Effort: Pulmonary effort is normal.      Breath sounds: Normal breath sounds.   Abdominal:      General: Bowel sounds are normal.      Palpations: Abdomen is soft.   Musculoskeletal:      Cervical back: Normal range of motion and neck supple.      Thoracic back: No tenderness. Decreased range of motion.   Lymphadenopathy:      Cervical: No cervical adenopathy.   Skin:     General: Skin is warm and dry.      Findings: No rash.   Neurological:      Mental Status: She is alert and oriented to person, place, and time.      Cranial Nerves: No cranial nerve deficit.      Coordination: Coordination normal.   Psychiatric:         Behavior: Behavior normal.         Thought Content: Thought content normal.         Judgment: Judgment normal.        Result Review :  The following data was reviewed by: Jarred Carrizales MD on 02/23/2023:  Common labs    Common Labs 5/23/22 5/23/22 5/23/22 5/23/22 12/12/22 12/12/22 1/19/23 1/19/23    1323 1323 1323 1323 1451 1451 1511 1511   Glucose   97   109 (A) 120 (A)    BUN   16   13 14    Creatinine   0.72   0.91 0.72    Sodium   136   136 131 (A)    Potassium   5.1   4.9 4.9    Chloride   99   98 96 (A)    Calcium   10.2   10.9 (A) 10.1    Total Protein   7.2    6.6    Albumin   4.8 (A)    4.4    Total Bilirubin   0.6    0.5    Alkaline Phosphatase   53    71    AST (SGOT)   15    15    ALT (SGPT)   13    10    WBC  5.4   4.83   5.75   Hemoglobin  13.8   12.8   11.7 (A)   Hematocrit  42.1   38.8   35.4   Platelets  246   269   242   Total Cholesterol 202 (A)          Triglycerides 66          HDL Cholesterol 98          LDL Cholesterol  92          Hemoglobin A1C    5.8 (A)       (A) Abnormal value       Comments are available for some flowsheets but are not being displayed.                        Assessment and Plan   Diagnoses and all orders for this visit:    1. Compression fracture of T8 vertebra with delayed healing (Primary)  Comments:  Physical  therapy started for post kyphoplasty for compression fracture of thoracic vertebrae with progressive healing it appears.    2. Essential hypertension  Comments:  Blood pressure not adequately controlled continue carvedilol 12.5 twice daily continue losartan 50 twice daily add amlodipine 2.5 mg daily.  Stop clonidine  Orders:  -     carvedilol (COREG) 12.5 MG tablet; Take 1 tablet by mouth 2 (Two) Times a Day With Meals.  Dispense: 180 tablet; Refill: 1    Other orders  -     amLODIPine (NORVASC) 2.5 MG tablet; Take 1 tablet by mouth Daily.  Dispense: 90 tablet; Refill: 1             Follow Up   No follow-ups on file.  Patient was given instructions and counseling regarding her condition or for health maintenance advice. Please see specific information pulled into the AVS if appropriate.       Answers for HPI/ROS submitted by the patient on 2/21/2023  Please describe your symptoms.: Wellness visit, back issues  Have you had these symptoms before?: Yes  How long have you been having these symptoms?: Greater than 2 weeks  Please list any medications you are currently taking for this condition.: Tylenol  Please describe any probable cause for these symptoms. : Kyphoplasty  What is the primary reason for your visit?: Other

## 2023-03-20 RX ORDER — LEVOTHYROXINE SODIUM 0.03 MG/1
TABLET ORAL
Qty: 90 TABLET | Refills: 1 | Status: SHIPPED | OUTPATIENT
Start: 2023-03-20

## 2023-03-29 ENCOUNTER — TELEPHONE (OUTPATIENT)
Dept: INTERNAL MEDICINE | Facility: CLINIC | Age: 83
End: 2023-03-29

## 2023-03-29 NOTE — TELEPHONE ENCOUNTER
Caller: Moriah Petty    Relationship to patient: Self    Best call back number: [phone] (Mobile)    Date of exposure:   HAD IT PREVIOUSLY       Date of positive COVID19 test: 3-29-23    Date if possible COVID19 exposure:     COVID19 symptoms: SORE THROAT, SLIGHT COUGH, EYES FULL,     Date of initial quarantine:     Additional information or concerns:     What is the patients preferred pharmacy:    [facility]/pharmacy #[id number] - [place], [place] - [address] AT IN [facility] [phone] [facility] [phone]   [phone]    THEY WILL SEND A COPY OF THEIR LAB TEST LATER TODAY

## 2023-03-30 ENCOUNTER — TELEMEDICINE (OUTPATIENT)
Dept: INTERNAL MEDICINE | Facility: CLINIC | Age: 83
End: 2023-03-30
Payer: MEDICARE

## 2023-03-30 DIAGNOSIS — U07.1 COVID-19 VIRUS INFECTION: Primary | ICD-10-CM

## 2023-03-30 NOTE — PROGRESS NOTES
"Chief Complaint  Other and URI (COVID)    Subjective        Moriah Petty presents to [facility]  History of Present Illness  You have chosen to receive care through a telehealth visit.  Do you consent to use a video/audio connection for your medical care today? Yes  This was an audio and video enabled telemedicine encounter.    Moriah is a delightful lady who has a positive COVID test after her  contracted COVID recently.  She has a positive COVID test yesterday and apparently soon to be positive COVID test from [facility].  Symptoms are URI symptoms and body aches.  No dyspnea or shortness of breath.  We discussed alternatives and given medical history she agrees to take Paxlovid.  This is described to her in Paxlovid is sent to her pharmacy.  URI         Objective   Vital Signs:  There were no vitals taken for this visit.  Estimated body mass index is 24.05 kg/m² as calculated from the following:    Height as of 1/19/23: 167.6 cm (66\").    Weight as of 2/23/23: 67.6 kg (149 lb).       BMI is within normal parameters. No other follow-up for BMI required.      Physical Exam  HENT:      Head: Normocephalic.   Pulmonary:      Effort: Pulmonary effort is normal.   Neurological:      Mental Status: She is alert and oriented to person, place, and time.   Psychiatric:         Mood and Affect: Mood normal.         Behavior: Behavior normal.         Thought Content: Thought content normal.        Result Review :                   Assessment and Plan   Diagnoses and all orders for this visit:    1. COVID-19 virus infection (Primary)  Comments:  She is agreeable to Paxlovid which is into her pharmacy.  She is not to take Paxlovid with Halcion/triazolam.  Triazolam is for dental procedures.    Other orders  -     Nirmatrelvir&Ritonavir 300/100 (PAXLOVID) 20 x 150 MG & 10 x 100MG tablet therapy pack tablet; Take 3 tablets by mouth 2 (Two) Times a Day for 5 days.  Dispense: 30 tablet; Refill: " 0             Follow Up   No follow-ups on file.  Patient was given instructions and counseling regarding her condition or for health maintenance advice. Please see specific information pulled into the AVS if appropriate.       Answers for HPI/ROS submitted by the patient on 3/30/2023  Please describe your symptoms.: Positive home COVID test  Have you had these symptoms before?: No  How long have you been having these symptoms?: 1-4 days  Please list any medications you are currently taking for this condition.: None  What is the primary reason for your visit?: Other

## 2023-05-09 RX ORDER — CLONIDINE HYDROCHLORIDE 0.1 MG/1
TABLET ORAL
Qty: 180 TABLET | Refills: 0 | OUTPATIENT
Start: 2023-05-09

## 2023-05-16 ENCOUNTER — TELEPHONE (OUTPATIENT)
Dept: OBSTETRICS AND GYNECOLOGY | Facility: CLINIC | Age: 83
End: 2023-05-16
Payer: MEDICARE

## 2023-05-16 NOTE — TELEPHONE ENCOUNTER
Called pt about Prolia, pt wants to wait until June or July since she had dental work in late February, pt has the phone number for scheduling to call and schedule.kali

## 2023-07-24 ENCOUNTER — TELEPHONE (OUTPATIENT)
Dept: OBSTETRICS AND GYNECOLOGY | Facility: CLINIC | Age: 83
End: 2023-07-24
Payer: MEDICARE

## 2023-07-24 NOTE — TELEPHONE ENCOUNTER
Called pt about Prolia,  Pt has canceled her injections due to having a bunch of dental work.  Pt is scared to continue with Prolia.  She would like to know if she can take something else.    Alva

## 2023-08-09 ENCOUNTER — PROCEDURE VISIT (OUTPATIENT)
Dept: OBSTETRICS AND GYNECOLOGY | Facility: CLINIC | Age: 83
End: 2023-08-09
Payer: MEDICARE

## 2023-08-09 DIAGNOSIS — Z12.31 VISIT FOR SCREENING MAMMOGRAM: Primary | ICD-10-CM

## 2023-08-22 RX ORDER — AMLODIPINE BESYLATE 2.5 MG/1
TABLET ORAL
Qty: 90 TABLET | Refills: 1 | Status: SHIPPED | OUTPATIENT
Start: 2023-08-22

## 2023-08-24 ENCOUNTER — OFFICE VISIT (OUTPATIENT)
Dept: INTERNAL MEDICINE | Facility: CLINIC | Age: 83
End: 2023-08-24
Payer: MEDICARE

## 2023-08-24 VITALS
DIASTOLIC BLOOD PRESSURE: 72 MMHG | HEART RATE: 54 BPM | BODY MASS INDEX: 23.24 KG/M2 | SYSTOLIC BLOOD PRESSURE: 136 MMHG | WEIGHT: 144 LBS | OXYGEN SATURATION: 99 %

## 2023-08-24 DIAGNOSIS — I10 ESSENTIAL HYPERTENSION: Primary | ICD-10-CM

## 2023-08-24 DIAGNOSIS — M48.54XS COMPRESSION FRACTURE OF THORACIC SPINE, NON-TRAUMATIC, SEQUELA: ICD-10-CM

## 2023-08-24 DIAGNOSIS — F51.01 PRIMARY INSOMNIA: ICD-10-CM

## 2023-08-24 DIAGNOSIS — M19.049 HAND ARTHRITIS: ICD-10-CM

## 2023-08-24 DIAGNOSIS — R73.09 ELEVATED GLUCOSE: ICD-10-CM

## 2023-08-24 DIAGNOSIS — E03.9 ACQUIRED HYPOTHYROIDISM: ICD-10-CM

## 2023-08-24 DIAGNOSIS — E78.00 ELEVATED CHOLESTEROL: ICD-10-CM

## 2023-08-24 NOTE — PROGRESS NOTES
"Chief Complaint  Medicare Wellness-subsequent    Subjective        Moriah Petty presents to [facility]  History of Present Illness  Moriah is here for Medicare wellness and to review treatment of hypertension hyperlipidemia significant chronic back pain after prior vertebral compression fracture.  Back pain seems to be quiescent to a certain regardless she has difficulty standing for long periods or even short periods.    Continue treatment of hypertension hyperlipidemia insomnia are continued.  Also low-dose levothyroxine for hypothyroidism.    Objective   Vital Signs:  /72 (BP Location: Left arm, Patient Position: Sitting, Cuff Size: Adult)   Pulse 54   Wt 65.3 kg (144 lb)   SpO2 99%   BMI 23.24 kg/m²   Estimated body mass index is 23.24 kg/m² as calculated from the following:    Height as of 1/19/23: 167.6 cm (66\").    Weight as of this encounter: 65.3 kg (144 lb).       BMI is within normal parameters. No other follow-up for BMI required.      Physical Exam  Vitals reviewed.   Constitutional:       Appearance: She is well-developed.   HENT:      Head: Normocephalic and atraumatic.      Right Ear: Tympanic membrane and external ear normal.      Left Ear: Tympanic membrane and external ear normal.      Nose: Nose normal.   Eyes:      Conjunctiva/sclera: Conjunctivae normal.      Pupils: Pupils are equal, round, and reactive to light.   Neck:      Thyroid: No thyromegaly.      Vascular: No JVD.   Cardiovascular:      Rate and Rhythm: Normal rate and regular rhythm.      Heart sounds: Normal heart sounds.   Pulmonary:      Effort: Pulmonary effort is normal.      Breath sounds: Normal breath sounds.   Abdominal:      General: Bowel sounds are normal.      Palpations: Abdomen is soft.   Musculoskeletal:         General: Normal range of motion.      Cervical back: Normal range of motion and neck supple.      Lumbar back: Tenderness present. Decreased range of motion. "   Lymphadenopathy:      Cervical: No cervical adenopathy.   Skin:     General: Skin is warm and dry.      Findings: No rash.   Neurological:      Mental Status: She is alert and oriented to person, place, and time.      Cranial Nerves: No cranial nerve deficit.      Coordination: Coordination normal.   Psychiatric:         Behavior: Behavior normal.         Thought Content: Thought content normal.         Judgment: Judgment normal.      Result Review :                   Assessment and Plan   Diagnoses and all orders for this visit:    1. Essential hypertension (Primary)  Comments:  Carvedilol 12.5 twice daily amlodipine 2.5 mg daily losartan 50 mg daily  Orders:  -     Urinalysis With Microscopic If Indicated (No Culture) - Urine, Clean Catch  -     TSH  -     T4, Free  -     T3, Free  -     Lipid Panel With / Chol / HDL Ratio  -     CBC & Differential  -     Comprehensive Metabolic Panel  -     Hemoglobin A1c    2. Elevated cholesterol  Comments:  Rosuvastatin 5 mg daily  Orders:  -     Urinalysis With Microscopic If Indicated (No Culture) - Urine, Clean Catch  -     TSH  -     T4, Free  -     T3, Free  -     Lipid Panel With / Chol / HDL Ratio  -     CBC & Differential  -     Comprehensive Metabolic Panel  -     Hemoglobin A1c    3. Acquired hypothyroidism  Comments:  Levothyroxine 25 mcg daily  Orders:  -     Urinalysis With Microscopic If Indicated (No Culture) - Urine, Clean Catch  -     TSH  -     T4, Free  -     T3, Free  -     Lipid Panel With / Chol / HDL Ratio  -     CBC & Differential  -     Comprehensive Metabolic Panel  -     Hemoglobin A1c    4. Elevated glucose  -     Hemoglobin A1c    5. Hand arthritis    6. Primary insomnia  Comments:  Triazolam 0.25 at bedtime as needed    7. Compression fracture of thoracic spine, non-traumatic, sequela  Comments:  Prolia infusion twice yearly    Other orders  -     Microscopic Examination -             Follow Up   No follow-ups on file.  Patient was given  instructions and counseling regarding her condition or for health maintenance advice. Please see specific information pulled into the AVS if appropriate.

## 2023-08-24 NOTE — PROGRESS NOTES
The ABCs of the Annual Wellness Visit  Subsequent Medicare Wellness Visit    Subjective      Moriah Petty is a 82 y.o. female who presents for a Subsequent Medicare Wellness Visit.    The following portions of the patient's history were reviewed and   updated as appropriate: allergies, current medications, past family history, past medical history, past social history, past surgical history, and problem list.    Compared to one year ago, the patient feels her physical   health is the same.    Compared to one year ago, the patient feels her mental   health is the same.    Recent Hospitalizations:  She was not admitted to the hospital during the last year.       Current Medical Providers:  Patient Care Team:  Jarred Carrizales MD as PCP - General (Family Medicine)  Anibal Trujillo Jr., MD (Cardiology)  Gordon Brewster MD as Consulting Physician (Obstetrics and Gynecology)    Outpatient Medications Prior to Visit   Medication Sig Dispense Refill    amLODIPine (NORVASC) 2.5 MG tablet TAKE 1 TABLET BY MOUTH EVERY DAY 90 tablet 1    Calcium Carb-Cholecalciferol (CALCIUM + D3) 600-200 MG-UNIT tablet Take 1 tablet by mouth 2 (Two) Times a Day.      carvedilol (COREG) 12.5 MG tablet Take 1 tablet by mouth 2 (Two) Times a Day With Meals. 180 tablet 1    levothyroxine (SYNTHROID, LEVOTHROID) 25 MCG tablet TAKE 1 TABLET BY MOUTH EVERY DAY 90 tablet 1    losartan (COZAAR) 50 MG tablet TAKE 1 TABLET BY MOUTH TWICE A  tablet 3    omeprazole (priLOSEC) 20 MG capsule TAKE 1 CAPSULE BY MOUTH EVERY DAY 90 capsule 3    polyethylene glycol (MIRALAX) 17 GM/SCOOP powder Take 17 g by mouth Daily. 289 g 5    rosuvastatin (CRESTOR) 5 MG tablet TAKE 1 TABLET BY MOUTH EVERY DAY 90 tablet 1    triazolam (HALCION) 0.25 MG tablet TAKE 1 TABLET BY MOUTH 1 HOUR BEFORE DENTAL PROCEDURE (Patient not taking: Reported on 8/24/2023)      Denosumab (PROLIA SC) Inject  under the skin into the appropriate area as directed Every 6 (Six) Months. Dose  unknown, injection every 6 months; DEC 3rd last injection      Misc. Devices (Rollator Ultra-Light) misc 1 each As Needed (walking). 1 each 1    Omega-3 Fatty Acids (FISH OIL) 1200 MG capsule capsule Take 1 capsule by mouth 2 (Two) Times a Day With Meals.      tiZANidine (ZANAFLEX) 2 MG tablet Take 1 tablet by mouth Every 8 (Eight) Hours As Needed for Muscle Spasms. 30 tablet 1     No facility-administered medications prior to visit.       No opioid medication identified on active medication list. I have reviewed chart for other potential  high risk medication/s and harmful drug interactions in the elderly.        Aspirin is not on active medication list.  Aspirin use is not indicated based on review of current medical condition/s. Risk of harm outweighs potential benefits.  .    Patient Active Problem List   Diagnosis    Abnormal electrocardiogram    Non-specific colitis    Acute post-traumatic headache    Postoperative anemia due to acute blood loss    Arthritis    Hematochezia    Chest pain    Compression fracture of lumbar vertebra    Closed wedge compression fracture of second lumbar vertebra    Constipation    Dizziness    Dyslipidemia    Fatigue    Ventricular premature beats    Gastroesophageal reflux disease without esophagitis    Essential hypertension    Insomnia    Left lower quadrant pain    Low back pain    Osteoporosis    Premature atrial contraction    Peripheral nerve disease    Pneumonia    Nausea    Sensorineural hearing loss    Shortness of breath    Sinus bradycardia    Sleep apnea    Disorder of thyroid    Trigeminal neuralgia    Rectal bleeding    Family history of colon cancer    Adenomatous polyp of colon    Hypothyroidism    Osteoporosis, post-menopausal    Diverticulitis of large intestine with abscess without bleeding    Mild malnutrition    Elevated cholesterol    History of colon polyps    Diverticulitis    Meningioma, cerebral    Cervical disc disorder with radiculopathy    Closed  "comminuted intertrochanteric fracture of proximal end of left femur    Alcohol use    Elevated glucose     Vitamin D deficiency    Compression fracture of thoracic spine, non-traumatic, initial encounter    Compression fracture of T8 vertebra with delayed healing     Advance Care Planning   Advance Care Planning     Advance Directive is on file.  ACP discussion was held with the patient during this visit. Patient has an advance directive in EMR which is still valid.      Objective    Vitals:    23 1038   BP: 136/72   BP Location: Left arm   Patient Position: Sitting   Cuff Size: Adult   Pulse: 54   SpO2: 99%   Weight: 65.3 kg (144 lb)   PainSc: 0-No pain     Estimated body mass index is 23.24 kg/m² as calculated from the following:    Height as of 23: 167.6 cm (66\").    Weight as of this encounter: 65.3 kg (144 lb).    BMI is within normal parameters. No other follow-up for BMI required.      Does the patient have evidence of cognitive impairment?   Yes: Patient advised to make follow up appointment with primary care provider for more detailed examination.            HEALTH RISK ASSESSMENT    Smoking Status:  Social History     Tobacco Use   Smoking Status Former    Packs/day: 0.50    Years: 30.00    Pack years: 15.00    Types: Cigarettes    Start date: 1957    Quit date: 1989    Years since quittin.3   Smokeless Tobacco Never   Tobacco Comments    2 cups coffee daily     Alcohol Consumption:  Social History     Substance and Sexual Activity   Alcohol Use Yes    Alcohol/week: 4.0 standard drinks    Types: 2 Shots of liquor, 2 Standard drinks or equivalent per week    Comment: 2 DRINKS PER DAY     Fall Risk Screen:    STEADI Fall Risk Assessment was completed, and patient is at MODERATE risk for falls. Assessment completed on:2023    Depression Screenin/24/2023    10:44 AM   PHQ-2/PHQ-9 Depression Screening   Little Interest or Pleasure in Doing Things 0-->not at all   Feeling " Down, Depressed or Hopeless 0-->not at all   PHQ-9: Brief Depression Severity Measure Score 0       Health Habits and Functional and Cognitive Screenin/24/2023    10:43 AM   Functional & Cognitive Status   Do you have difficulty preparing food and eating? No   Do you have difficulty bathing yourself, getting dressed or grooming yourself? No   Do you have difficulty using the toilet? No   Do you have difficulty moving around from place to place? Yes   Do you have trouble with steps or getting out of a bed or a chair? Yes   Current Diet Well Balanced Diet   Dental Exam Up to date   Eye Exam Up to date   Exercise (times per week) 0 times per week   Current Exercises Include No Regular Exercise   Do you need help using the phone?  No   Are you deaf or do you have serious difficulty hearing?  No   Do you need help to go to places out of walking distance? No   Do you need help shopping? No   Do you need help preparing meals?  No   Do you need help with housework?  No   Do you need help with laundry? No   Do you need help taking your medications? No   Do you need help managing money? No   Do you ever drive or ride in a car without wearing a seat belt? No   Have you felt unusual stress, anger or loneliness in the last month? Yes   Who do you live with? Spouse   If you need help, do you have trouble finding someone available to you? No   Have you been bothered in the last four weeks by sexual problems? No   Do you have difficulty concentrating, remembering or making decisions? No       Age-appropriate Screening Schedule:  Refer to the list below for future screening recommendations based on patient's age, sex and/or medical conditions. Orders for these recommended tests are listed in the plan section. The patient has been provided with a written plan.    Health Maintenance   Topic Date Due    TDAP/TD VACCINES (1 - Tdap) Never done    ZOSTER VACCINE (1 of 2) Never done    Pneumococcal Vaccine 65+ (2 - PPSV23 or  PCV20) 09/24/2019    ANNUAL WELLNESS VISIT  05/25/2022    DXA SCAN  05/26/2022    COVID-19 Vaccine (6 - Pfizer series) 02/04/2023    LIPID PANEL  05/23/2023    INFLUENZA VACCINE  10/01/2023    COLORECTAL CANCER SCREENING  07/09/2025                  CMS Preventative Services Quick Reference  Risk Factors Identified During Encounter:    None Identified    The above risks/problems have been discussed with the patient.  Pertinent information has been shared with the patient in the After Visit Summary.    There are no diagnoses linked to this encounter.    Follow Up:   Next Medicare Wellness visit to be scheduled in 1 year.      An After Visit Summary and PPPS were made available to the patient.

## 2023-08-24 NOTE — PATIENT INSTRUCTIONS
Medicare Wellness  Personal Prevention Plan of Service     Date of Office Visit:    Encounter Provider:  Jarred Carrizales MD  Place of Service:  [facility] PRIMARY [facility]  Patient Name: Moriah Petty  :  1940    As part of the Medicare Wellness portion of your visit today, we are providing you with this personalized preventive plan of services (PPPS). This plan is based upon recommendations of the [place] Preventive Services Task Force (USPSTF) and the Advisory Committee on Immunization Practices (ACIP).    This lists the preventive care services that should be considered, and provides dates of when you are due. Items listed as completed are up-to-date and do not require any further intervention.    Health Maintenance   Topic Date Due    TDAP/TD VACCINES (1 - Tdap) Never done    Pneumococcal Vaccine 65+ (2 - PPSV23 or PCV20) 2019    ANNUAL WELLNESS VISIT  2022    DXA SCAN  2022    COVID-19 Vaccine (6 - Pfizer series) 2023    LIPID PANEL  2023    ZOSTER VACCINE (1 of 2) 2024 (Originally 1989)    INFLUENZA VACCINE  10/01/2023    COLORECTAL CANCER SCREENING  2025       Orders Placed This Encounter   Procedures    Urinalysis With Microscopic If Indicated (No Culture) - Urine, Clean Catch     Order Specific Question:   Release to patient     Answer:   Routine Release [[id number]]    TSH     Order Specific Question:   Release to patient     Answer:   Routine Release [[id number]]    T4, Free     Order Specific Question:   Release to patient     Answer:   Routine Release [[id number]]    T3, Free     Order Specific Question:   Release to patient     Answer:   Routine Release [[id number]]    Lipid Panel With / Chol / HDL Ratio     Order Specific Question:   Release to patient     Answer:   Routine Release [[id number]]    Comprehensive Metabolic Panel     Order Specific Question:   Release to patient     Answer:   Routine Release [[id number]]     Hemoglobin A1c     Order Specific Question:   Release to patient     Answer:   Routine Release [[id number]]    CBC & Differential     Order Specific Question:   Manual Differential     Answer:   No     Order Specific Question:   Release to patient     Answer:   Routine Release [[id number]]       No follow-ups on file.

## 2023-08-25 LAB
ALBUMIN SERPL-MCNC: 4.8 G/DL (ref 3.5–5.2)
ALBUMIN/GLOB SERPL: 2.2 G/DL
ALP SERPL-CCNC: 81 U/L (ref 39–117)
ALT SERPL-CCNC: 13 U/L (ref 1–33)
APPEARANCE UR: CLEAR
AST SERPL-CCNC: 18 U/L (ref 1–32)
BACTERIA #/AREA URNS HPF: ABNORMAL /HPF
BASOPHILS # BLD AUTO: 0.06 10*3/MM3 (ref 0–0.2)
BASOPHILS NFR BLD AUTO: 1.3 % (ref 0–1.5)
BILIRUB SERPL-MCNC: 0.7 MG/DL (ref 0–1.2)
BILIRUB UR QL STRIP: NEGATIVE
BUN SERPL-MCNC: 11 MG/DL (ref 8–23)
BUN/CREAT SERPL: 13.8 (ref 7–25)
CALCIUM SERPL-MCNC: 10.7 MG/DL (ref 8.6–10.5)
CASTS URNS MICRO: ABNORMAL
CHLORIDE SERPL-SCNC: 96 MMOL/L (ref 98–107)
CHOLEST SERPL-MCNC: 176 MG/DL (ref 0–200)
CHOLEST/HDLC SERPL: 1.71 {RATIO}
CO2 SERPL-SCNC: 25.1 MMOL/L (ref 22–29)
COLOR UR: YELLOW
CREAT SERPL-MCNC: 0.8 MG/DL (ref 0.57–1)
EGFRCR SERPLBLD CKD-EPI 2021: 73.2 ML/MIN/1.73
EOSINOPHIL # BLD AUTO: 0.12 10*3/MM3 (ref 0–0.4)
EOSINOPHIL NFR BLD AUTO: 2.5 % (ref 0.3–6.2)
EPI CELLS #/AREA URNS HPF: ABNORMAL /HPF
ERYTHROCYTE [DISTWIDTH] IN BLOOD BY AUTOMATED COUNT: 12.6 % (ref 12.3–15.4)
GLOBULIN SER CALC-MCNC: 2.2 GM/DL
GLUCOSE SERPL-MCNC: 107 MG/DL (ref 65–99)
GLUCOSE UR QL STRIP: NEGATIVE
HBA1C MFR BLD: 5.7 % (ref 4.8–5.6)
HCT VFR BLD AUTO: 38.8 % (ref 34–46.6)
HDLC SERPL-MCNC: 103 MG/DL (ref 40–60)
HGB BLD-MCNC: 13.1 G/DL (ref 12–15.9)
HGB UR QL STRIP: NEGATIVE
IMM GRANULOCYTES # BLD AUTO: 0.01 10*3/MM3 (ref 0–0.05)
IMM GRANULOCYTES NFR BLD AUTO: 0.2 % (ref 0–0.5)
KETONES UR QL STRIP: NEGATIVE
LDLC SERPL CALC-MCNC: 61 MG/DL (ref 0–100)
LEUKOCYTE ESTERASE UR QL STRIP: ABNORMAL
LYMPHOCYTES # BLD AUTO: 1.54 10*3/MM3 (ref 0.7–3.1)
LYMPHOCYTES NFR BLD AUTO: 32.2 % (ref 19.6–45.3)
MCH RBC QN AUTO: 31.3 PG (ref 26.6–33)
MCHC RBC AUTO-ENTMCNC: 33.8 G/DL (ref 31.5–35.7)
MCV RBC AUTO: 92.8 FL (ref 79–97)
MONOCYTES # BLD AUTO: 0.47 10*3/MM3 (ref 0.1–0.9)
MONOCYTES NFR BLD AUTO: 9.8 % (ref 5–12)
NEUTROPHILS # BLD AUTO: 2.59 10*3/MM3 (ref 1.7–7)
NEUTROPHILS NFR BLD AUTO: 54 % (ref 42.7–76)
NITRITE UR QL STRIP: NEGATIVE
NRBC BLD AUTO-RTO: 0 /100 WBC (ref 0–0.2)
PH UR STRIP: 7.5 [PH] (ref 5–8)
PLATELET # BLD AUTO: 242 10*3/MM3 (ref 140–450)
POTASSIUM SERPL-SCNC: 5.2 MMOL/L (ref 3.5–5.2)
PROT SERPL-MCNC: 7 G/DL (ref 6–8.5)
PROT UR QL STRIP: NEGATIVE
RBC # BLD AUTO: 4.18 10*6/MM3 (ref 3.77–5.28)
RBC #/AREA URNS HPF: ABNORMAL /HPF
SODIUM SERPL-SCNC: 133 MMOL/L (ref 136–145)
SP GR UR STRIP: 1.01 (ref 1–1.03)
T3FREE SERPL-MCNC: 2.4 PG/ML (ref 2–4.4)
T4 FREE SERPL-MCNC: 1.16 NG/DL (ref 0.93–1.7)
TRIGL SERPL-MCNC: 62 MG/DL (ref 0–150)
TSH SERPL DL<=0.005 MIU/L-ACNC: 2.34 UIU/ML (ref 0.27–4.2)
UROBILINOGEN UR STRIP-MCNC: ABNORMAL MG/DL
VLDLC SERPL CALC-MCNC: 12 MG/DL (ref 5–40)
WBC # BLD AUTO: 4.79 10*3/MM3 (ref 3.4–10.8)
WBC #/AREA URNS HPF: ABNORMAL /HPF

## 2023-09-08 ENCOUNTER — OFFICE VISIT (OUTPATIENT)
Dept: OBSTETRICS AND GYNECOLOGY | Facility: CLINIC | Age: 83
End: 2023-09-08
Payer: MEDICARE

## 2023-09-08 VITALS
DIASTOLIC BLOOD PRESSURE: 84 MMHG | SYSTOLIC BLOOD PRESSURE: 142 MMHG | WEIGHT: 144.4 LBS | HEIGHT: 66 IN | BODY MASS INDEX: 23.21 KG/M2

## 2023-09-08 DIAGNOSIS — M81.0 MENOPAUSAL OSTEOPOROSIS: Primary | ICD-10-CM

## 2023-09-08 NOTE — PROGRESS NOTES
HPI   Moriah Petty  is a 82 y.o. female who presents to discuss treatment options for her osteoporosis.  She has osteoporosis as well as a spontaneous lumbar fracture.  She has been treated with Prolia for several years.  Her oral surgeon has required her to stop Prolia, as several procedures are planned and the oral surgeon does not feel it is safe to continue Prolia in this context.  The patient would like to discuss other options for treatment of her osteoporosis.    Chief Complaint   Patient presents with    Gynecologic Exam     Patient is here today to discuss bone health. Wants to stop Prolia and start something different.        Past Medical History:   Diagnosis Date    Adenomatous polyp of colon 11/6/2017    Anemia     Bursitis of elbow     RIGHT ELBOW    Carotid stenosis     LESS THAN 50%, CAROTID ULTRASOUND AT [facility] 2016    surgeries - May & June 2016    Cholelithiasis     Colitis     Colostomy in place     Depression     Diverticulosis     GERD (gastroesophageal reflux disease)     History of blood transfusion 11/2013    History of trigeminal neuralgia     Hyperlipidemia     Hypertension     Hypothyroidism     Insomnia     Neuromuscular disorder     OA (osteoarthritis)     Osteopenia     Osteoporosis     Pneumonia     PVC (premature ventricular contraction)     PVC's (premature ventricular contractions)     Sleep apnea     DOES NOT WEAR CPAP       Past Surgical History:   Procedure Laterality Date    BRAIN SURGERY  08/2014    MVD surgery - trigeminal neuralgia    BREAST BIOPSY Left 05/10/2018    Ultrasound-guided mammotome vacuum assisted left breast biopsy with placement of metallic clip (path: fragments of fibroepithelial lesion, consistent with fibroadenoma)-Dr. Sree Glass, [facility]    BRONCHOSCOPY N/A 08/29/2001    Dr. Christiano Horton    CARDIAC CATHETERIZATION  03/2002    CARDIAC CATHETERIZATION Left 11/28/2012    Dr. Anibal Trujillo    CHOLECYSTECTOMY N/A 11/20/2013    DR. ESME GOLDMAN     COLON SURGERY  12/5/2018, 6/1/2019    colostomy    COLONOSCOPY N/A 02/21/2012    INT/EXT HEMORRHOIDS, DIVERTICULOSIS, 2 HYPERPLASTIC POLYPS, TORT (path: Rectum hyperplastic polyp)-Dr. Morgan Collins    COLONOSCOPY N/A 4/24/2018    NTEH, Diverticulosis, tortuous colon, IH.  No specimens collected     COLONOSCOPY N/A 02/27/2015    Non-thrombosed external hemorrhoids found on perianal exam, diverticulosis in the sigmoid and descending colon, tortuous colon, normal ileum, internal hemorrhoids-Dr. Morgan Collins    COLONOSCOPY N/A 7/9/2020    Procedure: COLONOSCOPY into cecum with cold biopsy polypectomy x1;  Surgeon: Emanuel Clark MD;  Location: [facility] ENDOSCOPY;  Service: General;  Laterality: N/A;  Pre op: Diverticulitis, Family history of colon cancer  Post op: Polyp    COLONOSCOPY W/ POLYPECTOMY N/A 05/06/2004    Diverticulosis, small recto-sigmoid polyps, otherwise normal (path: Colon at 20 cm hyperplastic polyp) -Dr. Morgan Collins    COLOSTOMY CLOSURE N/A 5/28/2019    Procedure: COLOSTOMY TAKEDOWN;  Surgeon: Emanuel Clark MD;  Location: [facility] OR;  Service: General    EXPLORATORY LAPAROTOMY N/A 12/5/2018    Procedure: exploratory laparotomy with sigmoid resection, possible colostomy, APPENDECTOMY;  Surgeon: Emanuel Clark MD;  Location: [facility] MAIN OR;  Service: General    EYE SURGERY  5/2016    cataracts removed    FRACTURE SURGERY  2/14/2004    partial hip replacement    JOINT REPLACEMENT      KYPHOPLASTY N/A 12/21/2012    COMPRESSION FX OF L2    TOTAL HIP ARTHROPLASTY Left 03/16/2004    Left bipolar hip replacement-Dr. Chioma Burch    TOTAL KNEE ARTHROPLASTY Right 11/13/2013    Dr. Reza Longoria    TRIGEMINAL NERVE DECOMPRESSION  2014    LEFT EAR        Social History     Socioeconomic History    Marital status:    Tobacco Use    Smoking status: Former     Packs/day: 0.50     Years: 30.00     Pack years: 15.00     Types: Cigarettes     Start date: 2/1/1957     Quit  date: 1989     Years since quittin.3    Smokeless tobacco: Never    Tobacco comments:     2 cups coffee daily   Vaping Use    Vaping Use: Never used   Substance and Sexual Activity    Alcohol use: Yes     Alcohol/week: 4.0 standard drinks     Types: 2 Shots of liquor, 2 Standard drinks or equivalent per week     Comment: 2 DRINKS PER DAY    Drug use: No    Sexual activity: Not Currently     Partners: Male     Birth control/protection: Post-menopausal       The following portions of the patient's history were reviewed and updated as appropriate: allergies, current medications, past family history, past medical history, past social history, past surgical history and problem list.    Review of Systems     This is positive for back pain.  It is negative for fever or chills.  Negative for nausea or vomiting.    Physical Exam  Vitals and nursing note reviewed.   Constitutional:       Appearance: Normal appearance.   Neurological:      Mental Status: She is alert and oriented to person, place, and time.       Assessment    Diagnoses and all orders for this visit:    1. Menopausal osteoporosis (Primary)  -     DEXA Bone Density Axial; Future        Plan  I counseled the patient regarding treatment options for osteoporosis other than the bisphosphonates.  The patient's oral surgeon is concerned about the risk to the mandible, as several oral surgery procedures are planned.  The benefits and risks of using Forteo versus using Reclast versus using raloxifene were discussed with the patient.  30 minutes were spent in direct face-to-face counseling on this issue.  I answered the patient's questions.  She would like to use Reclast.  She will also confirm with her oral surgeon that this is an acceptable choice.    No follow-ups on file.    Social History     Tobacco Use   Smoking Status Former    Packs/day: 0.50    Years: 30.00    Pack years: 15.00    Types: Cigarettes    Start date: 1957    Quit date: 1989     Years since quittin.3   Smokeless Tobacco Never   Tobacco Comments    2 cups coffee daily

## 2023-10-10 RX ORDER — LEVOTHYROXINE SODIUM 0.03 MG/1
TABLET ORAL
Qty: 90 TABLET | Refills: 1 | Status: SHIPPED | OUTPATIENT
Start: 2023-10-10

## 2023-10-16 ENCOUNTER — OFFICE VISIT (OUTPATIENT)
Dept: CARDIOLOGY | Facility: CLINIC | Age: 83
End: 2023-10-16
Payer: MEDICARE

## 2023-10-16 VITALS
DIASTOLIC BLOOD PRESSURE: 82 MMHG | WEIGHT: 140 LBS | SYSTOLIC BLOOD PRESSURE: 122 MMHG | HEIGHT: 66 IN | HEART RATE: 63 BPM | BODY MASS INDEX: 22.5 KG/M2

## 2023-10-16 DIAGNOSIS — I10 ESSENTIAL HYPERTENSION: Primary | ICD-10-CM

## 2023-10-16 DIAGNOSIS — I49.3 VENTRICULAR PREMATURE BEATS: ICD-10-CM

## 2023-10-16 DIAGNOSIS — I49.1 PREMATURE ATRIAL CONTRACTION: ICD-10-CM

## 2023-10-16 PROCEDURE — 1160F RVW MEDS BY RX/DR IN RCRD: CPT | Performed by: NURSE PRACTITIONER

## 2023-10-16 PROCEDURE — 1159F MED LIST DOCD IN RCRD: CPT | Performed by: NURSE PRACTITIONER

## 2023-10-16 PROCEDURE — 3074F SYST BP LT 130 MM HG: CPT | Performed by: NURSE PRACTITIONER

## 2023-10-16 PROCEDURE — 99214 OFFICE O/P EST MOD 30 MIN: CPT | Performed by: NURSE PRACTITIONER

## 2023-10-16 PROCEDURE — 3079F DIAST BP 80-89 MM HG: CPT | Performed by: NURSE PRACTITIONER

## 2023-10-16 PROCEDURE — 93000 ELECTROCARDIOGRAM COMPLETE: CPT | Performed by: NURSE PRACTITIONER

## 2023-10-16 NOTE — PROGRESS NOTES
"    CARDIOLOGY        Patient Name: Moriah Petty  :1940  Age: 83 y.o.  Primary Cardiologist: Mimi Sampson MD  Encounter Provider:  LYN Bear    Date of Service: 10/16/23            CHIEF COMPLAINT / REASON FOR OFFICE VISIT     Hypertension (/)      HISTORY OF PRESENT ILLNESS       HPI  Moriah Petty is a 83 y.o. female who presents today for annual evaluation.     Pt has a  history significant for PVCs, sinus bradycardia, hyperlipidemia, GERD, hypertension.    Patient reports that she has done well since last assessment.  She admits that she does not routinely exercise but she is active and is able to complete activities of daily living around her home.  She also does some walking.  Patient reports that she is asymptomatic from a cardiovascular standpoint and denies any chest pain, dyspnea at rest or with exertion, palpitations, lightheadedness, edema, fatigue.  She is tolerating all medications without adverse effects.      The following portions of the patient's history were reviewed and updated as appropriate: allergies, current medications, past family history, past medical history, past social history, past surgical history and problem list.      VITAL SIGNS     Visit Vitals  /82   Pulse 63   Ht 167.6 cm (66\")   Wt 63.5 kg (140 lb)   BMI 22.60 kg/m²         Wt Readings from Last 3 Encounters:   10/16/23 63.5 kg (140 lb)   23 65.5 kg (144 lb 6.4 oz)   23 65.3 kg (144 lb)     Body mass index is 22.6 kg/m².      REVIEW OF SYSTEMS   Review of Systems   Constitutional: Negative for chills, fever, weight gain and weight loss.   Cardiovascular:  Negative for leg swelling.   Respiratory:  Negative for cough, snoring and wheezing.    Hematologic/Lymphatic: Negative for bleeding problem. Does not bruise/bleed easily.   Skin:  Negative for color change.   Musculoskeletal:  Negative for falls, joint pain and myalgias.   Gastrointestinal:  Negative for melena.   Genitourinary:  " Negative for hematuria.   Neurological:  Negative for excessive daytime sleepiness.   Psychiatric/Behavioral:  Negative for depression. The patient is not nervous/anxious.            PHYSICAL EXAMINATION     Vitals and nursing note reviewed.   Constitutional:       Appearance: Normal appearance. Well-developed.   Eyes:      Conjunctiva/sclera: Conjunctivae normal.   Neck:      Vascular: No carotid bruit.   Pulmonary:      Breath sounds: Normal breath sounds.   Cardiovascular:      Normal rate. Regular rhythm. Normal S1 with normal intensity. Normal S2 with normal intensity.       Murmurs: There is no murmur.      No gallop.  No click. No rub.   Edema:     Peripheral edema absent.   Musculoskeletal: Normal range of motion. Skin:     General: Skin is warm and dry.   Neurological:      Mental Status: Alert and oriented to person, place, and time.      GCS: GCS eye subscore is 4. GCS verbal subscore is 5. GCS motor subscore is 6.   Psychiatric:         Speech: Speech normal.         Behavior: Behavior normal.         Thought Content: Thought content normal.         Judgment: Judgment normal.           REVIEWED DATA       ECG 12 Lead    Date/Time: 10/16/2023 11:27 AM  Performed by: Ainsley Ansari APRN    Authorized by: Ainsley Ansari APRN  Comparison: compared with previous ECG from 10/14/2022  Rhythm: sinus rhythm  Rate: normal  ST Segments: ST segments normal  T Waves: T waves normal  QRS axis: left    Clinical impression: normal ECG          Cardiac Procedures:  Echocardiogram in January 2014.  Normal LVEF.  LVH.  Grade 1 diastolic dysfunction.  PFO noted.  Trace to mild tricuspid valve regurgitation.      Lipid Panel          8/24/2023    12:54   Lipid Panel   Total Cholesterol 176    Triglycerides 62    HDL Cholesterol 103    VLDL Cholesterol 12    LDL Cholesterol  61        Lab Results   Component Value Date     (L) 08/24/2023     (L) 01/19/2023    K 5.2 08/24/2023    K 4.9 01/19/2023    CL 96  (L) 08/24/2023    CL 96 (L) 01/19/2023    CO2 25.1 08/24/2023    CO2 25.6 01/19/2023    BUN 11 08/24/2023    BUN 14 01/19/2023    CREATININE 0.80 08/24/2023    CREATININE 0.72 01/19/2023    EGFRIFNONA 69 02/11/2022    EGFRIFNONA 74 12/17/2021    EGFRIFAFRI 93 05/25/2021    EGFRIFAFRI 79 05/05/2020    GLUCOSE 107 (H) 08/24/2023    GLUCOSE 120 (H) 01/19/2023    CALCIUM 10.7 (H) 08/24/2023    CALCIUM 10.1 01/19/2023    PROTENTOTREF 7.0 08/24/2023    PROTENTOTREF 6.6 01/19/2023    ALBUMIN 4.8 08/24/2023    ALBUMIN 4.4 01/19/2023    BILITOT 0.7 08/24/2023    BILITOT 0.5 01/19/2023    AST 18 08/24/2023    AST 15 01/19/2023    ALT 13 08/24/2023    ALT 10 01/19/2023     Lab Results   Component Value Date    WBC 4.79 08/24/2023    WBC 5.75 01/19/2023    HGB 13.1 08/24/2023    HGB 11.7 (L) 01/19/2023    HCT 38.8 08/24/2023    HCT 35.4 01/19/2023    MCV 92.8 08/24/2023    MCV 91.0 01/19/2023     08/24/2023     01/19/2023     Lab Results   Component Value Date    PROBNP 252.1 12/21/2020     Lab Results   Component Value Date    CKTOTAL 162 01/28/2015    TROPONINT <0.010 10/12/2020     Lab Results   Component Value Date    TSH 2.340 08/24/2023    TSH 2.310 05/23/2022             ASSESSMENT & PLAN     Diagnoses and all orders for this visit:    1. Essential hypertension (Primary)  Blood pressure controlled in clinic at 122/82.  Patient reports that it is similar at home.  Continue amlodipine 2.5 mg/day, carvedilol 12.5 mg twice daily, losartan 50 mg/day  -     ECG 12 Lead    2. Premature atrial contraction  3. Ventricular premature beats  Denies palpitation  Continue carvedilol        Return in about 1 year (around 10/16/2024) for Dr. Sampson-follow up.    Future Appointments         Provider Department Center    11/6/2023 1:30 PM REFERRED INJECTION CHAIR EP [facility] INFUSION CBC [facility]    2/26/2024 10:15 AM Jarred Carrizales MD [facility] PRIMARY CARE BILL    3/20/2024 10:30 AM BILL CARMELO 1  [facility] DEXA BILL    10/18/2024 10:40 AM Mimi Sampson MD [facility] CARDIOLOGY BILL                  MEDICATIONS         Discharge Medications            Accurate as of October 16, 2023 11:54 AM. If you have any questions, ask your nurse or doctor.                Changes to Medications        Instructions Start Date   omeprazole 20 MG capsule  Commonly known as: priLOSEC  What changed: when to take this   TAKE 1 CAPSULE BY MOUTH EVERY DAY             Continue These Medications        Instructions Start Date   amLODIPine 2.5 MG tablet  Commonly known as: NORVASC   TAKE 1 TABLET BY MOUTH EVERY DAY      Calcium + D3 600-200 MG-UNIT tablet   1 tablet, Oral, 2 Times Daily      carvedilol 12.5 MG tablet  Commonly known as: COREG   12.5 mg, Oral, 2 Times Daily With Meals      levothyroxine 25 MCG tablet  Commonly known as: SYNTHROID, LEVOTHROID   TAKE 1 TABLET BY MOUTH EVERY DAY      losartan 50 MG tablet  Commonly known as: COZAAR   TAKE 1 TABLET BY MOUTH TWICE A DAY      polyethylene glycol 17 GM/SCOOP powder  Commonly known as: MIRALAX   17 g, Oral, Daily      rosuvastatin 5 MG tablet  Commonly known as: CRESTOR   TAKE 1 TABLET BY MOUTH EVERY DAY      triazolam 0.25 MG tablet  Commonly known as: HALCION   TAKE 1 TABLET BY MOUTH 1 HOUR BEFORE DENTAL PROCEDURE                   **Dragon Disclaimer:   Much of this encounter note is an electronic transcription/translation of spoken language to printed text. The electronic translation of spoken language may permit erroneous, or at times, nonsensical words or phrases to be inadvertently transcribed. Although I have reviewed the note for such errors, some may still exist.

## 2023-10-30 ENCOUNTER — TELEPHONE (OUTPATIENT)
Dept: OBSTETRICS AND GYNECOLOGY | Facility: CLINIC | Age: 83
End: 2023-10-30
Payer: MEDICARE

## 2023-11-01 ENCOUNTER — TELEPHONE (OUTPATIENT)
Dept: NEUROSURGERY | Facility: CLINIC | Age: 83
End: 2023-11-01
Payer: MEDICARE

## 2023-11-01 NOTE — TELEPHONE ENCOUNTER
CALLER: KAREN FROM [facility]    KAREN CALLED STATING THAT SHE HAS SENT US A PLAN of CARE AND PROGRESS EVAL A FEW TIMES NOW OVER THE PAST MONTH AND SHE HAS NOT GOTTEN IT BACK. SHE IS RE-FAXING IT TODAY, 11/01/23, AND IT NEEDS TO BE SIGNED AND FAXED BACK TO # [phone] ASAP. THANKS.

## 2023-11-25 DIAGNOSIS — I10 ESSENTIAL HYPERTENSION: ICD-10-CM

## 2023-11-27 RX ORDER — CARVEDILOL 12.5 MG/1
12.5 TABLET ORAL 2 TIMES DAILY WITH MEALS
Qty: 180 TABLET | Refills: 1 | Status: SHIPPED | OUTPATIENT
Start: 2023-11-27

## 2023-12-06 DIAGNOSIS — M81.0 AGE-RELATED OSTEOPOROSIS WITHOUT CURRENT PATHOLOGICAL FRACTURE: Primary | ICD-10-CM

## 2023-12-13 DIAGNOSIS — M81.0 OSTEOPOROSIS, POST-MENOPAUSAL: Primary | ICD-10-CM

## 2023-12-13 RX ORDER — ZOLEDRONIC ACID 5 MG/100ML
5 INJECTION, SOLUTION INTRAVENOUS ONCE
OUTPATIENT
Start: 2023-12-13

## 2023-12-13 RX ORDER — SODIUM CHLORIDE 9 MG/ML
250 INJECTION, SOLUTION INTRAVENOUS ONCE
OUTPATIENT
Start: 2023-12-13

## 2023-12-19 ENCOUNTER — HOSPITAL ENCOUNTER (OUTPATIENT)
Dept: INFUSION THERAPY | Facility: HOSPITAL | Age: 83
Discharge: HOME | End: 2023-12-19
Admitting: OBSTETRICS & GYNECOLOGY
Payer: MEDICARE

## 2023-12-19 VITALS
RESPIRATION RATE: 20 BRPM | WEIGHT: 147 LBS | SYSTOLIC BLOOD PRESSURE: 135 MMHG | OXYGEN SATURATION: 97 % | BODY MASS INDEX: 23.73 KG/M2 | HEART RATE: 64 BPM | TEMPERATURE: 97.9 F | DIASTOLIC BLOOD PRESSURE: 70 MMHG

## 2023-12-19 DIAGNOSIS — M81.0 OSTEOPOROSIS, POST-MENOPAUSAL: Primary | ICD-10-CM

## 2023-12-19 LAB
ALBUMIN SERPL-MCNC: 4.2 G/DL (ref 3.5–5.2)
ALBUMIN/GLOB SERPL: 1.7 G/DL
ALP SERPL-CCNC: 73 U/L (ref 39–117)
ALT SERPL W P-5'-P-CCNC: 12 U/L (ref 1–33)
ANION GAP SERPL CALCULATED.3IONS-SCNC: 10 MMOL/L (ref 5–15)
AST SERPL-CCNC: 18 U/L (ref 1–32)
BASOPHILS # BLD AUTO: 0.05 10*3/MM3 (ref 0–0.2)
BASOPHILS NFR BLD AUTO: 1 % (ref 0–1.5)
BILIRUB SERPL-MCNC: 0.7 MG/DL (ref 0–1.2)
BUN SERPL-MCNC: 15 MG/DL (ref 8–23)
BUN/CREAT SERPL: 18.8 (ref 7–25)
CALCIUM SPEC-SCNC: 9.8 MG/DL (ref 8.6–10.5)
CHLORIDE SERPL-SCNC: 96 MMOL/L (ref 98–107)
CO2 SERPL-SCNC: 24 MMOL/L (ref 22–29)
CREAT SERPL-MCNC: 0.8 MG/DL (ref 0.57–1)
DEPRECATED RDW RBC AUTO: 41 FL (ref 37–54)
EGFRCR SERPLBLD CKD-EPI 2021: 72.8 ML/MIN/1.73
EOSINOPHIL # BLD AUTO: 0.08 10*3/MM3 (ref 0–0.4)
EOSINOPHIL NFR BLD AUTO: 1.7 % (ref 0.3–6.2)
ERYTHROCYTE [DISTWIDTH] IN BLOOD BY AUTOMATED COUNT: 12.4 % (ref 12.3–15.4)
GLOBULIN UR ELPH-MCNC: 2.5 GM/DL
GLUCOSE SERPL-MCNC: 147 MG/DL (ref 65–99)
HCT VFR BLD AUTO: 36.2 % (ref 34–46.6)
HGB BLD-MCNC: 11.8 G/DL (ref 12–15.9)
IMM GRANULOCYTES # BLD AUTO: 0.01 10*3/MM3 (ref 0–0.05)
IMM GRANULOCYTES NFR BLD AUTO: 0.2 % (ref 0–0.5)
LYMPHOCYTES # BLD AUTO: 1.38 10*3/MM3 (ref 0.7–3.1)
LYMPHOCYTES NFR BLD AUTO: 28.9 % (ref 19.6–45.3)
MAGNESIUM SERPL-MCNC: 1.8 MG/DL (ref 1.6–2.4)
MCH RBC QN AUTO: 29.8 PG (ref 26.6–33)
MCHC RBC AUTO-ENTMCNC: 32.6 G/DL (ref 31.5–35.7)
MCV RBC AUTO: 91.4 FL (ref 79–97)
MONOCYTES # BLD AUTO: 0.43 10*3/MM3 (ref 0.1–0.9)
MONOCYTES NFR BLD AUTO: 9 % (ref 5–12)
NEUTROPHILS NFR BLD AUTO: 2.82 10*3/MM3 (ref 1.7–7)
NEUTROPHILS NFR BLD AUTO: 59.2 % (ref 42.7–76)
NRBC BLD AUTO-RTO: 0 /100 WBC (ref 0–0.2)
PHOSPHATE SERPL-MCNC: 3.3 MG/DL (ref 2.5–4.5)
PLATELET # BLD AUTO: 236 10*3/MM3 (ref 140–450)
PMV BLD AUTO: 10.9 FL (ref 6–12)
POTASSIUM SERPL-SCNC: 4.2 MMOL/L (ref 3.5–5.2)
PROT SERPL-MCNC: 6.7 G/DL (ref 6–8.5)
RBC # BLD AUTO: 3.96 10*6/MM3 (ref 3.77–5.28)
SODIUM SERPL-SCNC: 130 MMOL/L (ref 136–145)
WBC NRBC COR # BLD AUTO: 4.77 10*3/MM3 (ref 3.4–10.8)

## 2023-12-19 PROCEDURE — 84100 ASSAY OF PHOSPHORUS: CPT | Performed by: OBSTETRICS & GYNECOLOGY

## 2023-12-19 PROCEDURE — 25010000002 ZOLEDRONIC ACID 5 MG/100ML SOLUTION: Performed by: OBSTETRICS & GYNECOLOGY

## 2023-12-19 PROCEDURE — 85025 COMPLETE CBC W/AUTO DIFF WBC: CPT | Performed by: OBSTETRICS & GYNECOLOGY

## 2023-12-19 PROCEDURE — 36415 COLL VENOUS BLD VENIPUNCTURE: CPT

## 2023-12-19 PROCEDURE — 83735 ASSAY OF MAGNESIUM: CPT | Performed by: OBSTETRICS & GYNECOLOGY

## 2023-12-19 PROCEDURE — 96374 THER/PROPH/DIAG INJ IV PUSH: CPT

## 2023-12-19 PROCEDURE — 96365 THER/PROPH/DIAG IV INF INIT: CPT

## 2023-12-19 PROCEDURE — 80053 COMPREHEN METABOLIC PANEL: CPT | Performed by: OBSTETRICS & GYNECOLOGY

## 2023-12-19 RX ORDER — SODIUM CHLORIDE 9 MG/ML
250 INJECTION, SOLUTION INTRAVENOUS ONCE
Status: DISCONTINUED | OUTPATIENT
Start: 2023-12-19 | End: 2023-12-21 | Stop reason: HOSPADM

## 2023-12-19 RX ORDER — SODIUM CHLORIDE 9 MG/ML
250 INJECTION, SOLUTION INTRAVENOUS ONCE
Status: CANCELLED | OUTPATIENT
Start: 2023-12-19

## 2023-12-19 RX ORDER — ZOLEDRONIC ACID 5 MG/100ML
5 INJECTION, SOLUTION INTRAVENOUS ONCE
COMMUNITY

## 2023-12-19 RX ORDER — ZOLEDRONIC ACID 5 MG/100ML
5 INJECTION, SOLUTION INTRAVENOUS ONCE
Status: CANCELLED | OUTPATIENT
Start: 2023-12-19

## 2023-12-19 RX ORDER — ZOLEDRONIC ACID 5 MG/100ML
5 INJECTION, SOLUTION INTRAVENOUS ONCE
Status: COMPLETED | OUTPATIENT
Start: 2023-12-19 | End: 2023-12-19

## 2023-12-19 RX ADMIN — ZOLEDRONIC ACID 5 MG: 5 INJECTION INTRAVENOUS at 13:27

## 2023-12-20 ENCOUNTER — HOSPITAL ENCOUNTER (EMERGENCY)
Facility: HOSPITAL | Age: 83
Discharge: HOME | End: 2023-12-20
Attending: EMERGENCY MEDICINE | Admitting: EMERGENCY MEDICINE
Payer: MEDICARE

## 2023-12-20 VITALS
HEIGHT: 66 IN | BODY MASS INDEX: 23.63 KG/M2 | WEIGHT: 147 LBS | SYSTOLIC BLOOD PRESSURE: 142 MMHG | TEMPERATURE: 98.2 F | RESPIRATION RATE: 18 BRPM | DIASTOLIC BLOOD PRESSURE: 78 MMHG | HEART RATE: 71 BPM | OXYGEN SATURATION: 97 %

## 2023-12-20 DIAGNOSIS — T88.7XXA MEDICATION SIDE EFFECT: ICD-10-CM

## 2023-12-20 DIAGNOSIS — R55 VASOVAGAL SYNCOPE: Primary | ICD-10-CM

## 2023-12-20 LAB
ALBUMIN SERPL-MCNC: 4.1 G/DL (ref 3.5–5.2)
ALBUMIN/GLOB SERPL: 1.7 G/DL
ALP SERPL-CCNC: 70 U/L (ref 39–117)
ALT SERPL W P-5'-P-CCNC: 13 U/L (ref 1–33)
ANION GAP SERPL CALCULATED.3IONS-SCNC: 10.2 MMOL/L (ref 5–15)
AST SERPL-CCNC: 19 U/L (ref 1–32)
BILIRUB SERPL-MCNC: 0.4 MG/DL (ref 0–1.2)
BUN SERPL-MCNC: 19 MG/DL (ref 8–23)
BUN/CREAT SERPL: 22.9 (ref 7–25)
CALCIUM SPEC-SCNC: 9.1 MG/DL (ref 8.6–10.5)
CHLORIDE SERPL-SCNC: 98 MMOL/L (ref 98–107)
CO2 SERPL-SCNC: 23.8 MMOL/L (ref 22–29)
CREAT SERPL-MCNC: 0.83 MG/DL (ref 0.57–1)
DEPRECATED RDW RBC AUTO: 44.1 FL (ref 37–54)
EGFRCR SERPLBLD CKD-EPI 2021: 69.6 ML/MIN/1.73
ERYTHROCYTE [DISTWIDTH] IN BLOOD BY AUTOMATED COUNT: 12.8 % (ref 12.3–15.4)
GLOBULIN UR ELPH-MCNC: 2.4 GM/DL
GLUCOSE SERPL-MCNC: 96 MG/DL (ref 65–99)
HCT VFR BLD AUTO: 36.7 % (ref 34–46.6)
HGB BLD-MCNC: 12.3 G/DL (ref 12–15.9)
LYMPHOCYTES # BLD MANUAL: 0.44 10*3/MM3 (ref 0.7–3.1)
LYMPHOCYTES NFR BLD MANUAL: 2.1 % (ref 5–12)
MCH RBC QN AUTO: 31.5 PG (ref 26.6–33)
MCHC RBC AUTO-ENTMCNC: 33.5 G/DL (ref 31.5–35.7)
MCV RBC AUTO: 94.1 FL (ref 79–97)
MONOCYTES # BLD: 0.18 10*3/MM3 (ref 0.1–0.9)
NEUTROPHILS # BLD AUTO: 7.87 10*3/MM3 (ref 1.7–7)
NEUTROPHILS NFR BLD MANUAL: 92.8 % (ref 42.7–76)
PLAT MORPH BLD: NORMAL
PLATELET # BLD AUTO: 184 10*3/MM3 (ref 140–450)
PMV BLD AUTO: 11.3 FL (ref 6–12)
POTASSIUM SERPL-SCNC: 3.7 MMOL/L (ref 3.5–5.2)
PROT SERPL-MCNC: 6.5 G/DL (ref 6–8.5)
QT INTERVAL: 416 MS
QTC INTERVAL: 430 MS
RBC # BLD AUTO: 3.9 10*6/MM3 (ref 3.77–5.28)
RBC MORPH BLD: NORMAL
SODIUM SERPL-SCNC: 132 MMOL/L (ref 136–145)
TROPONIN T SERPL HS-MCNC: 13 NG/L
VARIANT LYMPHS NFR BLD MANUAL: 5.2 % (ref 19.6–45.3)
WBC MORPH BLD: NORMAL
WBC NRBC COR # BLD AUTO: 8.48 10*3/MM3 (ref 3.4–10.8)

## 2023-12-20 PROCEDURE — 80053 COMPREHEN METABOLIC PANEL: CPT | Performed by: EMERGENCY MEDICINE

## 2023-12-20 PROCEDURE — 99284 EMERGENCY DEPT VISIT MOD MDM: CPT

## 2023-12-20 PROCEDURE — 85007 BL SMEAR W/DIFF WBC COUNT: CPT | Performed by: EMERGENCY MEDICINE

## 2023-12-20 PROCEDURE — 84484 ASSAY OF TROPONIN QUANT: CPT | Performed by: EMERGENCY MEDICINE

## 2023-12-20 PROCEDURE — 85025 COMPLETE CBC W/AUTO DIFF WBC: CPT | Performed by: EMERGENCY MEDICINE

## 2023-12-20 PROCEDURE — 93005 ELECTROCARDIOGRAM TRACING: CPT | Performed by: EMERGENCY MEDICINE

## 2023-12-20 PROCEDURE — 36415 COLL VENOUS BLD VENIPUNCTURE: CPT

## 2023-12-20 PROCEDURE — 25810000003 SODIUM CHLORIDE 0.9 % SOLUTION: Performed by: EMERGENCY MEDICINE

## 2023-12-20 RX ORDER — SODIUM CHLORIDE 0.9 % (FLUSH) 0.9 %
10 SYRINGE (ML) INJECTION AS NEEDED
Status: DISCONTINUED | OUTPATIENT
Start: 2023-12-20 | End: 2023-12-20 | Stop reason: HOSPADM

## 2023-12-20 RX ADMIN — SODIUM CHLORIDE 500 ML: 9 INJECTION, SOLUTION INTRAVENOUS at 05:44

## 2023-12-20 NOTE — ED TRIAGE NOTES
Pt arrived to ER via [facility].     Pt had a reclast infusion yesterday for osteoporosis. Pt states the side effects of the infusion is diarrhea. Pt got up to go to the bathroom to have a BM and had a syncopal episode on the toilet.

## 2023-12-20 NOTE — DISCHARGE INSTRUCTIONS
Drink plenty fluids.  Return to the emergency department for recurrent fainting, chest pain, shortness of breath, palpitations, vomiting, diarrhea, or other concern.

## 2023-12-20 NOTE — ED PROVIDER NOTES
EMERGENCY DEPARTMENT ENCOUNTER    Room Number:  15/15  PCP: Jarred Carrizales MD  Historian: Patient, EMS      HPI:  Chief Complaint: Syncope  A complete HPI/ROS/PMH/PSH/SH/FH are unobtainable due to: Nothing  Context: Moriah Petty is a 83 y.o. female who presents to the ED from home by EMS c/o a syncopal episode that occurred just prior to arrival.  Patient got out of bed to go to the bathroom earlier this morning.  She urinated and then went back to bed.  She was lying in bed trying to go back to sleep when she felt the sudden urge to have a bowel movement.  She felt like she was going to have diarrhea.  She got out of bed and went back into the bathroom.  She began to feel faint and lightheaded.  She then sat down on the toilet and passed out.  When she regained consciousness, she noticed that she had had a large watery bowel movement.  She denies preceding headache, chest pain, abdominal pain, shortness of breath, or palpitations.  She had her first Reclast infusion yesterday afternoon.  Denies recent illness, cough, fever, chills, or numbness/tingling/weakness in her extremities.  Denies history of arrhythmia.  Glucose was 106 per EMS            PAST MEDICAL HISTORY  Active Ambulatory Problems     Diagnosis Date Noted    Abnormal electrocardiogram 03/04/2017    Non-specific colitis 03/04/2017    Acute post-traumatic headache 10/09/2013    Postoperative anemia due to acute blood loss 03/04/2017    Hand arthritis 03/04/2017    Hematochezia 03/04/2017    Chest pain 03/04/2017    Compression fracture of lumbar vertebra 10/09/2013    Closed wedge compression fracture of second lumbar vertebra 04/15/2013    Constipation 03/04/2017    Dizziness 03/04/2017    Dyslipidemia 03/04/2017    Fatigue 03/04/2017    Ventricular premature beats 03/04/2017    Gastroesophageal reflux disease without esophagitis 03/04/2017    Essential hypertension 03/04/2017    Insomnia 03/04/2017    Left lower quadrant pain 03/04/2017    Low back  pain 08/04/2014    Osteoporosis 03/04/2017    Premature atrial contraction 03/04/2017    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    Pneumonia 09/03/2014    Nausea 03/04/2017    Sensorineural hearing loss 10/08/2014    Shortness of breath 03/04/2017    Sinus bradycardia 03/04/2017    Sleep apnea 03/04/2017    Disorder of thyroid 03/04/2017    Trigeminal neuralgia 04/15/2013    Rectal bleeding 11/06/2017    Family history of colon cancer 11/06/2017    Adenomatous polyp of colon 11/06/2017    Hypothyroidism 01/23/2018    Osteoporosis, 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    Diverticulitis of large intestine with abscess without bleeding 12/04/2018    Mild malnutrition 12/06/2018    Elevated cholesterol 04/12/2019    History of colon polyps 06/03/2020    Diverticulitis 11/05/2018    Meningioma, cerebral 10/16/2020    Cervical disc disorder with radiculopathy 10/16/2020    Closed comminuted intertrochanteric fracture of proximal end of left femur 12/21/2020    Alcohol use 12/21/2020    Elevated glucose  05/23/2022    Vitamin D deficiency 05/23/2022    Compression fracture of thoracic spine, non-traumatic, initial encounter 12/03/2022    Compression fracture of T8 vertebra with delayed healing 12/08/2022     Resolved Ambulatory Problems     Diagnosis Date Noted    Hypotension 09/10/2014    Chest pain, atypical 12/05/2018    Hypokalemia 12/09/2018    Colostomy status 05/06/2019     Past Medical History:   Diagnosis Date    Anemia     Bursitis of elbow     Carotid stenosis     Cataract 2016    Cholelithiasis     Colitis     Colostomy in place     Depression     Diverticulosis     GERD (gastroesophageal reflux disease)     History of blood transfusion 11/2013    History of trigeminal neuralgia     Hyperlipidemia     Hypertension     Neuromuscular disorder     OA (osteoarthritis)     Osteopenia     PVC (premature ventricular contraction)     PVC's (premature ventricular contractions)          PAST SURGICAL HISTORY  Past Surgical History:    Procedure Laterality Date    BRAIN SURGERY  08/2014    MVD surgery - trigeminal neuralgia    BREAST BIOPSY Left 05/10/2018    Ultrasound-guided mammotome vacuum assisted left breast biopsy with placement of metallic clip (path: fragments of fibroepithelial lesion, consistent with fibroadenoma)-Dr. rSee Glass, [facility]    BRONCHOSCOPY N/A 08/29/2001    Dr. Christiano Horton    CARDIAC CATHETERIZATION  03/2002    CARDIAC CATHETERIZATION Left 11/28/2012    Dr. Anibal Trujillo    CHOLECYSTECTOMY N/A 11/20/2013    DR. ESME GOLDMAN    COLON SURGERY  12/5/2018, 6/1/2019    colostomy    COLONOSCOPY N/A 02/21/2012    INT/EXT HEMORRHOIDS, DIVERTICULOSIS, 2 HYPERPLASTIC POLYPS, TORT (path: Rectum hyperplastic polyp)-Dr. Morgan Collins    COLONOSCOPY N/A 04/24/2018    NTEH, Diverticulosis, tortuous colon, IH.  No specimens collected     COLONOSCOPY N/A 02/27/2015    Non-thrombosed external hemorrhoids found on perianal exam, diverticulosis in the sigmoid and descending colon, tortuous colon, normal ileum, internal hemorrhoids-Dr. Morgan Collins    COLONOSCOPY N/A 07/09/2020    Procedure: COLONOSCOPY into cecum with cold biopsy polypectomy x1;  Surgeon: Emanuel Clark MD;  Location: [facility] ENDOSCOPY;  Service: General;  Laterality: N/A;  Pre op: Diverticulitis, Family history of colon cancer  Post op: Polyp    COLONOSCOPY W/ POLYPECTOMY N/A 05/06/2004    Diverticulosis, small recto-sigmoid polyps, otherwise normal (path: Colon at 20 cm hyperplastic polyp) -Dr. Morgan Collins    COLOSTOMY CLOSURE N/A 05/28/2019    Procedure: COLOSTOMY TAKEDOWN;  Surgeon: Emanuel Clark MD;  Location: [facility] MAIN OR;  Service: General    EXPLORATORY LAPAROTOMY N/A 12/05/2018    Procedure: exploratory laparotomy with sigmoid resection, possible colostomy, APPENDECTOMY;  Surgeon: Emanuel Clark MD;  Location: [facility] MAIN OR;  Service: General    EYE SURGERY  05/2016    cataracts removed    FRACTURE SURGERY  02/14/2004    partial hip  replacement    JOINT REPLACEMENT      KYPHOPLASTY N/A 2012    COMPRESSION FX OF L2    TOTAL HIP ARTHROPLASTY Left 2004    Left bipolar hip replacement-Dr. Chioma Burch    TOTAL KNEE ARTHROPLASTY Right 2013    Dr. Reza Longoria    TRIGEMINAL NERVE DECOMPRESSION  2014    LEFT EAR          FAMILY HISTORY  Family History   Problem Relation Age of Onset    Ovarian cancer Daughter     Cancer Daughter         ovarian cancer    Colon cancer Mother     Arthritis Mother     Stomach cancer Mother     Cancer Mother         colon cancer    Stomach cancer Maternal Grandfather     Cancer Maternal Grandfather         stomach cancer    Glaucoma Father     Arthritis Father     Heart disease Father     Hypertension Father     Hyperlipidemia Father     Stroke Father     Arrhythmia Father     Alcohol abuse Son     Stroke Paternal Grandmother     Malig Hyperthermia Neg Hx          SOCIAL HISTORY  Social History     Socioeconomic History    Marital status:    Tobacco Use    Smoking status: Former     Packs/day: 0.50     Years: 30.00     Additional pack years: 0.00     Total pack years: 15.00     Types: Cigarettes     Start date: 1957     Quit date: 1989     Years since quittin.6    Smokeless tobacco: Never    Tobacco comments:     2 cups coffee daily   Vaping Use    Vaping Use: Never used   Substance and Sexual Activity    Alcohol use: Yes     Alcohol/week: 2.0 standard drinks of alcohol     Types: 2 Shots of liquor per week     Comment: 2 DRINKS PER DAY    Drug use: No    Sexual activity: Not Currently     Partners: Male     Birth control/protection: Post-menopausal, Birth control pill         ALLERGIES  Lisinopril and Morphine and related    REVIEW OF SYSTEMS  All systems have been reviewed and are negative except for those listed in the HPI      PHYSICAL EXAM  ED Triage Vitals   Temp Heart Rate Resp BP SpO2   23 0507 23 0506 23 0506 23 0506 23 0506   98.2 °F (36.8  °C) 60 18 116/56 99 %      Temp src Heart Rate Source Patient Position BP Location FiO2 (%)   12/20/23 0507 -- -- -- --   Oral           Physical Exam      GENERAL: Awake, alert, oriented x 3.  Well-developed, well-nourished elderly female.  Resting comfortably in no acute distress  HENT: NCAT, nares patent  EYES: PERRL, EOMI  CV: regular rhythm, normal rate  RESPIRATORY: normal effort, clear to auscultation bilaterally  ABDOMEN: soft, nondistended, nontender  MUSCULOSKELETAL: Extremities are nontender with full range of motion  NEURO: Speech is clear and fluent.  No aphasia.  No facial droop.  Normal strength and light touch sensation in all extremities.  Follows commands  PSYCH:  calm, cooperative  SKIN: warm, dry    Vital signs and nursing notes reviewed.          LAB RESULTS  Recent Results (from the past 24 hour(s))   ECG 12 Lead Syncope    Collection Time: 12/20/23  5:27 AM   Result Value Ref Range    QT Interval 416 ms    QTC Interval 430 ms   Comprehensive Metabolic Panel    Collection Time: 12/20/23  5:35 AM    Specimen: Blood   Result Value Ref Range    Glucose 96 65 - 99 mg/dL    BUN 19 8 - 23 mg/dL    Creatinine 0.83 0.57 - 1.00 mg/dL    Sodium 132 (L) 136 - 145 mmol/L    Potassium 3.7 3.5 - 5.2 mmol/L    Chloride 98 98 - 107 mmol/L    CO2 23.8 22.0 - 29.0 mmol/L    Calcium 9.1 8.6 - 10.5 mg/dL    Total Protein 6.5 6.0 - 8.5 g/dL    Albumin 4.1 3.5 - 5.2 g/dL    ALT (SGPT) 13 1 - 33 U/L    AST (SGOT) 19 1 - 32 U/L    Alkaline Phosphatase 70 39 - 117 U/L    Total Bilirubin 0.4 0.0 - 1.2 mg/dL    Globulin 2.4 gm/dL    A/G Ratio 1.7 g/dL    BUN/Creatinine Ratio 22.9 7.0 - 25.0    Anion Gap 10.2 5.0 - 15.0 mmol/L    eGFR 69.6 >60.0 mL/min/1.73   CBC Auto Differential    Collection Time: 12/20/23  5:35 AM    Specimen: Blood   Result Value Ref Range    WBC 8.48 3.40 - 10.80 10*3/mm3    RBC 3.90 3.77 - 5.28 10*6/mm3    Hemoglobin 12.3 12.0 - 15.9 g/dL    Hematocrit 36.7 34.0 - 46.6 %    MCV 94.1 79.0 - 97.0 fL     MCH 31.5 26.6 - 33.0 pg    MCHC 33.5 31.5 - 35.7 g/dL    RDW 12.8 12.3 - 15.4 %    RDW-SD 44.1 37.0 - 54.0 fl    MPV 11.3 6.0 - 12.0 fL    Platelets 184 140 - 450 10*3/mm3   Single High Sensitivity Troponin T    Collection Time: 12/20/23  5:35 AM    Specimen: Blood   Result Value Ref Range    HS Troponin T 13 <14 ng/L   Manual Differential    Collection Time: 12/20/23  5:35 AM    Specimen: Blood   Result Value Ref Range    Neutrophil % 92.8 (H) 42.7 - 76.0 %    Lymphocyte % 5.2 (L) 19.6 - 45.3 %    Monocyte % 2.1 (L) 5.0 - 12.0 %    Neutrophils Absolute 7.87 (H) 1.70 - 7.00 10*3/mm3    Lymphocytes Absolute 0.44 (L) 0.70 - 3.10 10*3/mm3    Monocytes Absolute 0.18 0.10 - 0.90 10*3/mm3    RBC Morphology Normal Normal    WBC Morphology Normal Normal    Platelet Morphology Normal Normal       Ordered the above labs and reviewed the results.        RADIOLOGY  No Radiology Exams Resulted Within Past 24 Hours    Ordered the above noted radiological studies. Reviewed by me in PACS.            PROCEDURES  Procedures              MEDICATIONS GIVEN IN ER  Medications   sodium chloride 0.9 % bolus 500 mL (0 mL Intravenous Stopped 12/20/23 0713)                   MEDICAL DECISION MAKING, PROGRESS, and CONSULTS    All labs have been independently reviewed by me.  All radiology studies have been reviewed by me and I have also reviewed the radiology report.   EKG's independently viewed and interpreted by me.  Discussion below represents my analysis of pertinent findings related to patient's condition, differential diagnosis, treatment plan and final disposition.      Additional sources:  - Discussed/ obtained information from independent historians: EMS    - External (non-ED) record review: Patient was last admitted here in December 2020 for a femur fracture.  This was treated nonoperatively.  Patient had a Reclast infusion on 12/19/2023    - Chronic or social conditions impacting care: None          Orders placed during this  visit:  Orders Placed This Encounter   Procedures    Comprehensive Metabolic Panel    CBC Auto Differential    Single High Sensitivity Troponin T    Manual Differential    Monitor Blood Pressure    Pulse Oximetry, Continuous    ECG 12 Lead Syncope    CBC & Differential         Additional orders considered but not ordered:  None        Differential diagnosis:    Vasovagal episode, orthostasis, arrhythmia, dehydration, seizure      Independent interpretation of labs, radiology studies, and discussions with consultants:  ED Course as of 12/21/23 0217   Wed Dec 20, 2023   0530 EKG personally interpreted by me at 5:30 AM.  My personal interpretation is:         EKG time: 5:27 AM  Rhythm/Rate: Sinus rhythm, rate 64  P waves and OH: Normal  QRS, axis: LAD, anterior Q waves  ST and T waves: Nonspecific ST/T wave changes in the inferior leads  QT interval is normal    Interpreted Contemporaneously by me, independently viewed  EKG is not significantly changed compared to prior EKG done on 12/21/2020   [WH]   0532 Per [facility], common reactions to Reclast include dizziness, nausea, diarrhea, abdominal pain, and dehydration. [WH]   0633 WBC: 8.48 [WH]   0633 Hemoglobin: 12.3 [WH]   0633 HS Troponin T: 13 [WH]   0633 Glucose: 96 [WH]   0633 Creatinine: 0.83 [WH]   0633 Sodium(!): 132 [WH]   0642 Test results discussed with the patient and her .  She is resting comfortably.  Vital signs are normal.  She wants to walk to the bathroom. [WH]   0654 Patient was able to walk to the bathroom without difficulty and without assistance.  She has not had any further diarrhea.  Her symptoms are consistent with a vasovagal episode.  Patient will be discharged.  Return precautions were discussed. [WH]   0705 MDM: Patient presented to the ED after having a syncopal episode.  Patient passed out while sitting on the commode and getting ready to have a bowel movement.  Her workup was unremarkable.  EKG was unchanged.  Her symptoms are  consistent with vasovagal syncope.  She was able to ambulate in the ED without difficulty. [WH]      ED Course User Index  [WH] Santos Goetz MD               DIAGNOSIS  Final diagnoses:   Vasovagal syncope   Medication side effect         DISPOSITION  DISCHARGE    Patient discharged in stable condition.    Reviewed implications of results, diagnosis, meds, responsibility to follow up, warning signs and symptoms of possible worsening, potential complications and reasons to return to ER, including recurrent fainting, chest pain, palpitations, shortness of breath, or other concern..    Patient/Family voiced understanding of above instructions.    Discussed plan for discharge, as there is no emergent indication for admission. Patient referred to primary care provider for BP management due to today's BP. Pt/family is agreeable and understands need for follow up and repeat testing.  Pt is aware that discharge does not mean that nothing is wrong but it indicates no emergency is present that requires admission and they must continue care with follow-up as given below or physician of their choice.     FOLLOW-UP  Jarred Carrizales MD  [address] [address]  [phone]    Schedule an appointment as soon as possible for a visit            Medication List        Changed      omeprazole 20 MG capsule  Commonly known as: priLOSEC  TAKE 1 CAPSULE BY MOUTH EVERY DAY  What changed: when to take this                        Latest Documented Vital Signs:  As of 02:17 EST  BP- 142/78 HR- 71 Temp- 98.2 °F (36.8 °C) (Oral) O2 sat- 97%              --    Please note that portions of this were completed with a voice recognition program.       Note Disclaimer: At [facility], we believe that sharing information builds trust and better relationships. You are receiving this note because you are receiving care at [facility] or recently visited. It is possible you will see health information before a  provider has talked with you about it. This kind of information can be easy to misunderstand. To help you fully understand what it means for your health, we urge you to discuss this note with your provider.             Santos Goetz MD  12/21/23 0212

## 2023-12-26 ENCOUNTER — PATIENT OUTREACH (OUTPATIENT)
Dept: CASE MANAGEMENT | Facility: OTHER | Age: 83
End: 2023-12-26
Payer: MEDICARE

## 2023-12-26 DIAGNOSIS — E78.5 DYSLIPIDEMIA: Primary | ICD-10-CM

## 2023-12-26 DIAGNOSIS — I10 ESSENTIAL HYPERTENSION: ICD-10-CM

## 2023-12-26 NOTE — OUTREACH NOTE
AMBULATORY CASE MANAGEMENT NOTE    Name and Relationship of Patient/Support Person: Moriah Petty J - Self    CCM Interim Update    Spoke with patient at this time regarding recent ER visit, identified self and role.  Patient states she is doing fine, says she went to the ER and was told what she experienced was a reaction to the Reclast infusion she had had.  Offered ER f/u appt with PCP, patient declines at this time, states she will just keep her appt in February 2024.  Offered assistance with chronic disease management through CCM, patient declines.  Patient denies any further needs, will close program.        Aishwarya GARDNER  Ambulatory Case Management    12/26/2023, 10:42 EST

## 2024-01-17 RX ORDER — ROSUVASTATIN CALCIUM 5 MG/1
TABLET, COATED ORAL
Qty: 90 TABLET | Refills: 1 | Status: SHIPPED | OUTPATIENT
Start: 2024-01-17

## 2024-01-17 RX ORDER — LOSARTAN POTASSIUM 50 MG/1
TABLET ORAL
Qty: 180 TABLET | Refills: 0 | Status: SHIPPED | OUTPATIENT
Start: 2024-01-17

## 2024-02-26 ENCOUNTER — OFFICE VISIT (OUTPATIENT)
Dept: INTERNAL MEDICINE | Facility: CLINIC | Age: 84
End: 2024-02-26
Payer: MEDICARE

## 2024-02-26 VITALS
WEIGHT: 147 LBS | BODY MASS INDEX: 23.73 KG/M2 | HEART RATE: 60 BPM | DIASTOLIC BLOOD PRESSURE: 88 MMHG | SYSTOLIC BLOOD PRESSURE: 128 MMHG | OXYGEN SATURATION: 99 %

## 2024-02-26 DIAGNOSIS — E78.5 DYSLIPIDEMIA: Primary | ICD-10-CM

## 2024-02-26 DIAGNOSIS — E55.9 VITAMIN D DEFICIENCY: ICD-10-CM

## 2024-02-26 DIAGNOSIS — I10 ESSENTIAL HYPERTENSION: ICD-10-CM

## 2024-02-26 DIAGNOSIS — E03.9 ACQUIRED HYPOTHYROIDISM: ICD-10-CM

## 2024-02-26 DIAGNOSIS — E87.1 HYPONATREMIA: ICD-10-CM

## 2024-02-26 DIAGNOSIS — R73.09 ELEVATED GLUCOSE: ICD-10-CM

## 2024-02-26 PROCEDURE — G0009 ADMIN PNEUMOCOCCAL VACCINE: HCPCS | Performed by: FAMILY MEDICINE

## 2024-02-26 PROCEDURE — 3074F SYST BP LT 130 MM HG: CPT | Performed by: FAMILY MEDICINE

## 2024-02-26 PROCEDURE — 3079F DIAST BP 80-89 MM HG: CPT | Performed by: FAMILY MEDICINE

## 2024-02-26 PROCEDURE — 99214 OFFICE O/P EST MOD 30 MIN: CPT | Performed by: FAMILY MEDICINE

## 2024-02-26 PROCEDURE — 90677 PCV20 VACCINE IM: CPT | Performed by: FAMILY MEDICINE

## 2024-02-26 NOTE — PROGRESS NOTES
"Answers submitted by the patient for this visit:  Other (Submitted on 2/19/2024)  Please describe your symptoms.: Wellness visit.  Have you had these symptoms before?: No  How long have you been having these symptoms?: Greater than 2 weeks  Please list any medications you are currently taking for this condition.: None  - This is wellness visit.  Please describe any probable cause for these symptoms. : None  Primary Reason for Visit (Submitted on 2/19/2024)  What is the primary reason for your visit?: Other  Chief Complaint  Hypertension, Hypothyroidism, Vitamin D Deficiency, and Insomnia    Subjective        Moriah Petty presents to [facility]  History of Present Illness  Marina is a delightful lady who is gone through arduous journey with spinal vertebral fracture and kyphoplasty.  Last December she had 3 class but the day after passed out on the toilet was evaluated emergency room without any obvious findings.  Presumption was perhaps relation to the previous Reclast infusion.    Otherwise continue treatment for hyperlipidemia hypertension which is going well hypothyroidism on a tiny dose of thyroid supplement.  There have been relatives who are ill which are causing anxiety obviously.    Recent labs have shown a slight degree of hyponatremia and we will retest that today.  Hypertension    Hypothyroidism    Insomnia        Objective   Vital Signs:  /88 (BP Location: Left arm, Patient Position: Sitting, Cuff Size: Adult)   Pulse 60   Wt 66.7 kg (147 lb)   SpO2 99%   BMI 23.73 kg/m²   Estimated body mass index is 23.73 kg/m² as calculated from the following:    Height as of 12/20/23: 167.6 cm (66\").    Weight as of this encounter: 66.7 kg (147 lb).       BMI is within normal parameters. No other follow-up for BMI required.      Physical Exam  Vitals reviewed.   Constitutional:       Appearance: She is well-developed.   HENT:      Head: Normocephalic and atraumatic.      Right " Ear: Tympanic membrane and external ear normal.      Left Ear: Tympanic membrane and external ear normal.      Nose: Nose normal.   Eyes:      Conjunctiva/sclera: Conjunctivae normal.      Pupils: Pupils are equal, round, and reactive to light.   Neck:      Thyroid: No thyromegaly.      Vascular: No JVD.   Cardiovascular:      Rate and Rhythm: Normal rate and regular rhythm.      Heart sounds: Normal heart sounds.   Pulmonary:      Effort: Pulmonary effort is normal.      Breath sounds: Normal breath sounds.   Abdominal:      General: Bowel sounds are normal.      Palpations: Abdomen is soft.   Musculoskeletal:      Cervical back: Normal range of motion and neck supple.      Lumbar back: Decreased range of motion. Scoliosis present.   Lymphadenopathy:      Cervical: No cervical adenopathy.   Skin:     General: Skin is warm and dry.      Findings: No rash.   Neurological:      Mental Status: She is alert and oriented to person, place, and time.      Cranial Nerves: No cranial nerve deficit.      Coordination: Coordination normal.   Psychiatric:         Behavior: Behavior normal.         Thought Content: Thought content normal.         Judgment: Judgment normal.        Result Review :    The following data was reviewed by: Jarred Carrizales MD on 02/26/2024:  Common labs          8/24/2023    12:54 12/19/2023    12:47 12/20/2023    05:35   Common Labs   Glucose 107  147  96    BUN 11  15  19    Creatinine 0.80  0.80  0.83    Sodium 133  130  132    Potassium 5.2  4.2  3.7    Chloride 96  96  98    Calcium 10.7  9.8  9.1    Total Protein 7.0      Albumin 4.8  4.2  4.1    Total Bilirubin 0.7  0.7  0.4    Alkaline Phosphatase 81  73  70    AST (SGOT) 18  18  19    ALT (SGPT) 13  12  13    WBC 4.79  4.77  8.48    Hemoglobin 13.1  11.8  12.3    Hematocrit 38.8  36.2  36.7    Platelets 242  236  184    Total Cholesterol 176      Triglycerides 62      HDL Cholesterol 103      LDL Cholesterol  61      Hemoglobin A1C 5.70                      Assessment and Plan     Diagnoses and all orders for this visit:    1. Dyslipidemia (Primary)  Comments:  Rosuvastatin 5 mg daily  Orders:  -     Urinalysis With Microscopic If Indicated (No Culture) - Urine, Clean Catch  -     TSH  -     T4, Free  -     T3, Free  -     Lipid Panel With / Chol / HDL Ratio  -     CBC & Differential  -     Comprehensive Metabolic Panel  -     Hemoglobin A1c  -     Vitamin D,25-Hydroxy  -     Vitamin B12  -     Osmolality, Serum    2. Essential hypertension  Comments:  Carvedilol 12.5 mg twice daily amlodipine 2.5 mg daily losartan 50 mg daily  Orders:  -     Urinalysis With Microscopic If Indicated (No Culture) - Urine, Clean Catch  -     TSH  -     T4, Free  -     T3, Free  -     Lipid Panel With / Chol / HDL Ratio  -     CBC & Differential  -     Comprehensive Metabolic Panel  -     Hemoglobin A1c  -     Vitamin D,25-Hydroxy  -     Vitamin B12  -     Osmolality, Serum    3. Elevated glucose  -     Urinalysis With Microscopic If Indicated (No Culture) - Urine, Clean Catch  -     TSH  -     T4, Free  -     T3, Free  -     Lipid Panel With / Chol / HDL Ratio  -     CBC & Differential  -     Comprehensive Metabolic Panel  -     Hemoglobin A1c  -     Vitamin D,25-Hydroxy  -     Vitamin B12  -     Osmolality, Serum    4. Acquired hypothyroidism  Comments:  Synthroid 25 mcg daily equivalent  Orders:  -     Urinalysis With Microscopic If Indicated (No Culture) - Urine, Clean Catch  -     TSH  -     T4, Free  -     T3, Free  -     Lipid Panel With / Chol / HDL Ratio  -     CBC & Differential  -     Comprehensive Metabolic Panel  -     Hemoglobin A1c  -     Vitamin D,25-Hydroxy  -     Vitamin B12  -     Osmolality, Serum    5. Vitamin D deficiency  -     Urinalysis With Microscopic If Indicated (No Culture) - Urine, Clean Catch  -     TSH  -     T4, Free  -     T3, Free  -     Lipid Panel With / Chol / HDL Ratio  -     CBC & Differential  -     Comprehensive Metabolic  Panel  -     Hemoglobin A1c  -     Vitamin D,25-Hydroxy  -     Vitamin B12  -     Osmolality, Serum    6. Hyponatremia  -     Urinalysis With Microscopic If Indicated (No Culture) - Urine, Clean Catch  -     TSH  -     T4, Free  -     T3, Free  -     Lipid Panel With / Chol / HDL Ratio  -     CBC & Differential  -     Comprehensive Metabolic Panel  -     Hemoglobin A1c  -     Vitamin D,25-Hydroxy  -     Vitamin B12  -     Osmolality, Serum    Addendum discussion about the need for Shingrix vaccination and also RSV vaccine discussed.         Follow Up     No follow-ups on file.  Patient was given instructions and counseling regarding her condition or for health maintenance advice. Please see specific information pulled into the AVS if appropriate.

## 2024-02-27 RX ORDER — AMLODIPINE BESYLATE 2.5 MG/1
TABLET ORAL
Qty: 90 TABLET | Refills: 1 | Status: SHIPPED | OUTPATIENT
Start: 2024-02-27

## 2024-03-05 LAB
25(OH)D3+25(OH)D2 SERPL-MCNC: 41.1 NG/ML (ref 30–100)
ALBUMIN SERPL-MCNC: 4.7 G/DL (ref 3.7–4.7)
ALBUMIN/GLOB SERPL: 1.9 {RATIO} (ref 1.2–2.2)
ALP SERPL-CCNC: 65 IU/L (ref 44–121)
ALT SERPL-CCNC: 14 IU/L (ref 0–32)
APPEARANCE UR: CLEAR
AST SERPL-CCNC: 22 IU/L (ref 0–40)
BACTERIA #/AREA URNS HPF: NORMAL /[HPF]
BASOPHILS # BLD AUTO: 0.1 X10E3/UL (ref 0–0.2)
BASOPHILS NFR BLD AUTO: 1 %
BILIRUB SERPL-MCNC: 0.6 MG/DL (ref 0–1.2)
BILIRUB UR QL STRIP: NEGATIVE
BUN SERPL-MCNC: 17 MG/DL (ref 8–27)
BUN/CREAT SERPL: 20 (ref 12–28)
CALCIUM SERPL-MCNC: 9.8 MG/DL (ref 8.7–10.3)
CASTS URNS QL MICRO: NORMAL /LPF
CHLORIDE SERPL-SCNC: 97 MMOL/L (ref 96–106)
CHOLEST SERPL-MCNC: 190 MG/DL (ref 100–199)
CHOLEST/HDLC SERPL: 1.7 RATIO (ref 0–4.4)
CO2 SERPL-SCNC: 23 MMOL/L (ref 20–29)
COLOR UR: YELLOW
CREAT SERPL-MCNC: 0.85 MG/DL (ref 0.57–1)
EGFRCR SERPLBLD CKD-EPI 2021: 68 ML/MIN/1.73
EOSINOPHIL # BLD AUTO: 0.1 X10E3/UL (ref 0–0.4)
EOSINOPHIL NFR BLD AUTO: 2 %
EPI CELLS #/AREA URNS HPF: NORMAL /HPF (ref 0–10)
ERYTHROCYTE [DISTWIDTH] IN BLOOD BY AUTOMATED COUNT: 13.2 % (ref 11.7–15.4)
GLOBULIN SER CALC-MCNC: 2.5 G/DL (ref 1.5–4.5)
GLUCOSE SERPL-MCNC: 108 MG/DL (ref 70–99)
GLUCOSE UR QL STRIP: NEGATIVE
HBA1C MFR BLD: 5.9 % (ref 4.8–5.6)
HCT VFR BLD AUTO: 40.7 % (ref 34–46.6)
HDLC SERPL-MCNC: 111 MG/DL
HGB BLD-MCNC: 13.6 G/DL (ref 11.1–15.9)
HGB UR QL STRIP: NEGATIVE
IMM GRANULOCYTES # BLD AUTO: 0 X10E3/UL (ref 0–0.1)
IMM GRANULOCYTES NFR BLD AUTO: 0 %
KETONES UR QL STRIP: NEGATIVE
LDLC SERPL CALC-MCNC: 70 MG/DL (ref 0–99)
LEUKOCYTE ESTERASE UR QL STRIP: ABNORMAL
LYMPHOCYTES # BLD AUTO: 1.5 X10E3/UL (ref 0.7–3.1)
LYMPHOCYTES NFR BLD AUTO: 26 %
MCH RBC QN AUTO: 30.9 PG (ref 26.6–33)
MCHC RBC AUTO-ENTMCNC: 33.4 G/DL (ref 31.5–35.7)
MCV RBC AUTO: 93 FL (ref 79–97)
MICRO URNS: ABNORMAL
MONOCYTES # BLD AUTO: 0.5 X10E3/UL (ref 0.1–0.9)
MONOCYTES NFR BLD AUTO: 10 %
MUCOUS THREADS URNS QL MICRO: PRESENT
NEUTROPHILS # BLD AUTO: 3.5 X10E3/UL (ref 1.4–7)
NEUTROPHILS NFR BLD AUTO: 61 %
NITRITE UR QL STRIP: NEGATIVE
OSMOLALITY SERPL: 277 MOSMOL/KG (ref 280–301)
PH UR STRIP: 6.5 [PH] (ref 5–7.5)
PLATELET # BLD AUTO: 263 X10E3/UL (ref 150–450)
POTASSIUM SERPL-SCNC: 5.3 MMOL/L (ref 3.5–5.2)
PROT SERPL-MCNC: 7.2 G/DL (ref 6–8.5)
PROT UR QL STRIP: NEGATIVE
RBC # BLD AUTO: 4.4 X10E6/UL (ref 3.77–5.28)
RBC #/AREA URNS HPF: NORMAL /HPF (ref 0–2)
SODIUM SERPL-SCNC: 133 MMOL/L (ref 134–144)
SP GR UR STRIP: 1.01 (ref 1–1.03)
T3FREE SERPL-MCNC: 2.5 PG/ML (ref 2–4.4)
T4 FREE SERPL-MCNC: 1.25 NG/DL (ref 0.82–1.77)
TRIGL SERPL-MCNC: 42 MG/DL (ref 0–149)
TSH SERPL DL<=0.005 MIU/L-ACNC: 2.46 UIU/ML (ref 0.45–4.5)
UROBILINOGEN UR STRIP-MCNC: 0.2 MG/DL (ref 0.2–1)
VIT B12 SERPL-MCNC: 497 PG/ML (ref 232–1245)
VLDLC SERPL CALC-MCNC: 9 MG/DL (ref 5–40)
WBC # BLD AUTO: 5.7 X10E3/UL (ref 3.4–10.8)
WBC #/AREA URNS HPF: NORMAL /HPF (ref 0–5)

## 2024-03-20 ENCOUNTER — HOSPITAL ENCOUNTER (OUTPATIENT)
Dept: BONE DENSITY | Facility: HOSPITAL | Age: 84
Discharge: HOME | End: 2024-03-20
Admitting: OBSTETRICS & GYNECOLOGY
Payer: MEDICARE

## 2024-03-20 DIAGNOSIS — M81.0 MENOPAUSAL OSTEOPOROSIS: ICD-10-CM

## 2024-03-20 PROCEDURE — 77080 DXA BONE DENSITY AXIAL: CPT

## 2024-03-21 DIAGNOSIS — E87.5 HYPERKALEMIA: Primary | ICD-10-CM

## 2024-03-24 DIAGNOSIS — E87.5 HYPERKALEMIA: Primary | ICD-10-CM

## 2024-03-25 DIAGNOSIS — E87.5 HYPERKALEMIA: ICD-10-CM

## 2024-03-27 LAB
BUN SERPL-MCNC: 15 MG/DL (ref 8–27)
BUN/CREAT SERPL: 20 (ref 12–28)
CALCIUM SERPL-MCNC: 9.8 MG/DL (ref 8.7–10.3)
CHLORIDE SERPL-SCNC: 95 MMOL/L (ref 96–106)
CO2 SERPL-SCNC: 24 MMOL/L (ref 20–29)
CREAT SERPL-MCNC: 0.74 MG/DL (ref 0.57–1)
EGFRCR SERPLBLD CKD-EPI 2021: 80 ML/MIN/1.73
GLUCOSE SERPL-MCNC: 108 MG/DL (ref 70–99)
POTASSIUM SERPL-SCNC: 4.7 MMOL/L (ref 3.5–5.2)
SODIUM SERPL-SCNC: 132 MMOL/L (ref 134–144)

## 2024-04-23 RX ORDER — LOSARTAN POTASSIUM 50 MG/1
TABLET ORAL
Qty: 180 TABLET | Refills: 0 | Status: SHIPPED | OUTPATIENT
Start: 2024-04-23

## 2024-04-23 RX ORDER — LEVOTHYROXINE SODIUM 0.03 MG/1
TABLET ORAL
Qty: 90 TABLET | Refills: 1 | Status: SHIPPED | OUTPATIENT
Start: 2024-04-23

## 2024-05-17 ENCOUNTER — TELEPHONE (OUTPATIENT)
Dept: INTERNAL MEDICINE | Facility: CLINIC | Age: 84
End: 2024-05-17
Payer: MEDICARE

## 2024-05-17 NOTE — TELEPHONE ENCOUNTER
Caller: [facility]    Relationship: Other    Best call back number: [phone]    What is the best time to reach you: ANYTIME     Who are you requesting to speak with (clinical staff, provider,  specific staff member): CLINICAL     What was the call regarding: [facility] IS CALLING TO SEE IF THE FAX WAS RECEIVED FOR A LAB ORDER TO HAVE SIGNED AND SENT BACK.     PLEASE ADVISE

## 2024-05-22 ENCOUNTER — TELEPHONE (OUTPATIENT)
Dept: INTERNAL MEDICINE | Facility: CLINIC | Age: 84
End: 2024-05-22
Payer: MEDICARE

## 2024-05-22 NOTE — TELEPHONE ENCOUNTER
"Got a call today from [employer] and ask about an order.  We have not recived anything.  I also ask patient if she knows anything about it and she said that she does not want any of these test rowena.  She did not request anything they just called her.  If "[employer] calls back patient is not interested in these test. Hub please inform patient Hub please inform patient Relay     \"If "[employer] calls back patient is not interested in these test. \"                 "

## 2024-05-23 NOTE — TELEPHONE ENCOUNTER
Name: MARCELLO MONTGOMERY    Relationship:       HUB PROVIDED THE RELAY MESSAGE FROM THE OFFICE   PATIENT VOICED UNDERSTANDING AND HAS NO FURTHER QUESTIONS AT THIS TIME    ADDITIONAL INFORMATION:

## 2024-05-31 DIAGNOSIS — I10 ESSENTIAL HYPERTENSION: ICD-10-CM

## 2024-05-31 RX ORDER — CARVEDILOL 12.5 MG/1
12.5 TABLET ORAL 2 TIMES DAILY WITH MEALS
Qty: 180 TABLET | Refills: 0 | Status: SHIPPED | OUTPATIENT
Start: 2024-05-31

## 2024-06-17 ENCOUNTER — TELEPHONE (OUTPATIENT)
Dept: INTERNAL MEDICINE | Facility: CLINIC | Age: 84
End: 2024-06-17
Payer: MEDICARE

## 2024-06-17 NOTE — TELEPHONE ENCOUNTER
Provider: DR SALAZAR    Caller: TRENTON    Relationship to Patient: [employer]        Phone Number: [phone]    Reason for Call: TRENTON WITH [employer] IS CALLING TO CHECK IF WE RECEIVED A FORM THAT WAS FAXED TODAY, FOR DR SALAZAR TO SIGN OFF ON.    PLEASE ADVISE.

## 2024-06-21 ENCOUNTER — TELEPHONE (OUTPATIENT)
Dept: INTERNAL MEDICINE | Facility: CLINIC | Age: 84
End: 2024-06-21
Payer: MEDICARE

## 2024-06-21 NOTE — TELEPHONE ENCOUNTER
Caller: TRENTON - [employer]    Relationship: Other    Best call back number: [phone]     What was the call regarding: TRENTON WITH [employer] STATES HE FAXED A FORM ON 06/17 AND AGAIN ON 06/20. HE IS CALLING TO CHECK IF IT HAS BEEN RECEIVED. PLEASE ADVISE.

## 2024-06-24 NOTE — TELEPHONE ENCOUNTER
Name: TRENTON      Relationship: Other      Best Callback Number: [phone]      HUB PROVIDED THE RELAY MESSAGE FROM THE OFFICE      PATIENT: VOICED UNDERSTANDING AND HAS NO FURTHER QUESTIONS AT THIS TIME    ADDITIONAL INFORMATION: N/A

## 2024-07-16 ENCOUNTER — OFFICE VISIT (OUTPATIENT)
Dept: OBSTETRICS AND GYNECOLOGY | Facility: CLINIC | Age: 84
End: 2024-07-16
Payer: MEDICARE

## 2024-07-16 VITALS — SYSTOLIC BLOOD PRESSURE: 160 MMHG | BODY MASS INDEX: 24.69 KG/M2 | DIASTOLIC BLOOD PRESSURE: 90 MMHG | WEIGHT: 153 LBS

## 2024-07-16 DIAGNOSIS — N63.21 MASS OF UPPER OUTER QUADRANT OF LEFT BREAST: Primary | ICD-10-CM

## 2024-07-16 PROCEDURE — 3077F SYST BP >= 140 MM HG: CPT | Performed by: OBSTETRICS & GYNECOLOGY

## 2024-07-16 PROCEDURE — 3080F DIAST BP >= 90 MM HG: CPT | Performed by: OBSTETRICS & GYNECOLOGY

## 2024-07-16 PROCEDURE — 1159F MED LIST DOCD IN RCRD: CPT | Performed by: OBSTETRICS & GYNECOLOGY

## 2024-07-16 PROCEDURE — 99213 OFFICE O/P EST LOW 20 MIN: CPT | Performed by: OBSTETRICS & GYNECOLOGY

## 2024-07-16 PROCEDURE — 1160F RVW MEDS BY RX/DR IN RCRD: CPT | Performed by: OBSTETRICS & GYNECOLOGY

## 2024-07-16 NOTE — PROGRESS NOTES
HPI   Moriah Petty  is a 83 y.o. female who presents for evaluation of left breast pain.  It has been present for approximately 10 days.  The patient was initially concerned that there could have been trauma to cause this.  She leaned over sideways in the chair and bumped the left side of her rib cage.  While this healed, the pain did not resolve.  It is not severe.  She describes it as a dull ache.  She had a breast biopsy in 2018, which was benign.  Does not have a family history of breast cancer.  Denies itching or sweats.  Denies unexplained weight change.    Chief Complaint   Patient presents with    Breast Problem     Towards center of breast for about a week       Past Medical History:   Diagnosis Date    Abnormal electrocardiogram 03/04/2017    Adenomatous polyp of colon 11/06/2017    Anemia     Bursitis of elbow     RIGHT ELBOW    Carotid stenosis     LESS THAN 50%, CAROTID ULTRASOUND AT [facility] 2016    surgeries - May & June 2016    Cholelithiasis     Colitis     Colostomy in place     Depression     Diverticulosis     GERD (gastroesophageal reflux disease)     History of blood transfusion 11/2013    History of trigeminal neuralgia     Hyperlipidemia     Hypertension     Hypothyroidism     Insomnia     Neuromuscular disorder     OA (osteoarthritis)     Osteopenia     Osteoporosis     Pneumonia     PVC (premature ventricular contraction)     PVC's (premature ventricular contractions)     Sleep apnea     DOES NOT WEAR CPAP       Past Surgical History:   Procedure Laterality Date    BRAIN SURGERY  08/2014    MVD surgery - trigeminal neuralgia    BREAST BIOPSY Left 05/10/2018    Ultrasound-guided mammotome vacuum assisted left breast biopsy with placement of metallic clip (path: fragments of fibroepithelial lesion, consistent with fibroadenoma)-Dr. Sree Glass, [facility]    BRONCHOSCOPY N/A 08/29/2001    Dr. Christiano Hortno    CARDIAC CATHETERIZATION  03/2002    CARDIAC CATHETERIZATION Left  11/28/2012    Dr. Anibal Trujillo    CHOLECYSTECTOMY N/A 11/20/2013    DR. ESME GOLDMAN    COLON SURGERY  12/5/2018, 6/1/2019    colostomy    COLONOSCOPY N/A 02/21/2012    INT/EXT HEMORRHOIDS, DIVERTICULOSIS, 2 HYPERPLASTIC POLYPS, TORT (path: Rectum hyperplastic polyp)-Dr. Morgan Collins    COLONOSCOPY N/A 04/24/2018    NTEH, Diverticulosis, tortuous colon, IH.  No specimens collected     COLONOSCOPY N/A 02/27/2015    Non-thrombosed external hemorrhoids found on perianal exam, diverticulosis in the sigmoid and descending colon, tortuous colon, normal ileum, internal hemorrhoids-Dr. Morgan Collins    COLONOSCOPY N/A 07/09/2020    Procedure: COLONOSCOPY into cecum with cold biopsy polypectomy x1;  Surgeon: Emanuel Clark MD;  Location: [facility] ENDOSCOPY;  Service: General;  Laterality: N/A;  Pre op: Diverticulitis, Family history of colon cancer  Post op: Polyp    COLONOSCOPY W/ POLYPECTOMY N/A 05/06/2004    Diverticulosis, small recto-sigmoid polyps, otherwise normal (path: Colon at 20 cm hyperplastic polyp) -Dr. Morgan Collins    COLOSTOMY CLOSURE N/A 05/28/2019    Procedure: COLOSTOMY TAKEDOWN;  Surgeon: Emanuel Clark MD;  Location: [facility] OR;  Service: General    EXPLORATORY LAPAROTOMY N/A 12/05/2018    Procedure: exploratory laparotomy with sigmoid resection, possible colostomy, APPENDECTOMY;  Surgeon: Emanuel Clark MD;  Location: [facility] MAIN OR;  Service: General    EYE SURGERY  05/2016    cataracts removed    FRACTURE SURGERY  02/14/2004    partial hip replacement    JOINT REPLACEMENT      KYPHOPLASTY N/A 12/21/2012    COMPRESSION FX OF L2    TOTAL HIP ARTHROPLASTY Left 03/16/2004    Left bipolar hip replacement-Dr. Chioma Burch    TOTAL KNEE ARTHROPLASTY Right 11/13/2013    Dr. Reza Longoria    TRIGEMINAL NERVE DECOMPRESSION  2014    LEFT EAR        Social History     Socioeconomic History    Marital status:    Tobacco Use    Smoking status: Former     Current packs/day: 0.00      Average packs/day: 0.5 packs/day for 32.3 years (16.1 ttl pk-yrs)     Types: Cigarettes     Start date: 1957     Quit date: 1989     Years since quittin.2    Smokeless tobacco: Never    Tobacco comments:     2 cups coffee daily   Vaping Use    Vaping status: Never Used   Substance and Sexual Activity    Alcohol use: Yes     Alcohol/week: 2.0 standard drinks of alcohol     Types: 2 Shots of liquor per week     Comment: 2 DRINKS PER DAY    Drug use: No    Sexual activity: Not Currently     Partners: Male     Birth control/protection: Post-menopausal, Birth control pill       The following portions of the patient's history were reviewed and updated as appropriate: allergies, current medications, past family history, past medical history, past social history, past surgical history and problem list.    Review of Systems     This is positive for left breast pain.  It is negative for nipple discharge.  It is negative for unexplained weight loss.  Negative for itching or sweats.    Physical Exam  Vitals and nursing note reviewed.   Constitutional:       Appearance: Normal appearance.   Chest:      Comments: There is a 2 cm rubbery lesion in the left upper outer quadrant.  This is mobile and tender.  There is no induration or erythema.  There are no palpable lumps on the right breast.  Nipple discharge and axillary adenopathy are absent bilaterally.  Neurological:      Mental Status: She is alert and oriented to person, place, and time.         Assessment    Diagnoses and all orders for this visit:    1. Mass of upper outer quadrant of left breast (Primary)  -     Mammo Diagnostic Left With CAD; Future        Plan  Tender and painful lump in the left breast.  Counseled and questions answered.  We discussed possible causes of this as well as my recommendation for workup.  I recommend diagnostic breast imaging which may also include a breast ultrasound.  Further recommendations pending the results of the  studies.  The patient agrees with this recommendation.    No follow-ups on file.    Social History     Tobacco Use   Smoking Status Former    Current packs/day: 0.00    Average packs/day: 0.5 packs/day for 32.3 years (16.1 ttl pk-yrs)    Types: Cigarettes    Start date: 1957    Quit date: 1989    Years since quittin.2   Smokeless Tobacco Never   Tobacco Comments    2 cups coffee daily

## 2024-07-18 ENCOUNTER — TELEPHONE (OUTPATIENT)
Dept: OBSTETRICS AND GYNECOLOGY | Facility: CLINIC | Age: 84
End: 2024-07-18
Payer: MEDICARE

## 2024-07-18 NOTE — TELEPHONE ENCOUNTER
PT IS CALLING BACK REQUESTING A CALL BACK ON HER DIAGNOSTIC MAMMOGRAM     PT STATES A KINDRA SHOULD BE CALLING HER

## 2024-07-18 NOTE — TELEPHONE ENCOUNTER
Pt says she is supposed to be getting diagnostic mammogram and possible breast US, but has not heard anything, do you help with scheduling this?    Looks like she has mammogram here in aug does she need to keep this?  thanks

## 2024-07-22 NOTE — TELEPHONE ENCOUNTER
Referral set for scheduling at [facility].  [facility] will call pt in the next 3-5 business days to schedule, or pt may reach their office at [phone]

## 2024-07-23 RX ORDER — LOSARTAN POTASSIUM 50 MG/1
TABLET ORAL
Qty: 180 TABLET | Refills: 0 | Status: SHIPPED | OUTPATIENT
Start: 2024-07-23

## 2024-07-23 RX ORDER — ROSUVASTATIN CALCIUM 5 MG/1
TABLET, COATED ORAL
Qty: 90 TABLET | Refills: 1 | Status: SHIPPED | OUTPATIENT
Start: 2024-07-23

## 2024-07-24 DIAGNOSIS — N64.4 BREAST PAIN, LEFT: Primary | ICD-10-CM

## 2024-07-24 NOTE — TELEPHONE ENCOUNTER
Pt called C and is scheduled on 7/29/24 @ 115 & 2pm for DX Bilat Mammo & left Limited .  Appt for screening in office was cancelled.   General

## 2024-07-29 ENCOUNTER — APPOINTMENT (OUTPATIENT)
Dept: WOMENS IMAGING | Facility: HOSPITAL | Age: 84
End: 2024-07-29
Payer: MEDICARE

## 2024-07-29 PROCEDURE — G0279 TOMOSYNTHESIS, MAMMO: HCPCS | Performed by: RADIOLOGY

## 2024-07-29 PROCEDURE — 77066 DX MAMMO INCL CAD BI: CPT | Performed by: RADIOLOGY

## 2024-07-29 PROCEDURE — 76642 ULTRASOUND BREAST LIMITED: CPT | Performed by: RADIOLOGY

## 2024-08-05 ENCOUNTER — TELEPHONE (OUTPATIENT)
Dept: OBSTETRICS AND GYNECOLOGY | Facility: CLINIC | Age: 84
End: 2024-08-05
Payer: MEDICARE

## 2024-08-05 DIAGNOSIS — N64.4 BREAST PAIN, LEFT: ICD-10-CM

## 2024-08-05 NOTE — TELEPHONE ENCOUNTER
Pt called in to get her results from recent DX Mammo & US.  I reviewed results with pt and she was relieved.  Will return in 1 yr for screening mammogram.

## 2024-08-29 DIAGNOSIS — I10 ESSENTIAL HYPERTENSION: ICD-10-CM

## 2024-08-29 RX ORDER — AMLODIPINE BESYLATE 2.5 MG/1
TABLET ORAL
Qty: 90 TABLET | Refills: 1 | Status: SHIPPED | OUTPATIENT
Start: 2024-08-29

## 2024-08-29 RX ORDER — CARVEDILOL 12.5 MG/1
12.5 TABLET ORAL 2 TIMES DAILY WITH MEALS
Qty: 180 TABLET | Refills: 0 | Status: SHIPPED | OUTPATIENT
Start: 2024-08-29

## 2024-09-09 ENCOUNTER — LAB (OUTPATIENT)
Facility: HOSPITAL | Age: 84
End: 2024-09-09
Payer: MEDICARE

## 2024-09-09 DIAGNOSIS — E53.8 B12 DEFICIENCY: ICD-10-CM

## 2024-09-09 DIAGNOSIS — E03.9 ACQUIRED HYPOTHYROIDISM: ICD-10-CM

## 2024-09-09 DIAGNOSIS — I10 ESSENTIAL HYPERTENSION: Primary | ICD-10-CM

## 2024-09-09 DIAGNOSIS — E87.5 HYPERKALEMIA: ICD-10-CM

## 2024-09-09 DIAGNOSIS — E55.9 VITAMIN D DEFICIENCY: ICD-10-CM

## 2024-09-09 DIAGNOSIS — R73.09 ELEVATED GLUCOSE: ICD-10-CM

## 2024-09-09 DIAGNOSIS — E78.5 DYSLIPIDEMIA: ICD-10-CM

## 2024-09-09 LAB
25(OH)D3 SERPL-MCNC: 52.6 NG/ML (ref 30–100)
ALBUMIN SERPL-MCNC: 4.4 G/DL (ref 3.5–5.2)
ALBUMIN/GLOB SERPL: 1.5 G/DL
ALP SERPL-CCNC: 58 U/L (ref 39–117)
ALT SERPL W P-5'-P-CCNC: 10 U/L (ref 1–33)
ANION GAP SERPL CALCULATED.3IONS-SCNC: 10.1 MMOL/L (ref 5–15)
AST SERPL-CCNC: 14 U/L (ref 1–32)
BACTERIA UR QL AUTO: ABNORMAL /HPF
BASOPHILS # BLD AUTO: 0.06 10*3/MM3 (ref 0–0.2)
BASOPHILS NFR BLD AUTO: 1.2 % (ref 0–1.5)
BILIRUB SERPL-MCNC: 0.7 MG/DL (ref 0–1.2)
BILIRUB UR QL STRIP: NEGATIVE
BUN SERPL-MCNC: 13 MG/DL (ref 8–23)
BUN/CREAT SERPL: 17.3 (ref 7–25)
CALCIUM SPEC-SCNC: 9.8 MG/DL (ref 8.6–10.5)
CHLORIDE SERPL-SCNC: 97 MMOL/L (ref 98–107)
CHOLEST SERPL-MCNC: 181 MG/DL (ref 0–200)
CLARITY UR: CLEAR
CO2 SERPL-SCNC: 25.9 MMOL/L (ref 22–29)
COLOR UR: YELLOW
CREAT SERPL-MCNC: 0.75 MG/DL (ref 0.57–1)
DEPRECATED RDW RBC AUTO: 43.1 FL (ref 37–54)
EGFRCR SERPLBLD CKD-EPI 2021: 78.6 ML/MIN/1.73
EOSINOPHIL # BLD AUTO: 0.13 10*3/MM3 (ref 0–0.4)
EOSINOPHIL NFR BLD AUTO: 2.5 % (ref 0.3–6.2)
ERYTHROCYTE [DISTWIDTH] IN BLOOD BY AUTOMATED COUNT: 12.7 % (ref 12.3–15.4)
GLOBULIN UR ELPH-MCNC: 2.9 GM/DL
GLUCOSE SERPL-MCNC: 93 MG/DL (ref 65–99)
GLUCOSE UR STRIP-MCNC: NEGATIVE MG/DL
HBA1C MFR BLD: 6 % (ref 4.8–5.6)
HCT VFR BLD AUTO: 39 % (ref 34–46.6)
HDLC SERPL QL: 1.76
HDLC SERPL-MCNC: 103 MG/DL (ref 40–60)
HGB BLD-MCNC: 13.2 G/DL (ref 12–15.9)
HGB UR QL STRIP.AUTO: NEGATIVE
HYALINE CASTS UR QL AUTO: ABNORMAL /LPF
IMM GRANULOCYTES # BLD AUTO: 0.02 10*3/MM3 (ref 0–0.05)
IMM GRANULOCYTES NFR BLD AUTO: 0.4 % (ref 0–0.5)
KETONES UR QL STRIP: NEGATIVE
LDLC SERPL CALC-MCNC: 69 MG/DL (ref 0–100)
LEUKOCYTE ESTERASE UR QL STRIP.AUTO: ABNORMAL
LYMPHOCYTES # BLD AUTO: 1.29 10*3/MM3 (ref 0.7–3.1)
LYMPHOCYTES NFR BLD AUTO: 25 % (ref 19.6–45.3)
MCH RBC QN AUTO: 31.4 PG (ref 26.6–33)
MCHC RBC AUTO-ENTMCNC: 33.8 G/DL (ref 31.5–35.7)
MCV RBC AUTO: 92.9 FL (ref 79–97)
MONOCYTES # BLD AUTO: 0.6 10*3/MM3 (ref 0.1–0.9)
MONOCYTES NFR BLD AUTO: 11.7 % (ref 5–12)
NEUTROPHILS NFR BLD AUTO: 3.05 10*3/MM3 (ref 1.7–7)
NEUTROPHILS NFR BLD AUTO: 59.2 % (ref 42.7–76)
NITRITE UR QL STRIP: NEGATIVE
NRBC BLD AUTO-RTO: 0 /100 WBC (ref 0–0.2)
PH UR STRIP.AUTO: 7.5 [PH] (ref 5–8)
PLATELET # BLD AUTO: 261 10*3/MM3 (ref 140–450)
PMV BLD AUTO: 11.2 FL (ref 6–12)
POTASSIUM SERPL-SCNC: 4.4 MMOL/L (ref 3.5–5.2)
PROT SERPL-MCNC: 7.3 G/DL (ref 6–8.5)
PROT UR QL STRIP: ABNORMAL
RBC # BLD AUTO: 4.2 10*6/MM3 (ref 3.77–5.28)
RBC # UR STRIP: ABNORMAL /HPF
REF LAB TEST METHOD: ABNORMAL
SODIUM SERPL-SCNC: 133 MMOL/L (ref 136–145)
SP GR UR STRIP: 1.02 (ref 1–1.03)
SQUAMOUS #/AREA URNS HPF: ABNORMAL /HPF
T3FREE SERPL-MCNC: 2.74 PG/ML (ref 2–4.4)
T4 FREE SERPL-MCNC: 1.3 NG/DL (ref 0.92–1.68)
TRIGL SERPL-MCNC: 44 MG/DL (ref 0–150)
TSH SERPL DL<=0.05 MIU/L-ACNC: 2.47 UIU/ML (ref 0.27–4.2)
UROBILINOGEN UR QL STRIP: ABNORMAL
VIT B12 BLD-MCNC: 532 PG/ML (ref 211–946)
VLDLC SERPL-MCNC: 9 MG/DL (ref 5–40)
WBC # UR STRIP: ABNORMAL /HPF
WBC NRBC COR # BLD AUTO: 5.15 10*3/MM3 (ref 3.4–10.8)

## 2024-09-09 PROCEDURE — 82607 VITAMIN B-12: CPT | Performed by: FAMILY MEDICINE

## 2024-09-09 PROCEDURE — 84481 FREE ASSAY (FT-3): CPT | Performed by: FAMILY MEDICINE

## 2024-09-09 PROCEDURE — 84443 ASSAY THYROID STIM HORMONE: CPT | Performed by: FAMILY MEDICINE

## 2024-09-09 PROCEDURE — 83036 HEMOGLOBIN GLYCOSYLATED A1C: CPT | Performed by: FAMILY MEDICINE

## 2024-09-09 PROCEDURE — 80061 LIPID PANEL: CPT | Performed by: FAMILY MEDICINE

## 2024-09-09 PROCEDURE — 80053 COMPREHEN METABOLIC PANEL: CPT | Performed by: FAMILY MEDICINE

## 2024-09-09 PROCEDURE — 82306 VITAMIN D 25 HYDROXY: CPT | Performed by: FAMILY MEDICINE

## 2024-09-09 PROCEDURE — 85025 COMPLETE CBC W/AUTO DIFF WBC: CPT | Performed by: FAMILY MEDICINE

## 2024-09-09 PROCEDURE — 36415 COLL VENOUS BLD VENIPUNCTURE: CPT | Performed by: FAMILY MEDICINE

## 2024-09-09 PROCEDURE — 84439 ASSAY OF FREE THYROXINE: CPT | Performed by: FAMILY MEDICINE

## 2024-09-09 PROCEDURE — 81001 URINALYSIS AUTO W/SCOPE: CPT | Performed by: FAMILY MEDICINE

## 2024-09-10 ENCOUNTER — OFFICE VISIT (OUTPATIENT)
Dept: INTERNAL MEDICINE | Facility: CLINIC | Age: 84
End: 2024-09-10
Payer: MEDICARE

## 2024-09-10 VITALS
BODY MASS INDEX: 22.76 KG/M2 | DIASTOLIC BLOOD PRESSURE: 84 MMHG | HEART RATE: 59 BPM | OXYGEN SATURATION: 98 % | SYSTOLIC BLOOD PRESSURE: 152 MMHG | WEIGHT: 141 LBS

## 2024-09-10 DIAGNOSIS — R10.9 ACUTE LEFT FLANK PAIN: Primary | ICD-10-CM

## 2024-09-10 DIAGNOSIS — E78.89 ELEVATED HDL: ICD-10-CM

## 2024-09-10 DIAGNOSIS — M48.54XA COMPRESSION FRACTURE OF THORACIC SPINE, NON-TRAUMATIC, INITIAL ENCOUNTER: ICD-10-CM

## 2024-09-10 DIAGNOSIS — K21.9 GASTROESOPHAGEAL REFLUX DISEASE WITHOUT ESOPHAGITIS: ICD-10-CM

## 2024-09-10 DIAGNOSIS — E78.00 ELEVATED CHOLESTEROL: ICD-10-CM

## 2024-09-10 DIAGNOSIS — Z00.00 MEDICARE ANNUAL WELLNESS VISIT, SUBSEQUENT: ICD-10-CM

## 2024-09-10 DIAGNOSIS — E03.9 ACQUIRED HYPOTHYROIDISM: ICD-10-CM

## 2024-09-10 PROCEDURE — 3079F DIAST BP 80-89 MM HG: CPT | Performed by: FAMILY MEDICINE

## 2024-09-10 PROCEDURE — 3077F SYST BP >= 140 MM HG: CPT | Performed by: FAMILY MEDICINE

## 2024-09-10 PROCEDURE — 99214 OFFICE O/P EST MOD 30 MIN: CPT | Performed by: FAMILY MEDICINE

## 2024-09-10 PROCEDURE — 1160F RVW MEDS BY RX/DR IN RCRD: CPT | Performed by: FAMILY MEDICINE

## 2024-09-10 PROCEDURE — 1126F AMNT PAIN NOTED NONE PRSNT: CPT | Performed by: FAMILY MEDICINE

## 2024-09-10 PROCEDURE — G0439 PPPS, SUBSEQ VISIT: HCPCS | Performed by: FAMILY MEDICINE

## 2024-09-10 PROCEDURE — 1159F MED LIST DOCD IN RCRD: CPT | Performed by: FAMILY MEDICINE

## 2024-09-10 RX ORDER — AMOXICILLIN AND CLAVULANATE POTASSIUM 500; 125 MG/1; MG/1
1 TABLET, FILM COATED ORAL 2 TIMES DAILY
Qty: 20 TABLET | Refills: 0 | Status: SHIPPED | OUTPATIENT
Start: 2024-09-10

## 2024-09-10 NOTE — PATIENT INSTRUCTIONS
Medicare Wellness  Personal Prevention Plan of Service     Date of Office Visit:    Encounter Provider:  Jarred Carrizales MD  Place of Service:  [facility]  Patient Name: Moriah Petty  :  1940    As part of the Medicare Wellness portion of your visit today, we are providing you with this personalized preventive plan of services (PPPS). This plan is based upon recommendations of the [place] Preventive Services Task Force (USPSTF) and the Advisory Committee on Immunization Practices (ACIP).    This lists the preventive care services that should be considered, and provides dates of when you are due. Items listed as completed are up-to-date and do not require any further intervention.    Health Maintenance   Topic Date Due    TDAP/TD VACCINES (1 - Tdap) Never done    ZOSTER VACCINE (1 of 2) Never done    RSV Vaccine - Adults (1 - 1-dose 60+ series) Never done    INFLUENZA VACCINE  2024    COLORECTAL CANCER SCREENING  2025    LIPID PANEL  2025    ANNUAL WELLNESS VISIT  09/10/2025    DXA SCAN  2026    COVID-19 Vaccine  Completed    Pneumococcal Vaccine 65+  Completed       No orders of the defined types were placed in this encounter.      No follow-ups on file.

## 2024-09-10 NOTE — PROGRESS NOTES
Subjective   The ABCs of the Annual Wellness Visit  Medicare Wellness Visit      Moriah Petty is a 83 y.o. patient who presents for a Medicare Wellness Visit.    The following portions of the patient's history were reviewed and   updated as appropriate: allergies, current medications, past family history, past medical history, past social history, past surgical history, and problem list.    Compared to one year ago, the patient's physical   health is the same.  Compared to one year ago, the patient's mental   health is the same.    Recent Hospitalizations:  She was not admitted to the hospital during the last year.     Current Medical Providers:  Patient Care Team:  Jarred Carrizales MD as PCP - General (Family Medicine)  Anibal Trujillo Jr., MD (Cardiology)  Gordon Brewster MD as Consulting Physician (Obstetrics and Gynecology)    Outpatient Medications Prior to Visit   Medication Sig Dispense Refill   • amLODIPine (NORVASC) 2.5 MG tablet TAKE 1 TABLET BY MOUTH EVERY DAY 90 tablet 1   • Calcium Carb-Cholecalciferol (CALCIUM + D3) 600-200 MG-UNIT tablet Take 1 tablet by mouth 2 (Two) Times a Day.     • carvedilol (COREG) 12.5 MG tablet TAKE 1 TABLET BY MOUTH TWICE A DAY WITH FOOD 180 tablet 0   • levothyroxine (SYNTHROID, LEVOTHROID) 25 MCG tablet TAKE 1 TABLET BY MOUTH EVERY DAY 90 tablet 1   • losartan (COZAAR) 50 MG tablet TAKE 1 TABLET BY MOUTH TWICE A  tablet 0   • omeprazole (priLOSEC) 20 MG capsule TAKE 1 CAPSULE BY MOUTH EVERY DAY (Patient taking differently: Take 1 capsule by mouth Every Other Day.) 90 capsule 3   • polyethylene glycol (MIRALAX) 17 GM/SCOOP powder Take 17 g by mouth Daily. 289 g 5   • rosuvastatin (CRESTOR) 5 MG tablet TAKE 1 TABLET BY MOUTH EVERY DAY 90 tablet 1   • triazolam (HALCION) 0.25 MG tablet      • zoledronic acid (RECLAST) 5 MG/100ML solution infusion Infuse 100 mL into a venous catheter 1 (One) Time.       No facility-administered medications prior to visit.     No opioid  medication identified on active medication list. I have reviewed chart for other potential  high risk medication/s and harmful drug interactions in the elderly.      Aspirin is not on active medication list.  Aspirin use is not indicated based on review of current medical condition/s. Risk of harm outweighs potential benefits.  .    Patient Active Problem List   Diagnosis   • Abnormal electrocardiogram   • Non-specific colitis   • Acute post-traumatic headache   • Postoperative anemia due to acute blood loss   • Hand arthritis   • Hematochezia   • Chest pain   • Compression fracture of lumbar vertebra   • Closed wedge compression fracture of second lumbar vertebra   • Constipation   • Dizziness   • Dyslipidemia   • Fatigue   • Ventricular premature beats   • Gastroesophageal reflux disease without esophagitis   • Essential hypertension   • Insomnia   • Left lower quadrant pain   • Low back pain   • Osteoporosis   • Premature atrial contraction   • Peripheral nerve disease   • Pneumonia   • Nausea   • Sensorineural hearing loss   • Shortness of breath   • Sinus bradycardia   • Sleep apnea   • Disorder of thyroid   • Trigeminal neuralgia   • Rectal bleeding   • Family history of colon cancer   • Adenomatous polyp of colon   • Hypothyroidism   • Osteoporosis, post-menopausal   • Diverticulitis of large intestine with abscess without bleeding   • Mild malnutrition   • Elevated cholesterol   • History of colon polyps   • Diverticulitis   • Meningioma, cerebral   • Cervical disc disorder with radiculopathy   • Closed comminuted intertrochanteric fracture of proximal end of left femur   • Alcohol use   • Elevated glucose    • Vitamin D deficiency   • Compression fracture of thoracic spine, non-traumatic, initial encounter   • Compression fracture of T8 vertebra with delayed healing     Advance Care Planning Advance Directive is on file.  ACP discussion was held with the patient during this visit. Patient has an advance  "directive in EMR which is still valid.             Objective   Vitals:    09/10/24 1537   BP: 152/84   BP Location: Left arm   Patient Position: Sitting   Cuff Size: Adult   Pulse: 59   SpO2: 98%   Weight: 64 kg (141 lb)       Estimated body mass index is 22.76 kg/m² as calculated from the following:    Height as of 23: 167.6 cm (66\").    Weight as of this encounter: 64 kg (141 lb).    BMI is within normal parameters. No other follow-up for BMI required.       Does the patient have evidence of cognitive impairment? No  Lab Results   Component Value Date    TRIG 44 2024     (H) 2024    LDL 69 2024    VLDL 9 2024    HGBA1C 6.00 (H) 2024                                                                                                Health  Risk Assessment    Smoking Status:  Social History     Tobacco Use   Smoking Status Former   • Current packs/day: 0.00   • Average packs/day: 0.5 packs/day for 32.3 years (16.1 ttl pk-yrs)   • Types: Cigarettes   • Start date: 1957   • Quit date: 1989   • Years since quittin.3   Smokeless Tobacco Never   Tobacco Comments    2 cups coffee daily     Alcohol Consumption:  Social History     Substance and Sexual Activity   Alcohol Use Yes   • Alcohol/week: 2.0 standard drinks of alcohol   • Types: 2 Shots of liquor per week    Comment: 2 DRINKS PER DAY       Fall Risk Screen  SAVADI Fall Risk Assessment was completed, and patient is at LOW risk for falls.Assessment completed on:9/10/2024    Depression Screenin/10/2024     3:41 PM   PHQ-2/PHQ-9 Depression Screening   Little Interest or Pleasure in Doing Things 0-->not at all   Feeling Down, Depressed or Hopeless 0-->not at all   PHQ-9: Brief Depression Severity Measure Score 0     Health Habits and Functional and Cognitive Screenin/4/2024    11:46 AM   Functional & Cognitive Status   Do you have difficulty preparing food and eating? No   Do you have difficulty " bathing yourself, getting dressed or grooming yourself? No   Do you have difficulty using the toilet? No   Do you have difficulty moving around from place to place? No   Do you have trouble with steps or getting out of a bed or a chair? No   Current Diet Well Balanced Diet   Dental Exam Up to date   Eye Exam Not up to date   Exercise (times per week) 0 times per week   Current Exercises Include No Regular Exercise   Do you need help using the phone?  No   Are you deaf or do you have serious difficulty hearing?  No   Do you need help to go to places out of walking distance? No   Do you need help shopping? No   Do you need help preparing meals?  No   Do you need help with housework?  No   Do you need help with laundry? No   Do you need help taking your medications? No   Do you need help managing money? No   Do you ever drive or ride in a car without wearing a seat belt? No   Have you felt unusual stress, anger or loneliness in the last month? No   Who do you live with? Spouse   If you need help, do you have trouble finding someone available to you? No   Have you been bothered in the last four weeks by sexual problems? No   Do you have difficulty concentrating, remembering or making decisions? No           Age-appropriate Screening Schedule:  Refer to the list below for future screening recommendations based on patient's age, sex and/or medical conditions. Orders for these recommended tests are listed in the plan section. The patient has been provided with a written plan.    Health Maintenance List  Health Maintenance   Topic Date Due   • TDAP/TD VACCINES (1 - Tdap) Never done   • ZOSTER VACCINE (1 of 2) Never done   • RSV Vaccine - Adults (1 - 1-dose 60+ series) Never done   • ANNUAL WELLNESS VISIT  08/24/2024   • INFLUENZA VACCINE  08/01/2024   • COLORECTAL CANCER SCREENING  07/09/2025   • LIPID PANEL  09/09/2025   • DXA SCAN  03/20/2026   • COVID-19 Vaccine  Completed   • Pneumococcal Vaccine 65+  Completed                                                                                                                                                 CMS Preventative Services Quick Reference  Risk Factors Identified During Encounter  Fall Risk-High or Moderate: Discussed Fall Prevention in the home    The above risks/problems have been discussed with the patient.  Pertinent information has been shared with the patient in the After Visit Summary.  An After Visit Summary and PPPS were made available to the patient.    Follow Up:   Next Medicare Wellness visit to be scheduled in 1 year.         Additional E&M Note during same encounter follows:  Patient has additional, significant, and separately identifiable condition(s)/problem(s) that require work above and beyond the Medicare Wellness Visit     Chief Complaint  Medicare Wellness-subsequent    Subjective   Moriah is a delightful lady whose had back issues with scoliosis and also thoracic compression fracture for which she had kyphoplasty.  She still has some tiredness upon standing prolonged periods of time.    Otherwise she has some left flank discomfort and a chronic cyst directly to cyst in the left kidney and will try to move up the evaluation with Dr. Packer at [facility] urology.    Otherwise rosuvastatin 5 mg daily appears to be entirely adequate for cholesterol management and she is on levothyroxine supplement which is therapeutic.    Omeprazole is taken every other day for reflux.  Blood pressure is with carvedilol 12.5 twice daily plus losartan 50 mg daily and amlodipine 2.5 mg daily which is effective also.    Marginal decrease in sodium which is chronic stable appears not to be pathologic although the exact etiology is not clear.          Moriah is also being seen today for an annual adult preventative physical exam.  and Moriah is also being seen today for additional medical problem/s.    Review of Systems   Musculoskeletal:  Positive for back pain.   All other systems  reviewed and are negative.           Objective   Vital Signs:  /84 (BP Location: Left arm, Patient Position: Sitting, Cuff Size: Adult)   Pulse 59   Wt 64 kg (141 lb)   SpO2 98%   BMI 22.76 kg/m²   Physical Exam  Vitals reviewed.   Constitutional:       Appearance: She is well-developed.   HENT:      Head: Normocephalic and atraumatic.      Right Ear: Tympanic membrane and external ear normal.      Left Ear: Tympanic membrane and external ear normal.      Nose: Nose normal.   Eyes:      Conjunctiva/sclera: Conjunctivae normal.      Pupils: Pupils are equal, round, and reactive to light.   Neck:      Thyroid: No thyromegaly.      Vascular: No JVD.   Cardiovascular:      Rate and Rhythm: Normal rate and regular rhythm.      Heart sounds: Normal heart sounds.   Pulmonary:      Effort: Pulmonary effort is normal.      Breath sounds: Normal breath sounds.   Abdominal:      General: Bowel sounds are normal.      Palpations: Abdomen is soft.   Musculoskeletal:      Cervical back: Normal range of motion and neck supple.      Lumbar back: Decreased range of motion. Scoliosis present.   Lymphadenopathy:      Cervical: No cervical adenopathy.   Skin:     General: Skin is warm and dry.      Findings: No rash.   Neurological:      Mental Status: She is alert and oriented to person, place, and time.      Cranial Nerves: No cranial nerve deficit.      Coordination: Coordination normal.   Psychiatric:         Behavior: Behavior normal.         Thought Content: Thought content normal.         Judgment: Judgment normal.       The following data was reviewed by: Jarred Carrizales MD on 09/10/2024:        Assessment and Plan               Acute left flank pain  Get opinion [facility] and reviewed x-rays of chronic cysts on the kidneys with 2 cyst in the left kidney  Elevated cholesterol    continue current rosuvastatin 5 mg daily  Elevated HDL  Appears to be genetic  Acquired hypothyroidism  Continue current  dose of levothyroxine  Gastroesophageal reflux disease without esophagitis  Omeprazole is every other day  Compression fracture of thoracic spine, non-traumatic, initial encounter  History of kyphoplasty  Medicare annual wellness visit, subsequent    No orders of the defined types were placed in this encounter.            Follow Up   No follow-ups on file.  Patient was given instructions and counseling regarding her condition or for health maintenance advice. Please see specific information pulled into the AVS if appropriate.

## 2024-09-10 NOTE — ASSESSMENT & PLAN NOTE
Get opinion from [facility] and reviewed x-rays of chronic cysts on the kidneys with 2 cyst in the left kidney

## 2024-10-02 ENCOUNTER — OFFICE VISIT (OUTPATIENT)
Dept: GASTROENTEROLOGY | Facility: CLINIC | Age: 84
End: 2024-10-02
Payer: MEDICARE

## 2024-10-02 VITALS
WEIGHT: 141.9 LBS | DIASTOLIC BLOOD PRESSURE: 82 MMHG | BODY MASS INDEX: 22.8 KG/M2 | SYSTOLIC BLOOD PRESSURE: 162 MMHG | HEIGHT: 66 IN | HEART RATE: 56 BPM

## 2024-10-02 DIAGNOSIS — R10.12 LEFT UPPER QUADRANT ABDOMINAL PAIN: Primary | ICD-10-CM

## 2024-10-02 DIAGNOSIS — R10.9 LEFT FLANK PAIN: ICD-10-CM

## 2024-10-02 RX ORDER — MULTIPLE VITAMINS W/ MINERALS TAB 9MG-400MCG
1 TAB ORAL DAILY
COMMUNITY

## 2024-10-02 RX ORDER — ASPIRIN 81 MG/1
81 TABLET ORAL DAILY
COMMUNITY

## 2024-10-02 RX ORDER — DESONIDE 0.5 MG/G
1 OINTMENT TOPICAL 2 TIMES DAILY
COMMUNITY

## 2024-10-02 NOTE — PROGRESS NOTES
"Chief Complaint  Abdominal Pain, Constipation, and Diarrhea    Subjective          History Of Present Illness:    Moriah Petty is a  84 y.o. female patient of Dr. Collins who was last seen in 2018 for evaluation of abdominal pain and constipation.    Patient reports for the last two weeks she has been experiencing left sided upper abdominal pain. She describes the pain as intermittent. She reports it radiates into her back. It does not appear to be triggered by her bowel movements or food. Patient does endorse constipation and uses Miralax daily. She does alternate between diarrhea and constipation with miralax use. She denies any melena or hematochezia.     She takes omeprazole every other day for heartburn. She denies nausea, vomiting, dysphagia, odynophagia, weight loss, poor appetite.    Patient has a history of left kidney cysts that measure up to 9.9 x 7.7 cm. She has seen [facility] and is planning to have this evalauted via US.    Patient does have a history of diverticultis complicated by an abscess requiring hartmans procedure with colostomy, small bowelresection and indidental appendetomy. She then underwent colostomy closure and primary repair of stomal hernia.     Patient has a family history of colon cancer in her mother.    Additional data reviewed:  Colonoscopy with Dr Ce Coates - widely patent anastomosis, benign polyp, no residual diverticula.   Colonoscopy 2018 -nonthrombosed external hemorrhoids, diverticulosis of the sigmoid and descending colon, tortuous colon, normal TI, nonbleeding internal hemorrhoids.    Objective   Vital Signs:   /82 (BP Location: Right arm, Patient Position: Sitting, Cuff Size: Adult)   Pulse 56   Ht 167.6 cm (66\")   Wt 64.4 kg (141 lb 14.4 oz)   BMI 22.90 kg/m²       Physical Exam  Vitals reviewed.   Constitutional:       General: She is not in acute distress.     Appearance: Normal appearance. She is not ill-appearing.   HENT:      Head: Normocephalic " and atraumatic.      Nose: Nose normal.      Mouth/Throat:      Pharynx: Oropharynx is clear.   Eyes:      Conjunctiva/sclera: Conjunctivae normal.   Pulmonary:      Effort: Pulmonary effort is normal.   Abdominal:      General: Abdomen is flat. Bowel sounds are normal. There is no distension.      Palpations: Abdomen is soft. There is no mass.      Tenderness: There is no abdominal tenderness.   Musculoskeletal:         General: No swelling. Normal range of motion.      Cervical back: Normal range of motion.   Skin:     General: Skin is warm and dry.      Findings: No bruising or rash.   Neurological:      General: No focal deficit present.      Mental Status: She is alert and oriented to person, place, and time.      Motor: No weakness.      Gait: Gait normal.   Psychiatric:         Mood and Affect: Mood normal.          Result Review :   The following data was reviewed by: Samia Pettit PA-C on 10/02/2024:  CMP          2/26/2024    11:58 3/26/2024    11:40 9/9/2024    11:41   CMP   Glucose 108  108  93    BUN 17  15  13    Creatinine 0.85  0.74  0.75    EGFR   78.6    Sodium 133  132  133    Potassium 5.3  4.7  4.4    Chloride 97  95  97    Calcium 9.8  9.8  9.8    Total Protein 7.2      Total Protein   7.3    Albumin 4.7   4.4    Globulin 2.5      Globulin   2.9    Total Bilirubin 0.6   0.7    Alkaline Phosphatase 65   58    AST (SGOT) 22   14    ALT (SGPT) 14   10    Albumin/Globulin Ratio   1.5    BUN/Creatinine Ratio 20  20  17.3    Anion Gap   10.1      CBC          12/20/2023    05:35 2/26/2024    11:58 9/9/2024    11:41   CBC   WBC 8.48  5.7  5.15    RBC 3.90  4.40  4.20    Hemoglobin 12.3  13.6  13.2    Hematocrit 36.7  40.7  39.0    MCV 94.1  93  92.9    MCH 31.5  30.9  31.4    MCHC 33.5  33.4  33.8    RDW 12.8  13.2  12.7    Platelets 184  263  261            Assessment and Plan    Diagnoses and all orders for this visit:    1. Left upper quadrant abdominal pain (Primary)  -     CT Abdomen  Pelvis With Contrast; Future    2. Left flank pain  -     CT Abdomen Pelvis With Contrast; Future       Recommend proceeding with CT abdomen pelvis with oral and IV contrast for further evaluation of left-sided abdominal pain.  Patient does have a history of complicated diverticulitis status post Velazquez's procedure and reversal of colostomy.  Patient additionally has a history of a complex renal cyst which was around 9 cm back in 2022.  She has seen urology for this who feels that this is not likely the cause of her discomfort.  May need to consider endoscopic evaluation if CT imaging is unremarkable and pain persists.     Follow Up   Return in about 8 weeks (around 11/27/2024) for Samia Coronado PA-C.    Dragon dictation used throughout this note.            Samia Coronado PA-C   [facility]  [address] [place] [address]  Office: [phone]

## 2024-10-07 ENCOUNTER — TELEPHONE (OUTPATIENT)
Dept: GASTROENTEROLOGY | Facility: CLINIC | Age: 84
End: 2024-10-07

## 2024-10-07 NOTE — TELEPHONE ENCOUNTER
Called pt and advised pt that she can call scheduling at [phone] option [phone] to arrange her ct scan. Verb understanding.

## 2024-10-07 NOTE — TELEPHONE ENCOUNTER
Caller: Moriah Petty    Relationship to patient: Self    Best call back number: [phone]    Patient is needing: PT IS CALLING REGARDING GETTING A CT SCHEDULED. REFERRAL WAS PLACED 10/2 BUT NOTHING HAS BEEN SCHEDULED YET. PLEASE CALL PT TO ADVISE

## 2024-10-09 NOTE — TELEPHONE ENCOUNTER
Caller: Moriah Petty    Relationship: Self    Best call back number: [phone]     Requested Prescriptions:   Requested Prescriptions     Pending Prescriptions Disp Refills    omeprazole (priLOSEC) 20 MG capsule 90 capsule 3     Sig: Take 1 capsule by mouth Daily.        Pharmacy where request should be sent: [id number] - [place], [place] - [address] AT [facility] - [phone]  - [phone] FX     Last office visit with prescribing clinician: 9/10/2024   Last telemedicine visit with prescribing clinician: Visit date not found   Next office visit with prescribing clinician: 3/11/2025     Additional details provided by patient: PATIENT STATES THAT SHE IS OUT     Does the patient have less than a 3 day supply:  [x] Yes  [] No

## 2024-10-14 ENCOUNTER — HOSPITAL ENCOUNTER (OUTPATIENT)
Dept: CT IMAGING | Facility: HOSPITAL | Age: 84
Discharge: HOME | End: 2024-10-14
Admitting: PHYSICIAN ASSISTANT
Payer: MEDICARE

## 2024-10-14 DIAGNOSIS — R10.9 LEFT FLANK PAIN: ICD-10-CM

## 2024-10-14 DIAGNOSIS — R10.12 LEFT UPPER QUADRANT ABDOMINAL PAIN: ICD-10-CM

## 2024-10-14 PROCEDURE — 25510000001 IOPAMIDOL 61 % SOLUTION: Performed by: PHYSICIAN ASSISTANT

## 2024-10-14 PROCEDURE — 74177 CT ABD & PELVIS W/CONTRAST: CPT

## 2024-10-14 RX ORDER — IOPAMIDOL 612 MG/ML
85 INJECTION, SOLUTION INTRAVASCULAR
Status: COMPLETED | OUTPATIENT
Start: 2024-10-14 | End: 2024-10-14

## 2024-10-14 RX ADMIN — IOPAMIDOL 85 ML: 612 INJECTION, SOLUTION INTRAVENOUS at 15:16

## 2024-10-17 NOTE — PROGRESS NOTES
Please let the patient know that she has a cyst on her left kidney which is known.  It actually appears smaller than previous.  She does have a moderate to large stool burden in the colon which indicates constipation.  This is likely causing some of her abdominal pain.  Would recommend starting MiraLAX 1 capful daily

## 2024-11-11 ENCOUNTER — OFFICE VISIT (OUTPATIENT)
Dept: CARDIOLOGY | Facility: CLINIC | Age: 84
End: 2024-11-11
Payer: MEDICARE

## 2024-11-11 VITALS
HEIGHT: 66 IN | SYSTOLIC BLOOD PRESSURE: 140 MMHG | WEIGHT: 140.2 LBS | HEART RATE: 71 BPM | BODY MASS INDEX: 22.53 KG/M2 | DIASTOLIC BLOOD PRESSURE: 80 MMHG | OXYGEN SATURATION: 98 %

## 2024-11-11 DIAGNOSIS — I10 ESSENTIAL HYPERTENSION: ICD-10-CM

## 2024-11-11 DIAGNOSIS — I10 ESSENTIAL HYPERTENSION: Primary | ICD-10-CM

## 2024-11-11 DIAGNOSIS — I49.3 VENTRICULAR PREMATURE BEATS: ICD-10-CM

## 2024-11-11 PROCEDURE — 99214 OFFICE O/P EST MOD 30 MIN: CPT | Performed by: NURSE PRACTITIONER

## 2024-11-11 PROCEDURE — 3077F SYST BP >= 140 MM HG: CPT | Performed by: NURSE PRACTITIONER

## 2024-11-11 PROCEDURE — 93000 ELECTROCARDIOGRAM COMPLETE: CPT | Performed by: NURSE PRACTITIONER

## 2024-11-11 PROCEDURE — 3079F DIAST BP 80-89 MM HG: CPT | Performed by: NURSE PRACTITIONER

## 2024-11-11 RX ORDER — LOSARTAN POTASSIUM 50 MG/1
50 TABLET ORAL 2 TIMES DAILY
Qty: 180 TABLET | Refills: 3 | Status: SHIPPED | OUTPATIENT
Start: 2024-11-11

## 2024-11-11 RX ORDER — ROSUVASTATIN CALCIUM 5 MG/1
5 TABLET, COATED ORAL DAILY
Qty: 90 TABLET | Refills: 3 | Status: SHIPPED | OUTPATIENT
Start: 2024-11-11

## 2024-11-11 RX ORDER — CARVEDILOL 12.5 MG/1
12.5 TABLET ORAL 2 TIMES DAILY WITH MEALS
Qty: 180 TABLET | Refills: 3 | Status: SHIPPED | OUTPATIENT
Start: 2024-11-11

## 2024-11-11 NOTE — PROGRESS NOTES
Date of Office Visit: 24  Encounter Provider: LYN Gipson  Place of Service: [facility] CARDIOLOGY  Patient Name: Moriah Petty  :1940    Chief Complaint   Patient presents with    Palpitations    Chest Pain    Follow-up   :     HPI: Moriah Petty is a 84 y.o. female  with premature ventricular contraction, obstructive sleep apnea, hypertension, osteoporosis, prediabetes, GERD left ventricular hypertrophy, anemia, and diverticulitis.        She was previously followed by Dr. Anibal Trujillo. She is now followed by Dr. Sampson.  I will visit with her in follow up today and have reviewed her medical record.      She had an echocardiogram in 2014 which showed normal left ventricular systolic function, mild left ventricular hypertrophy, grade 1 diastolic dysfunction, patent foramen ovale was noted, trace to mild tricuspid regurgitation was noted as well.  We were consulted inpatient 2020 for preoperative evaluation.  She was low risk for cardiovascular event for orthopedic surgery.  She had fracture her hip secondary to mechanical fall.  Surgical intervention was not warranted.     She presents today for reassessment.  She has been feeling well and ran out of losartan 4 days ago.  She is waiting for her refill request to be approved.  She had a little chest pressure this morning but states she has been worried about not getting her medicine and not sleeping well.  She has stable palpitations which are related to stress but not happening more frequent than usual.  No shortness of breath or edema or dizziness.  She stays active and ambulates with a walker for long distance.  Cane for long distance.            Allergies   Allergen Reactions    Lisinopril Cough    Morphine And Codeine Itching and Swelling           Family and social history reviewed.     ROS  All other systems were reviewed and are negative          Objective:     Vitals:    24 1118   BP:  "140/80   BP Location: Left arm   Patient Position: Sitting   Cuff Size: Adult   Pulse: 71   SpO2: 98%   Weight: 63.6 kg (140 lb 3.2 oz)   Height: 167.6 cm (66\")     Body mass index is 22.63 kg/m².    PHYSICAL EXAM:  Pulmonary:      Effort: Pulmonary effort is normal.      Breath sounds: Normal breath sounds.   Cardiovascular:      Normal rate. Regular rhythm.           ECG 12 Lead    Date/Time: 11/11/2024 11:29 AM  Performed by: Kandy Trevino APRN    Authorized by: Kandy Trevino APRN  Comparison: compared with previous ECG   Similar to previous ECG  Rhythm: sinus rhythm  Rate: normal  QRS axis: left    Clinical impression: abnormal EKG  Comments: No change compared to prior             Current Outpatient Medications   Medication Sig Dispense Refill    amLODIPine (NORVASC) 2.5 MG tablet TAKE 1 TABLET BY MOUTH EVERY DAY 90 tablet 1    aspirin 81 MG EC tablet Take 1 tablet by mouth Daily.      Calcium Carb-Cholecalciferol (CALCIUM + D3) 600-200 MG-UNIT tablet Take 1 tablet by mouth 2 (Two) Times a Day.      carvedilol (COREG) 12.5 MG tablet Take 1 tablet by mouth 2 (Two) Times a Day With Meals. 180 tablet 3    desonide (DESOWEN) 0.05 % ointment Apply 1 Application topically to the appropriate area as directed 2 (Two) Times a Day.      levothyroxine (SYNTHROID, LEVOTHROID) 25 MCG tablet TAKE 1 TABLET BY MOUTH EVERY DAY 90 tablet 1    losartan (COZAAR) 50 MG tablet Take 1 tablet by mouth 2 (Two) Times a Day. 180 tablet 3    multivitamin with minerals tablet tablet Take 1 tablet by mouth Daily.      omeprazole (priLOSEC) 20 MG capsule Take 1 capsule by mouth Daily. 90 capsule 3    polyethylene glycol (MIRALAX) 17 GM/SCOOP powder Take 17 g by mouth Daily. 289 g 5    rosuvastatin (CRESTOR) 5 MG tablet Take 1 tablet by mouth Daily. 90 tablet 3    triazolam (HALCION) 0.25 MG tablet       zoledronic acid (RECLAST) 5 MG/100ML solution infusion Infuse 100 mL into a venous catheter 1 (One) Time.      amoxicillin-clavulanate " (Augmentin) 500-125 MG per tablet Take 1 tablet by mouth 2 (Two) Times a Day. (Patient not taking: Reported on 11/11/2024) 20 tablet 0     No current facility-administered medications for this visit.     Assessment:       Diagnosis Plan   1. Essential hypertension  ECG 12 Lead    carvedilol (COREG) 12.5 MG tablet      2. Ventricular premature beats  ECG 12 Lead      3. Essential hypertension  ECG 12 Lead    carvedilol (COREG) 12.5 MG tablet    Blood pressure not adequately controlled continue carvedilol 12.5 twice daily continue losartan 50 twice daily add amlodipine 2.5 mg daily.  Stop clonidine           Orders Placed This Encounter   Procedures    ECG 12 Lead     This order was created via procedure documentation     Order Specific Question:   Release to patient     Answer:   Routine Release [[id number]]         Plan:         1.  84-year-old female with abnormal EKG-no new complaint.  EKG is unchanged today  2.hypertension-her blood pressure is right at 140/80 which is not unreasonable given her age.  She will monitor at home and call if she has readings greater than 150/85 too often at home.  3.  Obstructive sleep apnea- she did not tolerate mask in the past. That was over 10 years ago  4.  Hyperlipidemia-continue rosuvastatin.  Her last LDL was 69 in September  5.  GERD  6.  Diverticulitis  7.  Proximal femur fracture secondary to a fall December 2020 surgical intervention was not warranted-she has now recovered well.  8. Hypothyroidism on replacement therapy  9.  Osteoporosis on Prolia  10.Prediabetes  11.  Premature ventricular contractions- rare palpitaitons but stable  Follow-up in 1 year         It has been a pleasure to participate in this patient's care.      Thank you,  LYN Gipson      **I used Dragon to dictate this note:**

## 2024-11-12 RX ORDER — LEVOTHYROXINE SODIUM 25 UG/1
TABLET ORAL
Qty: 90 TABLET | Refills: 1 | Status: SHIPPED | OUTPATIENT
Start: 2024-11-12

## 2024-11-18 ENCOUNTER — TELEPHONE (OUTPATIENT)
Dept: GASTROENTEROLOGY | Facility: CLINIC | Age: 84
End: 2024-11-18
Payer: MEDICARE

## 2024-11-18 NOTE — TELEPHONE ENCOUNTER
Moriah Petty would like to cancel the following appointments:     Samia Cyrjacqueline in [facility] ([phone]), 11/26/2024 10:15 AM     Comments:  Out of town family arriving that day for Thanksgiving.  Need to reschedule after for after Thanksgiving.  Thank you.

## 2025-01-07 ENCOUNTER — TELEPHONE (OUTPATIENT)
Dept: GASTROENTEROLOGY | Facility: CLINIC | Age: 85
End: 2025-01-07
Payer: MEDICARE

## 2025-01-07 NOTE — TELEPHONE ENCOUNTER
Called patient and rescheduled appointment from yesterday due to the office being closed because of the weather conditions.

## 2025-02-03 ENCOUNTER — OFFICE VISIT (OUTPATIENT)
Dept: GASTROENTEROLOGY | Facility: CLINIC | Age: 85
End: 2025-02-03
Payer: MEDICARE

## 2025-02-03 ENCOUNTER — TELEPHONE (OUTPATIENT)
Dept: GASTROENTEROLOGY | Facility: CLINIC | Age: 85
End: 2025-02-03
Payer: MEDICARE

## 2025-02-03 VITALS
TEMPERATURE: 97.8 F | OXYGEN SATURATION: 95 % | DIASTOLIC BLOOD PRESSURE: 75 MMHG | HEART RATE: 61 BPM | SYSTOLIC BLOOD PRESSURE: 127 MMHG | HEIGHT: 66 IN | WEIGHT: 140.1 LBS | BODY MASS INDEX: 22.52 KG/M2

## 2025-02-03 DIAGNOSIS — K58.0 IRRITABLE BOWEL SYNDROME WITH DIARRHEA: Primary | ICD-10-CM

## 2025-02-03 DIAGNOSIS — Z86.0100 PERSONAL HISTORY OF COLON POLYPS, UNSPECIFIED: ICD-10-CM

## 2025-02-03 DIAGNOSIS — Z80.0 FAMILY HISTORY OF COLON CANCER: ICD-10-CM

## 2025-02-03 PROCEDURE — 99213 OFFICE O/P EST LOW 20 MIN: CPT | Performed by: PHYSICIAN ASSISTANT

## 2025-02-03 PROCEDURE — 1160F RVW MEDS BY RX/DR IN RCRD: CPT | Performed by: PHYSICIAN ASSISTANT

## 2025-02-03 PROCEDURE — 3078F DIAST BP <80 MM HG: CPT | Performed by: PHYSICIAN ASSISTANT

## 2025-02-03 PROCEDURE — 3074F SYST BP LT 130 MM HG: CPT | Performed by: PHYSICIAN ASSISTANT

## 2025-02-03 PROCEDURE — 1159F MED LIST DOCD IN RCRD: CPT | Performed by: PHYSICIAN ASSISTANT

## 2025-02-03 RX ORDER — AMLODIPINE BESYLATE 2.5 MG/1
1 TABLET ORAL DAILY
COMMUNITY
Start: 2024-08-29 | End: 2025-02-03

## 2025-02-03 NOTE — TELEPHONE ENCOUNTER
KIMBERLY JONES IN SCHEDULING PT SCHEDULED 0314/2025 ARRIVING AT 1030,CLS PREP INFORMATION HANDED TO THE PT OK FOR HUB TO RELAY

## 2025-02-03 NOTE — PROGRESS NOTES
"Chief Complaint  Abdominal Pain and Follow-up    Subjective          History Of Present Illness:    Moriah Petty is a  84 y.o. female patient of Dr. Collins who presents as a follow-up for abdominal pain.    Patient underwent CT abdomen pelvis in the setting of ongoing abdominal pain on 10/14/2024 with large peripherally calcified cyst in the left kidney measuring about 8.4 x 6.3 cm in its greatest diameter.  She does have a moderate to large stool burden in the colon.    Patient does have a history of diverticultis complicated by an abscess requiring hartmans procedure with colostomy, small bowelresection and incidental appendetomy. She then underwent colostomy closure and primary repair of stomal hernia.     Patient reports her pain has resolved since her last appointment.  We did discuss her CT imaging which was unremarkable with the exception of her known kidney cyst.  She denies any diarrhea, melena, hematochezia.  She does endorse some constipation and she is utilizing MiraLAX.  No abnormal weight loss or poor appetite.    Patient has a family history of colon cancer in her mother.     Additional data reviewed:  Colonoscopy 7/9/2020 with Dr Ce Coates - widely patent anastomosis, benign polyp, no residual diverticula.  Recall 5 years.  Colonoscopy 2018 -nonthrombosed external hemorrhoids, diverticulosis of the sigmoid and descending colon, tortuous colon, normal TI, nonbleeding internal hemorrhoids.    Objective   Vital Signs:   /75 (Patient Position: Sitting, Cuff Size: Adult)   Pulse 61   Temp 97.8 °F (36.6 °C)   Ht 167.6 cm (65.98\")   Wt 63.5 kg (140 lb 1.6 oz)   SpO2 95%   BMI 22.62 kg/m²       Physical Exam  Vitals reviewed.   Constitutional:       General: She is not in acute distress.     Appearance: Normal appearance. She is not ill-appearing.   HENT:      Head: Normocephalic and atraumatic.      Nose: Nose normal.      Mouth/Throat:      Pharynx: Oropharynx is clear.   Eyes:      " Conjunctiva/sclera: Conjunctivae normal.   Pulmonary:      Effort: Pulmonary effort is normal.   Abdominal:      General: There is no distension.      Palpations: Abdomen is soft. There is no mass.      Tenderness: There is no abdominal tenderness.   Musculoskeletal:         General: No swelling. Normal range of motion.      Cervical back: Normal range of motion.   Skin:     General: Skin is warm and dry.      Findings: No bruising or rash.   Neurological:      General: No focal deficit present.      Mental Status: She is alert and oriented to person, place, and time.      Motor: No weakness.      Gait: Gait normal.   Psychiatric:         Mood and Affect: Mood normal.          Result Review :   The following data was reviewed by: Samia Pettit PA-C on 02/03/2025:  CMP          2/26/2024    11:58 3/26/2024    11:40 9/9/2024    11:41   CMP   Glucose 108  108  93    BUN 17  15  13    Creatinine 0.85  0.74  0.75    EGFR   78.6    Sodium 133  132  133    Potassium 5.3  4.7  4.4    Chloride 97  95  97    Calcium 9.8  9.8  9.8    Total Protein 7.2      Total Protein   7.3    Albumin 4.7   4.4    Globulin 2.5      Globulin   2.9    Total Bilirubin 0.6   0.7    Alkaline Phosphatase 65   58    AST (SGOT) 22   14    ALT (SGPT) 14   10    Albumin/Globulin Ratio   1.5    BUN/Creatinine Ratio 20  20  17.3    Anion Gap   10.1      CBC          2/26/2024    11:58 9/9/2024    11:41   CBC   WBC 5.7  5.15    RBC 4.40  4.20    Hemoglobin 13.6  13.2    Hematocrit 40.7  39.0    MCV 93  92.9    MCH 30.9  31.4    MCHC 33.4  33.8    RDW 13.2  12.7    Platelets 263  261            Assessment and Plan    Diagnoses and all orders for this visit:    1. Irritable bowel syndrome with diarrhea (Primary)  -     Case Request; Standing  -     Implement Anesthesia orders day of procedure.; Standing  -     Follow Anesthesia Guidelines / Protocol; Future  -     Verify bowel prep was successful; Standing  -     Give tap water enema if bowel prep  was insufficient; Standing  -     Case Request    2. Personal history of colon polyps, unspecified  -     Case Request; Standing  -     Implement Anesthesia orders day of procedure.; Standing  -     Follow Anesthesia Guidelines / Protocol; Future  -     Verify bowel prep was successful; Standing  -     Give tap water enema if bowel prep was insufficient; Standing  -     Case Request    3. Family history of colon cancer  Overview:  Added automatically from request for surgery [id number]    Orders:  -     Case Request; Standing  -     Implement Anesthesia orders day of procedure.; Standing  -     Follow Anesthesia Guidelines / Protocol; Future  -     Verify bowel prep was successful; Standing  -     Give tap water enema if bowel prep was insufficient; Standing  -     Case Request       Largely suspicious for IBS-C as the cause of her pain as it is now since resolved.  Continue MiraLAX for constipation.  Proceed with screening colonoscopy with Dr. Collins.    Follow Up   Return for Colonoscopy.    Dragon dictation used throughout this note.            Samia Coronado PA-C   [facility]  [address] [place] [address]  Office: [phone]

## 2025-02-21 ENCOUNTER — TELEPHONE (OUTPATIENT)
Dept: GASTROENTEROLOGY | Facility: CLINIC | Age: 85
End: 2025-02-21
Payer: MEDICARE

## 2025-02-21 NOTE — TELEPHONE ENCOUNTER
Hub staff attempted to follow warm transfer process and was unsuccessful     Caller: Moriah Petty    Relationship to patient: Self    Best call back number: [phone]    Patient is needing: PT NEEDS TO R/S THE 03/14 C-SCOPE.  WOULD LIKE TO R/S FOR END OF MONTH IN MARCH

## 2025-02-21 NOTE — TELEPHONE ENCOUNTER
KIMBERLY PLATT IN SCHEDULING PT R/S TO 03/21/2025 ARRIVING AT 1145AM COMMUNICATED STILL HAS CLS INFORMATION AND WILL UPDATE OK FOR HUB TO RELAY

## 2025-03-03 RX ORDER — AMLODIPINE BESYLATE 2.5 MG/1
TABLET ORAL
Qty: 90 TABLET | Refills: 1 | Status: SHIPPED | OUTPATIENT
Start: 2025-03-03

## 2025-03-11 ENCOUNTER — OFFICE VISIT (OUTPATIENT)
Dept: INTERNAL MEDICINE | Facility: CLINIC | Age: 85
End: 2025-03-11
Payer: MEDICARE

## 2025-03-11 VITALS
OXYGEN SATURATION: 96 % | BODY MASS INDEX: 22.61 KG/M2 | SYSTOLIC BLOOD PRESSURE: 142 MMHG | HEART RATE: 65 BPM | DIASTOLIC BLOOD PRESSURE: 86 MMHG | WEIGHT: 140 LBS

## 2025-03-11 DIAGNOSIS — M19.041 PRIMARY OSTEOARTHRITIS OF BOTH HANDS: ICD-10-CM

## 2025-03-11 DIAGNOSIS — E03.9 ACQUIRED HYPOTHYROIDISM: ICD-10-CM

## 2025-03-11 DIAGNOSIS — E78.00 ELEVATED CHOLESTEROL: ICD-10-CM

## 2025-03-11 DIAGNOSIS — I10 ESSENTIAL HYPERTENSION: Primary | ICD-10-CM

## 2025-03-11 DIAGNOSIS — M19.042 PRIMARY OSTEOARTHRITIS OF BOTH HANDS: ICD-10-CM

## 2025-03-11 DIAGNOSIS — Z98.890 HISTORY OF KYPHOPLASTY: ICD-10-CM

## 2025-03-11 DIAGNOSIS — E78.89 ELEVATED HDL: ICD-10-CM

## 2025-03-11 RX ORDER — TRAZODONE HYDROCHLORIDE 50 MG/1
50 TABLET ORAL NIGHTLY
Qty: 30 TABLET | Refills: 5 | Status: SHIPPED | OUTPATIENT
Start: 2025-03-11

## 2025-03-11 NOTE — PROGRESS NOTES
Chief Complaint  Hypertension, Hypothyroidism, Vitamin D Deficiency, and Depression    Subjective        Moriah Petty presents to [facility]  History of Present Illness    Current Outpatient Medications:   ·  amLODIPine (NORVASC) 2.5 MG tablet, TAKE 1 TABLET BY MOUTH EVERY DAY, Disp: 90 tablet, Rfl: 1  ·  aspirin 81 MG EC tablet, Take 1 tablet by mouth Daily., Disp: , Rfl:   ·  Calcium Carb-Cholecalciferol (CALCIUM + D3) 600-200 MG-UNIT tablet, Take 1 tablet by mouth 2 (Two) Times a Day., Disp: , Rfl:   ·  carvedilol (COREG) 12.5 MG tablet, Take 1 tablet by mouth 2 (Two) Times a Day With Meals., Disp: 180 tablet, Rfl: 3  ·  desonide (DESOWEN) 0.05 % ointment, Apply 1 Application topically to the appropriate area as directed 2 (Two) Times a Day., Disp: , Rfl:   ·  levothyroxine (SYNTHROID, LEVOTHROID) 25 MCG tablet, TAKE 1 TABLET BY MOUTH EVERY DAY, Disp: 90 tablet, Rfl: 1  ·  losartan (COZAAR) 50 MG tablet, Take 1 tablet by mouth 2 (Two) Times a Day., Disp: 180 tablet, Rfl: 3  ·  multivitamin with minerals tablet tablet, Take 1 tablet by mouth Daily., Disp: , Rfl:   ·  omeprazole (priLOSEC) 20 MG capsule, Take 1 capsule by mouth Daily., Disp: 90 capsule, Rfl: 3  ·  polyethylene glycol (MIRALAX) 17 GM/SCOOP powder, Take 17 g by mouth Daily., Disp: 289 g, Rfl: 5  ·  rosuvastatin (CRESTOR) 5 MG tablet, Take 1 tablet by mouth Daily., Disp: 90 tablet, Rfl: 3  ·  traZODone (DESYREL) 50 MG tablet, Take 1 tablet by mouth Every Night., Disp: 30 tablet, Rfl: 5   Hypertension  Associated symptoms include neck pain. Pertinent negatives include no chest pain or headaches.   Hypothyroidism  Symptoms include fatigue. Patient reports no diaphoresis or visual change.   DepressionPatient is not experiencing: chest pain and nausea.  Her past medical history is significant for depression.     Symptoms are: recurrent.   Onset was 1 to 5 years.   Symptoms occur: daily.  Symptoms include: joint pain, nasal  congestion, fatigue, neck pain and weakness.   Pertinent negative symptoms include no abdominal pain, no anorexia, no change in stool, no chest pain, no chills, no cough, no diaphoresis, no fever, no headaches, no joint swelling, no myalgias, no nausea, no numbness, no rash, no sore throat, no swollen glands, no dysuria, no vertigo, no visual change and no vomiting.   Other symptom:  Joint pain (arthritis), back pain  Treatment and/or Medications comments include: kyphoplasty for back in 2022     History of Present Illness  The patient is an 84-year-old female who presents for evaluation of hypertension, hypothyroidism, vitamin D deficiency, depression, neck pain, fatigue, joint pain, nasal congestion, and weakness.    She experiences intermittent muscle spasms in her neck, which she attributes to arthritis and a back condition that causes her to bend forward. She has a history of kyphoplasty, performed twice since 2012, and reports persistent pain in the same area. Prolonged standing exacerbates her discomfort, leading her to use an elastic support belt during activities such as cooking, which provides some relief. She also suffers from scoliosis, which prevents her from maintaining an upright posture, and frequent swan-neck movements, which she believes contribute to her neck pain. Despite these issues, she does not take any medication for her arthritis, preferring to minimize her intake due to concerns about potential gastrointestinal side effects. She also reports pain in her elbow when opening jars, suspecting it may be tennis elbow. She expresses interest in trying Voltaren cream for her hand arthritis. Prolonged standing exacerbates her discomfort. She uses an elastic support belt during activities such as cooking, which provides some relief. She has a history of kyphoplasty, performed twice since 2012.    She reports poor sleep quality, often waking up to use the bathroom and struggling to fall back asleep.  "She has an upcoming appointment with her urologist,  in 09/2025. She has never taken Halcion. She is not currently on any medication for depression or anxiety. She reports feeling fatigued and worried, possibly related to her depression.    MEDICATIONS  Current: Vitamin D, Voltaren (as needed)  Past: Cymbalta    Objective   Vital Signs:  /86 (BP Location: Left arm, Patient Position: Sitting, Cuff Size: Adult)   Pulse 65   Wt 63.5 kg (140 lb)   SpO2 96%   BMI 22.61 kg/m²   Estimated body mass index is 22.61 kg/m² as calculated from the following:    Height as of 2/3/25: 167.6 cm (65.98\").    Weight as of this encounter: 63.5 kg (140 lb).    BMI is within normal parameters. No other follow-up for BMI required.      Physical Exam  Vitals reviewed.   Constitutional:       Appearance: She is well-developed.   HENT:      Head: Normocephalic and atraumatic.      Right Ear: Tympanic membrane and external ear normal.      Left Ear: Tympanic membrane and external ear normal.      Nose: Nose normal.   Eyes:      Conjunctiva/sclera: Conjunctivae normal.      Pupils: Pupils are equal, round, and reactive to light.   Neck:      Thyroid: No thyromegaly.      Vascular: No JVD.   Cardiovascular:      Rate and Rhythm: Normal rate and regular rhythm.      Heart sounds: Normal heart sounds.   Pulmonary:      Effort: Pulmonary effort is normal.      Breath sounds: Normal breath sounds.   Abdominal:      General: Bowel sounds are normal.      Palpations: Abdomen is soft.   Musculoskeletal:      Right hand: Tenderness present. Decreased range of motion.      Left hand: Tenderness present. Decreased range of motion.      Cervical back: Normal range of motion and neck supple.      Thoracic back: Decreased range of motion.      Lumbar back: Deformity present. Decreased range of motion.   Lymphadenopathy:      Cervical: No cervical adenopathy.   Skin:     General: Skin is warm and dry.      Findings: No rash.   Neurological:      " Mental Status: She is alert and oriented to person, place, and time.      Cranial Nerves: No cranial nerve deficit.      Coordination: Coordination normal.   Psychiatric:         Behavior: Behavior normal.         Thought Content: Thought content normal.         Judgment: Judgment normal.        Physical Exam  Lungs were auscultated.    Result Review :  The following data was reviewed by: Jarred Carrizales MD on 03/11/2025:  Common labs          3/26/2024    11:40 9/9/2024    11:41   Common Labs   Glucose 108  93    BUN 15  13    Creatinine 0.74  0.75    Sodium 132  133    Potassium 4.7  4.4    Chloride 95  97    Calcium 9.8  9.8    Albumin  4.4    Total Bilirubin  0.7    Alkaline Phosphatase  58    AST (SGOT)  14    ALT (SGPT)  10    WBC  5.15    Hemoglobin  13.2    Hematocrit  39.0    Platelets  261    Total Cholesterol  181    Triglycerides  44    HDL Cholesterol  103    LDL Cholesterol   69    Hemoglobin A1C  6.00      Data reviewed : Radiologic studies dCtr abdomen and pelvis    Results               Assessment and Plan   Diagnoses and all orders for this visit:    1. Essential hypertension (Primary)    2. Elevated cholesterol    3. Elevated HDL    4. Acquired hypothyroidism    5. Kyphoplasty x 2    6. Primary osteoarthritis of both hands    Other orders  -     traZODone (DESYREL) 50 MG tablet; Take 1 tablet by mouth Every Night.  Dispense: 30 tablet; Refill: 5      Assessment & Plan  1. Arthritis.  She reports neck pain and muscle spasms, likely due to arthritis and a previous kyphoplasty. She also experiences pain in her hands, particularly in the forefinger and thumb, which she attributes to arthritis. She was advised to try Voltaren cream for her hand arthritis. If Voltaren is ineffective, a compounded pain cream containing baclofen, ibuprofen, lidocaine, and gabapentin will be considered.    2. Insomnia.  She reports difficulty sleeping, often waking up to use the bathroom and struggling to fall back asleep. A  prescription for trazodone 50 mg, to be taken nightly approximately one hour before bedtime, has been provided. She was informed about the potential side effect of sleepiness. The prescription for 30 tablets will be sent to [facility] at 2200. If additional medication is required, a low dose of Cymbalta may be added.    PROCEDURE  The patient underwent kyphoplasty twice since 2012.         Follow Up   No follow-ups on file.    Patient or patient representative verbalized consent for the use of Ambient Listening during the visit with  Jarred Carrizales MD for chart documentation. 3/11/2025  11:29 EDT    Patient was given instructions and counseling regarding her condition or for health maintenance advice. Please see specific information pulled into the AVS if appropriate.

## 2025-03-12 ENCOUNTER — TELEPHONE (OUTPATIENT)
Dept: GASTROENTEROLOGY | Facility: CLINIC | Age: 85
End: 2025-03-12
Payer: MEDICARE

## 2025-03-12 NOTE — TELEPHONE ENCOUNTER
Hub staff attempted to follow warm transfer process and was unsuccessful     Caller: Moriah Petty    Relationship to patient: Self    Best call back number: [phone]    Patient is needing: CANCEL PROCEDURE, DO NOT R/S.

## 2025-03-12 NOTE — TELEPHONE ENCOUNTER
[facility] PT TO CONFIRM SHE WANTED TO CANCEL OFFERED TO PLACE IN DEPOT SO THAT SHE MAY R/S PT DECLINED VERBALIZES DOES 'NT WISH TO R/S [facility] HECK  IN SCHEDULING PT CANCELLED OK FOR THE HUB TO RELAY

## 2025-05-05 RX ORDER — LEVOTHYROXINE SODIUM 25 UG/1
25 TABLET ORAL DAILY
Qty: 90 TABLET | Refills: 1 | Status: SHIPPED | OUTPATIENT
Start: 2025-05-05

## (undated) DEVICE — Device: Brand: DEFENDO AIR/WATER/SUCTION AND BIOPSY VALVE

## (undated) DEVICE — SPNG LAP 18X18IN LF STRL PK/5

## (undated) DEVICE — ADAPT CLN BIOGUARD AIR/H2O DISP

## (undated) DEVICE — SUT PDS 0 CT1 36IN Z346H

## (undated) DEVICE — TUBING, SUCTION, 1/4" X 10', STRAIGHT: Brand: MEDLINE

## (undated) DEVICE — WOUND RETRACTOR AND PROTECTOR: Brand: ALEXIS WOUND PROTECTOR-RETRACTOR

## (undated) DEVICE — PENCL E/S HNDSWCH ROCKR CB

## (undated) DEVICE — SUT SILK 3/0 TIES 18IN A184H

## (undated) DEVICE — SUT VIC 2/0 TIES 18IN J111T

## (undated) DEVICE — GLV SURG BIOGEL LTX PF 8 1/2

## (undated) DEVICE — PK PROC MAJ 40

## (undated) DEVICE — KT ORCA ORCAPOD DISP STRL

## (undated) DEVICE — ELECTRD BLD EXT EDGE/INSUL 6IN

## (undated) DEVICE — THE TORRENT IRRIGATION SCOPE CONNECTOR IS USED WITH THE TORRENT IRRIGATION TUBING TO PROVIDE IRRIGATION FLUIDS SUCH AS STERILE WATER DURING GASTROINTESTINAL ENDOSCOPIC PROCEDURES WHEN USED IN CONJUNCTION WITH AN IRRIGATION PUMP (OR ELECTROSURGICAL UNIT).: Brand: TORRENT

## (undated) DEVICE — SUT SILK 2/0 TIES 18IN A185H

## (undated) DEVICE — APPL CHLORAPREP W/TINT 26ML ORNG

## (undated) DEVICE — DRSNG WND BORDR/ADHS NONADHR/GZ LF 4X14IN STRL

## (undated) DEVICE — BARRIER, ABSORBABLE, ADHESION: Brand: SEPRAFILM® PROCEDURE PACK

## (undated) DEVICE — TOTAL TRAY, 16FR 10ML SIL FOLEY, URN: Brand: MEDLINE

## (undated) DEVICE — DEV OPN LIGASURE CRV 180D 36MM 13.5CM  1P/U

## (undated) DEVICE — SUT SILK 3/0 SH CR5 18IN C0135

## (undated) DEVICE — CANN NASL CO2 TRULINK W/O2 A/

## (undated) DEVICE — LEGGINGS, PAIR, CLEAR, STERILE: Brand: MEDLINE

## (undated) DEVICE — SINGLE-USE BIOPSY FORCEPS: Brand: RADIAL JAW 4

## (undated) DEVICE — POOLE SUCTION HANDLE: Brand: CARDINAL HEALTH

## (undated) DEVICE — JELLY,LUBE,STERILE,FLIP TOP,TUBE,4-OZ: Brand: MEDLINE

## (undated) DEVICE — CANN O2 ETCO2 FITS ALL CONN CO2 SMPL A/ 7IN DISP LF

## (undated) DEVICE — ANTIBACTERIAL UNDYED BRAIDED (POLYGLACTIN 910), SYNTHETIC ABSORBABLE SUTURE: Brand: COATED VICRYL

## (undated) DEVICE — SUT SILK 0 TIES 18IN SA66G

## (undated) DEVICE — SENSR O2 OXIMAX FNGR A/ 18IN NONSTR

## (undated) DEVICE — SUT VIC 3/0 CTI 36IN J944H

## (undated) DEVICE — MEDI-VAC YANKAUER SUCTION HANDLE W/BULBOUS TIP: Brand: CARDINAL HEALTH

## (undated) DEVICE — IRRIGATOR BULB ASEPTO 60CC STRL

## (undated) DEVICE — BANDAGE,GAUZE,BULKEE II,4.5"X4.1YD,STRL: Brand: MEDLINE

## (undated) DEVICE — COVER,MAYO STAND,STERILE: Brand: MEDLINE

## (undated) DEVICE — TOWEL,OR,DSP,ST,BLUE,STD,4/PK,20PK/CS: Brand: MEDLINE